# Patient Record
Sex: MALE | Race: WHITE | NOT HISPANIC OR LATINO | Employment: OTHER | URBAN - NONMETROPOLITAN AREA
[De-identification: names, ages, dates, MRNs, and addresses within clinical notes are randomized per-mention and may not be internally consistent; named-entity substitution may affect disease eponyms.]

---

## 2017-03-20 ENCOUNTER — OFFICE VISIT (OUTPATIENT)
Dept: FAMILY MEDICINE CLINIC | Facility: CLINIC | Age: 77
End: 2017-03-20

## 2017-03-20 VITALS
SYSTOLIC BLOOD PRESSURE: 134 MMHG | TEMPERATURE: 98 F | BODY MASS INDEX: 25.33 KG/M2 | WEIGHT: 187 LBS | HEART RATE: 54 BPM | DIASTOLIC BLOOD PRESSURE: 72 MMHG | OXYGEN SATURATION: 98 % | RESPIRATION RATE: 16 BRPM | HEIGHT: 72 IN

## 2017-03-20 DIAGNOSIS — F41.9 ANXIETY: Primary | ICD-10-CM

## 2017-03-20 DIAGNOSIS — R00.1 BRADYCARDIA: ICD-10-CM

## 2017-03-20 PROCEDURE — 99213 OFFICE O/P EST LOW 20 MIN: CPT | Performed by: FAMILY MEDICINE

## 2017-03-20 RX ORDER — CLONAZEPAM 0.5 MG/1
0.5 TABLET ORAL DAILY PRN
Qty: 30 TABLET | Refills: 0 | Status: SHIPPED | OUTPATIENT
Start: 2017-03-20 | End: 2017-06-05

## 2017-03-20 RX ORDER — TEMAZEPAM 15 MG/1
CAPSULE ORAL AS NEEDED
COMMUNITY
Start: 2017-01-27 | End: 2017-06-05

## 2017-03-20 NOTE — PROGRESS NOTES
Chief Complaint   Patient presents with   • Follow-up     Patient said no problems today.        History:  Cabrera Avina is a 76 y.o. male presents who today for evaluation of the above problems.  PCP currently listed as Arnaldo Arnold MD.   No issues.  Here for f/u.  He is due for medication refills. The patient is using his Rx.  Bp is stable, HR is low.  He has appt with cardiology in April Dr. Colin.  The patient is on diltiazem.  Normally follows with Dr. Colin, we will wait until that appt and f/u with results.  The patient has no chest pain, no SOA, no vision changes.  He was given #16 klonopin and he has not had any rx since December. He uses 1 about every 7 days. He has 16 left.  He is reluctant to use them as he does not want to run out.  Last dose yesterday.  Company at home yesterday, young children, active and he wants to have the Rx.      Cabrera Avina  has a past medical history of Anemia; Anxiety; Atrial fibrillation; Cancer; Cholecystitis; Colon polyp; Constipation; GERD (gastroesophageal reflux disease); History of ERCP (2005); Nephrolithiasis; and Jaylyn-rectal abscess.    No Known Allergies  Past Medical History   Diagnosis Date   • Anemia    • Anxiety      chronic   • Atrial fibrillation    • Cancer    • Cholecystitis    • Colon polyp    • Constipation    • GERD (gastroesophageal reflux disease)    • History of ERCP 2005   • Nephrolithiasis      stones removed   • Jaylyn-rectal abscess      Past Surgical History   Procedure Laterality Date   • Total hip arthroplasty     • Colonoscopy  05/2012     3 yr recall   • Kidney stone surgery     • Cholecystectomy     • Rectal surgery       Family History   Problem Relation Age of Onset   • Colon cancer Mother    • No Known Problems Father    • Prostate cancer Brother    • No Known Problems Daughter    • No Known Problems Son    • No Known Problems Maternal Grandmother    • No Known Problems Maternal Grandfather    • No Known Problems Paternal  Grandmother    • No Known Problems Paternal Grandfather    • Prostate cancer Brother          Current Outpatient Prescriptions:   •  aspirin 81 MG EC tablet, Take 81 mg by mouth daily., Disp: , Rfl:   •  clonazePAM (KLONOPIN) 0.5 MG tablet, Take 1 tablet by mouth Daily As Needed for anxiety., Disp: 16 tablet, Rfl: 0  •  DIGOXIN PO, Take 250 mcg by mouth Daily., Disp: , Rfl:   •  diltiazem CD (DILTIAZEM CD) 240 MG 24 hr capsule, Take 240 mg by mouth daily., Disp: , Rfl:   •  escitalopram (LEXAPRO) 20 MG tablet, Daily., Disp: , Rfl:   •  finasteride (PROSCAR) 5 MG tablet, Take 5 mg by mouth daily., Disp: , Rfl:   •  Fludrocortisone Acetate (FLORINEF PO), Take 0.1 mg by mouth daily., Disp: , Rfl:   •  omeprazole (PriLOSEC) 20 MG capsule, Take 1 capsule by mouth daily., Disp: 30 capsule, Rfl: 11  •  oxyCODONE-acetaminophen (PERCOCET)  MG per tablet, Take 1 tablet by mouth every 6 (six) hours as needed for moderate pain (4-6)., Disp: , Rfl:   •  temazepam (RESTORIL) 15 MG capsule, As Needed., Disp: , Rfl:   •  tiZANidine (ZANAFLEX) 4 MG tablet, Take 1 tablet by mouth Every 8 (Eight) Hours As Needed for muscle spasms., Disp: 60 tablet, Rfl: 3  •  warfarin (COUMADIN) 2 MG tablet, Take 2 mg by mouth daily., Disp: , Rfl:     ROS:  Review of Systems   Constitutional: Negative for activity change, appetite change and chills.   HENT: Negative for congestion, dental problem and drooling.    Eyes: Negative for pain, discharge and itching.   Respiratory: Negative for apnea, choking and chest tightness.    Cardiovascular: Negative for chest pain, palpitations and leg swelling.   Gastrointestinal: Negative for abdominal distention, abdominal pain and anal bleeding.   Endocrine: Negative for cold intolerance, heat intolerance and polydipsia.   Genitourinary: Negative for difficulty urinating, dysuria and enuresis.   Musculoskeletal: Negative for arthralgias, back pain and gait problem.   Skin: Negative for color change, pallor  "and rash.   Neurological: Negative for dizziness, facial asymmetry and headaches.   Hematological: Negative for adenopathy. Does not bruise/bleed easily.   Psychiatric/Behavioral: Negative for agitation, behavioral problems and confusion.        Anxiety       OBJECTIVE:  /72 (BP Location: Left arm, Patient Position: Sitting, Cuff Size: Adult)  Pulse 54  Temp 98 °F (36.7 °C) (Oral)   Resp 16  Ht 72\" (182.9 cm)  Wt 187 lb (84.8 kg)  SpO2 98%  BMI 25.36 kg/m2   Physical Exam   Constitutional: He is oriented to person, place, and time. He appears well-developed and well-nourished.   HENT:   Head: Normocephalic and atraumatic.   Right Ear: External ear normal.   Left Ear: External ear normal.   Nose: Nose normal.   Mouth/Throat: Oropharynx is clear and moist.   Eyes: Conjunctivae and EOM are normal. Pupils are equal, round, and reactive to light.   Neck: Normal range of motion. Neck supple. No thyromegaly present.   Cardiovascular: Normal rate, regular rhythm, normal heart sounds and intact distal pulses.    No murmur heard.  Pulmonary/Chest: Effort normal and breath sounds normal. No respiratory distress. He has no wheezes. He exhibits no tenderness.   Abdominal: Soft. Bowel sounds are normal. There is no tenderness. There is no rebound and no guarding.   Lymphadenopathy:     He has no cervical adenopathy.   Neurological: He is alert and oriented to person, place, and time. No cranial nerve deficit. Coordination normal.   Skin: Skin is warm and dry. No rash noted.   No obvious ecchymoses or signs of bleeding.  Chronic anticoagulation   Psychiatric: His speech is normal and behavior is normal. Judgment and thought content normal. His mood appears anxious. He is not actively hallucinating. Cognition and memory are normal. He is attentive.   Nursing note and vitals reviewed.      Assessment/Plan:  Anxiety: Chronic and stable problem. Using medications as prescribed.  Discussed need to take regularly as " prescribed, to avoid missing doses as able.  Medications reviewed with patient today. We discussed cessation/continuation of medications for current problem.  Needed Rx refills will be provided.  No side effects from medications.  Risks/benefits to current therapy discussed. Reviewed requirements of House Bill 1 today with patient.  History discussed, discussed need to complete physical exam and action plan roughly minimum q 3 months to maintain ability to receive controlled substances. Discussed personal and family history of substance abuse.  Informed consent for controlled substances will be reviewed and signed at initation of treatment, as Rx changes and annually. The potential adictive nature of medications discussed.  Simba requested today and will be checked with each office visit, where controlled Rx are written.  Discussed random drug screens/pill counts.  Appropriate methods for medication destruction were discussed to include police department.  Discussed to not zulema, keep, maintain or share medications.  It is illegal to carry these in unmarked bottle. Leave in original bottle.  We discussed options for treatment to include medications or to not use medications. Alternative therapies discussed.  The patient understands the risks associated with this controlled medication, including tolerance and addiction. Patient also agrees to only obtain this medication from me, and not from a another provider, unless that provider is covering for me in my absence. Patient also agrees to be compliant in dosing, and not self adjust the dose of medication.  The patient has signed a consent for treatment with a controlled substance as per [facility] policy. SIMBA is obtained.  Plan of care reviewed.     · Med refills as listed below.     Bradycardia: Etiology unknown.  No recent EKG.  Declind EKG today.  He noted he has a heart doctor for a reason.      Orders Placed Today:  Cabrera was seen today for  follow-up.    Diagnoses and all orders for this visit:    Anxiety  Comments:  Offered EKG today.  Declined EKG. Follow-up with Cardiology in 1 month.  He is on diltiazem.  Coumadin followed by oncology.   Orders:  -     clonazePAM (KLONOPIN) 0.5 MG tablet; Take 1 tablet by mouth Daily As Needed for Anxiety.    Bradycardia        Risks/benefits of current and new medications discussed with the patient and or family today.  The patient/family are aware and accept that if there any side effects they should call or return to clinic as soon as possible.  Appropriate F/U discussed for topics addressed today. All questions were answered to the satisfactory state of patient/family.  Should symptoms fail to improve or worsen they agree to call or return to clinic or to go to the ER. Education handouts were offered on any new Rx if requested.  Discussed the importance of following up with any needed screening tests/labs/specialist appointments and any requested follow-up recommended by me today.  Importance of maintaining follow-up discussed and patient accepts that missed appointments can delay diagnosis and potentially lead to worsening of conditions.    An After Visit Summary was printed and given to the patient at discharge.  Return in about 3 months (around 6/20/2017).         Didier Jung M.D. 3/20/2017

## 2017-03-22 ENCOUNTER — ANTICOAGULATION VISIT (OUTPATIENT)
Dept: FAMILY MEDICINE CLINIC | Facility: CLINIC | Age: 77
End: 2017-03-22

## 2017-03-22 DIAGNOSIS — Z79.01 CHRONIC ANTICOAGULATION: Primary | ICD-10-CM

## 2017-03-22 LAB — INR PPP: 2.3 (ref 0.9–1.1)

## 2017-03-22 PROCEDURE — 99211 OFF/OP EST MAY X REQ PHY/QHP: CPT | Performed by: FAMILY MEDICINE

## 2017-03-22 PROCEDURE — 85610 PROTHROMBIN TIME: CPT | Performed by: FAMILY MEDICINE

## 2017-03-29 NOTE — PROGRESS NOTES
Patient decided to use our office for chronic anticoagulation management.  This is a new evaluation, taken from Dr. Richards who has managed this problem.  INR today 1.3 and normal.  Personally reviewed the INR, addressed patient, some superficial bruising.  Benign appearance.  Plan to continue current coumadin dosing with repeat in 3 weeks.  Goal 2-3.0.  Discussed dietary changes, discussed bleeding, discussed if any changes he needed to let our office know.  Didier Jung MD DOS 3/22/17.  Date of note completion 3/29/2017

## 2017-04-12 ENCOUNTER — ANTICOAGULATION VISIT (OUTPATIENT)
Dept: FAMILY MEDICINE CLINIC | Facility: CLINIC | Age: 77
End: 2017-04-12

## 2017-04-12 LAB — INR PPP: 2.1 (ref 0.9–1.1)

## 2017-04-12 PROCEDURE — 85610 PROTHROMBIN TIME: CPT | Performed by: FAMILY MEDICINE

## 2017-04-25 RX ORDER — DIGOXIN 250 MCG
TABLET ORAL
Qty: 90 TABLET | Refills: 0 | Status: SHIPPED | OUTPATIENT
Start: 2017-04-25 | End: 2017-06-20 | Stop reason: SDUPTHER

## 2017-05-04 ENCOUNTER — ANTICOAGULATION VISIT (OUTPATIENT)
Dept: FAMILY MEDICINE CLINIC | Facility: CLINIC | Age: 77
End: 2017-05-04

## 2017-05-04 LAB — INR PPP: 2.2 (ref 0.9–1.1)

## 2017-05-04 PROCEDURE — 85610 PROTHROMBIN TIME: CPT | Performed by: FAMILY MEDICINE

## 2017-06-05 ENCOUNTER — OFFICE VISIT (OUTPATIENT)
Dept: GASTROENTEROLOGY | Facility: CLINIC | Age: 77
End: 2017-06-05

## 2017-06-05 VITALS
HEIGHT: 72 IN | SYSTOLIC BLOOD PRESSURE: 132 MMHG | OXYGEN SATURATION: 98 % | DIASTOLIC BLOOD PRESSURE: 74 MMHG | WEIGHT: 188 LBS | TEMPERATURE: 98 F | BODY MASS INDEX: 25.47 KG/M2 | HEART RATE: 49 BPM

## 2017-06-05 DIAGNOSIS — K59.01 SLOW TRANSIT CONSTIPATION: Primary | ICD-10-CM

## 2017-06-05 DIAGNOSIS — K21.9 GASTROESOPHAGEAL REFLUX DISEASE WITHOUT ESOPHAGITIS: ICD-10-CM

## 2017-06-05 PROCEDURE — 99212 OFFICE O/P EST SF 10 MIN: CPT | Performed by: INTERNAL MEDICINE

## 2017-06-05 RX ORDER — OMEPRAZOLE 20 MG/1
20 CAPSULE, DELAYED RELEASE ORAL DAILY
Qty: 30 CAPSULE | Refills: 11 | Status: SHIPPED | OUTPATIENT
Start: 2017-06-05 | End: 2017-06-20

## 2017-06-05 NOTE — PROGRESS NOTES
[facility]    Chief Complaint   Patient presents with   • Follow-up     yearly       Subjective     HPI    Cabrera Avina is a 77 y.o. male who presents with a chief complaint of  Constipation.  He tells me is doing okay.  He still will go up to 5 days without a bowel movement.  He is not using MiraLAX as previously prescribed.  He has been taken Dulcolax.  He will use it starting day 3 without a bowel movement.  It usually works by day for 5.  Some days he will go every other day.  He denies seeing melena bright red blood per rectum.  He has no abdominal discomfort.  He is up-to-date on his colonoscopy.    In regards to his reflux he states that is relatively in control.  He states he may have some breakthrough if he does not take the medicine after 3 days but is been a while since he missed the dose.       Past Medical History:   Diagnosis Date   • Anemia    • Anxiety     chronic   • Atrial fibrillation    • Cancer     non-hodgkins lymphoma   • Cholecystitis    • Colon polyp    • Colon polyp    • Constipation    • GERD (gastroesophageal reflux disease)    • History of ERCP 2005   • Nephrolithiasis     stones removed   • Jaylyn-rectal abscess    • Rectal abscess        Past Surgical History:   Procedure Laterality Date   • CHOLECYSTECTOMY     • COLONOSCOPY  05/2012    3 yr recall   • COLONOSCOPY  09/18/2015    5 yr recall   • KIDNEY STONE SURGERY     • RECTAL SURGERY     • TOTAL HIP ARTHROPLASTY           Current Outpatient Prescriptions:   •  aspirin 81 MG EC tablet, Take 81 mg by mouth daily., Disp: , Rfl:   •  digoxin (LANOXIN) 250 MCG tablet, TAKE ONE TABLET BY MOUTH DAILY, Disp: 90 tablet, Rfl: 0  •  diltiazem CD (DILTIAZEM CD) 240 MG 24 hr capsule, Take 240 mg by mouth daily., Disp: , Rfl:   •  escitalopram (LEXAPRO) 20 MG tablet, Daily., Disp: , Rfl:   •  Fludrocortisone Acetate (FLORINEF PO), Take 0.1 mg by mouth daily., Disp: , Rfl:   •  omeprazole (priLOSEC) 20 MG capsule, Take 1  capsule by mouth Daily., Disp: 30 capsule, Rfl: 11  •  warfarin (COUMADIN) 2 MG tablet, Take 2 mg by mouth daily., Disp: , Rfl:     No Known Allergies    Social History     Social History   • Marital status:      Spouse name: N/A   • Number of children: N/A   • Years of education: N/A     Occupational History   • Not on file.     Social History Main Topics   • Smoking status: Never Smoker   • Smokeless tobacco: Never Used   • Alcohol use No   • Drug use: No   • Sexual activity: Not on file     Other Topics Concern   • Not on file     Social History Narrative       Family History   Problem Relation Age of Onset   • Colon cancer Mother    • No Known Problems Father    • Prostate cancer Brother    • No Known Problems Daughter    • No Known Problems Son    • No Known Problems Maternal Grandmother    • No Known Problems Maternal Grandfather    • No Known Problems Paternal Grandmother    • No Known Problems Paternal Grandfather    • Prostate cancer Brother        Review of Systems  General no fever chills or sweats weight stable  Gastrointestinal: Not present-abdominal pain, diarrhea, dysphagia, hematemesis, melena, odynophagia, nausea, vomiting, pyrosis, regurgitation, hematochezia,    Objective     Vitals:    06/05/17 1256   BP: 132/74   Pulse: (!) 49   Temp: 98 °F (36.7 °C)   SpO2: 98%       Physical Exam  No acute distress. Vital signs as documented. Skin warm and dry and without overt rashes. EOMI, sclera anicteric.  Neck without JVD or masses. Lungs clear to auscultation bilaterally, no rales. Heart exam notable for regular rhythm, normal sounds and absence of loud murmurs, rubs or gallops. Abdomen is soft, nontender, non distended, normal bowel sounds and without evidence of organomegaly, masses, or abdominal aortic enlargement. Extremities nonedematous, no cyanosis. Neuro alert, moves extremities.    Assessment/Plan      Cabrera was seen today for follow-up.    Diagnoses and all orders for this  visit:    Slow transit constipation    Gastroesophageal reflux disease without esophagitis    Other orders  -     omeprazole (priLOSEC) 20 MG capsule; Take 1 capsule by mouth Daily.      His reflux and his constipation are stable.  In regards to his constipation reinforce high-fiber diet and plenty of fluids and activity.  He will continue with his current regimen.  In regards to his reflux and reinforced reflux cautions. I advised calcium with vit. D supplementation daily and why.  I discussed possible assoc. of renal disease and dementia with PPI use, although so far there has been no definitive evidence of causation.  An ideal goal, would be to stop PPI and other acid reducing medication use as soon as medically reasonable and use non-medical treatments such as healthy life style modifications to control symptoms and disease.  Although that goal is not obtainable for all, life style changes should always be tried to see if that goal can be obtained.  He is going tried a weaned down on his omeprazole to see how much he needs or not need.    I will I will see him back in a year for a checkup sooner if needed.  Continue ongoing management by primary care provider and other specialists.     EMR Dragon/transcription disclaimer:  Much of this encounter note is electronic transcription/translation of spoken language to printed text.  The electronic translation of spoken language may be erroneous, or at times, nonsensical words or phrases may be inadvertently transcribed.  Although I have reviewed the note for such errors, some may still exist.    Ander Miguel MD  5:06 PM  06/05/17

## 2017-06-07 ENCOUNTER — OFFICE VISIT (OUTPATIENT)
Dept: CARDIOLOGY | Facility: CLINIC | Age: 77
End: 2017-06-07

## 2017-06-07 VITALS
HEIGHT: 72 IN | HEART RATE: 58 BPM | SYSTOLIC BLOOD PRESSURE: 136 MMHG | WEIGHT: 188 LBS | DIASTOLIC BLOOD PRESSURE: 78 MMHG | BODY MASS INDEX: 25.47 KG/M2

## 2017-06-07 DIAGNOSIS — I48.20 CHRONIC ATRIAL FIBRILLATION (HCC): Primary | ICD-10-CM

## 2017-06-07 DIAGNOSIS — R00.1 BRADYCARDIA: ICD-10-CM

## 2017-06-07 PROBLEM — I48.91 ATRIAL FIBRILLATION: Status: ACTIVE | Noted: 2017-06-07

## 2017-06-07 PROCEDURE — 93000 ELECTROCARDIOGRAM COMPLETE: CPT | Performed by: INTERNAL MEDICINE

## 2017-06-07 PROCEDURE — 99213 OFFICE O/P EST LOW 20 MIN: CPT | Performed by: INTERNAL MEDICINE

## 2017-06-07 RX ORDER — FINASTERIDE 5 MG/1
5 TABLET, FILM COATED ORAL DAILY
COMMUNITY
End: 2017-06-20

## 2017-06-07 NOTE — PROGRESS NOTES
Subjective:     Encounter Date:06/07/2017      Patient ID: Cabrera Avina is a 77 y.o. male.With paroxysmal atrial fibrillation and presents today for follow-up.    Chief Complaint: Routine follow-up    Atrial Fibrillation   Presents for follow-up visit. Symptoms include bradycardia (chronic and asymptomatic). Symptoms are negative for chest pain, dizziness, hemodynamic instability, hypertension, hypotension, palpitations, shortness of breath, syncope, tachycardia and weakness. The symptoms have been stable. Past medical history includes atrial fibrillation. There are no medication compliance problems.     The patient presents today for routine follow-up.  We follow him here for a history of atrial fibrillation.  He has had no significant palpitations or prolonged episodes of tachycardia since his last office visit.  He has remained anticoagulated with Coumadin and has tolerated this well without any significant bleeding difficulties.  He denies any chest pain, shortness of breath, dyspnea on exertion, orthopnea, PND, edema, lightheaded this, dizziness, syncope.  He has not had any trouble with his medications.  Otherwise, the patient is doing well at this time without any further complaints.      Current Outpatient Prescriptions:   •  aspirin 81 MG EC tablet, Take 81 mg by mouth daily., Disp: , Rfl:   •  digoxin (LANOXIN) 250 MCG tablet, TAKE ONE TABLET BY MOUTH DAILY, Disp: 90 tablet, Rfl: 0  •  diltiazem CD (DILTIAZEM CD) 240 MG 24 hr capsule, Take 240 mg by mouth daily., Disp: , Rfl:   •  escitalopram (LEXAPRO) 20 MG tablet, Take 10 mg by mouth Daily., Disp: , Rfl:   •  finasteride (PROSCAR) 5 MG tablet, Take 5 mg by mouth Daily., Disp: , Rfl:   •  Fludrocortisone Acetate (FLORINEF PO), Take 0.1 mg by mouth daily., Disp: , Rfl:   •  omeprazole (priLOSEC) 20 MG capsule, Take 1 capsule by mouth Daily. (Patient taking differently: Take 40 mg by mouth Daily.), Disp: 30 capsule, Rfl: 11  •  warfarin (COUMADIN) 2  MG tablet, Take 2 mg by mouth Daily. 7 mg every other day and 8 mg every other, Disp: , Rfl:     No Known Allergies    Social History   Substance Use Topics   • Smoking status: Never Smoker   • Smokeless tobacco: Never Used   • Alcohol use No     Review of Systems   Constitution: Negative for chills, fever, weakness, night sweats and weight loss.   HENT: Negative for congestion, headaches and sore throat.    Cardiovascular: Negative for chest pain, claudication, dyspnea on exertion, irregular heartbeat, leg swelling, orthopnea, palpitations, paroxysmal nocturnal dyspnea and syncope.   Respiratory: Negative for cough, hemoptysis, shortness of breath and wheezing.    Endocrine: Negative for cold intolerance, heat intolerance, polydipsia and polyuria.   Hematologic/Lymphatic: Negative for bleeding problem. Does not bruise/bleed easily.   Gastrointestinal: Negative for abdominal pain, hematemesis, hematochezia, melena, nausea and vomiting.   Genitourinary: Negative for bladder incontinence, dysuria and hematuria.   Neurological: Negative for dizziness, loss of balance, numbness, paresthesias and seizures.       ECG 12 Lead  Date/Time: 6/7/2017 12:07 PM  Performed by: ARMAND LARA  Authorized by: ARMAND LARA   Comparison: compared with previous ECG from 7/12/2016  Rhythm: atrial fibrillation  Rate: bradycardic  BPM: 58  QRS axis: normal  Clinical impression: abnormal ECG  Comments: NSTT             Objective:     Physical Exam   Constitutional: He is oriented to person, place, and time. He appears well-developed and well-nourished. No distress.   HENT:   Head: Normocephalic and atraumatic.   Mouth/Throat: Oropharynx is clear and moist.   Eyes: EOM are normal. Pupils are equal, round, and reactive to light.   Neck: Normal range of motion. Neck supple. No JVD present. No thyromegaly present.   Cardiovascular: S1 normal, S2 normal, normal heart sounds and intact distal pulses.  An irregularly irregular  "rhythm present. Bradycardia present.  Exam reveals no gallop and no friction rub.    No murmur heard.  Pulmonary/Chest: Effort normal and breath sounds normal.   Abdominal: Soft. Bowel sounds are normal. He exhibits no distension. There is no tenderness.   Musculoskeletal: Normal range of motion. He exhibits no edema.   Neurological: He is alert and oriented to person, place, and time. No cranial nerve deficit.   Skin: Skin is warm and dry. No rash noted. No cyanosis or erythema. Nails show no clubbing.   Psychiatric: He has a normal mood and affect.   Nursing note and vitals reviewed.    /78 (BP Location: Left arm, Patient Position: Sitting)  Pulse 58  Ht 72\" (182.9 cm)  Wt 188 lb (85.3 kg)  BMI 25.5 kg/m2        Assessment:          Diagnosis Plan   1. Chronic atrial fibrillation  ECG 12 Lead   2. Bradycardia            Plan:       1.  Chronic atrial fibrillation: The patient remains clinically stable at this time.  He remains in atrial fibrillation with stable heart rates.  He has had no new symptoms develop since his last office visit.  He remains anticoagulated and tolerating this well.  Continue current medical therapy.    2.  Bradycardia: This is been intermittent for the patient over the years.  He is asymptomatic at this time.  No change in therapy is thought indicated at this time.    Follow-up: 12 months was needed sooner    EMR Dragon/Transcription disclaimer: Much of this encounter note is an electronic transcription/translation of spoken language to printed text. The electronic translation of spoken language may permit erroneous, or at times, nonsensical words or phrases to be inadvertently transcribed; although I have reviewed the note for such errors, some may still exist.            "

## 2017-06-12 ENCOUNTER — OFFICE VISIT (OUTPATIENT)
Dept: UROLOGY | Facility: CLINIC | Age: 77
End: 2017-06-12

## 2017-06-12 VITALS
TEMPERATURE: 97.9 F | SYSTOLIC BLOOD PRESSURE: 102 MMHG | DIASTOLIC BLOOD PRESSURE: 64 MMHG | BODY MASS INDEX: 25.73 KG/M2 | HEIGHT: 72 IN | WEIGHT: 190 LBS

## 2017-06-12 DIAGNOSIS — N28.1 RENAL CYSTS, ACQUIRED, BILATERAL: ICD-10-CM

## 2017-06-12 DIAGNOSIS — N13.8 BPH WITH URINARY OBSTRUCTION: Primary | ICD-10-CM

## 2017-06-12 DIAGNOSIS — N20.0 RENAL CALCULUS: ICD-10-CM

## 2017-06-12 DIAGNOSIS — N40.1 BPH WITH URINARY OBSTRUCTION: Primary | ICD-10-CM

## 2017-06-12 LAB
BILIRUB BLD-MCNC: NEGATIVE MG/DL
CLARITY, POC: CLEAR
COLOR UR: YELLOW
GLUCOSE UR STRIP-MCNC: NEGATIVE MG/DL
KETONES UR QL: NEGATIVE
LEUKOCYTE EST, POC: NEGATIVE
NITRITE UR-MCNC: NEGATIVE MG/ML
PH UR: 6.5 [PH] (ref 5–8)
PROT UR STRIP-MCNC: NEGATIVE MG/DL
RBC # UR STRIP: ABNORMAL /UL
SP GR UR: 1.02 (ref 1–1.03)
UROBILINOGEN UR QL: NORMAL

## 2017-06-12 PROCEDURE — 81003 URINALYSIS AUTO W/O SCOPE: CPT | Performed by: UROLOGY

## 2017-06-12 PROCEDURE — 99204 OFFICE O/P NEW MOD 45 MIN: CPT | Performed by: UROLOGY

## 2017-06-12 NOTE — PROGRESS NOTES
Mr. Avina is 77 y.o. male    CHIEF COMPLAINT: I am here for BPH    HPI  Benign Prostatic hyperplasia  Patient was initially diagnosed with benign prostatic hyperplasia [id number] years ago. This was identified in the context of worsening voiding symptoms. Severity of the diease would be moderate. This has been managed with 5 alpha reductase inhibitors. 5 alpha reductase inhibitors has/have stablized the symptoms which is satisfactory to the patient..  His disease has not been complicated by gross hematuria, urinary infection and bladder stones. His most bothersome symptom(s) is/are urinary hesitancy, weak stream.    Renal mass  The patient presents with a right renal mass and a left renal mass. This was first identified approximately 3 years ago. This was found in the context of an evaluation of an unrelated illness on a Renal ultrasound. This is a Previously diagnosed problem. The onset was unknown. Associated symptoms/signs to consider: no evidence of hematuria, flank or abdominal pain.      Renal Urolithiasis  The patient presents with right renal urolithiasis. This was initially diagnosed approximately 4 years ago. This was identified on CT that included abdominal cuts. This was found in the context of an evaluation of abdominal pain. This is a(n) established problem for the patient. The onset of this was gradual. The course is stable. The patient is not currently being managed by dietary alterations and hydration. Current symptoms that may or may not be related to this stone include none reported.        No results found for: PSA      The following portions of the patient's history were reviewed and updated as appropriate: allergies, current medications, past family history, past medical history, past social history, past surgical history and problem list.    Review of Systems   Constitutional: Negative for chills and fever.   Gastrointestinal: Negative for abdominal pain, anal bleeding and blood in  stool.   Genitourinary: Negative for flank pain and hematuria.         Current Outpatient Prescriptions:   •  aspirin 81 MG EC tablet, Take 81 mg by mouth daily., Disp: , Rfl:   •  digoxin (LANOXIN) 250 MCG tablet, TAKE ONE TABLET BY MOUTH DAILY, Disp: 90 tablet, Rfl: 0  •  diltiazem CD (DILTIAZEM CD) 240 MG 24 hr capsule, Take 240 mg by mouth daily., Disp: , Rfl:   •  escitalopram (LEXAPRO) 20 MG tablet, Take 10 mg by mouth Daily., Disp: , Rfl:   •  finasteride (PROSCAR) 5 MG tablet, Take 5 mg by mouth Daily., Disp: , Rfl:   •  Fludrocortisone Acetate (FLORINEF PO), Take 0.1 mg by mouth daily., Disp: , Rfl:   •  omeprazole (priLOSEC) 20 MG capsule, Take 1 capsule by mouth Daily. (Patient taking differently: Take 40 mg by mouth Daily.), Disp: 30 capsule, Rfl: 11  •  warfarin (COUMADIN) 2 MG tablet, Take 2 mg by mouth Daily. 7 mg every other day and 8 mg every other, Disp: , Rfl:     Past Medical History:   Diagnosis Date   • Anemia    • Anxiety     chronic   • Atrial fibrillation    • Cancer     non-hodgkins lymphoma   • Cholecystitis    • Colon polyp    • Colon polyp    • Constipation    • GERD (gastroesophageal reflux disease)    • History of ERCP 2005   • Nephrolithiasis     stones removed   • Jaylyn-rectal abscess    • Rectal abscess        Past Surgical History:   Procedure Laterality Date   • CHOLECYSTECTOMY     • COLONOSCOPY  05/2012    3 yr recall   • COLONOSCOPY  09/18/2015    5 yr recall   • KIDNEY STONE SURGERY     • RECTAL SURGERY     • TOTAL HIP ARTHROPLASTY         Social History     Social History   • Marital status:      Spouse name: N/A   • Number of children: N/A   • Years of education: N/A     Social History Main Topics   • Smoking status: Never Smoker   • Smokeless tobacco: Never Used   • Alcohol use No   • Drug use: No   • Sexual activity: Defer     Other Topics Concern   • None     Social History Narrative       Family History   Problem Relation Age of Onset   • Colon cancer Mother    • No  "Known Problems Father    • Prostate cancer Brother    • No Known Problems Daughter    • No Known Problems Son    • No Known Problems Maternal Grandmother    • No Known Problems Maternal Grandfather    • No Known Problems Paternal Grandmother    • No Known Problems Paternal Grandfather    • Prostate cancer Brother        /64  Temp 97.9 °F (36.6 °C)  Ht 72\" (182.9 cm)  Wt 190 lb (86.2 kg)  BMI 25.77 kg/m2    Physical Exam  Constitutional: He is oriented to person, place, and time. He appears well-developed and well-nourished. No distress.   HENT:   Nose: Nose normal.   Neck: Normal range of motion. Neck supple. No tracheal deviation present. No thyromegaly present.   Cardiovascular: Normal rate, regular rhythm and intact distal pulses.    No significant edema or tenderness    Pulmonary/Chest: Breath sounds normal. No accessory muscle usage. No respiratory distress.   Abdominal: Soft. Bowel sounds are normal. He exhibits no distension, no ascites and no mass. There is no hepatosplenomegaly. There is no tenderness. There is no rebound, no guarding and no CVA tenderness. No hernia.   Stool specimen is not indicated for my portion of the exam   Genitourinary: Testes normal and penis normal. Rectal exam shows no mass, no tenderness, anal tone normal and guaiac negative stool. Tender: no nodules. Right testis shows no mass, no swelling and no tenderness. Left testis shows no mass, no swelling and no tenderness. No penile tenderness (no lesion or deformities).   Genitourinary Comments:  The urethral meatus normal in position without evidence of stricture. Epididymis without mass or tenderness. Vas Deferens is palpably normal.Anus and perineum without mass or tenderness. The seminal vesicle are without mass or enlargement. The prostate is approximately 15 ml. It is Symmetric, with a Soft consistency. There are no nodules present. . The seminal vesicles are Palpably normal in size and without mass.   "   Lymphadenopathy:     He has no cervical adenopathy. No inguinal adenopathy noted on the right or left side.        Right: No inguinal adenopathy present.        Left: No inguinal adenopathy present.   Neurological: He is alert and oriented to person, place, and time.   Skin: Skin is warm and dry. No rash noted. He is not diaphoretic. No pallor.   Psychiatric: He has a normal mood and affect. His behavior is normal.   Vitals reviewed.      Results for orders placed or performed in visit on 06/12/17   POC Urinalysis Dipstick, Automated   Result Value Ref Range    Color Yellow Yellow, Straw, Dark Yellow, Janette    Clarity, UA Clear Clear    Glucose, UA Negative Negative, 1000 mg/dL (3+) mg/dL    Bilirubin Negative Negative    Ketones, UA Negative Negative    Specific Gravity  1.020 1.005 - 1.030    Blood, UA Large (A) Negative    pH, Urine 6.5 5.0 - 8.0    Protein, POC Negative Negative mg/dL    Urobilinogen, UA Normal Normal    Leukocytes Negative Negative    Nitrite, UA Negative Negative   Microscopic Urinalysis  I inspected the urine myself based on the clinical situation including the dipstick urine. The urine is spun in a centrifuge for three minutes. The spun urine shows 3-6 rbc/hpf, 0-2 wbc/hpf, none epi/hpf, negative bacteria, negative crystals, and negative casts.     Medical Records Summary:  Available to me today are  medical records from the patient's previous urologist, Dr Mayes, . There are approximately 8 pages of records that can be summarized as follows:   Patient is basically been followed by previous urologist for 3 things.    #1.  He has benign prostatic hyperplasia with some element of obstruction.  He is been on finasteride for several years. We are going to stop it.   #2.  Patient has known renal calculi present in the right kidney but has not required any further management of these other than surveillance.  #3.  He has a known renal mass that sounds like a Bosniak 2 type cyst initially  identified in 2011 involving the left kidney because it has a small septum and in the right kidney has a large Bosniak one cyst.      Assessment and Plan  Diagnoses and all orders for this visit:    BPH with urinary obstruction  -     POC Urinalysis Dipstick, Automated  -     PSA; Future    Renal calculus  -     XR Abdomen KUB; Future    Renal cysts, acquired, bilateral    #1. We are going to stop his finasteride for completion of course. Resume if symptoms improve.   #2. The patient has been diagnosed with renal urolithiasis. Location is described as  right. The patient's options for therapy are discussed based on the size, location, stone burden, and symptoms.  The decision has been made to follow these stones radiographically without treatment.  The patient understands the risks associated with the conservative approach, but this is a reasonable treatment plan.  The need to contract me for hematuria, fever, recurrent UTI's, flank pain or change in ideology is explained.  #3. Renal Cyst stable for six years. No further f/u needed.               West Jeff MD  06/12/17  10:48 AM      Cc:

## 2017-06-16 ENCOUNTER — ANTICOAGULATION VISIT (OUTPATIENT)
Dept: FAMILY MEDICINE CLINIC | Facility: CLINIC | Age: 77
End: 2017-06-16

## 2017-06-16 LAB — INR PPP: 1.9 (ref 0.9–1.1)

## 2017-06-16 PROCEDURE — 85610 PROTHROMBIN TIME: CPT | Performed by: FAMILY MEDICINE

## 2017-06-16 NOTE — PROGRESS NOTES
Pt present for routine PT/INR finger stick.  Pt has no complaints.  It was forwarded to Dr. Rachel in Hematology

## 2017-06-20 ENCOUNTER — OFFICE VISIT (OUTPATIENT)
Dept: FAMILY MEDICINE CLINIC | Facility: CLINIC | Age: 77
End: 2017-06-20

## 2017-06-20 VITALS
RESPIRATION RATE: 18 BRPM | TEMPERATURE: 98.3 F | OXYGEN SATURATION: 93 % | WEIGHT: 186.4 LBS | DIASTOLIC BLOOD PRESSURE: 68 MMHG | HEIGHT: 72 IN | SYSTOLIC BLOOD PRESSURE: 128 MMHG | BODY MASS INDEX: 25.25 KG/M2 | HEART RATE: 79 BPM

## 2017-06-20 DIAGNOSIS — G89.29 CHRONIC HIP PAIN, RIGHT: ICD-10-CM

## 2017-06-20 DIAGNOSIS — I48.20 CHRONIC ATRIAL FIBRILLATION (HCC): Primary | ICD-10-CM

## 2017-06-20 DIAGNOSIS — R00.1 BRADYCARDIA: ICD-10-CM

## 2017-06-20 DIAGNOSIS — M25.551 CHRONIC HIP PAIN, RIGHT: ICD-10-CM

## 2017-06-20 DIAGNOSIS — K21.9 GASTROESOPHAGEAL REFLUX DISEASE WITHOUT ESOPHAGITIS: ICD-10-CM

## 2017-06-20 DIAGNOSIS — F41.9 ANXIETY: ICD-10-CM

## 2017-06-20 PROCEDURE — 99214 OFFICE O/P EST MOD 30 MIN: CPT | Performed by: FAMILY MEDICINE

## 2017-06-20 RX ORDER — DILTIAZEM HYDROCHLORIDE 240 MG/1
240 CAPSULE, COATED, EXTENDED RELEASE ORAL DAILY
Qty: 90 CAPSULE | Refills: 1 | Status: SHIPPED | OUTPATIENT
Start: 2017-06-20 | End: 2017-12-19 | Stop reason: SDUPTHER

## 2017-06-20 RX ORDER — CITALOPRAM 10 MG/1
10 TABLET ORAL DAILY
Qty: 90 TABLET | Refills: 1 | Status: SHIPPED | OUTPATIENT
Start: 2017-06-20 | End: 2017-12-19 | Stop reason: DRUGHIGH

## 2017-06-20 RX ORDER — DIGOXIN 250 MCG
250 TABLET ORAL
Qty: 90 TABLET | Refills: 1 | Status: SHIPPED | OUTPATIENT
Start: 2017-06-20 | End: 2018-01-24 | Stop reason: SDUPTHER

## 2017-06-20 NOTE — PROGRESS NOTES
Chief Complaint   Patient presents with   • Follow-up     Pt is here for a f/u visit.  Pt states that he has been doing well.        History:  Cabrera Avina is a 77 y.o. male presents who today for evaluation of the above problems.  PCP currently listed as Didier Jung MD.   The patient last INR was 1.9.  Instead of alternating 7 and 8 mg he went 8mg 8mg.  He is due again in 2-4 weeks for INR check.  I am really okay to keep him about 1.9-2.5 ideally.  No issues, no complaints.  Doing very well.  BP is stable.  He met with cardiology.  He met with urology and he met with GI this month.  Urologist told him to stop the finasteride.  He recommended if any problem ID to consider resolving.  GI recommended to try to stop the prilosec.  The patient has stopped using prilosec for past 4 days. He personally stopped the lexapro after running out 3 days ago.  He feels fine at present.  He wants it refilled.  He said he got a letter that told they would not pay for lexapro any more.  He feels he needs something for his mood.  I will switch this to celexa 10mg daily.  I will also refill his digoxin and diltiazem today.  He is on baby ASA 81 mg. Anxiety is stable.  He still has Klonopin left that he uses 1x weekly.  I have seen him as of 6/20/2017 and will him to call over next 90 days for refill of klonopin if needed.  He can have zanaflex as needed as well.     Cabrera Avina  has a past medical history of Anemia; Anxiety; Atrial fibrillation; Cancer; Cholecystitis; Colon polyp; Colon polyp; Constipation; GERD (gastroesophageal reflux disease); History of ERCP (2005); Nephrolithiasis; Jaylyn-rectal abscess; and Rectal abscess.    No Known Allergies  Past Medical History:   Diagnosis Date   • Anemia    • Anxiety     chronic   • Atrial fibrillation    • Cancer     non-hodgkins lymphoma   • Cholecystitis    • Colon polyp    • Colon polyp    • Constipation    • GERD (gastroesophageal reflux disease)    • History of ERCP  2005   • Nephrolithiasis     stones removed   • Jaylyn-rectal abscess    • Rectal abscess      Past Surgical History:   Procedure Laterality Date   • CHOLECYSTECTOMY     • COLONOSCOPY  05/2012    3 yr recall   • COLONOSCOPY  09/18/2015    5 yr recall   • KIDNEY STONE SURGERY     • RECTAL SURGERY     • TOTAL HIP ARTHROPLASTY       Family History   Problem Relation Age of Onset   • Colon cancer Mother    • No Known Problems Father    • Prostate cancer Brother    • No Known Problems Daughter    • No Known Problems Son    • No Known Problems Maternal Grandmother    • No Known Problems Maternal Grandfather    • No Known Problems Paternal Grandmother    • No Known Problems Paternal Grandfather    • Prostate cancer Brother        Current Outpatient Prescriptions on File Prior to Visit   Medication Sig Dispense Refill   • aspirin 81 MG EC tablet Take 81 mg by mouth daily.     • digoxin (LANOXIN) 250 MCG tablet TAKE ONE TABLET BY MOUTH DAILY 90 tablet 0   • diltiazem CD (DILTIAZEM CD) 240 MG 24 hr capsule Take 240 mg by mouth daily.     • omeprazole (priLOSEC) 20 MG capsule Take 1 capsule by mouth Daily. (Patient taking differently: Take 40 mg by mouth Daily.) 30 capsule 11   • warfarin (COUMADIN) 2 MG tablet Take 2 mg by mouth Daily. 7 mg every other day and 8 mg every other     • [DISCONTINUED] escitalopram (LEXAPRO) 20 MG tablet Take 10 mg by mouth Daily.     • [DISCONTINUED] finasteride (PROSCAR) 5 MG tablet Take 5 mg by mouth Daily.     • [DISCONTINUED] Fludrocortisone Acetate (FLORINEF PO) Take 0.1 mg by mouth daily.       No current facility-administered medications on file prior to visit.        Family history, surgical history, past medical history, Allergies and meds reviewed with patient today and updated in [facility] EMR.     ROS:  Review of Systems   Constitutional: Negative for activity change, appetite change, chills, diaphoresis, fatigue and fever.   HENT: Negative for congestion, ear discharge, ear pain, facial  "swelling, hearing loss, rhinorrhea, sinus pressure, sneezing, sore throat and tinnitus.    Eyes: Negative for pain, discharge, redness and visual disturbance.   Respiratory: Negative for apnea, cough, choking, chest tightness, shortness of breath and wheezing.    Cardiovascular: Negative for chest pain, palpitations and leg swelling.   Gastrointestinal: Negative for abdominal pain, constipation, diarrhea, nausea and vomiting.   Genitourinary: Negative for difficulty urinating, flank pain, frequency, penile pain and urgency.   Musculoskeletal: Negative for arthralgias, back pain, gait problem, joint swelling, myalgias and neck pain.   Skin: Negative for color change, pallor and rash.   Allergic/Immunologic: Negative for environmental allergies and food allergies.   Neurological: Negative for dizziness, seizures, weakness, numbness and headaches.   Hematological: Negative for adenopathy. Does not bruise/bleed easily.   Psychiatric/Behavioral: Negative for agitation, behavioral problems, confusion, hallucinations, self-injury, sleep disturbance and suicidal ideas.       OBJECTIVE:  Vitals:    06/20/17 1054   BP: 128/68   Pulse: 79   Resp: 18   Temp: 98.3 °F (36.8 °C)   TempSrc: Oral   SpO2: 93%   Weight: 186 lb 6.4 oz (84.6 kg)   Height: 72\" (182.9 cm)     Physical Exam   Constitutional: He is oriented to person, place, and time. He appears well-developed and well-nourished.   HENT:   Head: Normocephalic and atraumatic.   Right Ear: External ear normal.   Left Ear: External ear normal.   Nose: Nose normal.   Mouth/Throat: Oropharynx is clear and moist.   Eyes: Conjunctivae and EOM are normal. Pupils are equal, round, and reactive to light.   Neck: Normal range of motion. Neck supple. No thyromegaly present.   Cardiovascular: Normal rate, regular rhythm, normal heart sounds and intact distal pulses.    No murmur heard.  Pulmonary/Chest: Effort normal and breath sounds normal. No respiratory distress. He has no wheezes. " He exhibits no tenderness.   Abdominal: Soft. Bowel sounds are normal. There is no tenderness. There is no rebound and no guarding.   Musculoskeletal:        Right hip: He exhibits decreased range of motion, decreased strength, tenderness and crepitus.        Left hip: He exhibits decreased range of motion, decreased strength and tenderness. He exhibits no bony tenderness.        Right knee: He exhibits decreased range of motion. He exhibits no swelling and no effusion. No tenderness found. No medial joint line and no lateral joint line tenderness noted.        Left knee: He exhibits decreased range of motion. He exhibits no swelling and no effusion. No tenderness found. No medial joint line and no lateral joint line tenderness noted.        Right ankle: Normal. He exhibits normal range of motion. No tenderness.        Left ankle: Normal. He exhibits normal range of motion. No tenderness.        Lumbar back: He exhibits decreased range of motion. He exhibits no tenderness, no bony tenderness, no pain and no spasm.   Paraspinal tenderness remains bilaterally.   Lymphadenopathy:     He has no cervical adenopathy.   Neurological: He is alert and oriented to person, place, and time. No cranial nerve deficit. Coordination normal.   Skin: Skin is warm and dry. No rash noted.   No obvious ecchymoses or signs of bleeding.  Chronic anticoagulation   Psychiatric: His behavior is normal. Judgment and thought content normal. His mood appears anxious.   Pacing room and halls prior to my entry.    Nursing note and vitals reviewed.      Assessment/Plan:  Chronic atrial fibrillation: Chronic established problem. Current Status:stable.  We reviewed current therapy for this problem.  Discussed R/B/A to current therapy. Discussed need to follow recommendations and to use medications regularly as prescribed, to avoid missing doses as able.  Medications reviewed with patient today. We discussed cessation/continuation of medications for  current problem.  If refills needed Rx refills will be provided.  No side effects from medications reported.  Risks/benefits to current therapy discussed.  Needs med refills. Will provide these to the patient today.   · Orders as listed below   -     diltiaZEM CD (DILTIAZEM CD) 240 MG 24 hr capsule; Take 1 capsule by mouth Daily.  -     digoxin (LANOXIN) 250 MCG tablet; Take 1 tablet by mouth Daily.    Bradycardia  Comments:  Resolved, stable. HR 79 today.     Chronic hip pain, right: EMMA with pain in posterior hip and SI joint. Pain with FADIR as well.  Suspect impingement.  He has had surgery.  Not interested in further w/u for this problem.   Comments:  Unchanged, he does not want surgery.     Gastroesophageal reflux disease without esophagitis: Chronic problem.  Discussed consideration of regular F/U with GI to monitor for changes that can sometimes occur with chronic GERD.  We reviewed SE of PPI.  We have discussed R/B/A to these agents. Offered handout on current and new medications. We reviewed latest news regarding bone loss, regarding risks for Cdiff, MI risks, Bacterial peritonitis, interstitial nephritis, Pneumonia risks due to long term use of PPI.    Comments:  Stable off prilosec.  Monitor symptoms.  Med list updated.  He feels he is stable at this time. He is off the rx.      Anxiety:  Was on lexapro.  This is not covered by his insurance at present apparently.  R/B/A to other SSRI d/w patient. SSRI Education: I've explained to the patient that drugs within the SSRI class can have side effects such as weight gain, sexual dysfunction, insomnia, headache, nausea and increase in suicidal ideation. These medications are generally effective at alleviating symptoms of anxiety and/or depression but may take up to 6 weeks to show full effects.  The patient has been instructed to call the clinic if significant side effects do occur.  Depression and anxiety require long-term treatment.  Benzodiazepine  medications are not indicated for the treatment of depression or anxiety.  For any suicidal or homicidal ideations, seek help immediately and go to the nearest ER.  Medications should be supplemented with counseling services, which were recommended.    ORDERS:  -     citalopram (CELEXA) 10 MG tablet; Take 1 tablet by mouth Daily.      Risks/benefits of current and new medications discussed with the patient and or family today.  The patient/family are aware and accept that if there any side effects they should call or return to clinic as soon as possible.  Appropriate F/U discussed for topics addressed today. All questions were answered to the satisfactory state of patient/family.  Should symptoms fail to improve or worsen they agree to call or return to clinic or to go to the ER. Education handouts were offered on any new Rx if requested.  Discussed the importance of following up with any needed screening tests/labs/specialist appointments and any requested follow-up recommended by me today.  Importance of maintaining follow-up discussed and patient accepts that missed appointments can delay diagnosis and potentially lead to worsening of conditions.    An After Visit Summary was printed and given to the patient at discharge.  Follow-up: Return in about 3 months (around 9/20/2017) for call in 1 month w/ update on celexa..         Didier Jung M.D. 6/20/2017

## 2017-06-20 NOTE — PATIENT INSTRUCTIONS
Citalopram tablets  What is this medicine?  CITALOPRAM (sye CALOS oh pram) is a medicine for depression.  This medicine may be used for other purposes; ask your health care provider or pharmacist if you have questions.  COMMON BRAND NAME(S): Celexa  What should I tell my health care provider before I take this medicine?  They need to know if you have any of these conditions:  -bleeding disorders  -bipolar disorder or a family history of bipolar disorder  -glaucoma  -heart disease  -history of irregular heartbeat  -kidney disease  -liver disease  -low levels of magnesium or potassium in the blood  -receiving electroconvulsive therapy  -seizures  -suicidal thoughts, plans, or attempt; a previous suicide attempt by you or a family member  -take medicines that treat or prevent blood clots  -thyroid disease  -an unusual or allergic reaction to citalopram, escitalopram, other medicines, foods, dyes, or preservatives  -pregnant or trying to become pregnant  -breast-feeding  How should I use this medicine?  Take this medicine by mouth with a glass of water. Follow the directions on the prescription label. You can take it with or without food. Take your medicine at regular intervals. Do not take your medicine more often than directed. Do not stop taking this medicine suddenly except upon the advice of your doctor. Stopping this medicine too quickly may cause serious side effects or your condition may worsen.  A special MedGuide will be given to you by the pharmacist with each prescription and refill. Be sure to read this information carefully each time.  Talk to your pediatrician regarding the use of this medicine in children. Special care may be needed.  Patients over 60 years old may have a stronger reaction and need a smaller dose.  Overdosage: If you think you have taken too much of this medicine contact a poison control center or emergency room at once.  NOTE: This medicine is only for you. Do not share this medicine with  others.  What if I miss a dose?  If you miss a dose, take it as soon as you can. If it is almost time for your next dose, take only that dose. Do not take double or extra doses.  What may interact with this medicine?  Do not take this medicine with any of the following medications:  -certain medicines for fungal infections like fluconazole, itraconazole, ketoconazole, posaconazole, voriconazole  -cisapride  -dofetilide  -dronedarone  -escitalopram  -linezolid  -MAOIs like Carbex, Eldepryl, Marplan, Nardil, and Parnate  -methylene blue (injected into a vein)  -pimozide  -thioridazine  -ziprasidone  This medicine may also interact with the following medications:  -alcohol  -amphetamines  -aspirin and aspirin-like medicines  -carbamazepine  -certain medicines for depression, anxiety, or psychotic disturbances  -certain medicines for infections like chloroquine, clarithromycin, erythromycin, furazolidone, isoniazid, pentamidine  -certain medicines for migraine headaches like almotriptan, eletriptan, frovatriptan, naratriptan, rizatriptan, sumatriptan, zolmitriptan  -certain medicines for sleep  -certain medicines that treat or prevent blood clots like dalteparin, enoxaparin, warfarin  -cimetidine  -diuretics  -fentanyl  -lithium  -methadone  -metoprolol  -NSAIDs, medicines for pain and inflammation, like ibuprofen or naproxen  -omeprazole  -other medicines that prolong the QT interval (cause an abnormal heart rhythm)  -procarbazine  -rasagiline  -supplements like Mariana's wort, kava kava, valerian  -tramadol  -tryptophan  This list may not describe all possible interactions. Give your health care provider a list of all the medicines, herbs, non-prescription drugs, or dietary supplements you use. Also tell them if you smoke, drink alcohol, or use illegal drugs. Some items may interact with your medicine.  What should I watch for while using this medicine?  Tell your doctor if your symptoms do not get better or if they  get worse. Visit your doctor or health care professional for regular checks on your progress. Because it may take several weeks to see the full effects of this medicine, it is important to continue your treatment as prescribed by your doctor.  Patients and their families should watch out for new or worsening thoughts of suicide or depression. Also watch out for sudden changes in feelings such as feeling anxious, agitated, panicky, irritable, hostile, aggressive, impulsive, severely restless, overly excited and hyperactive, or not being able to sleep. If this happens, especially at the beginning of treatment or after a change in dose, call your health care professional.  You may get drowsy or dizzy. Do not drive, use machinery, or do anything that needs mental alertness until you know how this medicine affects you. Do not stand or sit up quickly, especially if you are an older patient. This reduces the risk of dizzy or fainting spells. Alcohol may interfere with the effect of this medicine. Avoid alcoholic drinks.  Your mouth may get dry. Chewing sugarless gum or sucking hard candy, and drinking plenty of water will help. Contact your doctor if the problem does not go away or is severe.  What side effects may I notice from receiving this medicine?  Side effects that you should report to your doctor or health care professional as soon as possible:  -allergic reactions like skin rash, itching or hives, swelling of the face, lips, or tongue  -black, tarry stools  -breathing problems  -changes in vision  -chest pain  -confusion  -eye pain  -fast, irregular heartbeat  -feeling faint or lightheaded, falls  -hallucination, loss of contact with reality  -loss of balance or coordination  -loss of memory  -restlessness, pacing, inability to keep still  -seizures  -stiff muscles  -suicidal thoughts or other mood changes  -trouble sleeping  -unusual bleeding or bruising  -unusually weak or tired  -vomiting  Side effects that  usually do not require medical attention (report to your doctor or health care professional if they continue or are bothersome):  -change in appetite or weight  -change in sex drive or performance  -dizziness  -headache  -increased sweating  -indigestion, nausea  -tremor  This list may not describe all possible side effects. Call your doctor for medical advice about side effects. You may report side effects to FDA at 8-234-BCL-5828.  Where should I keep my medicine?  Keep out of reach of children.  Store at room temperature between 15 and 30 degrees C (59 and 86 degrees F). Throw away any unused medicine after the expiration date.  NOTE: This sheet is a summary. It may not cover all possible information. If you have questions about this medicine, talk to your doctor, pharmacist, or health care provider.     © 2017, [employer]. (2017-02-07 11:53:44)

## 2017-07-03 ENCOUNTER — ANTICOAGULATION VISIT (OUTPATIENT)
Dept: FAMILY MEDICINE CLINIC | Facility: CLINIC | Age: 77
End: 2017-07-03

## 2017-07-03 LAB — INR PPP: 2.7 (ref 0.9–1.1)

## 2017-07-03 PROCEDURE — 85610 PROTHROMBIN TIME: CPT | Performed by: FAMILY MEDICINE

## 2017-07-11 ENCOUNTER — TELEPHONE (OUTPATIENT)
Dept: FAMILY MEDICINE CLINIC | Facility: CLINIC | Age: 77
End: 2017-07-11

## 2017-07-11 NOTE — TELEPHONE ENCOUNTER
I HAVE REVIEWED THE PATIENT CURRENT MED LIST I DO NOT SEE THESE AS CURRENT MEDICATIONS. PATIENT WILL NEED TO SCHEDULE TO SEE DR PIERRE

## 2017-07-11 NOTE — TELEPHONE ENCOUNTER
Patient came into office and requested refills of Lorazepam and Tizalidine. Patient was last seen on 6/20/17 by Dr. Jung. Please review.

## 2017-07-13 ENCOUNTER — OFFICE VISIT (OUTPATIENT)
Dept: FAMILY MEDICINE CLINIC | Facility: CLINIC | Age: 77
End: 2017-07-13

## 2017-07-13 VITALS
OXYGEN SATURATION: 98 % | HEIGHT: 72 IN | SYSTOLIC BLOOD PRESSURE: 130 MMHG | BODY MASS INDEX: 24.92 KG/M2 | TEMPERATURE: 98 F | HEART RATE: 58 BPM | WEIGHT: 184 LBS | DIASTOLIC BLOOD PRESSURE: 68 MMHG | RESPIRATION RATE: 18 BRPM

## 2017-07-13 DIAGNOSIS — F41.9 ANXIETY: Primary | ICD-10-CM

## 2017-07-13 DIAGNOSIS — G89.29 CHRONIC HIP PAIN, RIGHT: ICD-10-CM

## 2017-07-13 DIAGNOSIS — K21.9 GASTROESOPHAGEAL REFLUX DISEASE WITHOUT ESOPHAGITIS: ICD-10-CM

## 2017-07-13 DIAGNOSIS — M25.551 CHRONIC HIP PAIN, RIGHT: ICD-10-CM

## 2017-07-13 DIAGNOSIS — M54.50 CHRONIC RIGHT-SIDED LOW BACK PAIN WITHOUT SCIATICA: ICD-10-CM

## 2017-07-13 DIAGNOSIS — G89.29 CHRONIC RIGHT-SIDED LOW BACK PAIN WITHOUT SCIATICA: ICD-10-CM

## 2017-07-13 DIAGNOSIS — Z79.01 CHRONIC ANTICOAGULATION: ICD-10-CM

## 2017-07-13 PROCEDURE — 99214 OFFICE O/P EST MOD 30 MIN: CPT | Performed by: FAMILY MEDICINE

## 2017-07-13 RX ORDER — CLONAZEPAM 0.5 MG/1
0.5 TABLET ORAL DAILY PRN
Qty: 30 TABLET | Refills: 0 | Status: SHIPPED | OUTPATIENT
Start: 2017-07-13 | End: 2018-03-19

## 2017-07-13 RX ORDER — TIZANIDINE HYDROCHLORIDE 4 MG/1
4 CAPSULE, GELATIN COATED ORAL 3 TIMES DAILY PRN
Qty: 60 CAPSULE | Refills: 2 | Status: SHIPPED | OUTPATIENT
Start: 2017-07-13 | End: 2018-03-19

## 2017-07-13 RX ORDER — OXYCODONE AND ACETAMINOPHEN 10; 325 MG/1; MG/1
TABLET ORAL
COMMUNITY
Start: 2017-07-11 | End: 2017-12-19 | Stop reason: SDUPTHER

## 2017-07-13 NOTE — PROGRESS NOTES
Chief Complaint   Patient presents with   • Med Refill     Medication refill.  Pt had two medications that state they are given by Dr. Jung, but weren't listed in the computer.        History:  Cabrera Avina is a 77 y.o. male presents who today for evaluation of the above problems.  PCP currently listed as Didier Jung MD.   Patient is here requesting refills of klonopin and zanaflex.  He is using percocet this am due to back and leg pain.  He needs zanaflex for his back.   Stable.  UDS today. Nii today.  Informed consent.   Last fill of Klonopin 3/2017 by me 0.5mg as needed. Uses them very infrequently. These have lasted 3 months.  I am okay to refill meds.  He has ecchymoses along bilateral thigh.  INR was 2.7.  He wants INR under 2.5 and requests repeat of the INR today. INR 2.7 again today.  I told him this is at goal.  He did not want to make any changes today.  H/O Atrial fib on anticoagulation.  He is currently using Rx regularly.  INR managed by our clinic at this time.  HR is stable/controlled. Bradycardia today, asymptomatic.  No chest pain, no vision changes, no HA.  He does not want EKG today. He is stable.  Mood is okay on celexa.  Klonopin controls anxiety and he requests refills.      Cabrera Avina  has a past medical history of Anemia; Anxiety; Atrial fibrillation; Cancer; Cholecystitis; Colon polyp; Colon polyp; Constipation; GERD (gastroesophageal reflux disease); History of ERCP (2005); Nephrolithiasis; Jaylyn-rectal abscess; and Rectal abscess.    No Known Allergies  Past Medical History:   Diagnosis Date   • Anemia    • Anxiety     chronic   • Atrial fibrillation    • Cancer     non-hodgkins lymphoma   • Cholecystitis    • Colon polyp    • Colon polyp    • Constipation    • GERD (gastroesophageal reflux disease)    • History of ERCP 2005   • Nephrolithiasis     stones removed   • Jaylyn-rectal abscess    • Rectal abscess      Past Surgical History:   Procedure Laterality Date   •  CHOLECYSTECTOMY     • COLONOSCOPY  05/2012    3 yr recall   • COLONOSCOPY  09/18/2015    5 yr recall   • KIDNEY STONE SURGERY     • RECTAL SURGERY     • TOTAL HIP ARTHROPLASTY       Family History   Problem Relation Age of Onset   • Colon cancer Mother    • No Known Problems Father    • Prostate cancer Brother    • No Known Problems Daughter    • No Known Problems Son    • No Known Problems Maternal Grandmother    • No Known Problems Maternal Grandfather    • No Known Problems Paternal Grandmother    • No Known Problems Paternal Grandfather    • Prostate cancer Brother        Current Outpatient Prescriptions on File Prior to Visit   Medication Sig Dispense Refill   • aspirin 81 MG EC tablet Take 81 mg by mouth daily.     • citalopram (CELEXA) 10 MG tablet Take 1 tablet by mouth Daily. 90 tablet 1   • digoxin (LANOXIN) 250 MCG tablet Take 1 tablet by mouth Daily. 90 tablet 1   • diltiaZEM CD (DILTIAZEM CD) 240 MG 24 hr capsule Take 1 capsule by mouth Daily. 90 capsule 1   • warfarin (COUMADIN) 2 MG tablet Take 2 mg by mouth Daily. 7 mg every other day and 8 mg every other       No current facility-administered medications on file prior to visit.        Family history, surgical history, past medical history, Allergies and meds reviewed with patient today and updated in [facility] EMR.     ROS:  Review of Systems   Constitutional: Negative for activity change, appetite change, chills, diaphoresis, fatigue and fever.   HENT: Negative for congestion, ear discharge, ear pain, facial swelling, hearing loss, rhinorrhea, sinus pressure, sneezing, sore throat and tinnitus.    Eyes: Negative for pain, discharge, redness and visual disturbance.   Respiratory: Negative for apnea, cough, choking, chest tightness, shortness of breath and wheezing.    Cardiovascular: Negative for chest pain, palpitations and leg swelling.   Gastrointestinal: Negative for abdominal pain, constipation, diarrhea, nausea and vomiting.   Genitourinary:  "Negative for difficulty urinating, flank pain, frequency, penile pain and urgency.   Musculoskeletal: Negative for arthralgias, back pain, gait problem, joint swelling, myalgias and neck pain.   Skin: Negative for color change, pallor and rash.   Allergic/Immunologic: Negative for environmental allergies and food allergies.   Neurological: Negative for dizziness, seizures, weakness, numbness and headaches.   Hematological: Negative for adenopathy. Does not bruise/bleed easily.   Psychiatric/Behavioral: Negative for agitation, behavioral problems, confusion, hallucinations, self-injury, sleep disturbance and suicidal ideas. The patient is nervous/anxious.        OBJECTIVE:  Vitals:    07/13/17 1120   BP: 130/68   Pulse: 58   Resp: 18   Temp: 98 °F (36.7 °C)   TempSrc: Oral   SpO2: 98%   Weight: 184 lb (83.5 kg)   Height: 72\" (182.9 cm)     Physical Exam   Constitutional: He is oriented to person, place, and time. He appears well-developed and well-nourished.   HENT:   Head: Normocephalic and atraumatic.   Right Ear: External ear normal.   Left Ear: External ear normal.   Nose: Nose normal.   Mouth/Throat: Oropharynx is clear and moist.   Eyes: Conjunctivae and EOM are normal. Pupils are equal, round, and reactive to light.   Neck: Normal range of motion. Neck supple. No thyromegaly present.   Cardiovascular: Regular rhythm, normal heart sounds and intact distal pulses.  Bradycardia present.    No murmur heard.  Pulmonary/Chest: Effort normal and breath sounds normal. No respiratory distress. He has no wheezes. He exhibits no tenderness.   Abdominal: Soft. Bowel sounds are normal. There is no tenderness. There is no rebound and no guarding.   Musculoskeletal:        Right hip: He exhibits decreased range of motion, decreased strength, tenderness and crepitus.        Left hip: He exhibits decreased range of motion, decreased strength and tenderness. He exhibits no bony tenderness.        Right knee: He exhibits " decreased range of motion. He exhibits no swelling and no effusion. No tenderness found. No medial joint line and no lateral joint line tenderness noted.        Left knee: He exhibits decreased range of motion. He exhibits no swelling and no effusion. No tenderness found. No medial joint line and no lateral joint line tenderness noted.        Right ankle: Normal. He exhibits normal range of motion. No tenderness.        Left ankle: Normal. He exhibits normal range of motion. No tenderness.        Thoracic back: He exhibits decreased range of motion, tenderness, pain and spasm. He exhibits normal pulse.        Lumbar back: He exhibits decreased range of motion. He exhibits no tenderness, no bony tenderness, no pain and no spasm.   Paraspinal tenderness remains bilaterally. Straight leg normal bilaterally both sitting and supine.  patellar and achilles reflexes normal.  No ankles clonus, normal with 2 beats.   Babinski negative.  Normal contour bilateral LE. FABERE and FADIR negative bilaterally. Piriformis stretching negative bilaterally. Hallux strength equal 5/5 bilaterally.    Lymphadenopathy:     He has no cervical adenopathy.   Neurological: He is alert and oriented to person, place, and time. No cranial nerve deficit. Coordination normal.   Skin: Skin is warm and dry. No rash noted.   ecchymoses bilateral forearms. No other ecchymoses.  Chronic anticoagulation   Psychiatric: His behavior is normal. Judgment and thought content normal. His mood appears anxious.   Pacing room and halls prior to my entry.    Nursing note and vitals reviewed.      Assessment/Plan:  Anxiety: Chronic established problem. Current Status:stable.  We reviewed current therapy for this problem.  Discussed R/B/A to current therapy. Discussed need to follow recommendations and to use medications regularly as prescribed, to avoid missing doses as able.  Medications reviewed with patient today. We discussed cessation/continuation of  medications for current problem.  If refills needed Rx refills will be provided.  No side effects from medications reported.  Risks/benefits to current therapy discussed. The patient has read and signed the [facility] Controlled Substance Contract.  I will continue to see patient for regular follow up appointments.  They are well controlled on their medication.  SIMBA is updated every 3 months. The patient is aware of the potential for addiction and dependence. R/B/A to meds d/w patient, SE reviewed, handout offered regarding medications listed.    We talked today about changing meds and he is very happy with current rx.  Does not need Celexa Refills.  The patient has been using klonopin as instructed.    · Orders as listed below   -     clonazePAM (KLONOPIN) 0.5 MG tablet; Take 1 tablet by mouth Daily As Needed for Anxiety   -     Nursing communication    Gastroesophageal reflux disease without esophagitis: Chronic problem.  Discussed consideration of regular F/U with GI to monitor for changes that can sometimes occur with chronic GERD.  We reviewed SE of PPI.  We have discussed R/B/A to these agents. Offered handout on current and new medications. We reviewed latest news regarding bone loss, regarding risks for Cdiff, MI risks, Bacterial peritonitis, interstitial nephritis, Pneumonia risks due to long term use of PPI. Occasional prilosec.     Chronic anticoagulation: Chronic at goal. Alternating 7mg and 8 mg.  The patient is currently stable.  Discussed goal of 2-3.  He likes to be closer to 2.5.  After discussion he is going to drop by 1 mg today.  Aware of dose changes, aware of impact of food on his coumadin level.    -     Protime-INR; Future  -     Protime-INR    Chronic hip pain, right/Back pain: Not interested in referral nor imaging.  Zanaflex controls back and hip pain more than anything so far.  He wants the Rx refills.  He has percocet from outside source.  Uses these infrequently..   -      TiZANidine (ZANAFLEX) 4 MG capsule; Take 1 capsule by mouth 3 (Three) Times a Day As Needed for Muscle Spasms.    Bradycardia:  Asymptomatic, declined EKG today.     Risks/benefits of current and new medications discussed with the patient and or family today.  The patient/family are aware and accept that if there any side effects they should call or return to clinic as soon as possible.  Appropriate F/U discussed for topics addressed today. All questions were answered to the satisfactory state of patient/family.  Should symptoms fail to improve or worsen they agree to call or return to clinic or to go to the ER. Education handouts were offered on any new Rx if requested.  Discussed the importance of following up with any needed screening tests/labs/specialist appointments and any requested follow-up recommended by me today.  Importance of maintaining follow-up discussed and patient accepts that missed appointments can delay diagnosis and potentially lead to worsening of conditions.    An After Visit Summary was printed and given to the patient at discharge.  Follow-up: Return in about 1 month (around 8/13/2017) for INR.         Didier Jung M.D. 7/25/2017

## 2017-07-21 ENCOUNTER — ANTICOAGULATION VISIT (OUTPATIENT)
Dept: FAMILY MEDICINE CLINIC | Facility: CLINIC | Age: 77
End: 2017-07-21

## 2017-07-21 LAB — INR PPP: 2.7 (ref 0.9–1.1)

## 2017-07-21 PROCEDURE — 85610 PROTHROMBIN TIME: CPT | Performed by: FAMILY MEDICINE

## 2017-08-02 ENCOUNTER — ANTICOAGULATION VISIT (OUTPATIENT)
Dept: FAMILY MEDICINE CLINIC | Facility: CLINIC | Age: 77
End: 2017-08-02

## 2017-08-02 LAB — INR PPP: 2.5 (ref 0.9–1.1)

## 2017-08-02 PROCEDURE — 85610 PROTHROMBIN TIME: CPT | Performed by: FAMILY MEDICINE

## 2017-08-16 ENCOUNTER — ANTICOAGULATION VISIT (OUTPATIENT)
Dept: FAMILY MEDICINE CLINIC | Facility: CLINIC | Age: 77
End: 2017-08-16

## 2017-08-16 LAB — INR PPP: 2.5 (ref 0.9–1.1)

## 2017-08-16 PROCEDURE — 85610 PROTHROMBIN TIME: CPT | Performed by: FAMILY MEDICINE

## 2017-08-29 ENCOUNTER — ANTICOAGULATION VISIT (OUTPATIENT)
Dept: FAMILY MEDICINE CLINIC | Facility: CLINIC | Age: 77
End: 2017-08-29

## 2017-08-29 DIAGNOSIS — I50.9 CONGESTIVE HEART FAILURE, UNSPECIFIED CONGESTIVE HEART FAILURE CHRONICITY, UNSPECIFIED CONGESTIVE HEART FAILURE TYPE: Primary | ICD-10-CM

## 2017-08-29 LAB — INR PPP: 2.4 (ref 0.9–1.1)

## 2017-08-29 PROCEDURE — 85610 PROTHROMBIN TIME: CPT | Performed by: FAMILY MEDICINE

## 2017-09-21 ENCOUNTER — OFFICE VISIT (OUTPATIENT)
Dept: FAMILY MEDICINE CLINIC | Facility: CLINIC | Age: 77
End: 2017-09-21

## 2017-09-21 VITALS
DIASTOLIC BLOOD PRESSURE: 86 MMHG | HEIGHT: 72 IN | RESPIRATION RATE: 18 BRPM | HEART RATE: 83 BPM | SYSTOLIC BLOOD PRESSURE: 132 MMHG | WEIGHT: 182.8 LBS | BODY MASS INDEX: 24.76 KG/M2

## 2017-09-21 DIAGNOSIS — Z00.00 ROUTINE GENERAL MEDICAL EXAMINATION AT HEALTH CARE FACILITY: ICD-10-CM

## 2017-09-21 DIAGNOSIS — R79.1 SUBTHERAPEUTIC INTERNATIONAL NORMALIZED RATIO (INR): ICD-10-CM

## 2017-09-21 DIAGNOSIS — Z00.00 MEDICARE ANNUAL WELLNESS VISIT, SUBSEQUENT: Primary | ICD-10-CM

## 2017-09-21 DIAGNOSIS — Z79.01 CHRONIC ANTICOAGULATION: Primary | ICD-10-CM

## 2017-09-21 LAB
INR PPP: 1.8 (ref 0.9–1.1)
INR PPP: 1.8 (ref 0.9–1.1)

## 2017-09-21 PROCEDURE — 99212 OFFICE O/P EST SF 10 MIN: CPT | Performed by: FAMILY MEDICINE

## 2017-09-21 PROCEDURE — 85610 PROTHROMBIN TIME: CPT | Performed by: FAMILY MEDICINE

## 2017-09-21 PROCEDURE — G0439 PPPS, SUBSEQ VISIT: HCPCS | Performed by: FAMILY MEDICINE

## 2017-09-21 RX ORDER — OMEPRAZOLE 20 MG/1
CAPSULE, DELAYED RELEASE ORAL
COMMUNITY
Start: 2017-08-16 | End: 2018-03-19

## 2017-09-21 RX ORDER — TEMAZEPAM 15 MG/1
15 CAPSULE ORAL
COMMUNITY
Start: 2017-08-17 | End: 2018-05-10 | Stop reason: SDUPTHER

## 2017-10-09 ENCOUNTER — ANTICOAGULATION VISIT (OUTPATIENT)
Dept: FAMILY MEDICINE CLINIC | Facility: CLINIC | Age: 77
End: 2017-10-09

## 2017-10-09 LAB — INR PPP: 2 (ref 0.9–1.1)

## 2017-10-09 PROCEDURE — 85610 PROTHROMBIN TIME: CPT | Performed by: FAMILY MEDICINE

## 2017-10-09 NOTE — PROGRESS NOTES
Patient presents for routine Pt/Inr fingerstick. Results sent to Dr. Jung to monitor. Patient had no complaints or concerns.

## 2017-10-30 ENCOUNTER — ANTICOAGULATION VISIT (OUTPATIENT)
Dept: FAMILY MEDICINE CLINIC | Facility: CLINIC | Age: 77
End: 2017-10-30

## 2017-10-30 LAB — INR PPP: 2.7 (ref 0.9–1.1)

## 2017-10-30 PROCEDURE — 85610 PROTHROMBIN TIME: CPT | Performed by: FAMILY MEDICINE

## 2017-11-28 ENCOUNTER — ANTICOAGULATION VISIT (OUTPATIENT)
Dept: FAMILY MEDICINE CLINIC | Facility: CLINIC | Age: 77
End: 2017-11-28

## 2017-11-28 LAB — INR PPP: 2.2 (ref 0.9–1.1)

## 2017-11-28 PROCEDURE — 85610 PROTHROMBIN TIME: CPT | Performed by: FAMILY MEDICINE

## 2017-11-28 NOTE — PROGRESS NOTES
Patient presents for routine Pt/Inr fingerstick. Results forwarded to Dr. Rachel. Patient had no complaints or concerns.

## 2017-12-12 ENCOUNTER — ANTICOAGULATION VISIT (OUTPATIENT)
Dept: FAMILY MEDICINE CLINIC | Facility: CLINIC | Age: 77
End: 2017-12-12

## 2017-12-12 LAB — INR PPP: 1.9 (ref 0.9–1.1)

## 2017-12-12 PROCEDURE — 85610 PROTHROMBIN TIME: CPT | Performed by: FAMILY MEDICINE

## 2017-12-19 ENCOUNTER — OFFICE VISIT (OUTPATIENT)
Dept: FAMILY MEDICINE CLINIC | Facility: CLINIC | Age: 77
End: 2017-12-19

## 2017-12-19 VITALS
BODY MASS INDEX: 25.22 KG/M2 | SYSTOLIC BLOOD PRESSURE: 160 MMHG | HEIGHT: 72 IN | OXYGEN SATURATION: 97 % | DIASTOLIC BLOOD PRESSURE: 83 MMHG | WEIGHT: 186.2 LBS | TEMPERATURE: 97.4 F | RESPIRATION RATE: 18 BRPM | HEART RATE: 63 BPM

## 2017-12-19 DIAGNOSIS — M25.551 CHRONIC PAIN OF RIGHT HIP: ICD-10-CM

## 2017-12-19 DIAGNOSIS — F41.9 ANXIETY: ICD-10-CM

## 2017-12-19 DIAGNOSIS — C85.90 LYMPHOMA IN REMISSION (HCC): ICD-10-CM

## 2017-12-19 DIAGNOSIS — I48.20 CHRONIC ATRIAL FIBRILLATION (HCC): ICD-10-CM

## 2017-12-19 DIAGNOSIS — G89.29 CHRONIC PAIN OF RIGHT HIP: ICD-10-CM

## 2017-12-19 DIAGNOSIS — K21.9 GASTROESOPHAGEAL REFLUX DISEASE WITHOUT ESOPHAGITIS: ICD-10-CM

## 2017-12-19 DIAGNOSIS — I48.20 CHRONIC ATRIAL FIBRILLATION (HCC): Primary | ICD-10-CM

## 2017-12-19 PROCEDURE — 99214 OFFICE O/P EST MOD 30 MIN: CPT | Performed by: FAMILY MEDICINE

## 2017-12-19 RX ORDER — DILTIAZEM HYDROCHLORIDE 240 MG/1
CAPSULE, COATED, EXTENDED RELEASE ORAL
Qty: 90 CAPSULE | Refills: 0 | Status: SHIPPED | OUTPATIENT
Start: 2017-12-19 | End: 2018-03-19 | Stop reason: SDUPTHER

## 2017-12-19 RX ORDER — CITALOPRAM 20 MG/1
20 TABLET ORAL DAILY
Qty: 30 TABLET | Refills: 2 | Status: SHIPPED | OUTPATIENT
Start: 2017-12-19 | End: 2018-02-19 | Stop reason: SDUPTHER

## 2017-12-19 RX ORDER — OXYCODONE AND ACETAMINOPHEN 10; 325 MG/1; MG/1
1 TABLET ORAL 3 TIMES DAILY PRN
Qty: 90 TABLET | Refills: 0 | Status: SHIPPED | OUTPATIENT
Start: 2017-12-19 | End: 2018-01-19 | Stop reason: SDUPTHER

## 2017-12-19 NOTE — PROGRESS NOTES
Subjective cc: right hip pain   Cabrera Avina is a 77 y.o. male who presents to establish care with me. Patient in 2005 had emergency lap dillon, found to have non-hodgkins lymphoma.  Patient was in hospital in and out for 4 months, 6 surgeries in 7 months, kidney stones, dania-rectal abscess. In 2010 in June - right hip replacement with Dr Cerna.  He reports his problems started then.  Patient reports constant pain since that time.  They offered him a right hip replacement again but patient declined.  He reports he is hardly able to walk.  Patient sees dr hendrix for oncology, he has been taking Percocet.  He takes Percocet BID, occasionally a little more if his activity increases.  He was told by Dr Hendrix that he cannot prescribe it any more because he will only be doing his oncology treatments.      Patient has atrial fibrillation since 2004.  He is treated with coumadin and managed by this office.     He takes Celexa daily every morning.  It is not helping with his anxiety.  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  He gets easily depressed.  His dose of celexa has never been increased, he is interested in increasing it. He takes Temazepam QHS PRN.     Sees Dr Miguel for GI.  He previously had GERD and took Omeprazole. It is not giving him any problems so he has not restarted it.     He sees Dr Jeff for urology. He discontinued his finesteride - the patient is nervous about this.      Pain   This is a chronic problem. The current episode started more than 1 year ago. The problem occurs constantly. Associated symptoms include arthralgias (right hip). Pertinent negatives include no abdominal pain, anorexia, change in bowel habit, chest pain, chills, congestion, coughing, diaphoresis, fatigue, fever, headaches, joint swelling, nausea, neck pain, numbness, rash, sore throat, urinary symptoms, vomiting or weakness. The symptoms are aggravated by walking, standing and  "exertion. He has tried oral narcotics (patient gets stiff if sitting for long periods of time ) for the symptoms. The treatment provided moderate relief.        The following portions of the patient's history were reviewed and updated as appropriate: allergies, current medications, past family history, past medical history, past social history, past surgical history and problem list.        Review of Systems   Constitutional: Negative for activity change, appetite change, chills, diaphoresis, fatigue and fever.   HENT: Negative for congestion and sore throat.    Respiratory: Negative for cough.    Cardiovascular: Negative for chest pain.   Gastrointestinal: Negative for abdominal pain, anorexia, change in bowel habit, nausea and vomiting.        H/o GERD - no current problems   Genitourinary: Negative for decreased urine volume, difficulty urinating, frequency and hematuria.   Musculoskeletal: Positive for arthralgias (right hip) and gait problem. Negative for back pain, joint swelling and neck pain.   Skin: Negative for rash.   Neurological: Negative for weakness, numbness and headaches.   Psychiatric/Behavioral: The patient is nervous/anxious.        Objective   Blood pressure 160/83, pulse 63, temperature 97.4 °F (36.3 °C), temperature source Temporal Artery , resp. rate 18, height 182.9 cm (72\"), weight 84.5 kg (186 lb 3.2 oz), SpO2 97 %.  Physical Exam   Constitutional: He is oriented to person, place, and time. He appears well-developed and well-nourished. No distress.   HENT:   Head: Normocephalic and atraumatic.   Right Ear: External ear normal.   Left Ear: External ear normal.   Nose: Nose normal.   Mouth/Throat: Oropharynx is clear and moist. No oropharyngeal exudate.   Eyes: Conjunctivae and EOM are normal. Pupils are equal, round, and reactive to light. Right eye exhibits no discharge. Left eye exhibits no discharge.   Neck: Normal range of motion. No tracheal deviation present. No thyromegaly present. "   Cardiovascular: Normal rate.  An irregularly irregular rhythm present.   Pulmonary/Chest: Effort normal and breath sounds normal. No stridor. No respiratory distress. He has no wheezes. He has no rales.   Abdominal: Soft. Bowel sounds are normal. He exhibits no distension. There is no tenderness.   Musculoskeletal: He exhibits tenderness. He exhibits no edema.   Significantly limited ROM in right hip, pain with movement, walks with limp favoring right hip, difficulty changing positions and getting onto the exam table.    Lymphadenopathy:     He has no cervical adenopathy.   Neurological: He is alert and oriented to person, place, and time.   Skin: Skin is warm and dry. He is not diaphoretic.   Psychiatric: He has a normal mood and affect. His behavior is normal. Judgment and thought content normal.   Nursing note and vitals reviewed.      Assessment/Plan   Problems Addressed this Visit        Cardiovascular and Mediastinum    Atrial fibrillation - Primary       Digestive    Gastroesophageal reflux disease without esophagitis       Nervous and Auditory    Chronic hip pain       Hematopoietic and Hemostatic    Lymphoma in remission       Other    Anxiety               PLAN:     #1 anxiety: will increase Celexa.  Patient no longer taking Klonopin.  Patient will continue to take Temazepam QHS PRN - does not have to take it very often.     #2 chronic right hip pain: [facility] #[id number].  Will continue current dose of Percocet. Patient signed controlled medication contract. Advised he has to come to office every month to get medication and have re-evaluation. Advised that if his pain medication requirement increases I will not be managing this and will refer him to pain management at that time. Patient is in agreement,    #3 GERD: currently controlled with diet, will restart medication if patient needs it in the future    #4 Lymphoma: follows with oncology, reviewed labs that he has done there.     #5 Atrial fibrillation:  currently in a fib, rate controlled, on coumadin - managed in this office, patient to continue current medications, advised on importance of compliance, advised on warning signs    Screening: Reviewed labs in office today, normal.  Colonoscopy UTD - repeat in 2019.           This document has been electronically signed by Sunita Crawford MD on December 31, 2017 1:01 PM

## 2018-01-05 ENCOUNTER — ANTICOAGULATION VISIT (OUTPATIENT)
Dept: FAMILY MEDICINE CLINIC | Facility: CLINIC | Age: 78
End: 2018-01-05

## 2018-01-05 LAB — INR PPP: 2.2 (ref 0.9–1.1)

## 2018-01-05 PROCEDURE — 85610 PROTHROMBIN TIME: CPT | Performed by: FAMILY MEDICINE

## 2018-01-19 ENCOUNTER — OFFICE VISIT (OUTPATIENT)
Dept: FAMILY MEDICINE CLINIC | Facility: CLINIC | Age: 78
End: 2018-01-19

## 2018-01-19 VITALS
DIASTOLIC BLOOD PRESSURE: 66 MMHG | HEART RATE: 79 BPM | TEMPERATURE: 98.4 F | BODY MASS INDEX: 25.63 KG/M2 | WEIGHT: 189.2 LBS | OXYGEN SATURATION: 97 % | RESPIRATION RATE: 16 BRPM | HEIGHT: 72 IN | SYSTOLIC BLOOD PRESSURE: 143 MMHG

## 2018-01-19 DIAGNOSIS — R79.1 SUBTHERAPEUTIC INTERNATIONAL NORMALIZED RATIO (INR): ICD-10-CM

## 2018-01-19 DIAGNOSIS — F41.9 ANXIETY: ICD-10-CM

## 2018-01-19 DIAGNOSIS — G89.29 CHRONIC PAIN OF RIGHT HIP: Primary | ICD-10-CM

## 2018-01-19 DIAGNOSIS — M25.551 CHRONIC PAIN OF RIGHT HIP: Primary | ICD-10-CM

## 2018-01-19 DIAGNOSIS — I48.20 CHRONIC ATRIAL FIBRILLATION (HCC): ICD-10-CM

## 2018-01-19 DIAGNOSIS — K21.9 GASTROESOPHAGEAL REFLUX DISEASE WITHOUT ESOPHAGITIS: ICD-10-CM

## 2018-01-19 DIAGNOSIS — C85.90 LYMPHOMA IN REMISSION (HCC): ICD-10-CM

## 2018-01-19 PROCEDURE — 96160 PT-FOCUSED HLTH RISK ASSMT: CPT | Performed by: FAMILY MEDICINE

## 2018-01-19 PROCEDURE — 99214 OFFICE O/P EST MOD 30 MIN: CPT | Performed by: FAMILY MEDICINE

## 2018-01-19 RX ORDER — OXYCODONE AND ACETAMINOPHEN 10; 325 MG/1; MG/1
1 TABLET ORAL 3 TIMES DAILY PRN
Qty: 90 TABLET | Refills: 0 | Status: SHIPPED | OUTPATIENT
Start: 2018-01-19 | End: 2018-02-19 | Stop reason: SDUPTHER

## 2018-01-19 NOTE — PROGRESS NOTES
Subjective cc: right hip pain   Cabrera Avina is a 77 y.o. male who presents for chronic pain medication refills for his right hip. Patient reports no changes over hte past 1 month. His pain is manageable with the current dose of medication. He is able to remain fairly active.  He continues to take the Percocet BID, occasionally TID if he is really active that day.  He keeps it locked in a safe. He denies any side effects.  He has a h/o non-hodgkins lymphoma and is followed by oncology, dr macias.      Patient has atrial fibrillation since 2004.  He is treated with coumadin and managed by this office. His INR is sub therapeutic today but patient has been taking his mediation as prescribed, has not missed doses, and has not been on any new medication. He does not feel that todays INR is accurate.     He takes Celexa daily every morning.  The increased dose is helping with his anxiety.  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  He gets easily depressed.  He takes Temazepam QHS PRN.     Sees Dr Miguel for GI.  He previously had GERD and took Omeprazole. It is not giving him any problems so he has not restarted it.     He sees Dr Jeff for urology. He discontinued his finesteride - the patient is nervous about this.      Pain   This is a chronic problem. The current episode started more than 1 year ago. The problem occurs constantly. Associated symptoms include arthralgias (right hip). Pertinent negatives include no abdominal pain, anorexia, change in bowel habit, chest pain, chills, congestion, coughing, diaphoresis, fatigue, fever, headaches, joint swelling, nausea, neck pain, numbness, rash, sore throat, urinary symptoms, vomiting or weakness. The symptoms are aggravated by walking, standing and exertion. He has tried oral narcotics (patient gets stiff if sitting for long periods of time ) for the symptoms. The treatment provided moderate relief.        The following portions of  "the patient's history were reviewed and updated as appropriate: allergies, current medications, past family history, past medical history, past social history, past surgical history and problem list.        Review of Systems   Constitutional: Negative for activity change, appetite change, chills, diaphoresis, fatigue and fever.   HENT: Negative for congestion and sore throat.    Respiratory: Negative for cough.    Cardiovascular: Negative for chest pain.   Gastrointestinal: Negative for abdominal pain, anorexia, change in bowel habit, nausea and vomiting.        H/o GERD - no current problems   Genitourinary: Negative for decreased urine volume, difficulty urinating, frequency and hematuria.   Musculoskeletal: Positive for arthralgias (right hip) and gait problem. Negative for back pain, joint swelling and neck pain.   Skin: Negative for rash.   Neurological: Negative for weakness, numbness and headaches.   Psychiatric/Behavioral: The patient is nervous/anxious.        Objective   Blood pressure 143/66, pulse 79, temperature 98.4 °F (36.9 °C), temperature source Oral, resp. rate 16, height 182.9 cm (72.01\"), weight 85.8 kg (189 lb 3.2 oz), SpO2 97 %.  Physical Exam   Constitutional: He is oriented to person, place, and time. He appears well-developed and well-nourished. No distress.   HENT:   Head: Normocephalic and atraumatic.   Right Ear: External ear normal.   Left Ear: External ear normal.   Nose: Nose normal.   Mouth/Throat: Oropharynx is clear and moist. No oropharyngeal exudate.   Eyes: Conjunctivae and EOM are normal. Pupils are equal, round, and reactive to light. Right eye exhibits no discharge. Left eye exhibits no discharge.   Neck: Normal range of motion. No tracheal deviation present. No thyromegaly present.   Cardiovascular: Normal rate.  An irregularly irregular rhythm present.   Pulmonary/Chest: Effort normal and breath sounds normal. No stridor. No respiratory distress. He has no wheezes. He has no " rales.   Abdominal: Soft. Bowel sounds are normal. He exhibits no distension. There is no tenderness.   Musculoskeletal: He exhibits tenderness. He exhibits no edema.   Significantly limited ROM in right hip, pain with movement, walks with limp favoring right hip, difficulty changing positions and getting onto the exam table.    Lymphadenopathy:     He has no cervical adenopathy.   Neurological: He is alert and oriented to person, place, and time.   Skin: Skin is warm and dry. He is not diaphoretic.   Psychiatric: He has a normal mood and affect. His behavior is normal. Judgment and thought content normal.   Nursing note and vitals reviewed.      Assessment/Plan   Problems Addressed this Visit        Cardiovascular and Mediastinum    Atrial fibrillation       Digestive    Gastroesophageal reflux disease without esophagitis       Nervous and Auditory    Chronic hip pain - Primary       Hematopoietic and Hemostatic    Lymphoma in remission       Other    Anxiety      Other Visit Diagnoses     Subtherapeutic international normalized ratio (INR)                   PLAN:     #1 anxiety: will continue Celexa.  Patient will continue to take Temazepam QHS PRN - does not have to take it very often.     #2 chronic right hip pain: Nii appropriate.  Will continue current dose of Percocet. Patient signed controlled medication contract previouly. Advised he has to come to office every month to get medication and have re-evaluation. Advised that if his pain medication requirement increases I will not be managing this and will refer him to pain management at that time. Patient is in agreement. Patient to drop rx off at pharmacy even though he is not completely out of medication yet.     #3 GERD: currently controlled with diet, will restart medication if patient needs it in the future    #4 Lymphoma: follows with oncology, reviewed labs that he has done there.     #5 Atrial fibrillation: currently in a fib, rate controlled, on  coumadin - managed in this office, patient to continue current medications, advised on importance of compliance, advised on warning signs    #6 subtherapeutic INR: reviewed dosing, return next week for recheck.     Screening: Reviewed labs in office today, normal.  Colonoscopy UTD - repeat in 2019.           This document has been electronically signed by Sunita Crawford MD on January 20, 2018 9:10 AM

## 2018-01-24 ENCOUNTER — ANTICOAGULATION VISIT (OUTPATIENT)
Dept: FAMILY MEDICINE CLINIC | Facility: CLINIC | Age: 78
End: 2018-01-24

## 2018-01-24 DIAGNOSIS — I48.20 CHRONIC ATRIAL FIBRILLATION (HCC): ICD-10-CM

## 2018-01-24 LAB — INR PPP: 1.4 (ref 0.9–1.1)

## 2018-01-24 PROCEDURE — 85610 PROTHROMBIN TIME: CPT

## 2018-01-24 RX ORDER — DIGOXIN 250 MCG
250 TABLET ORAL
Qty: 90 TABLET | Refills: 1 | Status: SHIPPED | OUTPATIENT
Start: 2018-01-24 | End: 2018-06-25 | Stop reason: SDUPTHER

## 2018-01-30 ENCOUNTER — ANTICOAGULATION VISIT (OUTPATIENT)
Dept: FAMILY MEDICINE CLINIC | Facility: CLINIC | Age: 78
End: 2018-01-30

## 2018-01-30 LAB — INR PPP: 1.9 (ref 0.9–1.1)

## 2018-01-30 PROCEDURE — 85610 PROTHROMBIN TIME: CPT | Performed by: FAMILY MEDICINE

## 2018-02-19 ENCOUNTER — OFFICE VISIT (OUTPATIENT)
Dept: FAMILY MEDICINE CLINIC | Facility: CLINIC | Age: 78
End: 2018-02-19

## 2018-02-19 VITALS
TEMPERATURE: 97.8 F | OXYGEN SATURATION: 96 % | BODY MASS INDEX: 25.19 KG/M2 | HEART RATE: 54 BPM | SYSTOLIC BLOOD PRESSURE: 128 MMHG | DIASTOLIC BLOOD PRESSURE: 71 MMHG | WEIGHT: 186 LBS | HEIGHT: 72 IN | RESPIRATION RATE: 18 BRPM

## 2018-02-19 DIAGNOSIS — M25.551 CHRONIC PAIN OF RIGHT HIP: Primary | ICD-10-CM

## 2018-02-19 DIAGNOSIS — G89.29 CHRONIC PAIN OF RIGHT HIP: Primary | ICD-10-CM

## 2018-02-19 DIAGNOSIS — I48.20 CHRONIC ATRIAL FIBRILLATION (HCC): ICD-10-CM

## 2018-02-19 DIAGNOSIS — C85.90 LYMPHOMA IN REMISSION (HCC): ICD-10-CM

## 2018-02-19 DIAGNOSIS — F41.9 ANXIETY: ICD-10-CM

## 2018-02-19 PROCEDURE — 99214 OFFICE O/P EST MOD 30 MIN: CPT | Performed by: FAMILY MEDICINE

## 2018-02-19 PROCEDURE — 85610 PROTHROMBIN TIME: CPT | Performed by: FAMILY MEDICINE

## 2018-02-19 RX ORDER — OXYCODONE AND ACETAMINOPHEN 10; 325 MG/1; MG/1
1 TABLET ORAL 3 TIMES DAILY PRN
Qty: 90 TABLET | Refills: 0 | Status: SHIPPED | OUTPATIENT
Start: 2018-02-19 | End: 2018-03-19 | Stop reason: SDUPTHER

## 2018-02-19 RX ORDER — CITALOPRAM 20 MG/1
20 TABLET ORAL DAILY
Qty: 90 TABLET | Refills: 1 | Status: SHIPPED | OUTPATIENT
Start: 2018-02-19 | End: 2018-05-21 | Stop reason: SDUPTHER

## 2018-02-19 NOTE — PROGRESS NOTES
Subjective cc: right hip pain   Cabrera Avina is a 77 y.o. male who presents for chronic pain medication refills for his right hip. Patient reports no changes over hte past 1 month, but the pain has been really bad this winter for some reason. His pain is manageable with the current dose of medication. He is able to remain fairly active.  He continues to take the Percocet BID, occasionally TID if he is really active that day.  He keeps it locked in a safe. He denies any side effects.  He has a h/o non-hodgkins lymphoma and is followed by oncology, dr macias.  Last saw him on Friday - no changes - pleased with progress - CBC brought in today for review.    Patient has atrial fibrillation since 2004.  He is treated with coumadin and managed by this office. His INR is sub therapeutic today. Patient has been taking 8, 8, 7, 8, 8, 7.  INR is still subtherapeutic. Patient denies any bleeding problems.     He takes Celexa daily every morning.  The increased dose has helped with his anxiety.  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  He gets easily depressed.  He takes Temazepam QHS PRN. Refill not needed today.     Sees Dr Miguel for GI.  He previously had GERD and took Omeprazole. It is not giving him any problems so he has not restarted it.     He sees Dr Jeff for urology. He discontinued his finesteride - the patient is nervous about this.      Pain   This is a chronic problem. The current episode started more than 1 year ago. The problem occurs constantly. Associated symptoms include arthralgias (right hip). Pertinent negatives include no abdominal pain, anorexia, change in bowel habit, chest pain, chills, congestion, coughing, diaphoresis, fatigue, fever, headaches, joint swelling, nausea, neck pain, numbness, rash, sore throat, urinary symptoms, vomiting or weakness. The symptoms are aggravated by walking, standing and exertion. He has tried oral narcotics (patient gets stiff if  "sitting for long periods of time ) for the symptoms. The treatment provided moderate relief.        The following portions of the patient's history were reviewed and updated as appropriate: allergies, current medications, past family history, past medical history, past social history, past surgical history and problem list.        Review of Systems   Constitutional: Negative for activity change, appetite change, chills, diaphoresis, fatigue and fever.   HENT: Negative for congestion and sore throat.    Respiratory: Negative for cough.    Cardiovascular: Negative for chest pain.   Gastrointestinal: Negative for abdominal pain, anorexia, change in bowel habit, nausea and vomiting.        H/o GERD - no current problems   Genitourinary: Negative for decreased urine volume, difficulty urinating, frequency and hematuria.   Musculoskeletal: Positive for arthralgias (right hip) and gait problem. Negative for back pain, joint swelling and neck pain.   Skin: Negative for rash.   Neurological: Negative for weakness, numbness and headaches.   Psychiatric/Behavioral: The patient is nervous/anxious.        Objective   Blood pressure 128/71, pulse 54, temperature 97.8 °F (36.6 °C), temperature source Oral, resp. rate 18, height 182.9 cm (72.01\"), weight 84.4 kg (186 lb), SpO2 96 %.  Physical Exam   Constitutional: He is oriented to person, place, and time. He appears well-developed and well-nourished. No distress.   HENT:   Head: Normocephalic and atraumatic.   Right Ear: External ear normal.   Left Ear: External ear normal.   Nose: Nose normal.   Mouth/Throat: Oropharynx is clear and moist. No oropharyngeal exudate.   Eyes: Conjunctivae and EOM are normal. Pupils are equal, round, and reactive to light. Right eye exhibits no discharge. Left eye exhibits no discharge.   Neck: Normal range of motion. No tracheal deviation present. No thyromegaly present.   Cardiovascular: Normal rate.  An irregularly irregular rhythm present. "   Pulmonary/Chest: Effort normal and breath sounds normal. No stridor. No respiratory distress. He has no wheezes. He has no rales.   Abdominal: Soft. Bowel sounds are normal. He exhibits no distension. There is no tenderness.   Musculoskeletal: He exhibits tenderness. He exhibits no edema.   Significantly limited ROM in right hip, pain with movement, walks with limp favoring right hip, difficulty changing positions and getting onto the exam table.    Lymphadenopathy:     He has no cervical adenopathy.   Neurological: He is alert and oriented to person, place, and time.   Skin: Skin is warm and dry. He is not diaphoretic.   Psychiatric: He has a normal mood and affect. His behavior is normal. Judgment and thought content normal.   Nursing note and vitals reviewed.      Assessment/Plan   Problems Addressed this Visit        Cardiovascular and Mediastinum    Atrial fibrillation       Nervous and Auditory    Chronic hip pain - Primary       Hematopoietic and Hemostatic    Lymphoma in remission       Other    Anxiety               PLAN:     #1 anxiety: will continue Celexa.  Patient will continue to take Temazepam QHS PRN - does not have to take it very often. Refill not needed today.     #2 chronic right hip pain: Nii appropriate.  Will continue current dose of Percocet. Patient signed controlled medication contract previously. Advised he has to come to office every month to get medication and have re-evaluation. Advised that if his pain medication requirement increases I will not be managing this and will refer him to pain management at that time. Patient is in agreement. Patient to drop rx off at pharmacy even though he is not completely out of medication yet. Refill given today. WILL GET UDS AT NEXT VISIT.     #3 GERD: currently controlled with diet, will restart medication if patient needs it in the future    #4 Lymphoma: follows with oncology, reviewed labs that he has done there.     #5 Atrial fibrillation:  currently in a fib, rate controlled, on coumadin - managed in this office, patient to continue current medications, advised on importance of compliance, advised on warning signs    #6 subtherapeutic INR: will increase to 8 MG daily. reviewed dosing, return next week for recheck.     Patient's BMI is above normal parameters. Follow-up plan includes:  exercise counseling and nutrition counseling.    Screening: Reviewed labs in office today, normal.  Colonoscopy UTD - repeat in 2019.           This document has been electronically signed by Sunita Crawford MD on February 19, 2018 12:03 PM

## 2018-02-28 LAB
INR PPP: 1.8 (ref 0.9–1.1)
PROTHROMBIN TIME: 22.2 SECONDS

## 2018-03-01 ENCOUNTER — TELEPHONE (OUTPATIENT)
Dept: FAMILY MEDICINE CLINIC | Facility: CLINIC | Age: 78
End: 2018-03-01

## 2018-03-09 ENCOUNTER — ANTICOAGULATION VISIT (OUTPATIENT)
Dept: FAMILY MEDICINE CLINIC | Facility: CLINIC | Age: 78
End: 2018-03-09

## 2018-03-09 DIAGNOSIS — I50.9 CONGESTIVE HEART FAILURE, UNSPECIFIED CONGESTIVE HEART FAILURE CHRONICITY, UNSPECIFIED CONGESTIVE HEART FAILURE TYPE: Primary | ICD-10-CM

## 2018-03-09 LAB — INR PPP: 2.7 (ref 0.9–1.1)

## 2018-03-09 PROCEDURE — 85610 PROTHROMBIN TIME: CPT | Performed by: FAMILY MEDICINE

## 2018-03-12 ENCOUNTER — HOSPITAL ENCOUNTER (OUTPATIENT)
Dept: GENERAL RADIOLOGY | Facility: HOSPITAL | Age: 78
Discharge: HOME | End: 2018-03-12
Attending: UROLOGY | Admitting: UROLOGY

## 2018-03-12 ENCOUNTER — OFFICE VISIT (OUTPATIENT)
Dept: UROLOGY | Facility: CLINIC | Age: 78
End: 2018-03-12

## 2018-03-12 VITALS — BODY MASS INDEX: 25.47 KG/M2 | HEIGHT: 72 IN | TEMPERATURE: 97.8 F | WEIGHT: 188 LBS

## 2018-03-12 DIAGNOSIS — N40.1 BPH WITH URINARY OBSTRUCTION: Primary | ICD-10-CM

## 2018-03-12 DIAGNOSIS — N20.0 RENAL CALCULUS: ICD-10-CM

## 2018-03-12 DIAGNOSIS — N13.8 BPH WITH URINARY OBSTRUCTION: Primary | ICD-10-CM

## 2018-03-12 LAB
BILIRUB BLD-MCNC: NEGATIVE MG/DL
CLARITY, POC: CLEAR
COLOR UR: YELLOW
GLUCOSE UR STRIP-MCNC: NEGATIVE MG/DL
KETONES UR QL: NEGATIVE
LEUKOCYTE EST, POC: NEGATIVE
NITRITE UR-MCNC: NEGATIVE MG/ML
PH UR: 5.5 [PH] (ref 5–8)
PROT UR STRIP-MCNC: NEGATIVE MG/DL
RBC # UR STRIP: ABNORMAL /UL
SP GR UR: 1.02 (ref 1–1.03)
UROBILINOGEN UR QL: NORMAL

## 2018-03-12 PROCEDURE — 81003 URINALYSIS AUTO W/O SCOPE: CPT | Performed by: UROLOGY

## 2018-03-12 PROCEDURE — 99213 OFFICE O/P EST LOW 20 MIN: CPT | Performed by: UROLOGY

## 2018-03-12 PROCEDURE — 74018 RADEX ABDOMEN 1 VIEW: CPT

## 2018-03-12 NOTE — PROGRESS NOTES
Mr. Avina is 77 y.o. male    CHIEF COMPLAINT: I am here for BPH    HPI  This gentleman has been followed for several years in the practice but only recent by me for benign prostatic hyperplasia and obstruction.  He has moderate voiding symptoms that consist of urgency frequency and decreased force of stream.  He is satisfied with the symptoms as they have no effect on bother.  They did not change we took him on finasteride because his prostate is now so small.    Also see him for recurrent urolithiasis and known renal calculi.  He had small bilateral renal calculi on his most recent study.  He denies any hematuria or flank discomfort or diaphoresis.    No results found for: PSA      The following portions of the patient's history were reviewed and updated as appropriate: allergies, current medications, past family history, past medical history, past social history, past surgical history and problem list.      Review of Systems   Constitutional: Negative for chills and fever.   Gastrointestinal: Negative for abdominal pain, anal bleeding and blood in stool.   Genitourinary: Negative for flank pain and hematuria.           Current Outpatient Prescriptions:   •  aspirin 81 MG EC tablet, Take 81 mg by mouth daily., Disp: , Rfl:   •  citalopram (CELEXA) 20 MG tablet, Take 1 tablet by mouth Daily., Disp: 90 tablet, Rfl: 1  •  clonazePAM (KLONOPIN) 0.5 MG tablet, Take 1 tablet by mouth Daily As Needed for Anxiety or Seizures., Disp: 30 tablet, Rfl: 0  •  digoxin (LANOXIN) 250 MCG tablet, Take 1 tablet by mouth Daily., Disp: 90 tablet, Rfl: 1  •  diltiaZEM CD (CARDIZEM CD) 240 MG 24 hr capsule, TAKE ONE CAPSULE BY MOUTH DAILY, Disp: 90 capsule, Rfl: 0  •  omeprazole (priLOSEC) 20 MG capsule, , Disp: , Rfl:   •  oxyCODONE-acetaminophen (PERCOCET)  MG per tablet, Take 1 tablet by mouth 3 (Three) Times a Day As Needed for Severe Pain ., Disp: 90 tablet, Rfl: 0  •  temazepam (RESTORIL) 15 MG capsule, , Disp: , Rfl:   •  " TiZANidine (ZANAFLEX) 4 MG capsule, Take 1 capsule by mouth 3 (Three) Times a Day As Needed for Muscle Spasms., Disp: 60 capsule, Rfl: 2  •  warfarin (COUMADIN) 2 MG tablet, Take 2 mg by mouth Daily. 7 mg every other day and 8 mg every other, Disp: , Rfl:     Past Medical History:   Diagnosis Date   • Anemia    • Anxiety     chronic   • Atrial fibrillation    • Cancer     non-hodgkins lymphoma   • Cholecystitis    • Colon polyp    • Colon polyp    • Constipation    • GERD (gastroesophageal reflux disease)    • History of ERCP 2005   • Nephrolithiasis     stones removed   • Jaylyn-rectal abscess    • Rectal abscess        Past Surgical History:   Procedure Laterality Date   • CHOLECYSTECTOMY     • COLONOSCOPY  05/2012    3 yr recall   • COLONOSCOPY  09/18/2015    5 yr recall   • KIDNEY STONE SURGERY     • RECTAL SURGERY     • TOTAL HIP ARTHROPLASTY         Social History     Social History   • Marital status:      Social History Main Topics   • Smoking status: Never Smoker   • Smokeless tobacco: Current User     Types: Snuff      Comment: occasional use    • Alcohol use No   • Drug use: No   • Sexual activity: Defer     Other Topics Concern   • Not on file       Family History   Problem Relation Age of Onset   • Colon cancer Mother    • No Known Problems Father    • Prostate cancer Brother    • No Known Problems Daughter    • No Known Problems Son    • No Known Problems Maternal Grandmother    • No Known Problems Maternal Grandfather    • No Known Problems Paternal Grandmother    • No Known Problems Paternal Grandfather    • Prostate cancer Brother          Temp 97.8 °F (36.6 °C)   Ht 182.9 cm (72\")   Wt 85.3 kg (188 lb)   BMI 25.50 kg/m²       Physical Exam  Alert and oriented ×3  Not agitated or distressed  No obvious deformities  No respiratory distress  Skin without pallor or diaphoresis  KOSTAS: Benign feeling prostate without nodule approximately 15 mL in size.   Penis and testicles are normal "     Data  Results for orders placed or performed in visit on 03/12/18   POC Urinalysis Dipstick, Automated   Result Value Ref Range    Color Yellow Yellow, Straw, Dark Yellow, Janette    Clarity, UA Clear Clear    Glucose, UA Negative Negative, 1000 mg/dL (3+) mg/dL    Bilirubin Negative Negative    Ketones, UA Negative Negative    Specific Gravity  1.025 1.005 - 1.030    Blood, UA Moderate (A) Negative    pH, Urine 5.5 5.0 - 8.0    Protein, POC Negative Negative mg/dL    Urobilinogen, UA Normal Normal    Leukocytes Negative Negative    Nitrite, UA Negative Negative         Imaging Results (last 7 days)     ** No results found for the last 168 hours. **            Assessment and Plan  Diagnoses and all orders for this visit:    BPH with urinary obstruction  -     POC Urinalysis Dipstick, Automated    Renal calculus  -     XR Abdomen KUB    #1. It is explained the benign prostatic hyperplasia is a chronic urologic condition.  His voiding symptoms are stable on his current regimen. He has remained infection free since his last visit. He has no evidence of progression. We discussed symptoms that would be worrisome of either of these. We talked about the importance of being seen if he develops gross hematuria, burning with urination, or episodes that may be worrisome for inability to empty the bladder. We talked about medications that may increase the risk of urinary retention especially over the counter decongestants. This chronic issue appears stable overall. We will continue to monitor this with history, exam, urinalysis. Any suggestion of progression will require further work-up.     #2. KuB to follow stones.         West Jeff MD  03/12/18  12:11 PM      Cc: Sunita Crawford MD  Cc: Paul Rachel MD

## 2018-03-19 ENCOUNTER — OFFICE VISIT (OUTPATIENT)
Dept: FAMILY MEDICINE CLINIC | Facility: CLINIC | Age: 78
End: 2018-03-19

## 2018-03-19 ENCOUNTER — ANTICOAGULATION VISIT (OUTPATIENT)
Dept: FAMILY MEDICINE CLINIC | Facility: CLINIC | Age: 78
End: 2018-03-19

## 2018-03-19 VITALS
RESPIRATION RATE: 18 BRPM | HEIGHT: 72 IN | OXYGEN SATURATION: 98 % | DIASTOLIC BLOOD PRESSURE: 84 MMHG | WEIGHT: 186.2 LBS | HEART RATE: 62 BPM | SYSTOLIC BLOOD PRESSURE: 140 MMHG | TEMPERATURE: 97.9 F | BODY MASS INDEX: 25.22 KG/M2

## 2018-03-19 DIAGNOSIS — G89.29 CHRONIC PAIN OF RIGHT HIP: Primary | ICD-10-CM

## 2018-03-19 DIAGNOSIS — M25.551 CHRONIC PAIN OF RIGHT HIP: Primary | ICD-10-CM

## 2018-03-19 DIAGNOSIS — I48.91 ATRIAL FIBRILLATION, UNSPECIFIED TYPE (HCC): ICD-10-CM

## 2018-03-19 DIAGNOSIS — K21.9 GASTROESOPHAGEAL REFLUX DISEASE WITHOUT ESOPHAGITIS: ICD-10-CM

## 2018-03-19 DIAGNOSIS — I48.20 CHRONIC ATRIAL FIBRILLATION (HCC): ICD-10-CM

## 2018-03-19 LAB — INR PPP: 2.5 (ref 0.9–1.1)

## 2018-03-19 PROCEDURE — 99214 OFFICE O/P EST MOD 30 MIN: CPT | Performed by: FAMILY MEDICINE

## 2018-03-19 PROCEDURE — 85610 PROTHROMBIN TIME: CPT | Performed by: FAMILY MEDICINE

## 2018-03-19 RX ORDER — OXYCODONE AND ACETAMINOPHEN 10; 325 MG/1; MG/1
1 TABLET ORAL 3 TIMES DAILY PRN
Qty: 90 TABLET | Refills: 0 | Status: SHIPPED | OUTPATIENT
Start: 2018-03-19 | End: 2018-04-19 | Stop reason: SDUPTHER

## 2018-03-19 RX ORDER — DILTIAZEM HYDROCHLORIDE 240 MG/1
240 CAPSULE, COATED, EXTENDED RELEASE ORAL DAILY
Qty: 90 CAPSULE | Refills: 1 | Status: SHIPPED | OUTPATIENT
Start: 2018-03-19 | End: 2018-09-14 | Stop reason: SDUPTHER

## 2018-03-19 NOTE — PROGRESS NOTES
Subjective cc: right hip pain   Cabrera Avina is a 77 y.o. male who presents for chronic pain medication refills for his right hip.  Patient reports that he has been doing well. He denies any changes over the past month.  His pain is manageable with the current dose of medication. He is able to remain active.  He continues to take the Percocet BID, occasionally TID if he is really active that day.  He denies any side effects.  He has a h/o non-hodgkins lymphoma and is followed by oncology, dr conception. Note reviewed today.     Patient has atrial fibrillation since 2004.  He is treated with coumadin and managed by this office. His INR has been going up and down and we have been monitoring it closely. Patient has been taking 8MG daily - takes 7MG about every 4th day.  INR is therapeutic currently. Patient denies any bleeding problems.     Sees Dr Miguel for GI for GERD.     He sees Dr Jeff for urology.     Pain   This is a chronic problem. The current episode started more than 1 year ago. The problem occurs constantly. The problem has been gradually worsening. Associated symptoms include arthralgias (right hip). Pertinent negatives include no abdominal pain, anorexia, change in bowel habit, chest pain, chills, congestion, coughing, diaphoresis, fatigue, fever, headaches, joint swelling, nausea, neck pain, numbness, rash, sore throat, urinary symptoms, vomiting or weakness. The symptoms are aggravated by walking, standing and exertion. He has tried oral narcotics (patient gets stiff if sitting for long periods of time ) for the symptoms. The treatment provided moderate relief.   Hip Pain    Incident onset: no specific injury - s/p right hip replacement.  There was no injury mechanism. The pain is present in the right hip. The quality of the pain is described as aching. The pain is at a severity of 8/10. The pain is severe. The pain has been constant since onset. Associated symptoms include a loss of motion.  "Pertinent negatives include no inability to bear weight, loss of sensation, muscle weakness, numbness or tingling. Foreign body present: artificial hip. The symptoms are aggravated by movement and weight bearing. Treatments tried: percocet. The treatment provided moderate relief.        The following portions of the patient's history were reviewed and updated as appropriate: allergies, current medications, past family history, past medical history, past social history, past surgical history and problem list.        Review of Systems   Constitutional: Negative for activity change, appetite change, chills, diaphoresis, fatigue, fever and unexpected weight change.   HENT: Negative for congestion and sore throat.    Respiratory: Negative for cough, shortness of breath and wheezing.    Cardiovascular: Negative for chest pain.   Gastrointestinal: Negative for abdominal pain, anal bleeding, anorexia, blood in stool, change in bowel habit, constipation, diarrhea, nausea and vomiting.        H/o GERD - no current problems   Genitourinary: Negative for decreased urine volume, difficulty urinating, frequency and hematuria.   Musculoskeletal: Positive for arthralgias (right hip) and gait problem. Negative for back pain, joint swelling and neck pain.   Skin: Negative for rash.   Neurological: Negative for tingling, weakness, numbness and headaches.   Hematological: Negative for adenopathy.   Psychiatric/Behavioral: The patient is nervous/anxious.    All other systems reviewed and are negative.      Objective   Blood pressure 140/84, pulse 62, temperature 97.9 °F (36.6 °C), temperature source Oral, resp. rate 18, height 182.9 cm (72\"), weight 84.5 kg (186 lb 3.2 oz), SpO2 98 %.  Physical Exam   Constitutional: He is oriented to person, place, and time. Vital signs are normal. He appears well-developed and well-nourished. He is active and cooperative. No distress.   HENT:   Head: Normocephalic and atraumatic.   Right Ear: External " ear normal.   Left Ear: External ear normal.   Nose: Nose normal.   Mouth/Throat: Oropharynx is clear and moist. Abnormal dentition. No oropharyngeal exudate.   Eyes: Conjunctivae and EOM are normal. Right eye exhibits no discharge. Left eye exhibits no discharge.   Neck: Normal range of motion. No tracheal deviation present. No thyromegaly present.   Cardiovascular: Normal rate.  An irregularly irregular rhythm present.   Pulmonary/Chest: Effort normal and breath sounds normal. No stridor. No respiratory distress. He has no wheezes. He has no rales.   Abdominal: Soft. Bowel sounds are normal. He exhibits no distension. There is no tenderness.   Musculoskeletal: He exhibits tenderness. He exhibits no edema.   Significantly limited ROM in right hip, pain with movement, walks with limp favoring right hip, difficulty changing positions and getting onto the exam table.    Lymphadenopathy:     He has no cervical adenopathy.   Neurological: He is alert and oriented to person, place, and time.   Skin: Skin is warm and dry. He is not diaphoretic.   Psychiatric: He has a normal mood and affect. His behavior is normal. Judgment and thought content normal.   Nursing note and vitals reviewed.      Assessment/Plan   Problems Addressed this Visit        Cardiovascular and Mediastinum    Atrial fibrillation    Relevant Medications    diltiaZEM CD (CARDIZEM CD) 240 MG 24 hr capsule    Other Relevant Orders    Protime-INR, Fingerstick       Digestive    Gastroesophageal reflux disease without esophagitis       Nervous and Auditory    Chronic hip pain - Primary      Other Visit Diagnoses    None.          PLAN:     #1 anxiety: continue Celexa.  Patient will continue to take Temazepam QHS PRN - does not have to take it very often. Refill not needed today.     #2 chronic right hip pain: Nii appropriate - in chart and reviewed.  Will continue current dose of Percocet. Patient signed controlled medication contract previously. Advised  he has to come to office every month to get medication and have re-evaluation. Advised that if his pain medication requirement increases I will not be managing this and will refer him to pain management at that time. Patient is in agreement. Patient to drop rx off at pharmacy even though he is not completely out of medication yet. Refill given today.     #3 GERD: currently controlled with diet, will restart medication if patient needs it in the future    #4 Lymphoma: follows with oncology, reviewed labs that he has done there today.     #5 Atrial fibrillation: INR therapeutic. currently in a fib, rate controlled, on coumadin - managed in this office, patient to continue current medications, advised on importance of compliance, advised on warning signs    Patient's BMI is above normal parameters. Follow-up plan includes:  exercise counseling and nutrition counseling.    Screening: Reviewed labs in office today, normal.  Colonoscopy UTD - repeat in 2019.           This document has been electronically signed by Sunita Crawford MD on March 19, 2018 12:44 PM

## 2018-04-19 ENCOUNTER — OFFICE VISIT (OUTPATIENT)
Dept: FAMILY MEDICINE CLINIC | Facility: CLINIC | Age: 78
End: 2018-04-19

## 2018-04-19 VITALS
RESPIRATION RATE: 14 BRPM | OXYGEN SATURATION: 97 % | WEIGHT: 185 LBS | HEIGHT: 72 IN | DIASTOLIC BLOOD PRESSURE: 70 MMHG | BODY MASS INDEX: 25.06 KG/M2 | TEMPERATURE: 98.2 F | HEART RATE: 62 BPM | SYSTOLIC BLOOD PRESSURE: 124 MMHG

## 2018-04-19 DIAGNOSIS — G89.4 CHRONIC PAIN SYNDROME: Primary | ICD-10-CM

## 2018-04-19 DIAGNOSIS — F41.9 ANXIETY: ICD-10-CM

## 2018-04-19 DIAGNOSIS — G89.29 CHRONIC PAIN OF RIGHT HIP: ICD-10-CM

## 2018-04-19 DIAGNOSIS — I48.91 ATRIAL FIBRILLATION, UNSPECIFIED TYPE (HCC): ICD-10-CM

## 2018-04-19 DIAGNOSIS — M25.551 CHRONIC PAIN OF RIGHT HIP: ICD-10-CM

## 2018-04-19 DIAGNOSIS — C85.90 LYMPHOMA IN REMISSION (HCC): ICD-10-CM

## 2018-04-19 DIAGNOSIS — K21.9 GASTROESOPHAGEAL REFLUX DISEASE WITHOUT ESOPHAGITIS: ICD-10-CM

## 2018-04-19 LAB — INR PPP: 2.7 (ref 0.9–1.1)

## 2018-04-19 PROCEDURE — 99213 OFFICE O/P EST LOW 20 MIN: CPT | Performed by: FAMILY MEDICINE

## 2018-04-19 PROCEDURE — 85610 PROTHROMBIN TIME: CPT | Performed by: FAMILY MEDICINE

## 2018-04-19 RX ORDER — OXYCODONE AND ACETAMINOPHEN 10; 325 MG/1; MG/1
1 TABLET ORAL 3 TIMES DAILY PRN
Qty: 90 TABLET | Refills: 0 | Status: SHIPPED | OUTPATIENT
Start: 2018-04-19 | End: 2018-05-10 | Stop reason: SDUPTHER

## 2018-04-19 NOTE — PROGRESS NOTES
Subjective cc: right hip pain   Cabrera Avina is a 77 y.o. male who presents for chronic pain medication refills for his right hip.  He denies any changes over the past month, doing well.  His pain is manageable with the current dose of medication. Occasionally gets muscle spasms for which he previously took a muscle relaxer. He is able to remain active.  He continues to take the Percocet TID.  He denies any side effects.  He has a h/o non-hodgkins lymphoma and is followed by oncology, dr macias. His last dose of pain medication was this AM     Patient has atrial fibrillation since 2004.  He is treated with coumadin and managed by this office.. Patient has been taking 8MG daily - takes 7MG about every 4th day.  INR is therapeutic currently at 2.7. Patient denies any bleeding problems.     Patient wants to know if he can come here to get B12 injections. He is currently getting them at dr macias office.     Sees Dr Miguel for GI for GERD.     He sees Dr Jeff for urology.     Hip Pain    Incident onset: no specific injury - s/p right hip replacement.  There was no injury mechanism. The pain is present in the right hip. The quality of the pain is described as aching. The pain is at a severity of 8/10. The pain is severe. The pain has been constant since onset. Associated symptoms include a loss of motion. Pertinent negatives include no inability to bear weight, loss of sensation, muscle weakness, numbness or tingling. Foreign body present: artificial hip. The symptoms are aggravated by movement and weight bearing. Treatments tried: percocet. The treatment provided moderate relief.   Pain   This is a chronic problem. The current episode started more than 1 year ago. The problem occurs constantly. The problem has been gradually worsening. Associated symptoms include arthralgias (right hip). Pertinent negatives include no abdominal pain, anorexia, change in bowel habit, chest pain, chills, congestion, coughing,  "diaphoresis, fatigue, fever, headaches, joint swelling, nausea, neck pain, numbness, rash, sore throat, urinary symptoms, vomiting or weakness. The symptoms are aggravated by walking, standing and exertion. He has tried oral narcotics (patient gets stiff if sitting for long periods of time ) for the symptoms. The treatment provided moderate relief.        The following portions of the patient's history were reviewed and updated as appropriate: allergies, current medications, past family history, past medical history, past social history, past surgical history and problem list.        Review of Systems   Constitutional: Negative for activity change, appetite change, chills, diaphoresis, fatigue, fever and unexpected weight change.   HENT: Negative for congestion and sore throat.    Respiratory: Negative for cough, shortness of breath and wheezing.    Cardiovascular: Negative for chest pain.   Gastrointestinal: Negative for abdominal pain, anal bleeding, anorexia, blood in stool, change in bowel habit, constipation, diarrhea, nausea and vomiting.        H/o GERD - no current problems   Genitourinary: Negative for decreased urine volume, difficulty urinating, frequency and hematuria.   Musculoskeletal: Positive for arthralgias (right hip) and gait problem. Negative for back pain, joint swelling and neck pain.   Skin: Negative for rash.   Neurological: Negative for tingling, weakness, numbness and headaches.   Hematological: Negative for adenopathy.   Psychiatric/Behavioral: The patient is nervous/anxious.    All other systems reviewed and are negative.      Objective   Blood pressure 124/70, pulse 62, temperature 98.2 °F (36.8 °C), temperature source Oral, resp. rate 14, height 182.9 cm (72\"), weight 83.9 kg (185 lb), SpO2 97 %.  Physical Exam   Constitutional: He is oriented to person, place, and time. Vital signs are normal. He appears well-developed and well-nourished. He is active and cooperative. No distress. "   HENT:   Head: Normocephalic and atraumatic.   Right Ear: External ear normal.   Left Ear: External ear normal.   Nose: Nose normal.   Mouth/Throat: Oropharynx is clear and moist. Abnormal dentition. No oropharyngeal exudate.   Eyes: Conjunctivae and EOM are normal. Right eye exhibits no discharge. Left eye exhibits no discharge.   Neck: Normal range of motion. No tracheal deviation present. No thyromegaly present.   Cardiovascular: Normal rate.  An irregularly irregular rhythm present.   Pulmonary/Chest: Effort normal and breath sounds normal. No stridor. No respiratory distress. He has no wheezes. He has no rales.   Abdominal: Soft. Bowel sounds are normal. He exhibits no distension. There is no tenderness.   Musculoskeletal: He exhibits tenderness. He exhibits no edema.   Significantly limited ROM in right hip, pain with movement, walks with limp favoring right hip, difficulty changing positions and getting onto the exam table.    Lymphadenopathy:     He has no cervical adenopathy.   Neurological: He is alert and oriented to person, place, and time.   Skin: Skin is warm and dry. He is not diaphoretic.   Psychiatric: He has a normal mood and affect. His behavior is normal. Judgment and thought content normal.   Nursing note and vitals reviewed.      Assessment/Plan   Problems Addressed this Visit        Cardiovascular and Mediastinum    Atrial fibrillation    Relevant Orders    POC Protime / INR (Completed)       Digestive    Gastroesophageal reflux disease without esophagitis       Nervous and Auditory    Chronic hip pain       Hematopoietic and Hemostatic    Lymphoma in remission       Other    Anxiety      Other Visit Diagnoses     Chronic pain syndrome    -  Primary    Relevant Orders    ToxASSURE Select 13 (MW) - Urine, Clean Catch           PLAN:     #1 anxiety: continue Celexa.  Patient will continue to take Temazepam QHS PRN.     #2 chronic right hip pain: Nii appropriate - in chart and reviewed.  Will  continue current dose of Percocet. Patient signed controlled medication contract previously. Advised he has to come to office every month to get medication and have re-evaluation. Advised that if his pain medication requirement increases I will not be managing this and will refer him to pain management at that time. Patient is in agreement.  Refill given today. Call for refill of muscle relaxer if needed - discussed risks, benefits, and alternatives to these treatments - should not drive if taking muscle relaxer.    #3 GERD: currently controlled with diet, will restart medication if patient needs it in the future    #4 Lymphoma: follows with oncology. Have order from dr macias sent for b12 if needed.     #5 Atrial fibrillation: INR therapeutic at 2.7. currently in a fib, rate controlled, on coumadin - managed in this office, patient to continue current medications, advised on importance of compliance, advised on warning signs of bleeding.    Patient's Body mass index is 25.09 kg/m². BMI is within normal parameters. No follow-up required.    Screening: labs previously normal.  Colonoscopy UTD - repeat in 2019. Need to discuss vaccines at next visit.           This document has been electronically signed by Sunita Crawford MD on April 19, 2018 11:47 AM

## 2018-04-26 LAB — DRUGS UR: NORMAL

## 2018-05-03 ENCOUNTER — ANTICOAGULATION VISIT (OUTPATIENT)
Dept: FAMILY MEDICINE CLINIC | Facility: CLINIC | Age: 78
End: 2018-05-03

## 2018-05-03 DIAGNOSIS — I50.9 CONGESTIVE HEART FAILURE, UNSPECIFIED CONGESTIVE HEART FAILURE CHRONICITY, UNSPECIFIED CONGESTIVE HEART FAILURE TYPE: Primary | ICD-10-CM

## 2018-05-03 LAB — INR PPP: 2.6 (ref 0.9–1.1)

## 2018-05-03 PROCEDURE — 85610 PROTHROMBIN TIME: CPT | Performed by: FAMILY MEDICINE

## 2018-05-10 ENCOUNTER — OFFICE VISIT (OUTPATIENT)
Dept: FAMILY MEDICINE CLINIC | Facility: CLINIC | Age: 78
End: 2018-05-10

## 2018-05-10 VITALS
BODY MASS INDEX: 24.6 KG/M2 | HEIGHT: 72 IN | HEART RATE: 67 BPM | TEMPERATURE: 98.4 F | OXYGEN SATURATION: 98 % | WEIGHT: 181.6 LBS | SYSTOLIC BLOOD PRESSURE: 136 MMHG | DIASTOLIC BLOOD PRESSURE: 72 MMHG

## 2018-05-10 DIAGNOSIS — I48.20 CHRONIC ATRIAL FIBRILLATION (HCC): ICD-10-CM

## 2018-05-10 DIAGNOSIS — G47.09 OTHER INSOMNIA: ICD-10-CM

## 2018-05-10 DIAGNOSIS — F41.9 ANXIETY: ICD-10-CM

## 2018-05-10 DIAGNOSIS — G89.29 CHRONIC PAIN OF RIGHT HIP: Primary | ICD-10-CM

## 2018-05-10 DIAGNOSIS — M25.551 CHRONIC PAIN OF RIGHT HIP: Primary | ICD-10-CM

## 2018-05-10 PROCEDURE — 99213 OFFICE O/P EST LOW 20 MIN: CPT | Performed by: FAMILY MEDICINE

## 2018-05-10 RX ORDER — TEMAZEPAM 15 MG/1
15 CAPSULE ORAL NIGHTLY PRN
Qty: 30 CAPSULE | Refills: 2 | Status: SHIPPED | OUTPATIENT
Start: 2018-05-10 | End: 2018-09-14 | Stop reason: SDUPTHER

## 2018-05-10 RX ORDER — OXYCODONE AND ACETAMINOPHEN 10; 325 MG/1; MG/1
1 TABLET ORAL 3 TIMES DAILY PRN
Qty: 90 TABLET | Refills: 0 | Status: SHIPPED | OUTPATIENT
Start: 2018-05-10 | End: 2018-06-12 | Stop reason: SDUPTHER

## 2018-05-10 NOTE — PROGRESS NOTES
Subjective cc: tingling in lower ext/chronic pain   Cabrera Avina is a 78 y.o. male who present for refills on chronic pain medication. Patients pain is the same as last time, right hip is worse, it does go into his back.     Patient reports pain and tingling in his lower ext, below the knee, has been going on for a couple months, does not seem to be worsening, percocet helps it. Extreme activity makes it worse. Patient is not wanting to do anything about it at this time.      Hip Pain    The incident occurred more than 1 week ago. There was no injury mechanism. The pain is present in the right hip. The quality of the pain is described as aching. Pain scale: can get up to 8-9, currently 0, had percocet this AM. The patient is experiencing no pain. The pain has been constant since onset. Associated symptoms include a loss of motion and tingling. Pertinent negatives include no inability to bear weight, loss of sensation, muscle weakness or numbness. Foreign body present: artificial hip  The symptoms are aggravated by movement and weight bearing. He has tried ice, rest and NSAIDs (percocet ) for the symptoms. The treatment provided significant relief.        The following portions of the patient's history were reviewed and updated as appropriate: allergies, current medications, past family history, past medical history, past social history, past surgical history and problem list.        Review of Systems   Constitutional: Negative for activity change, appetite change, chills, fatigue, fever and unexpected weight change.   Respiratory: Negative for cough, chest tightness, shortness of breath and wheezing.    Cardiovascular: Negative for chest pain and palpitations.   Gastrointestinal: Negative for blood in stool and constipation.   Musculoskeletal: Positive for arthralgias, back pain and gait problem.   Skin: Negative for color change and wound.   Neurological: Positive for tingling. Negative for numbness.  "  Hematological: Bruises/bleeds easily (no signs of active bleeding ).   Psychiatric/Behavioral: Negative for agitation and behavioral problems. The patient is nervous/anxious.        Objective   Blood pressure 136/72, pulse 67, temperature 98.4 °F (36.9 °C), height 182.9 cm (72.01\"), weight 82.4 kg (181 lb 9.6 oz), SpO2 98 %.  Physical Exam   Constitutional: He is oriented to person, place, and time. Vital signs are normal. He appears well-developed and well-nourished. He is active and cooperative. No distress.   HENT:   Head: Normocephalic and atraumatic.   Right Ear: External ear normal.   Left Ear: External ear normal.   Nose: Nose normal.   Mouth/Throat: Oropharynx is clear and moist. Abnormal dentition. No oropharyngeal exudate.   Eyes: Conjunctivae and EOM are normal. Right eye exhibits no discharge. Left eye exhibits no discharge.   Neck: Normal range of motion. No tracheal deviation present. No thyromegaly present.   Cardiovascular: Normal rate.  An irregularly irregular rhythm present.   Pulmonary/Chest: Effort normal and breath sounds normal. No stridor. No respiratory distress. He has no wheezes. He has no rales.   Abdominal: Soft. Bowel sounds are normal. He exhibits no distension. There is no tenderness.   Musculoskeletal: He exhibits tenderness. He exhibits no edema.   Significantly limited ROM in right hip, pain with movement, walks with limp favoring right hip, difficulty changing positions and getting onto the exam table.    Lymphadenopathy:     He has no cervical adenopathy.   Neurological: He is alert and oriented to person, place, and time.   Skin: Skin is warm and dry. He is not diaphoretic.   Psychiatric: He has a normal mood and affect. His behavior is normal. Judgment and thought content normal.   Nursing note and vitals reviewed.      Assessment/Plan   Problems Addressed this Visit        Cardiovascular and Mediastinum    Atrial fibrillation       Nervous and Auditory    Chronic hip pain - " Primary       Other    Anxiety    Other insomnia        Nii:     #1 Chronic right hip pain: Nii reviewed #[id number], controlled contract in chart, refill given on medication, remain active     #2 neuropathy: discussed options of treatment with other medications. Patient reports it is not to the point that he wants to do anything about it at this time.     #3 insomnia: continue on restoril PRN, discussed sleep hygiene.     #4 atrial fib: rate controlled, continue on coumadin, discussed INR range.           This document has been electronically signed by Sunita Crawford MD on May 22, 2018 8:45 AM

## 2018-05-21 RX ORDER — CITALOPRAM 20 MG/1
TABLET ORAL
Qty: 90 TABLET | Refills: 0 | Status: SHIPPED | OUTPATIENT
Start: 2018-05-21 | End: 2018-06-11 | Stop reason: ALTCHOICE

## 2018-05-22 PROBLEM — G47.09 OTHER INSOMNIA: Status: ACTIVE | Noted: 2018-05-22

## 2018-05-29 ENCOUNTER — ANTICOAGULATION VISIT (OUTPATIENT)
Dept: FAMILY MEDICINE CLINIC | Facility: CLINIC | Age: 78
End: 2018-05-29

## 2018-05-29 LAB — INR PPP: 2.6 (ref 0.9–1.1)

## 2018-05-29 PROCEDURE — 85610 PROTHROMBIN TIME: CPT | Performed by: FAMILY MEDICINE

## 2018-06-11 ENCOUNTER — OFFICE VISIT (OUTPATIENT)
Dept: CARDIOLOGY | Facility: CLINIC | Age: 78
End: 2018-06-11

## 2018-06-11 VITALS
DIASTOLIC BLOOD PRESSURE: 72 MMHG | HEART RATE: 48 BPM | WEIGHT: 178 LBS | OXYGEN SATURATION: 98 % | BODY MASS INDEX: 24.11 KG/M2 | HEIGHT: 72 IN | SYSTOLIC BLOOD PRESSURE: 124 MMHG

## 2018-06-11 DIAGNOSIS — I48.20 CHRONIC ATRIAL FIBRILLATION (HCC): Primary | ICD-10-CM

## 2018-06-11 PROCEDURE — 99213 OFFICE O/P EST LOW 20 MIN: CPT | Performed by: INTERNAL MEDICINE

## 2018-06-11 PROCEDURE — 93000 ELECTROCARDIOGRAM COMPLETE: CPT | Performed by: INTERNAL MEDICINE

## 2018-06-11 RX ORDER — FLUDROCORTISONE ACETATE 0.1 MG/1
0.1 TABLET ORAL DAILY
COMMUNITY
End: 2018-07-31

## 2018-06-11 RX ORDER — ESCITALOPRAM OXALATE 10 MG/1
10 TABLET ORAL DAILY
COMMUNITY
End: 2019-02-11 | Stop reason: SDDI

## 2018-06-11 NOTE — PROGRESS NOTES
Subjective:     Encounter Date: 06/11/2018      Patient ID: Cabrera Avina is a 78 y.o. male with atrial fibrillation who presents today for follow-up.    Chief Complaint: Follow-up    Atrial Fibrillation   Presents for follow-up visit. Symptoms include bradycardia. Symptoms are negative for chest pain, dizziness, hemodynamic instability, hypotension, palpitations, shortness of breath, syncope, tachycardia and weakness. The symptoms have been stable. Past medical history includes atrial fibrillation. There are no medication compliance problems.     This patient presents today for follow-up.  He has a history of atrial fibrillation.  The patient remains in atrial fibrillation and is chronically anticoagulated.  He has not had any difficulty with any of his medications.  He denies any significant palpitations.  The patient has some generalized fatigue that is intermittent.  The patient says that he is also lost some weight over the past 12 months.  He says that over his lifetime, he has done this periodically.  He has not had any other significant changes in his health.  He does have some chronic right hip pain.  The patient denies chest pain, shortness of breath, dyspnea on exertion.  He also denies orthopnea, PND, edema, lightheadedness, dizziness, syncope.      Current Outpatient Prescriptions:   •  aspirin 81 MG EC tablet, Take 81 mg by mouth daily., Disp: , Rfl:   •  digoxin (LANOXIN) 250 MCG tablet, Take 1 tablet by mouth Daily., Disp: 90 tablet, Rfl: 1  •  diltiaZEM CD (CARDIZEM CD) 240 MG 24 hr capsule, Take 1 capsule by mouth Daily., Disp: 90 capsule, Rfl: 1  •  escitalopram (LEXAPRO) 10 MG tablet, Take 10 mg by mouth Daily., Disp: , Rfl:   •  fludrocortisone 0.1 MG tablet, Take 0.1 mg by mouth Daily., Disp: , Rfl:   •  oxyCODONE-acetaminophen (PERCOCET)  MG per tablet, Take 1 tablet by mouth 3 (Three) Times a Day As Needed for Severe Pain ., Disp: 90 tablet, Rfl: 0  •  temazepam (RESTORIL) 15 MG  capsule, Take 1 capsule by mouth At Night As Needed for Sleep., Disp: 30 capsule, Rfl: 2  •  warfarin (COUMADIN) 2 MG tablet, Take 2 mg by mouth Daily. 7 mg every other day and 8 mg every other, Disp: , Rfl:     No Known Allergies    Social History   Substance Use Topics   • Smoking status: Never Smoker   • Smokeless tobacco: Current User     Types: Snuff      Comment: occasional use    • Alcohol use No     Review of Systems   Constitution: Positive for malaise/fatigue and weight loss. Negative for chills, fever, weakness and night sweats.   Cardiovascular: Negative for chest pain, claudication, dyspnea on exertion, irregular heartbeat, leg swelling, orthopnea, palpitations, paroxysmal nocturnal dyspnea and syncope.   Respiratory: Negative for cough, hemoptysis, shortness of breath and wheezing.    Hematologic/Lymphatic: Negative for bleeding problem. Does not bruise/bleed easily.   Musculoskeletal: Positive for arthritis and joint pain.   Gastrointestinal: Negative for abdominal pain, hematemesis, hematochezia, melena, nausea and vomiting.   Genitourinary: Negative for dysuria and hematuria.   Neurological: Negative for dizziness, headaches, loss of balance, numbness, paresthesias and seizures.         ECG 12 Lead  Date/Time: 6/11/2018 3:15 PM  Performed by: ARMAND LARA  Authorized by: ARMAND LARA   Comparison: compared with previous ECG from 6/7/2017  Comparison to previous ECG: PVCs now present  Rhythm: atrial fibrillation  Ectopy: PVCs  Rate: normal  BPM: 61  QRS axis: normal  Other findings: PRWP  Clinical impression: abnormal ECG and dysrhythmia - atrial  Comments: NSTT             Objective:     Physical Exam   Constitutional: He is oriented to person, place, and time. He appears well-developed and well-nourished. No distress.   HENT:   Head: Normocephalic and atraumatic.   Mouth/Throat: Oropharynx is clear and moist.   Eyes: EOM are normal. Pupils are equal, round, and reactive to  "light.   Neck: Normal range of motion. Neck supple. No JVD present. No thyromegaly present.   Cardiovascular: Normal rate, S1 normal, S2 normal, normal heart sounds and intact distal pulses.  An irregularly irregular rhythm present. Exam reveals no gallop and no friction rub.    No murmur heard.  Pulmonary/Chest: Effort normal and breath sounds normal.   Abdominal: Soft. Bowel sounds are normal. He exhibits no distension. There is no tenderness.   Musculoskeletal: Normal range of motion. He exhibits no edema.   Neurological: He is alert and oriented to person, place, and time. No cranial nerve deficit.   Skin: Skin is warm and dry. No rash noted. No cyanosis or erythema. Nails show no clubbing.   Psychiatric: He has a normal mood and affect.   Vitals reviewed.    /72 (BP Location: Left arm, Patient Position: Sitting)   Pulse (!) 48   Ht 182.9 cm (72\")   Wt 80.7 kg (178 lb)   SpO2 98%   BMI 24.14 kg/m²     Lab Review:     Lab Results   Component Value Date    INR 2.60 (A) 05/29/2018    INR 2.60 (A) 05/03/2018    INR 2.7 (A) 04/19/2018    PROTIME 22.2 02/28/2018         Assessment:          Diagnosis Plan   1. Chronic atrial fibrillation  ECG 12 Lead          Plan:       1.  Chronic atrial fibrillation: Overall, this patient is clinically stable.  He remains with heart rates well-controlled.  He is chronically anticoagulated.  I did review his most recent INR checks, which are all therapeutic.  No changes at this time.     Follow-up: 12 months unless needed sooner         "

## 2018-06-12 ENCOUNTER — RESULTS ENCOUNTER (OUTPATIENT)
Dept: UROLOGY | Facility: CLINIC | Age: 78
End: 2018-06-12

## 2018-06-12 ENCOUNTER — CLINICAL SUPPORT (OUTPATIENT)
Dept: FAMILY MEDICINE CLINIC | Facility: CLINIC | Age: 78
End: 2018-06-12

## 2018-06-12 ENCOUNTER — OFFICE VISIT (OUTPATIENT)
Dept: FAMILY MEDICINE CLINIC | Facility: CLINIC | Age: 78
End: 2018-06-12

## 2018-06-12 ENCOUNTER — ANTICOAGULATION VISIT (OUTPATIENT)
Dept: FAMILY MEDICINE CLINIC | Facility: CLINIC | Age: 78
End: 2018-06-12

## 2018-06-12 VITALS
HEIGHT: 72 IN | RESPIRATION RATE: 18 BRPM | OXYGEN SATURATION: 99 % | BODY MASS INDEX: 24 KG/M2 | SYSTOLIC BLOOD PRESSURE: 130 MMHG | DIASTOLIC BLOOD PRESSURE: 64 MMHG | HEART RATE: 62 BPM | WEIGHT: 177.2 LBS | TEMPERATURE: 98.1 F

## 2018-06-12 DIAGNOSIS — Z12.5 PROSTATE CANCER SCREENING: ICD-10-CM

## 2018-06-12 DIAGNOSIS — I48.20 ATRIAL FIBRILLATION, CHRONIC (HCC): Primary | ICD-10-CM

## 2018-06-12 DIAGNOSIS — G89.29 CHRONIC PAIN OF RIGHT HIP: Primary | ICD-10-CM

## 2018-06-12 DIAGNOSIS — N40.1 BPH WITH URINARY OBSTRUCTION: ICD-10-CM

## 2018-06-12 DIAGNOSIS — M79.2 PERIPHERAL NEUROPATHIC PAIN: ICD-10-CM

## 2018-06-12 DIAGNOSIS — R63.4 WEIGHT LOSS, UNINTENTIONAL: ICD-10-CM

## 2018-06-12 DIAGNOSIS — M25.551 CHRONIC PAIN OF RIGHT HIP: Primary | ICD-10-CM

## 2018-06-12 DIAGNOSIS — N13.8 BPH WITH URINARY OBSTRUCTION: ICD-10-CM

## 2018-06-12 DIAGNOSIS — I48.20 CHRONIC ATRIAL FIBRILLATION (HCC): ICD-10-CM

## 2018-06-12 LAB — INR PPP: 2.6 (ref 0.9–1.1)

## 2018-06-12 PROCEDURE — 99213 OFFICE O/P EST LOW 20 MIN: CPT | Performed by: FAMILY MEDICINE

## 2018-06-12 PROCEDURE — 85610 PROTHROMBIN TIME: CPT | Performed by: FAMILY MEDICINE

## 2018-06-12 RX ORDER — OXYCODONE AND ACETAMINOPHEN 10; 325 MG/1; MG/1
1 TABLET ORAL 3 TIMES DAILY PRN
Qty: 90 TABLET | Refills: 0 | Status: SHIPPED | OUTPATIENT
Start: 2018-06-12 | End: 2018-07-13 | Stop reason: SDUPTHER

## 2018-06-12 NOTE — PROGRESS NOTES
Subjective CC: right hip pain   Cabrera Avina is a 78 y.o. male who presents for refills on his pain medication.  Patient reports that his right hip pain is the same. The medication allows him to function. No side effects.  He is doing pretty well.  His concerns patient reports unintentional weight loss, no appetite - this has been since he had chemo. Patient eats one meal/day. Patient had non hodkins lymphoma dx in 2005. Patient did chemo x 27 months, 2 weeks of radiation. No appetite since then.  He brings in his labs from oncology - hgb 12.7 hct 38.5.  Sees them every 3 months. No new symptoms. Patient is more active in the summer. Colonoscopy in 2015. Pt sees urology and had a KUB, didn't hear results - reviewed with pt today. Patient did not have his PSA drawn. He has peripheral neuropathy but does not want treatment at this time.     Hip Pain    The incident occurred more than 1 week ago. Injury mechanism: right hip arthoplasty  The pain is present in the right hip. The quality of the pain is described as aching. The pain is at a severity of 8/10. The pain is severe. The pain has been constant since onset. Associated symptoms include a loss of motion and tingling. Pertinent negatives include no inability to bear weight or muscle weakness. The symptoms are aggravated by weight bearing and movement. Treatments tried: percocet. The treatment provided moderate relief.        The following portions of the patient's history were reviewed and updated as appropriate: allergies, current medications, past family history, past medical history, past social history, past surgical history and problem list.        Review of Systems   Constitutional: Positive for activity change (increased) and unexpected weight change. Negative for appetite change, diaphoresis, fatigue and fever.   Respiratory: Negative for cough, chest tightness, shortness of breath and wheezing.    Cardiovascular: Negative for chest pain, palpitations  "and leg swelling.   Gastrointestinal: Negative for abdominal pain, blood in stool, constipation, diarrhea, nausea and vomiting.   Musculoskeletal: Positive for arthralgias and gait problem.   Skin: Negative for wound.   Neurological: Positive for tingling.   Hematological: Bruises/bleeds easily.       Objective   Blood pressure 130/64, pulse 62, temperature 98.1 °F (36.7 °C), temperature source Oral, resp. rate 18, height 182.9 cm (72\"), weight 80.4 kg (177 lb 3.2 oz), SpO2 99 %.  Physical Exam   Constitutional: He is oriented to person, place, and time. Vital signs are normal. He appears well-developed and well-nourished. He is active and cooperative. No distress.   HENT:   Head: Normocephalic and atraumatic.   Right Ear: External ear normal.   Left Ear: External ear normal.   Nose: Nose normal.   Mouth/Throat: Oropharynx is clear and moist. Abnormal dentition. No oropharyngeal exudate.   Eyes: Conjunctivae and EOM are normal. Right eye exhibits no discharge. Left eye exhibits no discharge.   Neck: Normal range of motion. No tracheal deviation present. No thyromegaly present.   Cardiovascular: Normal rate.  An irregularly irregular rhythm present.   Pulmonary/Chest: Effort normal and breath sounds normal. No stridor. No respiratory distress. He has no wheezes. He has no rales.   Abdominal: Soft. Bowel sounds are normal. He exhibits no distension. There is no tenderness.   Musculoskeletal: He exhibits tenderness. He exhibits no edema.   Significantly limited ROM in right hip, pain with movement, walks with limp favoring right hip, difficulty changing positions and getting onto the exam table.    Lymphadenopathy:     He has no cervical adenopathy.   Neurological: He is alert and oriented to person, place, and time.   Skin: Skin is warm and dry. He is not diaphoretic.   Psychiatric: He has a normal mood and affect. His behavior is normal. Judgment and thought content normal.   Nursing note and vitals " reviewed.      Assessment/Plan   Problems Addressed this Visit        Cardiovascular and Mediastinum    Atrial fibrillation       Nervous and Auditory    Chronic hip pain - Primary      Other Visit Diagnoses     Prostate cancer screening        Relevant Orders    PSA SCREENING    Weight loss, unintentional        Peripheral neuropathic pain            PLAN:     #1 atrial fib: rate controlled, INR is WNL, continue current dose    #2 chronic right hip pain: refill on medication given, discussed risks and benefits, UDS UTD, controlled contract in chart, Nii UTD     #3 prostate cancer screen: will get PSA     #4 weight loss: reviewed labs, imaging, colonoscopy, will get PSA, monitor over next month - if continuing to decrease will get chest CT. Could be because pt has a poor appetite and has been more active in the warmer months.     #5 peripheral neuropathy: patient declines treatment at this time, discussed options - pt to notify us if he wants treatment.           This document has been electronically signed by Sunita Crawford MD on June 12, 2018 1:20 PM

## 2018-06-13 LAB — PSA SERPL-MCNC: 1.12 NG/ML (ref 0–4)

## 2018-06-18 DIAGNOSIS — I48.20 CHRONIC ATRIAL FIBRILLATION (HCC): ICD-10-CM

## 2018-06-18 RX ORDER — DILTIAZEM HYDROCHLORIDE 240 MG/1
CAPSULE, COATED, EXTENDED RELEASE ORAL
Qty: 90 CAPSULE | Refills: 0 | OUTPATIENT
Start: 2018-06-18

## 2018-06-19 ENCOUNTER — ANTICOAGULATION VISIT (OUTPATIENT)
Dept: FAMILY MEDICINE CLINIC | Facility: CLINIC | Age: 78
End: 2018-06-19

## 2018-06-19 NOTE — ADDENDUM NOTE
Addended by: TATIANA MERCADO on: 6/19/2018 08:59 AM     Modules accepted: Level of Service, SmartSet

## 2018-06-19 NOTE — ADDENDUM NOTE
Addended by: TATIANA MERCADO on: 6/19/2018 08:58 AM     Modules accepted: Level of Service, SmartSet

## 2018-06-25 DIAGNOSIS — I48.20 CHRONIC ATRIAL FIBRILLATION (HCC): ICD-10-CM

## 2018-06-25 RX ORDER — DIGOXIN 250 MCG
TABLET ORAL
Qty: 90 TABLET | Refills: 1 | Status: SHIPPED | OUTPATIENT
Start: 2018-06-25 | End: 2018-11-13 | Stop reason: SDUPTHER

## 2018-07-03 ENCOUNTER — OFFICE VISIT (OUTPATIENT)
Dept: GASTROENTEROLOGY | Facility: CLINIC | Age: 78
End: 2018-07-03

## 2018-07-03 VITALS
TEMPERATURE: 95.3 F | HEIGHT: 72 IN | OXYGEN SATURATION: 99 % | SYSTOLIC BLOOD PRESSURE: 124 MMHG | HEART RATE: 54 BPM | WEIGHT: 181 LBS | BODY MASS INDEX: 24.52 KG/M2 | DIASTOLIC BLOOD PRESSURE: 60 MMHG

## 2018-07-03 DIAGNOSIS — K21.9 GASTROESOPHAGEAL REFLUX DISEASE WITHOUT ESOPHAGITIS: ICD-10-CM

## 2018-07-03 DIAGNOSIS — K59.01 SLOW TRANSIT CONSTIPATION: Primary | ICD-10-CM

## 2018-07-03 DIAGNOSIS — D12.6 ADENOMATOUS POLYP OF COLON, UNSPECIFIED PART OF COLON: ICD-10-CM

## 2018-07-03 PROCEDURE — 99213 OFFICE O/P EST LOW 20 MIN: CPT | Performed by: INTERNAL MEDICINE

## 2018-07-03 NOTE — PROGRESS NOTES
[facility]    Chief Complaint   Patient presents with   • Follow-up     Patient is here for his yearly follow up for GERD.       Subjective     HPI    Cabrera Avina is a 78 y.o. male who presents with a chief complaint of  Constipation.    He states he is doing the same as he was last year.  He moves his bowels every 4-5 days.  He still taking MiraLAX.  He used to take Perdiem from the yellow can but is not able to find it anymore.  One swallow take a Dulcolax.  He is never been on any prescription medication such as Linzess.  He denies any alarm symptoms.  He has passed no blood or melena.  Bowel habits are unchanged over the last couple years.    Regards to his reflux he tells me he is able to get off PPI therapy.  He denies any heartburn.  He is not having trouble since stopping the PPI.  He does watch what he eats.      Past Medical History:   Diagnosis Date   • Anemia    • Anxiety     chronic   • Atrial fibrillation (CMS/HCC)    • Cancer (CMS/HCC)     non-hodgkins lymphoma   • Cholecystitis    • Colon polyp    • Colon polyp    • Constipation    • GERD (gastroesophageal reflux disease)    • History of ERCP 2005   • Nephrolithiasis     stones removed   • Jaylyn-rectal abscess    • Rectal abscess        Past Surgical History:   Procedure Laterality Date   • CHOLECYSTECTOMY     • COLONOSCOPY  05/2012    3 yr recall   • COLONOSCOPY  09/18/2015    5 yr recall   • KIDNEY STONE SURGERY     • RECTAL SURGERY     • TOTAL HIP ARTHROPLASTY           Current Outpatient Prescriptions:   •  aspirin 81 MG EC tablet, Take 81 mg by mouth daily., Disp: , Rfl:   •  digoxin (LANOXIN) 250 MCG tablet, TAKE ONE TABLET BY MOUTH DAILY, Disp: 90 tablet, Rfl: 1  •  diltiaZEM CD (CARDIZEM CD) 240 MG 24 hr capsule, Take 1 capsule by mouth Daily., Disp: 90 capsule, Rfl: 1  •  escitalopram (LEXAPRO) 10 MG tablet, Take 10 mg by mouth Daily., Disp: , Rfl:   •  fludrocortisone 0.1 MG tablet, Take 0.1 mg by mouth Daily., Disp:  , Rfl:   •  oxyCODONE-acetaminophen (PERCOCET)  MG per tablet, Take 1 tablet by mouth 3 (Three) Times a Day As Needed for Severe Pain ., Disp: 90 tablet, Rfl: 0  •  temazepam (RESTORIL) 15 MG capsule, Take 1 capsule by mouth At Night As Needed for Sleep., Disp: 30 capsule, Rfl: 2  •  warfarin (COUMADIN) 2 MG tablet, Take 2 mg by mouth Daily. 7 mg every other day and 8 mg every other, Disp: , Rfl:   •  linaclotide (LINZESS) 290 MCG capsule capsule, Take 1 capsule by mouth Every Morning Before Breakfast., Disp: 30 capsule, Rfl: 11    No Known Allergies    Social History     Social History   • Marital status:      Spouse name: N/A   • Number of children: N/A   • Years of education: N/A     Occupational History   • Not on file.     Social History Main Topics   • Smoking status: Never Smoker   • Smokeless tobacco: Current User     Types: Snuff   • Alcohol use No   • Drug use: No   • Sexual activity: Defer     Other Topics Concern   • Not on file     Social History Narrative   • No narrative on file       Family History   Problem Relation Age of Onset   • Colon cancer Mother    • No Known Problems Father    • Prostate cancer Brother    • No Known Problems Daughter    • No Known Problems Son    • No Known Problems Maternal Grandmother    • No Known Problems Maternal Grandfather    • No Known Problems Paternal Grandmother    • No Known Problems Paternal Grandfather    • Prostate cancer Brother    • Colon polyps Neg Hx        Review of Systems  General no fever chills or sweats weight stable  Gastrointestinal: Not present-abdominal pain,  diarrhea, dysphagia, hematemesis, melena, odynophagia, nausea, vomiting, pyrosis, regurgitation, hematochezia,    Objective     Vitals:    07/03/18 1340   BP: 124/60   Pulse: 54   Temp: 95.3 °F (35.2 °C)   SpO2: 99%       Physical Exam  No acute distress. Vital signs as documented. Skin warm and dry and without overt rashes. EOMI, sclera anicteric.  Neck without JVD or masses.  Lungs clear to auscultation bilaterally, no rales. Heart exam notable for regular rhythm, normal sounds and absence of loud murmurs, rubs or gallops. Abdomen is soft, nontender, non distended, normal bowel sounds and without evidence of organomegaly, masses, or abdominal aortic enlargement. Extremities nonedematous, no cyanosis. Neuro alert, moves extremities.        Assessment/Plan   Problem List Items Addressed This Visit        Digestive    Slow transit constipation - Primary    Gastroesophageal reflux disease without esophagitis    Adenomatous polyp of colon    Overview     Colonoscopy September 2015 small adenoma removed.  Surveillance colonoscopy recommended in September 2020 if clinically appropriate                 First, in regards to his constipation he is at his baseline.  We talked about trying Linzess.  He would like to give it a try.  I prescribed 290 µg to take daily.  We talked about side effects.  We talked how to use it.  It works well for him he will continue with that.  He does work in a continuing to use MiraLAX and adjust the dose as needed.  I will see him back in the year for follow-up sooner if he has any concerns.    In regards to his reflux is now off PPI therapy.  I reinforced reflux precautions.    Continue ongoing management by primary care provider and other specialists.     Patient's Body mass index is 24.55 kg/m². BMI is within normal parameters. No follow-up required.        EMR Dragon/transcription disclaimer:  Much of this encounter note is electronic transcription/translation of spoken language to printed text.  The electronic translation of spoken language may be erroneous, or at times, nonsensical words or phrases may be inadvertently transcribed.  Although I have reviewed the note for such errors, some may still exist.    Ander Miguel MD  1:25 PM  07/03/18

## 2018-07-12 ENCOUNTER — ANTICOAGULATION VISIT (OUTPATIENT)
Dept: FAMILY MEDICINE CLINIC | Facility: CLINIC | Age: 78
End: 2018-07-12

## 2018-07-12 DIAGNOSIS — I48.91 ATRIAL FIBRILLATION, UNSPECIFIED TYPE (HCC): Primary | ICD-10-CM

## 2018-07-12 LAB — INR PPP: 2.6 (ref 0.9–1.1)

## 2018-07-12 PROCEDURE — 85610 PROTHROMBIN TIME: CPT | Performed by: FAMILY MEDICINE

## 2018-07-13 ENCOUNTER — OFFICE VISIT (OUTPATIENT)
Dept: FAMILY MEDICINE CLINIC | Facility: CLINIC | Age: 78
End: 2018-07-13

## 2018-07-13 VITALS
WEIGHT: 178 LBS | TEMPERATURE: 98 F | DIASTOLIC BLOOD PRESSURE: 80 MMHG | RESPIRATION RATE: 18 BRPM | SYSTOLIC BLOOD PRESSURE: 122 MMHG | OXYGEN SATURATION: 97 % | BODY MASS INDEX: 24.11 KG/M2 | HEART RATE: 64 BPM | HEIGHT: 72 IN

## 2018-07-13 DIAGNOSIS — C85.90 LYMPHOMA IN REMISSION (HCC): ICD-10-CM

## 2018-07-13 DIAGNOSIS — F41.9 ANXIETY: ICD-10-CM

## 2018-07-13 DIAGNOSIS — G89.29 CHRONIC PAIN OF RIGHT HIP: Primary | ICD-10-CM

## 2018-07-13 DIAGNOSIS — M25.551 CHRONIC PAIN OF RIGHT HIP: Primary | ICD-10-CM

## 2018-07-13 DIAGNOSIS — R09.89 DIMINISHED PULSES IN LOWER EXTREMITY: ICD-10-CM

## 2018-07-13 DIAGNOSIS — I48.19 PERSISTENT ATRIAL FIBRILLATION (HCC): ICD-10-CM

## 2018-07-13 DIAGNOSIS — G47.09 OTHER INSOMNIA: ICD-10-CM

## 2018-07-13 PROCEDURE — 99214 OFFICE O/P EST MOD 30 MIN: CPT | Performed by: FAMILY MEDICINE

## 2018-07-13 RX ORDER — OXYCODONE AND ACETAMINOPHEN 10; 325 MG/1; MG/1
1 TABLET ORAL 3 TIMES DAILY PRN
Qty: 90 TABLET | Refills: 0 | Status: SHIPPED | OUTPATIENT
Start: 2018-07-13 | End: 2018-08-10 | Stop reason: SDUPTHER

## 2018-07-13 NOTE — PROGRESS NOTES
Subjective CC: right hip pain   Cabrera Avina is a 78 y.o. male who presents for refills on his pain medication.  Patient reports that his right hip pain is the same. When asked about claudication he does report that his pain is worse after walking. The medication allows him to function. No side effects.  He is doing pretty well.  He was having weight loss previously but this has stayed the same recently. His appetite is better. Patient eats one meal/day. Patient had non hodkins lymphoma dx in 2005. Patient did chemo x 27 months, 2 weeks of radiation.  Sees them every 3 months - follow up in Aug. No new symptoms. Patient is more active in the summer. Colonoscopy in 2015. Pt sees urology and had a KUB. Patient has had good BM recently - he was prescribed Linszess but has not started it.  He has peripheral neuropathy but does not want treatment at this time. It is not as severe as it had been - it is better lately. It is no longer waking him up at night.      Hip Pain    The incident occurred more than 1 week ago. Injury mechanism: right hip arthoplasty  The pain is present in the right hip. The quality of the pain is described as aching. The pain is at a severity of 8/10. The pain is severe. The pain has been constant since onset. Associated symptoms include a loss of motion and tingling. Pertinent negatives include no inability to bear weight or muscle weakness. The symptoms are aggravated by weight bearing and movement. Treatments tried: percocet. The treatment provided moderate relief.   Pain   Associated symptoms include arthralgias. Pertinent negatives include no abdominal pain, chest pain, coughing, diaphoresis, fatigue, fever, nausea or vomiting.   Atrial Fibrillation   Symptoms are negative for chest pain, palpitations and shortness of breath. Past medical history includes atrial fibrillation.        The following portions of the patient's history were reviewed and updated as appropriate: allergies, current  "medications, past family history, past medical history, past social history, past surgical history and problem list.        Review of Systems   Constitutional: Negative for activity change, appetite change, diaphoresis, fatigue, fever and unexpected weight change (stable).   Respiratory: Negative for cough, chest tightness, shortness of breath and wheezing.    Cardiovascular: Negative for chest pain, palpitations and leg swelling.   Gastrointestinal: Negative for abdominal pain, blood in stool, constipation, diarrhea, nausea and vomiting.   Musculoskeletal: Positive for arthralgias and gait problem.   Skin: Negative for wound.   Neurological: Positive for tingling.   Hematological: Bruises/bleeds easily.       Objective   Blood pressure 122/80, pulse 64, temperature 98 °F (36.7 °C), temperature source Oral, resp. rate 18, height 182.9 cm (72\"), weight 80.7 kg (178 lb), SpO2 97 %.  Physical Exam   Constitutional: He is oriented to person, place, and time. Vital signs are normal. He appears well-developed and well-nourished. He is active and cooperative. No distress.   HENT:   Head: Normocephalic and atraumatic.   Right Ear: External ear normal.   Left Ear: External ear normal.   Nose: Nose normal.   Mouth/Throat: Oropharynx is clear and moist. Abnormal dentition. No oropharyngeal exudate.   Eyes: Conjunctivae and EOM are normal. Right eye exhibits no discharge. Left eye exhibits no discharge.       Neck: Normal range of motion. No tracheal deviation present. No thyromegaly present.   Cardiovascular: Normal rate.  An irregularly irregular rhythm present. Exam reveals decreased pulses.    Pulses:       Dorsalis pedis pulses are 1+ on the right side, and 1+ on the left side.        Posterior tibial pulses are 1+ on the right side, and 1+ on the left side.   Pulmonary/Chest: Effort normal and breath sounds normal. No stridor. No respiratory distress. He has no wheezes. He has no rales.   Abdominal: Soft. Bowel sounds are " normal. He exhibits no distension. There is no tenderness.   Musculoskeletal: He exhibits tenderness. He exhibits no edema.   Significantly limited ROM in right hip, pain with movement, walks with limp favoring right hip, difficulty changing positions and getting onto the exam table.    Lymphadenopathy:     He has no cervical adenopathy.   Neurological: He is alert and oriented to person, place, and time.   Skin: Skin is warm and dry. He is not diaphoretic.   Psychiatric: He has a normal mood and affect. His behavior is normal. Judgment and thought content normal.   Nursing note and vitals reviewed.      Assessment/Plan   Problems Addressed this Visit        Cardiovascular and Mediastinum    Atrial fibrillation (CMS/HCC)       Nervous and Auditory    Chronic hip pain - Primary       Hematopoietic and Hemostatic    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)       Other    Anxiety    Other insomnia      Other Visit Diagnoses     Diminished pulses in lower extremity        Relevant Orders    US Ankle / Brachial Indices Extremity Complete        PLAN:     #1 atrial fib: rate controlled, INR is WNL, continue current dose, no signs of active bleeding.     #2 chronic right hip pain: refill on medication given, discussed risks and benefits, GUERO UTD, controlled contract in chart, Nii UTD, return in 1 month.     #3 diminished lower ext pulses: will get ABIs.     #4 Lymphoma: stable, following with oncology     #5 peripheral neuropathy: seems to be improving. patient declines treatment at this time, discussed options - pt to notify us if he wants treatment.     #6 screening: pt declines PNA and zoster vaccines today - advised to clear with oncology at his next visit.           This document has been electronically signed by Sunita Crawford MD on July 13, 2018 1:45 PM

## 2018-07-17 ENCOUNTER — HOSPITAL ENCOUNTER (OUTPATIENT)
Dept: ULTRASOUND IMAGING | Facility: HOSPITAL | Age: 78
Discharge: HOME | End: 2018-07-17
Admitting: FAMILY MEDICINE

## 2018-07-17 DIAGNOSIS — R09.89 DIMINISHED PULSES IN LOWER EXTREMITY: ICD-10-CM

## 2018-07-17 PROCEDURE — 93923 UPR/LXTR ART STDY 3+ LVLS: CPT

## 2018-07-18 NOTE — PROGRESS NOTES
PATIENT NOTIFIED OF  RESULTS.  Patient would like to see you next week about this I have scheduled him.

## 2018-07-24 ENCOUNTER — OFFICE VISIT (OUTPATIENT)
Dept: FAMILY MEDICINE CLINIC | Facility: CLINIC | Age: 78
End: 2018-07-24

## 2018-07-24 VITALS
WEIGHT: 181 LBS | DIASTOLIC BLOOD PRESSURE: 80 MMHG | BODY MASS INDEX: 24.52 KG/M2 | HEART RATE: 76 BPM | TEMPERATURE: 98.3 F | SYSTOLIC BLOOD PRESSURE: 126 MMHG | HEIGHT: 72 IN | OXYGEN SATURATION: 99 % | RESPIRATION RATE: 18 BRPM

## 2018-07-24 DIAGNOSIS — R68.89 ABNORMAL ANKLE BRACHIAL INDEX (ABI): ICD-10-CM

## 2018-07-24 DIAGNOSIS — R09.89 DIMINISHED PULSES IN LOWER EXTREMITY: Primary | ICD-10-CM

## 2018-07-24 PROCEDURE — 99213 OFFICE O/P EST LOW 20 MIN: CPT | Performed by: FAMILY MEDICINE

## 2018-07-27 NOTE — PROGRESS NOTES
"Subjective cc: Discuss results of vascular study  Cabrera Avina is a 78 y.o. male who presents to discuss results of vascular study.  SUHAS was ordered on patient due to diminished peripheral pulses.  Patient had abnormal SUHAS suggesting atherosclerosis in his lower extremities.  Patient wanted to discuss this in greater detail today in person.  Patient wanted to understand the results of his tests.  Patient I have reviewed this.  Patient is agreeable to vascular referral. He is chronically anticoagulated.     History of Present Illness     The following portions of the patient's history were reviewed and updated as appropriate: allergies, current medications, past family history, past medical history, past social history, past surgical history and problem list.        Review of Systems   Constitutional: Negative for activity change.   Cardiovascular: Negative for chest pain, palpitations and leg swelling.   Musculoskeletal: Positive for arthralgias and gait problem.       Objective   Blood pressure 126/80, pulse 76, temperature 98.3 °F (36.8 °C), temperature source Oral, resp. rate 18, height 182.9 cm (72\"), weight 82.1 kg (181 lb), SpO2 99 %.  Physical Exam   Constitutional: He is oriented to person, place, and time. Vital signs are normal. He appears well-developed and well-nourished. He is active and cooperative. No distress.   HENT:   Head: Normocephalic and atraumatic.   Right Ear: External ear normal.   Left Ear: External ear normal.   Nose: Nose normal.   Mouth/Throat: Abnormal dentition.   Eyes: Conjunctivae and EOM are normal. Right eye exhibits no discharge. Left eye exhibits no discharge.   Neck: No tracheal deviation present.   Cardiovascular: Normal rate.  An irregularly irregular rhythm present. Exam reveals decreased pulses.    Pulses:       Dorsalis pedis pulses are 1+ on the right side, and 1+ on the left side.        Posterior tibial pulses are 1+ on the right side, and 1+ on the left side. "   Pulmonary/Chest: Effort normal and breath sounds normal. No stridor. No respiratory distress. He has no wheezes. He has no rales.   Musculoskeletal: He exhibits no edema.   Significantly limited ROM in right hip, pain with movement, walks with limp favoring right hip, difficulty changing positions and getting onto the exam table.    Neurological: He is alert and oriented to person, place, and time.   Skin: Skin is warm and dry. He is not diaphoretic.   Psychiatric: He has a normal mood and affect. His behavior is normal. Judgment and thought content normal.   Nursing note and vitals reviewed.      FINDINGS:  Systolic pressures were obtained from bilateral brachial, posterior  tibial and dorsalis pedis arteries. Systolic pressure within the right  brachial artery was measured at 117 mmHg, while pressure within the left  brachial artery was measured at 125 mmHg.      Systolic pressures in the regions of the posterior tibialis and dorsalis  pedis arteries are not obtainable due to noncompressibility. This  suggests rigid atherosclerosis.     IMPRESSION:  1. Suspected atherosclerosis in the lower extremity arteries, supported  by noncompressible posterior tibialis and dorsalis pedis arteries.    Assessment/Plan   Problems Addressed this Visit     None      Visit Diagnoses     Diminished pulses in lower extremity    -  Primary    Relevant Orders    Ambulatory Referral to Vascular Surgery    Abnormal ankle brachial index (SUHAS)            PLAN:     #1 abnormal SUHAS/diminished pulses: discussed results with patient, referral to vascular for second ipinion.           This document has been electronically signed by Sunita Crawford MD on July 27, 2018 2:37 PM

## 2018-07-30 ENCOUNTER — TELEPHONE (OUTPATIENT)
Dept: VASCULAR SURGERY | Facility: CLINIC | Age: 78
End: 2018-07-30

## 2018-07-31 ENCOUNTER — OFFICE VISIT (OUTPATIENT)
Dept: VASCULAR SURGERY | Facility: CLINIC | Age: 78
End: 2018-07-31

## 2018-07-31 VITALS
DIASTOLIC BLOOD PRESSURE: 62 MMHG | SYSTOLIC BLOOD PRESSURE: 150 MMHG | BODY MASS INDEX: 24.38 KG/M2 | HEIGHT: 72 IN | WEIGHT: 180 LBS | HEART RATE: 64 BPM

## 2018-07-31 DIAGNOSIS — I71.40 ABDOMINAL AORTIC ANEURYSM (AAA) WITHOUT RUPTURE (HCC): ICD-10-CM

## 2018-07-31 DIAGNOSIS — G89.29 CHRONIC PAIN OF RIGHT HIP: ICD-10-CM

## 2018-07-31 DIAGNOSIS — I73.9 PAD (PERIPHERAL ARTERY DISEASE) (HCC): Primary | ICD-10-CM

## 2018-07-31 DIAGNOSIS — M25.551 CHRONIC PAIN OF RIGHT HIP: ICD-10-CM

## 2018-07-31 PROCEDURE — 99204 OFFICE O/P NEW MOD 45 MIN: CPT | Performed by: SURGERY

## 2018-07-31 NOTE — PROGRESS NOTES
07/31/2018      Sunita Crawford MD  [address]  [place] [address]    Cabrera Avina  1940    Chief Complaint   Patient presents with   • Leg Pain     Complaint of bilateral leg pain.       Dear Sunita Crawford MD:      HPI  I had the pleasure of seeing your patient Cabrera Avina in the office today.  Thank you kindly for this consultation.  As you recall, Cabrera Avina is a 78 y.o.  male who you are currently following for routine health maintenance.  He was recently seen by you for lower extremity PAD and had vascular studies ordered.  The test suggests lower extremity PAD of both lower extremities.  Upon further questioning, it appears as though most of his symptoms are related to neurogenic claudication.  He has symptoms of burning and numbness down both legs.  He states that he is very active and used to bike and run all the time now he can walk long distances.      Past Medical History:   Diagnosis Date   • Anemia    • Anxiety     chronic   • Atrial fibrillation (CMS/HCC)    • Cancer (CMS/HCC)     non-hodgkins lymphoma   • Cholecystitis    • Colon polyp    • Colon polyp    • Constipation    • GERD (gastroesophageal reflux disease)    • History of ERCP 2005   • Nephrolithiasis     stones removed   • Jaylyn-rectal abscess    • Rectal abscess        Past Surgical History:   Procedure Laterality Date   • CHOLECYSTECTOMY     • COLONOSCOPY  05/2012    3 yr recall   • COLONOSCOPY  09/18/2015    5 yr recall   • KIDNEY STONE SURGERY     • RECTAL SURGERY      X 2   • TOTAL HIP ARTHROPLASTY         Family History   Problem Relation Age of Onset   • Colon cancer Mother    • No Known Problems Father    • Prostate cancer Brother    • No Known Problems Daughter    • No Known Problems Son    • No Known Problems Maternal Grandmother    • No Known Problems Maternal Grandfather    • No Known Problems Paternal Grandmother    • No Known Problems Paternal Grandfather    • Prostate cancer Brother    • Colon  polyps Neg Hx        Social History     Social History   • Marital status:      Spouse name: N/A   • Number of children: N/A   • Years of education: N/A     Occupational History   • Not on file.     Social History Main Topics   • Smoking status: Never Smoker   • Smokeless tobacco: Current User     Types: Snuff   • Alcohol use No   • Drug use: No   • Sexual activity: Defer     Other Topics Concern   • Not on file     Social History Narrative   • No narrative on file       No Known Allergies    Prior to Admission medications    Medication Sig Start Date End Date Taking? Authorizing Provider   aspirin 81 MG EC tablet Take 81 mg by mouth daily.    Historical Provider, MD   digoxin (LANOXIN) 250 MCG tablet TAKE ONE TABLET BY MOUTH DAILY 6/25/18   Sunita Crawford MD   diltiaZEM CD (CARDIZEM CD) 240 MG 24 hr capsule Take 1 capsule by mouth Daily. 3/19/18   Snuita Crawford MD   escitalopram (LEXAPRO) 10 MG tablet Take 10 mg by mouth Daily.    Historical Provider, MD   fludrocortisone 0.1 MG tablet Take 0.1 mg by mouth Daily.    Historical Provider, MD   linaclotide (LINZESS) 290 MCG capsule capsule Take 1 capsule by mouth Every Morning Before Breakfast. 7/3/18   Ander Miguel MD   oxyCODONE-acetaminophen (PERCOCET)  MG per tablet Take 1 tablet by mouth 3 (Three) Times a Day As Needed for Severe Pain . 7/13/18   Sunita Crawford MD   temazepam (RESTORIL) 15 MG capsule Take 1 capsule by mouth At Night As Needed for Sleep. 5/10/18   Sunita Crawford MD   warfarin (COUMADIN) 2 MG tablet Take 2 mg by mouth Daily. 7 mg every other day and 8 mg every other    Historical Provider, MD       Review of Systems   Constitutional: Negative.    HENT: Negative.    Eyes: Negative.    Respiratory: Negative.    Cardiovascular: Negative.    Gastrointestinal: Negative.    Endocrine: Negative.    Genitourinary: Negative.    Musculoskeletal: Positive for gait problem.   Skin: Negative.    Allergic/Immunologic:  "Negative.    Hematological: Negative.    Psychiatric/Behavioral: Negative.    All other systems reviewed and are negative.      /62   Pulse 64   Ht 182.9 cm (72\")   Wt 81.6 kg (180 lb)   BMI 24.41 kg/m²   Physical Exam   Constitutional: He is oriented to person, place, and time. He appears well-developed and well-nourished.   HENT:   Head: Normocephalic and atraumatic.   Eyes: Pupils are equal, round, and reactive to light. No scleral icterus.   Neck: Neck supple. No JVD present. Carotid bruit is not present. No thyromegaly present.   Cardiovascular: Normal rate, regular rhythm and normal heart sounds.    Pulses:       Carotid pulses are 2+ on the right side, and 2+ on the left side.       Femoral pulses are 2+ on the right side, and 2+ on the left side.       Popliteal pulses are 2+ on the right side, and 2+ on the left side.        Dorsalis pedis pulses are 2+ on the right side, and 2+ on the left side.        Posterior tibial pulses are 2+ on the right side, and 2+ on the left side.   Pulmonary/Chest: Effort normal and breath sounds normal.   Abdominal: Soft. Bowel sounds are normal. He exhibits pulsatile midline mass. He exhibits no distension, no abdominal bruit and no mass. There is no hepatosplenomegaly. There is no tenderness.   Musculoskeletal: Normal range of motion. He exhibits no edema.   Lymphadenopathy:     He has no cervical adenopathy.   Neurological: He is alert and oriented to person, place, and time. He has normal strength. No cranial nerve deficit or sensory deficit.   Skin: Skin is warm, dry and intact.   Psychiatric: He has a normal mood and affect.   Nursing note and vitals reviewed.      Us Ankle / Brachial Indices Extremity Complete    Result Date: 7/17/2018  Narrative: US ANKLE / BRACHIAL INDICES EXTREMITY COMPLETE- 7/17/2018 11:54 AM CDT  HISTORY: diminished pulses; R09.89-Other specified symptoms and signs involving the circulatory and respiratory systems  COMPARISON: NONE  " FINDINGS: Systolic pressures were obtained from bilateral brachial, posterior tibial and dorsalis pedis arteries. Systolic pressure within the right brachial artery was measured at 117 mmHg, while pressure within the left brachial artery was measured at 125 mmHg.  Systolic pressures in the regions of the posterior tibialis and dorsalis pedis arteries are not obtainable due to noncompressibility. This suggests rigid atherosclerosis.      Impression: 1. Suspected atherosclerosis in the lower extremity arteries, supported by noncompressible posterior tibialis and dorsalis pedis arteries.  This report was finalized on 07/17/2018 16:55 by Dr. Lopez Boyer MD.      Patient Active Problem List   Diagnosis   • Slow transit constipation   • Gastroesophageal reflux disease without esophagitis   • 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[facility])   • Anxiety   • Chronic hip pain   • Atrial fibrillation (CMS/[facility])   • Other insomnia   • Adenomatous polyp of colon         ICD-10-CM ICD-9-CM   1. PAD (peripheral artery disease) (CMS/[facility]) I73.9 443.9   2. Chronic pain of right hip M25.551 719.45    G89.29 338.29   3. Abdominal aortic aneurysm (AAA) without rupture (CMS/[facility]) I71.4 441.4           Plan: After thoroughly evaluating Cabrera Avina, I believe the best course of action is to remain conservative from a vascular surgery standpoint.  With regards to his lower extremity arterial testing, the arteries are noncompressible but he does have palpable pedal pulses on exam.  He does have a history of back problems and this sounds more like a radiculopathy.  He may need an MRI of his lumbar spine and may be even Neurontin or Lyrica to help with the neuropathy. He also has a prominent aorta and probably has a small abdominal aortic aneurysm.  I would like for him to undergo an abdominal ultrasound for screening.  I will see him back in the next 2 weeks or so to check this.  The patient can continue taking her current medication regimen as  previously planned.  This was all discussed in full with complete understanding.    Thank you for allowing me to participate in the care of your patient.  Please do not hesitate with any questions or concerns.  I will keep you aware of any further encounters with Cabrera Avina.        Sincerely yours,         Jorge Alberto Rosario, DO Sunita Crawford MD

## 2018-07-31 NOTE — PATIENT INSTRUCTIONS
What You Need to Know About Smokeless Tobacco Use  Tobacco use is one of the leading causes of cancer and other chronic health problems. Smokeless tobacco is tobacco that is put directly into the mouth instead of being smoked. It may also be called chewing tobacco or snuff. Smokeless tobacco is made from the leaves of tobacco plants and it comes in several forms:  · Loose, dry leaves, plugs, or twists.  · Moist pouches.  · Dissolving lozenges or strips.    Chewing, sucking, or holding the tobacco in your mouth causes your mouth to make more saliva. The saliva mixes with the tobacco to make “tobacco juice” that is swallowed or spit out.  How can smokeless tobacco affect me?  Using smokeless tobacco:  · Increases your risk of developing cancer. Smokeless tobacco contains at least 28 different types of cancer-causing chemicals (carcinogens).  · Increases your chances of developing other long-term health problems, including high blood pressure, heart disease, stroke, and dental problems.  · Can make you become addicted. Nicotine is one of the chemicals in tobacco. When you chew tobacco, you absorb nicotine from the tobacco juice. This can make you feel more alert than usual.  · Can cause problems with pregnancy. Pregnant women who use smokeless tobacco are more likely to miscarry or deliver a baby too early (premature delivery).  · Can affect the appearance and health of your mouth. Using smokeless tobacco may cause bad breath, yellow-brown teeth, mouth sores, cracking and bleeding lips, gum recession, and lesions on the soft tissues of your mouth (leukoplakia).    What are the benefits of not using smokeless tobacco?  The benefits of not using smokeless tobacco include:  · A healthy mind because:  ? You avoid addiction.  · A healthy body because:  ? You avoid dental problems.  ? You promote healthy pregnancy.  ? You avoid long-term health problems.  · A healthy wallet because:  ? You avoid costs of buying  tobacco.  ? You avoid health care costs in the future.  · A healthy family because:  ? You avoid accidental poisoning of children in your household.    What can happen if I continue to use smokeless tobacco?  If you continue to use smokeless tobacco, you will increase your risk for developing certain cancers. These include:  · Tongue.  · Lips, mouth, and gums.  · Throat (esophagus) and voice box (larynx).  · Stomach.  · Pancreas.  · Bladder.  · Colon.    Long-term use of smokeless tobacco can also lead to:  · High blood pressure, heart disease, and stroke.  · Gum disease, gum recession, and bone loss around the teeth.  · Tooth decay.    How do I quit using smokeless tobacco?  Quitting the use of smokeless tobacco can be hard, but it can be done. Follow these steps:  · Pick a date to quit. Set a date within the next two weeks. This gives you time to prepare.  · Write down the reasons why you are quitting. Keep this list in places where you will see it often, such as on your bathroom mirror or in your car or wallet.  · Identify the people, places, things, and activities that make you want to use tobacco (triggers) and avoid them.  · Get rid of any tobacco you have and remove any tobacco smells. To do this:  ? Throw away all containers of tobacco at home, at work, and in your car.  ? Throw away any other items that you use regularly when you chew tobacco.  ? Clean your car and make sure to remove all tobacco-related items.  ? Clean your home, including curtains and carpets.  · Tell your family, friends, and coworkers that you are quitting. This can make quitting easier.  · Ask your health care provider for help quitting smokeless tobacco. This may involve treatment. Find out what treatment options are covered by your health insurance.  · Keep track of how many days have passed since you quit. Remembering how long and hard you have worked to quit can help you avoid using tobacco again.    Where can I get support?  Ask  your health care provider if there is a local support group for quitting smokeless tobacco.  Where can I get more information?  You can learn more about the risks of using smokeless tobacco and the benefits of quitting from these sources:  · National Cancer [place]: www.cancer.gov  · American Cancer Society: www.cancer.org    When should I seek medical care?  Seek medical care if you have:  · White or other discolored patches in your mouth.  · Difficulty swallowing.  · A change in your voice.  · Unexplained weight loss.  · Stomach pain, nausea, or vomiting.    Summary  · Smokeless tobacco contains at least 28 different chemicals that are known to cause cancer (carcinogen).  · Nicotine is an addictive chemical in smokeless tobacco.  · When you quit using smokeless tobacco, you lower your risk of developing cancer.  This information is not intended to replace advice given to you by your health care provider. Make sure you discuss any questions you have with your health care provider.  Document Released: 05/21/2012 Document Revised: 08/12/2017 Document Reviewed: 07/29/2016  [employer] Interactive Patient Education © 2018 [employer]

## 2018-08-10 ENCOUNTER — OFFICE VISIT (OUTPATIENT)
Dept: FAMILY MEDICINE CLINIC | Facility: CLINIC | Age: 78
End: 2018-08-10

## 2018-08-10 ENCOUNTER — HOSPITAL ENCOUNTER (OUTPATIENT)
Dept: ULTRASOUND IMAGING | Facility: HOSPITAL | Age: 78
Discharge: HOME | End: 2018-08-10
Attending: SURGERY | Admitting: SURGERY

## 2018-08-10 VITALS
TEMPERATURE: 98.4 F | WEIGHT: 181.2 LBS | RESPIRATION RATE: 20 BRPM | SYSTOLIC BLOOD PRESSURE: 126 MMHG | HEIGHT: 72 IN | DIASTOLIC BLOOD PRESSURE: 68 MMHG | HEART RATE: 51 BPM | OXYGEN SATURATION: 97 % | BODY MASS INDEX: 24.54 KG/M2

## 2018-08-10 DIAGNOSIS — R79.1 SUPRATHERAPEUTIC INR: ICD-10-CM

## 2018-08-10 DIAGNOSIS — I71.40 ABDOMINAL AORTIC ANEURYSM (AAA) WITHOUT RUPTURE (HCC): ICD-10-CM

## 2018-08-10 DIAGNOSIS — G62.9 NEUROPATHY: ICD-10-CM

## 2018-08-10 DIAGNOSIS — G89.29 CHRONIC PAIN OF RIGHT HIP: Primary | ICD-10-CM

## 2018-08-10 DIAGNOSIS — I48.20 CHRONIC ATRIAL FIBRILLATION (HCC): ICD-10-CM

## 2018-08-10 DIAGNOSIS — R09.89 DIMINISHED PULSES IN LOWER EXTREMITY: ICD-10-CM

## 2018-08-10 DIAGNOSIS — M25.551 CHRONIC PAIN OF RIGHT HIP: Primary | ICD-10-CM

## 2018-08-10 LAB
INR PPP: 3.7 (ref 0.9–1.1)
PROTHROMBIN TIME: 44 SECONDS

## 2018-08-10 PROCEDURE — 99214 OFFICE O/P EST MOD 30 MIN: CPT | Performed by: FAMILY MEDICINE

## 2018-08-10 PROCEDURE — 85610 PROTHROMBIN TIME: CPT | Performed by: FAMILY MEDICINE

## 2018-08-10 PROCEDURE — 76775 US EXAM ABDO BACK WALL LIM: CPT

## 2018-08-10 RX ORDER — OXYCODONE AND ACETAMINOPHEN 10; 325 MG/1; MG/1
1 TABLET ORAL 3 TIMES DAILY PRN
Qty: 90 TABLET | Refills: 0 | Status: SHIPPED | OUTPATIENT
Start: 2018-08-10 | End: 2018-09-14 | Stop reason: SDUPTHER

## 2018-08-10 RX ORDER — WARFARIN SODIUM 2 MG/1
2 TABLET ORAL
Qty: 120 TABLET | Refills: 5 | Status: SHIPPED | OUTPATIENT
Start: 2018-08-10 | End: 2018-11-13 | Stop reason: SDUPTHER

## 2018-08-10 NOTE — PROGRESS NOTES
Subjective cc: Discuss results of US/pain medication refill  Cabrera Avina is a 78 y.o. male who presents to discuss results of AAA screen and get a refill on his pain medication.  Patient was noted to have diminished pulses at his previous appointment.  SUHAS was completed suggesting peripheral arterial disease.  Patient was sent to vascular surgery for further evaluation.  Vascular recommends conservative management regarding his PAD.  However there was concern for abdominal aortic aneurysm.  Patient has return to office for ultrasound today.  I have reviewed the results with patient which are negative for AAA.    Patient reports he does have pain in his feet.  He reports this wakes him up approximately 3 AM in the morning.  Patient I have discussed medication management such as gabapentin or Lyrica for this in the past.  Patient does not wish to start medication like this at this time.  Patient is encouraged to let me know when he would like to start medication.    The patient would like to have his pain medication refilled today.  Patient is seen monthly for reevaluation of his pain.  Patient reports his pain is the same.  Its most prominent in his right hip.  Without his pain medication he is unable to function secondary to the pain.  With the pain medication it makes his pain bearable and he is able to perform activities around his house.    Patient is scheduled to go later today for lab work with oncology.  He is stressed out because he is afraid he will be late.    Patient INR is above goal today.  Patient has been taking 8 mg, 8 mg, 7 mg, 8 mg, 8 mg, 7 mg. patient's INR has been well controlled over the past several months.  It is unclear what caused it to become elevated.  Patient denies any bleeding at this time.  Pain   This is a chronic problem. The current episode started more than 1 year ago. The problem occurs constantly. The problem has been unchanged. Associated symptoms include arthralgias and  "numbness. Pertinent negatives include no abdominal pain, anorexia, chest pain or coughing. The symptoms are aggravated by exertion, standing and walking. He has tried rest, walking, position changes and oral narcotics (Percocet, patient cannot tolerate NSAIDs secondary to Coumadin) for the symptoms. The treatment provided moderate relief.        The following portions of the patient's history were reviewed and updated as appropriate: allergies, current medications, past family history, past medical history, past social history, past surgical history and problem list.        Review of Systems   Constitutional: Negative for activity change and appetite change.   Respiratory: Negative for cough and shortness of breath.    Cardiovascular: Negative for chest pain, palpitations and leg swelling.   Gastrointestinal: Negative for abdominal pain and anorexia.   Musculoskeletal: Positive for arthralgias and gait problem.   Neurological: Positive for numbness.   Hematological: Bruises/bleeds easily.   Psychiatric/Behavioral: Positive for dysphoric mood (controlled with medication) and sleep disturbance (wakes up in the middle of the night secondary to pain). Negative for self-injury and suicidal ideas. The patient is nervous/anxious.        Objective   Blood pressure 126/68, pulse 51, temperature 98.4 °F (36.9 °C), temperature source Oral, resp. rate 20, height 182.9 cm (72.01\"), weight 82.2 kg (181 lb 3.2 oz), SpO2 97 %.  Physical Exam   Constitutional: He is oriented to person, place, and time. Vital signs are normal. He appears well-developed and well-nourished. He is active and cooperative. No distress.   HENT:   Head: Normocephalic and atraumatic.   Right Ear: External ear normal.   Left Ear: External ear normal.   Nose: Nose normal.   Mouth/Throat: Abnormal dentition.   Eyes: Conjunctivae and EOM are normal. Right eye exhibits no discharge. Left eye exhibits no discharge.   Neck: No tracheal deviation present. "   Cardiovascular: Normal rate.  An irregularly irregular rhythm present. Exam reveals decreased pulses.    Pulses:       Dorsalis pedis pulses are 1+ on the right side, and 1+ on the left side.        Posterior tibial pulses are 1+ on the right side, and 1+ on the left side.   Pulmonary/Chest: Effort normal and breath sounds normal. No stridor. No respiratory distress. He has no wheezes. He has no rales.   Abdominal: Soft. Normal appearance and bowel sounds are normal. He exhibits no distension, no fluid wave, no pulsatile midline mass and no mass. There is no tenderness.   Musculoskeletal: He exhibits no edema.   Significantly limited ROM in right hip, pain with movement, walks with limp favoring right hip, difficulty changing positions and getting onto the exam table.    Neurological: He is alert and oriented to person, place, and time.   Skin: Skin is warm and dry. He is not diaphoretic.   Psychiatric: He has a normal mood and affect. His behavior is normal. Judgment and thought content normal.   Nursing note and vitals reviewed.    Lab Results (last 24 hours)     Procedure Component Value Units Date/Time    POC Protime / INR [[id number]]  (Abnormal) Collected:  08/10/18 0932    Specimen:  Blood Updated:  08/10/18 1058     Protime 44.0 seconds      INR 3.7 (A)        HISTORY: Screening for abdominal aortic aneurysm     Abdominal aortic ultrasound: Transabdominal imaging of the abdominal  aorta performed.     COMPARISON: CT exam 11/14/2011     FINDINGS: The proximal aorta measures 3.2 x 3.1 cm. The mid aorta  measures 2.2 x 2.2 cm. The distal aorta measures 2.0 x 2.0 cm. Common  iliac arteries measure 1.4 cm. There is appropriate Doppler flow seen  within the aorta. The IVC is patent.     IMPRESSION:  1. No abdominal aortic aneurysm.  This report was finalized on 08/10/2018 09:26 by Dr. Ro Esposito MD.    Assessment/Plan   Problems Addressed this Visit        Cardiovascular and Mediastinum    Atrial  fibrillation (CMS/HCC)    Relevant Medications    warfarin (COUMADIN) 2 MG tablet    Other Relevant Orders    POC Protime / INR (Completed)       Nervous and Auditory    Chronic hip pain - Primary    Relevant Medications    oxyCODONE-acetaminophen (PERCOCET)  MG per tablet      Other Visit Diagnoses     Diminished pulses in lower extremity        Neuropathy        Supratherapeutic INR            PLAN:     #1 vascular: discussed results with patient, no sign of AAA on ultrasound, reviewed patient's recent visit with Dr. Rosario.    #2 neuropathy: Discussed with patient we could trial gabapentin or Lyrica when he is ready.  Patient declines medication at this time.  Patient to let me know when he is ready.    #3 chronic pain in right hip: Chronic, controlled with oral pain medication.  Patient is unable to take NSAIDs secondary to Coumadin.  Nii reviewed and appropriate.  Controlled contract in the chart.  Refill given on medication.  Sent to pharmacy.  Pharmacy will hold until due.  Patient to return in September for reevaluation.    #4 supratherapeutic INR: Patient to hold his Coumadin ×2 days.  Resume dosing.  Return in one week for repeat INR.  Refill given on Coumadin.    #5 atrial fibrillation: Patient is anticoagulated and rate controlled.            This document has been electronically signed by Sunita Crawford MD on August 10, 2018 12:34 PM

## 2018-08-13 ENCOUNTER — TELEPHONE (OUTPATIENT)
Dept: VASCULAR SURGERY | Facility: CLINIC | Age: 78
End: 2018-08-13

## 2018-08-13 NOTE — TELEPHONE ENCOUNTER
Asked if he should keep tomorrows appointment.Informed Dr Rosario would go over test results with patient.

## 2018-08-14 ENCOUNTER — APPOINTMENT (OUTPATIENT)
Dept: ULTRASOUND IMAGING | Facility: HOSPITAL | Age: 78
End: 2018-08-14
Attending: SURGERY

## 2018-08-14 ENCOUNTER — OFFICE VISIT (OUTPATIENT)
Dept: VASCULAR SURGERY | Facility: CLINIC | Age: 78
End: 2018-08-14

## 2018-08-14 VITALS
SYSTOLIC BLOOD PRESSURE: 128 MMHG | WEIGHT: 181 LBS | OXYGEN SATURATION: 98 % | DIASTOLIC BLOOD PRESSURE: 74 MMHG | HEIGHT: 72 IN | HEART RATE: 56 BPM | BODY MASS INDEX: 24.52 KG/M2

## 2018-08-14 DIAGNOSIS — I73.9 PAD (PERIPHERAL ARTERY DISEASE) (HCC): Primary | ICD-10-CM

## 2018-08-14 PROCEDURE — 99212 OFFICE O/P EST SF 10 MIN: CPT | Performed by: SURGERY

## 2018-08-14 NOTE — PROGRESS NOTES
"08/14/2018       Sunita Crawford MD  [address]  [place] [address]    Cabrera Avina  1940    Chief Complaint   Patient presents with   • Follow-up     Patients here for his 2 week follow up, US Aorta completed on 8-10-18. Patient denies any stroke like symptoms.       Dear Sunita Crawford MD:      HPI  I had the pleasure of seeing your patient Cabrera Avina in the office today.    As you recall, Cabrera Avina is a 78 y.o.  male who you are currently following for routine health maintenance.  He was recently seen by you for lower extremity PAD and had vascular studies ordered.  The test suggests lower extremity PAD of both lower extremities.  Upon further questioning, it appears as though most of his symptoms are related to neurogenic claudication.  He has symptoms of burning and numbness down both legs.  He states that he is very active and used to bike and run all the time now he can walk long distances.  He did have noninvasive testing performed today to screen for abdominal aortic aneurysm.         Review of Systems   Constitutional: Negative.    HENT: Negative.    Eyes: Negative.    Respiratory: Negative.    Cardiovascular: Negative.    Gastrointestinal: Negative.    Endocrine: Negative.    Genitourinary: Negative.    Musculoskeletal: Positive for gait problem.   Skin: Negative.    Allergic/Immunologic: Negative.    Hematological: Negative.    Psychiatric/Behavioral: Negative.    All other systems reviewed and are negative.      /74 (BP Location: Right arm, Patient Position: Sitting, Cuff Size: Adult)   Pulse 56   Ht 182.9 cm (72.01\")   Wt 82.1 kg (181 lb)   SpO2 98%   BMI 24.54 kg/m²   Physical Exam   Constitutional: He is oriented to person, place, and time. He appears well-developed and well-nourished.   HENT:   Head: Normocephalic and atraumatic.   Eyes: Pupils are equal, round, and reactive to light. No scleral icterus.   Neck: Neck supple. No JVD present. Carotid " bruit is not present. No thyromegaly present.   Cardiovascular: Normal rate, regular rhythm and normal heart sounds.    Pulses:       Carotid pulses are 2+ on the right side, and 2+ on the left side.       Femoral pulses are 2+ on the right side, and 2+ on the left side.       Popliteal pulses are 2+ on the right side, and 2+ on the left side.        Dorsalis pedis pulses are 2+ on the right side, and 2+ on the left side.        Posterior tibial pulses are 2+ on the right side, and 2+ on the left side.   Pulmonary/Chest: Effort normal and breath sounds normal.   Abdominal: Soft. Bowel sounds are normal. He exhibits pulsatile midline mass. He exhibits no distension, no abdominal bruit and no mass. There is no hepatosplenomegaly. There is no tenderness.   Musculoskeletal: Normal range of motion. He exhibits no edema.   Lymphadenopathy:     He has no cervical adenopathy.   Neurological: He is alert and oriented to person, place, and time. He has normal strength. No cranial nerve deficit or sensory deficit.   Skin: Skin is warm, dry and intact.   Psychiatric: He has a normal mood and affect.   Nursing note and vitals reviewed.      Us Ankle / Brachial Indices Extremity Complete    Result Date: 7/17/2018  Narrative: US ANKLE / BRACHIAL INDICES EXTREMITY COMPLETE- 7/17/2018 11:54 AM CDT  HISTORY: diminished pulses; R09.89-Other specified symptoms and signs involving the circulatory and respiratory systems  COMPARISON: NONE  FINDINGS: Systolic pressures were obtained from bilateral brachial, posterior tibial and dorsalis pedis arteries. Systolic pressure within the right brachial artery was measured at 117 mmHg, while pressure within the left brachial artery was measured at 125 mmHg.  Systolic pressures in the regions of the posterior tibialis and dorsalis pedis arteries are not obtainable due to noncompressibility. This suggests rigid atherosclerosis.      Impression: 1. Suspected atherosclerosis in the lower extremity  arteries, supported by noncompressible posterior tibialis and dorsalis pedis arteries.  This report was finalized on 07/17/2018 16:55 by Dr. Lopez Boyer MD.    Us Aorta Limited    Result Date: 8/10/2018  Narrative: HISTORY: Screening for abdominal aortic aneurysm  Abdominal aortic ultrasound: Transabdominal imaging of the abdominal aorta performed.  COMPARISON: CT exam 11/14/2011  FINDINGS: The proximal aorta measures 3.2 x 3.1 cm. The mid aorta measures 2.2 x 2.2 cm. The distal aorta measures 2.0 x 2.0 cm. Common iliac arteries measure 1.4 cm. There is appropriate Doppler flow seen within the aorta. The IVC is patent.      Impression: 1. No abdominal aortic aneurysm. This report was finalized on 08/10/2018 09:26 by Dr. Ro Esposito MD.      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 PAD (peripheral artery disease) (CMS/[facility]) I73.9 443.9         Plan: After thoroughly evaluating Cabrera Avina, I believe the best course of action is to remain conservative from a vascular surgery standpoint.  With regards to his lower extremity arterial testing, the arteries are noncompressible but he does have palpable pedal pulses on exam. We will see him back in 1 year with repeat noninvasive testing.   He does have a history of back problems and this sounds more like a radiculopathy.  He may need an MRI of his lumbar spine and may be even Neurontin or Lyrica to help with the neuropathy. His ultrasound was negative for aneurysm.  The patient can continue taking her current medication regimen as previously planned.  This was all discussed in full with complete understanding.    Thank you for allowing me to participate in the care of your patient.  Please do not  hesitate with any questions or concerns.  I will keep you aware of any further encounters with Cabrera Avina.        Sincerely yours,         JOCELNIE Lozano, Sunita Clark MD

## 2018-08-17 ENCOUNTER — ANTICOAGULATION VISIT (OUTPATIENT)
Dept: FAMILY MEDICINE CLINIC | Facility: CLINIC | Age: 78
End: 2018-08-17

## 2018-08-17 DIAGNOSIS — I48.20 CHRONIC ATRIAL FIBRILLATION (HCC): Primary | ICD-10-CM

## 2018-08-17 LAB — INR PPP: 1.9 (ref 0.9–1.1)

## 2018-08-17 PROCEDURE — 85610 PROTHROMBIN TIME: CPT | Performed by: FAMILY MEDICINE

## 2018-08-31 ENCOUNTER — ANTICOAGULATION VISIT (OUTPATIENT)
Dept: FAMILY MEDICINE CLINIC | Facility: CLINIC | Age: 78
End: 2018-08-31

## 2018-08-31 PROCEDURE — 85610 PROTHROMBIN TIME: CPT | Performed by: NURSE PRACTITIONER

## 2018-09-05 DIAGNOSIS — I48.91 ATRIAL FIBRILLATION, UNSPECIFIED TYPE (HCC): Primary | ICD-10-CM

## 2018-09-14 ENCOUNTER — OFFICE VISIT (OUTPATIENT)
Dept: FAMILY MEDICINE CLINIC | Facility: CLINIC | Age: 78
End: 2018-09-14

## 2018-09-14 VITALS
OXYGEN SATURATION: 98 % | HEIGHT: 72 IN | RESPIRATION RATE: 18 BRPM | HEART RATE: 72 BPM | WEIGHT: 180.8 LBS | BODY MASS INDEX: 24.49 KG/M2 | SYSTOLIC BLOOD PRESSURE: 118 MMHG | TEMPERATURE: 98.1 F | DIASTOLIC BLOOD PRESSURE: 74 MMHG

## 2018-09-14 DIAGNOSIS — G89.29 CHRONIC PAIN OF RIGHT HIP: Primary | ICD-10-CM

## 2018-09-14 DIAGNOSIS — G47.09 OTHER INSOMNIA: ICD-10-CM
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
## 2018-09-14 DIAGNOSIS — F41.9 ANXIETY: ICD-10-CM

## 2018-09-14 DIAGNOSIS — M25.551 CHRONIC PAIN OF RIGHT HIP: Primary | ICD-10-CM

## 2018-09-14 DIAGNOSIS — I48.20 CHRONIC ATRIAL FIBRILLATION (HCC): ICD-10-CM

## 2018-09-14 DIAGNOSIS — Z79.01 ANTICOAGULATED ON COUMADIN: ICD-10-CM

## 2018-09-14 LAB — INR PPP: 2.8 (ref 0.9–1.1)

## 2018-09-14 PROCEDURE — 99213 OFFICE O/P EST LOW 20 MIN: CPT | Performed by: FAMILY MEDICINE

## 2018-09-14 PROCEDURE — 85610 PROTHROMBIN TIME: CPT | Performed by: FAMILY MEDICINE

## 2018-09-14 RX ORDER — OXYCODONE AND ACETAMINOPHEN 10; 325 MG/1; MG/1
1 TABLET ORAL 3 TIMES DAILY PRN
Qty: 90 TABLET | Refills: 0 | Status: SHIPPED | OUTPATIENT
Start: 2018-09-14 | End: 2018-10-15 | Stop reason: SDUPTHER

## 2018-09-14 RX ORDER — DILTIAZEM HYDROCHLORIDE 240 MG/1
CAPSULE, COATED, EXTENDED RELEASE ORAL
Qty: 90 CAPSULE | Refills: 3 | Status: SHIPPED | OUTPATIENT
Start: 2018-09-14 | End: 2018-12-14 | Stop reason: SDUPTHER

## 2018-09-14 RX ORDER — TEMAZEPAM 15 MG/1
15 CAPSULE ORAL NIGHTLY PRN
Qty: 30 CAPSULE | Refills: 2 | Status: SHIPPED | OUTPATIENT
Start: 2018-09-14 | End: 2018-12-14 | Stop reason: SDUPTHER

## 2018-09-14 NOTE — PROGRESS NOTES
Subjective cc: pain medication refill  Cabrera Avina is a 78 y.o. male who presents to get a refill on his pain medication. Pt reports he slipped and fell and hit his posterior head about 1 week ago, he was sore for a while, he has an area that is still healing, he denies any LOC.     The patient would like to have his pain medication refilled today.  Patient is seen monthly for reevaluation of his pain.  Patient reports his pain is the same.  Its most prominent in his right hip.  Without his pain medication he is unable to function secondary to the pain.  With the pain medication it makes his pain bearable and he is able to perform activities around his house.    Patient follows with oncology for labs every 3 months.    Patient INR is at goal today.   Patient denies any bleeding at this time.     Pain   This is a chronic problem. The current episode started more than 1 year ago. The problem occurs constantly. The problem has been unchanged. Associated symptoms include arthralgias and numbness. Pertinent negatives include no abdominal pain, anorexia, chest pain, coughing, fatigue, fever, headaches or weakness. The symptoms are aggravated by exertion, standing and walking. He has tried rest, walking, position changes and oral narcotics (Percocet, patient cannot tolerate NSAIDs secondary to Coumadin) for the symptoms. The treatment provided moderate relief.   Atrial Fibrillation   Presents for follow-up visit. Symptoms are negative for chest pain, dizziness, palpitations, shortness of breath, tachycardia and weakness. The symptoms have been stable. Past medical history includes atrial fibrillation. There are no medication compliance problems.        The following portions of the patient's history were reviewed and updated as appropriate: allergies, current medications, past family history, past medical history, past social history, past surgical history and problem list.        Review of Systems   Constitutional:  "Negative for activity change, appetite change, fatigue, fever and unexpected weight change.   Respiratory: Negative for cough, chest tightness and shortness of breath.    Cardiovascular: Negative for chest pain, palpitations and leg swelling.   Gastrointestinal: Negative for abdominal pain and anorexia.   Musculoskeletal: Positive for arthralgias and gait problem.   Skin: Positive for wound.   Neurological: Positive for numbness. Negative for dizziness, facial asymmetry, speech difficulty, weakness, light-headedness and headaches.   Hematological: Bruises/bleeds easily.   Psychiatric/Behavioral: Positive for dysphoric mood (controlled with medication) and sleep disturbance (wakes up in the middle of the night secondary to pain). Negative for self-injury and suicidal ideas. The patient is nervous/anxious.        Objective   Blood pressure 118/74, pulse 72, temperature 98.1 °F (36.7 °C), temperature source Oral, resp. rate 18, height 182.9 cm (72\"), weight 82 kg (180 lb 12.8 oz), SpO2 98 %.  Physical Exam   Constitutional: He is oriented to person, place, and time. Vital signs are normal. He appears well-developed and well-nourished. He is active and cooperative. No distress.   HENT:   Head: Normocephalic and atraumatic.       Right Ear: External ear normal.   Left Ear: External ear normal.   Nose: Nose normal.   Mouth/Throat: Abnormal dentition.   Eyes: Conjunctivae and EOM are normal. Right eye exhibits no discharge. Left eye exhibits no discharge.   Neck: No tracheal deviation present.   Cardiovascular: Normal rate.  An irregularly irregular rhythm present. Exam reveals decreased pulses.    Pulses:       Dorsalis pedis pulses are 1+ on the right side, and 1+ on the left side.        Posterior tibial pulses are 1+ on the right side, and 1+ on the left side.   Pulmonary/Chest: Effort normal and breath sounds normal. No stridor. No respiratory distress. He has no wheezes. He has no rales.   Abdominal: Soft. Normal " appearance and bowel sounds are normal. He exhibits no distension, no fluid wave, no pulsatile midline mass and no mass. There is no tenderness.   Musculoskeletal: He exhibits no edema.   Significantly limited ROM in right hip, pain with movement, walks with limp favoring right hip, difficulty changing positions and getting onto the exam table.    Neurological: He is alert and oriented to person, place, and time.   Skin: Skin is warm and dry. He is not diaphoretic.   Psychiatric: He has a normal mood and affect. His behavior is normal. Judgment and thought content normal.   Nursing note and vitals reviewed.    Lab Results (last 24 hours)     Procedure Component Value Units Date/Time    POCT INR [[id number]]  (Abnormal) Collected:  09/14/18 1532    Specimen:  Blood Updated:  09/14/18 1624     INR 2.8 (A)     Comment: 33.1             Assessment/Plan   Problems Addressed this Visit        Nervous and Auditory    Chronic hip pain - Primary    Relevant Medications    oxyCODONE-acetaminophen (PERCOCET)  MG per tablet       Other    Anxiety    Other insomnia      Other Visit Diagnoses     Anticoagulated on Coumadin        Relevant Orders    POCT INR (Completed)    Fall, initial encounter            PLAN:     #1 insomnia/anxiety: chronic, controlled, continue on current medication of celexa and restoril    #2 fall: new, no work up. Pt appears to have recovered well, wound is healing, no sign of bleeding.     #3 chronic pain in right hip: Chronic, controlled with oral pain medication.  Patient is unable to take NSAIDs secondary to Coumadin.  Nii reviewed and appropriate.  Controlled contract in the chart.  Refill given on medication.  Sent to pharmacy.  Pharmacy will hold until due.      #4 normal INR: continue on current dose of coumadin, no sign of bleeding, INR WNL, will repeat at his wellness visit.     #5 atrial fibrillation: Patient is anticoagulated and rate controlled. Refill given on cardizem.              This document has been electronically signed by Sunita Crawford MD on September 14, 2018 4:42 PM

## 2018-09-24 ENCOUNTER — OFFICE VISIT (OUTPATIENT)
Dept: FAMILY MEDICINE CLINIC | Facility: CLINIC | Age: 78
End: 2018-09-24

## 2018-09-24 VITALS
SYSTOLIC BLOOD PRESSURE: 114 MMHG | RESPIRATION RATE: 18 BRPM | WEIGHT: 181.4 LBS | HEART RATE: 62 BPM | HEIGHT: 72 IN | DIASTOLIC BLOOD PRESSURE: 60 MMHG | BODY MASS INDEX: 24.57 KG/M2 | OXYGEN SATURATION: 97 % | TEMPERATURE: 97.8 F

## 2018-09-24 DIAGNOSIS — Z00.00 ENCOUNTER FOR MEDICARE ANNUAL WELLNESS EXAM: Primary | ICD-10-CM

## 2018-09-24 DIAGNOSIS — I48.91 ATRIAL FIBRILLATION, UNSPECIFIED TYPE (HCC): ICD-10-CM

## 2018-09-24 LAB — INR PPP: 2.4 (ref 0.9–1.1)

## 2018-09-24 PROCEDURE — G0439 PPPS, SUBSEQ VISIT: HCPCS | Performed by: FAMILY MEDICINE

## 2018-09-24 PROCEDURE — 85610 PROTHROMBIN TIME: CPT | Performed by: FAMILY MEDICINE

## 2018-09-24 PROCEDURE — 96160 PT-FOCUSED HLTH RISK ASSMT: CPT | Performed by: FAMILY MEDICINE

## 2018-10-01 ENCOUNTER — OFFICE VISIT (OUTPATIENT)
Dept: FAMILY MEDICINE CLINIC | Facility: CLINIC | Age: 78
End: 2018-10-01

## 2018-10-01 VITALS
DIASTOLIC BLOOD PRESSURE: 62 MMHG | WEIGHT: 174 LBS | HEIGHT: 72 IN | OXYGEN SATURATION: 96 % | BODY MASS INDEX: 23.57 KG/M2 | HEART RATE: 87 BPM | RESPIRATION RATE: 20 BRPM | TEMPERATURE: 98.4 F | SYSTOLIC BLOOD PRESSURE: 112 MMHG

## 2018-10-01 DIAGNOSIS — R31.9 HEMATURIA, UNSPECIFIED TYPE: ICD-10-CM

## 2018-10-01 DIAGNOSIS — R50.9 FEVER, UNSPECIFIED FEVER CAUSE: Primary | ICD-10-CM

## 2018-10-01 LAB
BILIRUB BLD-MCNC: ABNORMAL MG/DL
CLARITY, POC: ABNORMAL
COLOR UR: ABNORMAL
EXPIRATION DATE: NORMAL
FLUAV AG NPH QL: NEGATIVE
FLUBV AG NPH QL: NEGATIVE
GLUCOSE UR STRIP-MCNC: NEGATIVE MG/DL
INTERNAL CONTROL: NORMAL
KETONES UR QL: ABNORMAL
LEUKOCYTE EST, POC: NEGATIVE
Lab: NORMAL
NITRITE UR-MCNC: NEGATIVE MG/ML
PH UR: 6 [PH] (ref 5–8)
PROT UR STRIP-MCNC: ABNORMAL MG/DL
RBC # UR STRIP: ABNORMAL /UL
SP GR UR: 1.02 (ref 1–1.03)
UROBILINOGEN UR QL: NORMAL

## 2018-10-01 PROCEDURE — 99214 OFFICE O/P EST MOD 30 MIN: CPT | Performed by: FAMILY MEDICINE

## 2018-10-01 PROCEDURE — 87804 INFLUENZA ASSAY W/OPTIC: CPT | Performed by: FAMILY MEDICINE

## 2018-10-01 PROCEDURE — 81003 URINALYSIS AUTO W/O SCOPE: CPT | Performed by: FAMILY MEDICINE

## 2018-10-01 RX ORDER — CEFDINIR 300 MG/1
300 CAPSULE ORAL 2 TIMES DAILY
Qty: 10 CAPSULE | Refills: 0 | Status: SHIPPED | OUTPATIENT
Start: 2018-10-01 | End: 2018-10-15

## 2018-10-01 NOTE — PROGRESS NOTES
"Subjective cc: fever   Cabrera Avina is a 78 y.o. male who complains of fever x 4 days at night/early afternoon, tmax 102.4, sweats during day, \"dont feel well,\" fatigue. Chronic blood in urine - seems to be worse than usual.  He denies any SOA, CP, vomiting, diarrhea. He reports decreased appetite and he has lost weight over the last several days. Pt has seen urology in the past. His wife has cough but no fever. No recent hospitalizations, no recent immunizations.     Fever    This is a new problem. The current episode started in the past 7 days. The problem occurs daily. The problem has been unchanged. The maximum temperature noted was 102 to 102.9 F. Associated symptoms include muscle aches. Pertinent negatives include no abdominal pain, chest pain, congestion, coughing, diarrhea, ear pain, headaches, nausea, rash, sleepiness, sore throat, urinary pain, vomiting or wheezing. He has tried nothing for the symptoms. The treatment provided no relief.   Risk factors: hx of cancer and sick contacts    Risk factors: no contaminated food, no contaminated water, no immunosuppression, no occupational exposure, no recent sickness and no recent travel         The following portions of the patient's history were reviewed and updated as appropriate: allergies, current medications, past family history, past medical history, past social history, past surgical history and problem list.        Review of Systems   Constitutional: Positive for activity change, appetite change, chills, diaphoresis, fatigue, fever and unexpected weight change.   HENT: Negative for congestion, ear pain and sore throat.    Respiratory: Negative for cough, chest tightness, shortness of breath and wheezing.    Cardiovascular: Negative for chest pain and palpitations.   Gastrointestinal: Negative for abdominal pain, diarrhea, nausea and vomiting.   Genitourinary: Positive for hematuria (chronic, but worse than usual). Negative for decreased urine volume, " "difficulty urinating, dysuria and urgency.   Musculoskeletal: Positive for arthralgias (chronic) and gait problem.   Skin: Negative for rash.   Neurological: Negative for syncope, light-headedness and headaches.   Hematological: Negative for adenopathy.   Psychiatric/Behavioral: Positive for sleep disturbance. Negative for agitation, behavioral problems and confusion. The patient is nervous/anxious.        Objective   Blood pressure 112/62, pulse 87, temperature 98.4 °F (36.9 °C), temperature source Oral, resp. rate 20, height 182.9 cm (72\"), weight 78.9 kg (174 lb), SpO2 96 %.  Physical Exam   Constitutional: He is oriented to person, place, and time. He appears well-developed and well-nourished. He appears distressed.   HENT:   Head: Normocephalic and atraumatic.   Right Ear: External ear normal.   Left Ear: External ear normal.   Nose: Nose normal.   Mouth/Throat: Oropharynx is clear and moist. No oropharyngeal exudate.   Eyes: EOM are normal. Right eye exhibits no discharge. Left eye exhibits no discharge.   Neck: Normal range of motion. No tracheal deviation present. No thyromegaly present.   Cardiovascular: Normal rate, regular rhythm and intact distal pulses.    Pulmonary/Chest: Effort normal and breath sounds normal. No stridor. No respiratory distress. He has no wheezes. He has no rales. He exhibits no tenderness.   Abdominal: Soft. Bowel sounds are normal. He exhibits no distension. There is no tenderness.   Musculoskeletal: He exhibits no edema.   Lymphadenopathy:     He has no cervical adenopathy.   Neurological: He is alert and oriented to person, place, and time. He exhibits normal muscle tone. Coordination normal.   Skin: Skin is warm. No rash noted. He is diaphoretic.   Multiple small scratches and abrasions on lower legs   Psychiatric: His behavior is normal. Judgment and thought content normal. His mood appears anxious.   Nursing note and vitals reviewed.      Lab Results (last 24 hours)     " Procedure Component Value Units Date/Time    POCT Influenza A/B [[id number]]  (Normal) Collected:  10/01/18 1141    Specimen:  Swab Updated:  10/01/18 1425     Rapid Influenza A Ag negative     Rapid Influenza B Ag negative     Internal Control Passed     Lot Number [id number]     Expiration Date 03/06/2021    POCT urinalysis dipstick, automated [[id number]]  (Abnormal) Collected:  10/01/18 1403    Specimen:  Urine Updated:  10/01/18 1425     Color Janette (A)     Clarity, UA Hazy (A)     Specific Gravity  1.025     pH, Urine 6.0     Leukocytes Negative     Nitrite, UA Negative     Protein,  mg/dL (A) mg/dL      Glucose, UA Negative mg/dL      Ketones, UA 40 mg/dL (A)     Urobilinogen, UA Normal     Bilirubin Small (1+) (A)     Blood, UA Moderate (A)            Assessment/Plan   Problems Addressed this Visit     None      Visit Diagnoses     Fever, unspecified fever cause    -  Primary    Relevant Orders    POCT Influenza A/B (Completed)    CBC No Differential    POCT urinalysis dipstick, automated (Completed)    Urine Culture - Urine, Urine, Clean Catch    Hematuria, unspecified type        Relevant Orders    POCT urinalysis dipstick, automated (Completed)    Urine Culture - Urine, Urine, Clean Catch        PLAN:     #1 fever: new, needs work-up, unknown source at this time, vitals stable, afebrile currently, considered tick borne illness due to waxing and waning fevers - pt denies tick bite - will defer doxycycline at this time due to atrial fib. Considered PNA but pt denies any respiratory complaints, discussed XR, our XR is currently down, lungs clear on exam, will defer to tomorrow if pt is not improving, considered bacteremia but no open wounds or likely sources of skin infection - pt has multiple small scratches from being on coumadin chronically but none appear infected. Considered cystitis due to chronic hematuria worse per pt - will get UA - pt unable to urinate at time of appt and returned to give urine  sample. At this time I feel the most likely source is viral infection - influenza screen is negative. Will get CBC. Reviewed past labs from oncology. Increase PO fluids. Advised on use of tylenol PRN, no motrin due to coumadin, will empirically start on oral antibiotics due to h/o cancer, fever and pt appearance today - advised pt abx will alter his INR and we will need to monitor this closely. Advised to return if not improving in 24 hours and/or getting worse.           This document has been electronically signed by Sunita Crawford MD on October 2, 2018 8:17 AM

## 2018-10-02 ENCOUNTER — TELEPHONE (OUTPATIENT)
Dept: FAMILY MEDICINE CLINIC | Facility: CLINIC | Age: 78
End: 2018-10-02

## 2018-10-02 LAB
ERYTHROCYTE [DISTWIDTH] IN BLOOD BY AUTOMATED COUNT: 13.6 % (ref 12–15)
HCT VFR BLD AUTO: 42.6 % (ref 40–52)
HGB BLD-MCNC: 13.8 G/DL (ref 14–18)
MCH RBC QN AUTO: 30.4 PG (ref 28–32)
MCHC RBC AUTO-ENTMCNC: 32.4 G/DL (ref 33–36)
MCV RBC AUTO: 93.8 FL (ref 82–95)
PLATELET # BLD AUTO: 207 10*3/MM3 (ref 130–400)
RBC # BLD AUTO: 4.54 10*6/MM3 (ref 4.8–5.9)
WBC # BLD AUTO: 12.22 10*3/MM3 (ref 4.8–10.8)

## 2018-10-02 NOTE — TELEPHONE ENCOUNTER
----- Message from Sunita Crawford MD sent at 10/2/2018  7:40 AM CDT -----  Please call and check on mr palomo. Please advise him his WBC (infection marker) was elevated at 12, normally he runs about 6 on last labs from oncology. This can be from bacterial or viral infection; so I wanted to see if he is doing better with the antibiotics. Is he eating and drinking?       Spoke with pt wife upon calling. Pt slept well through the night and reports had no fever. Wife encouraged to push fluids and make sure pt is eating. Wife will report if pt doesn't feel well.

## 2018-10-03 LAB
BACTERIA UR CULT: NO GROWTH
BACTERIA UR CULT: NORMAL

## 2018-10-05 ENCOUNTER — CLINICAL SUPPORT (OUTPATIENT)
Dept: FAMILY MEDICINE CLINIC | Facility: CLINIC | Age: 78
End: 2018-10-05

## 2018-10-05 VITALS — WEIGHT: 176.4 LBS | BODY MASS INDEX: 23.92 KG/M2

## 2018-10-05 DIAGNOSIS — R63.8 DECREASED ORAL INTAKE: ICD-10-CM

## 2018-10-05 DIAGNOSIS — I48.91 ATRIAL FIBRILLATION, UNSPECIFIED TYPE (HCC): ICD-10-CM

## 2018-10-05 DIAGNOSIS — R50.9 FEVER, UNSPECIFIED FEVER CAUSE: ICD-10-CM

## 2018-10-05 DIAGNOSIS — D72.829 LEUKOCYTOSIS, UNSPECIFIED TYPE: Primary | ICD-10-CM

## 2018-10-05 LAB — INR PPP: 3.4 (ref 0.9–1.1)

## 2018-10-05 PROCEDURE — 85610 PROTHROMBIN TIME: CPT | Performed by: FAMILY MEDICINE

## 2018-10-05 NOTE — PROGRESS NOTES
Patient here for fingerstick protime.   Dr Crawford has advised for the patient to hold Coumadin today and tomorrow and start Coumadin 6mg on Sunday and recheck on Monday.

## 2018-10-06 LAB
ALBUMIN SERPL-MCNC: 3.7 G/DL (ref 3.5–5)
ALBUMIN/GLOB SERPL: 1 G/DL (ref 1.1–2.5)
ALP SERPL-CCNC: 69 U/L (ref 24–120)
ALT SERPL-CCNC: 24 U/L (ref 0–54)
AST SERPL-CCNC: 29 U/L (ref 7–45)
BASOPHILS # BLD AUTO: 0.05 10*3/MM3 (ref 0–0.2)
BASOPHILS NFR BLD AUTO: 1 % (ref 0–2)
BILIRUB SERPL-MCNC: 0.5 MG/DL (ref 0.1–1)
BUN SERPL-MCNC: 17 MG/DL (ref 5–21)
BUN/CREAT SERPL: 18.7 (ref 7–25)
CALCIUM SERPL-MCNC: 9.6 MG/DL (ref 8.4–10.4)
CHLORIDE SERPL-SCNC: 97 MMOL/L (ref 98–110)
CO2 SERPL-SCNC: 33 MMOL/L (ref 24–31)
CREAT SERPL-MCNC: 0.91 MG/DL (ref 0.5–1.4)
EOSINOPHIL # BLD AUTO: 0.23 10*3/MM3 (ref 0–0.7)
EOSINOPHIL NFR BLD AUTO: 4.8 % (ref 0–4)
ERYTHROCYTE [DISTWIDTH] IN BLOOD BY AUTOMATED COUNT: 13.4 % (ref 12–15)
GLOBULIN SER CALC-MCNC: 3.6 GM/DL
GLUCOSE SERPL-MCNC: 101 MG/DL (ref 70–100)
HCT VFR BLD AUTO: 43 % (ref 40–52)
HGB BLD-MCNC: 13.6 G/DL (ref 14–18)
IMM GRANULOCYTES # BLD: 0.01 10*3/MM3 (ref 0–0.03)
IMM GRANULOCYTES NFR BLD: 0.2 % (ref 0–5)
LYMPHOCYTES # BLD AUTO: 0.9 10*3/MM3 (ref 0.72–4.86)
LYMPHOCYTES NFR BLD AUTO: 18.8 % (ref 15–45)
MCH RBC QN AUTO: 30.4 PG (ref 28–32)
MCHC RBC AUTO-ENTMCNC: 31.6 G/DL (ref 33–36)
MCV RBC AUTO: 96.2 FL (ref 82–95)
MONOCYTES # BLD AUTO: 0.54 10*3/MM3 (ref 0.19–1.3)
MONOCYTES NFR BLD AUTO: 11.3 % (ref 4–12)
NEUTROPHILS # BLD AUTO: 3.06 10*3/MM3 (ref 1.87–8.4)
NEUTROPHILS NFR BLD AUTO: 63.9 % (ref 39–78)
NRBC BLD AUTO-RTO: 0 /100 WBC (ref 0–0)
PLATELET # BLD AUTO: 276 10*3/MM3 (ref 130–400)
POTASSIUM SERPL-SCNC: 3.9 MMOL/L (ref 3.5–5.3)
PROT SERPL-MCNC: 7.3 G/DL (ref 6.3–8.7)
RBC # BLD AUTO: 4.47 10*6/MM3 (ref 4.8–5.9)
SODIUM SERPL-SCNC: 139 MMOL/L (ref 135–145)
WBC # BLD AUTO: 4.79 10*3/MM3 (ref 4.8–10.8)

## 2018-10-08 ENCOUNTER — TELEPHONE (OUTPATIENT)
Dept: FAMILY MEDICINE CLINIC | Facility: CLINIC | Age: 78
End: 2018-10-08

## 2018-10-08 ENCOUNTER — CLINICAL SUPPORT (OUTPATIENT)
Dept: FAMILY MEDICINE CLINIC | Facility: CLINIC | Age: 78
End: 2018-10-08

## 2018-10-08 DIAGNOSIS — I48.91 ATRIAL FIBRILLATION, UNSPECIFIED TYPE (HCC): ICD-10-CM

## 2018-10-08 LAB — INR PPP: 1.9 (ref 0.9–1.1)

## 2018-10-08 PROCEDURE — 85610 PROTHROMBIN TIME: CPT | Performed by: FAMILY MEDICINE

## 2018-10-15 ENCOUNTER — OFFICE VISIT (OUTPATIENT)
Dept: FAMILY MEDICINE CLINIC | Facility: CLINIC | Age: 78
End: 2018-10-15

## 2018-10-15 VITALS
HEART RATE: 67 BPM | TEMPERATURE: 98.5 F | WEIGHT: 179.2 LBS | SYSTOLIC BLOOD PRESSURE: 132 MMHG | RESPIRATION RATE: 18 BRPM | BODY MASS INDEX: 24.27 KG/M2 | OXYGEN SATURATION: 98 % | HEIGHT: 72 IN | DIASTOLIC BLOOD PRESSURE: 78 MMHG

## 2018-10-15 DIAGNOSIS — C85.90 LYMPHOMA IN REMISSION (HCC): ICD-10-CM

## 2018-10-15 DIAGNOSIS — I48.91 ATRIAL FIBRILLATION, UNSPECIFIED TYPE (HCC): ICD-10-CM

## 2018-10-15 DIAGNOSIS — G89.29 CHRONIC PAIN OF RIGHT HIP: ICD-10-CM

## 2018-10-15 DIAGNOSIS — M25.551 CHRONIC PAIN OF RIGHT HIP: ICD-10-CM

## 2018-10-15 DIAGNOSIS — F41.9 ANXIETY: ICD-10-CM

## 2018-10-15 DIAGNOSIS — G47.09 OTHER INSOMNIA: Primary | ICD-10-CM

## 2018-10-15 LAB — INR PPP: 2.8 (ref 0.9–1.1)

## 2018-10-15 PROCEDURE — 99213 OFFICE O/P EST LOW 20 MIN: CPT | Performed by: FAMILY MEDICINE

## 2018-10-15 PROCEDURE — 85610 PROTHROMBIN TIME: CPT | Performed by: FAMILY MEDICINE

## 2018-10-15 RX ORDER — OXYCODONE AND ACETAMINOPHEN 10; 325 MG/1; MG/1
1 TABLET ORAL 3 TIMES DAILY PRN
Qty: 90 TABLET | Refills: 0 | Status: SHIPPED | OUTPATIENT
Start: 2018-10-15 | End: 2018-11-13 | Stop reason: SDUPTHER

## 2018-10-15 RX ORDER — TEMAZEPAM 15 MG/1
15 CAPSULE ORAL NIGHTLY PRN
Qty: 30 CAPSULE | Refills: 2 | Status: CANCELLED | OUTPATIENT
Start: 2018-10-15

## 2018-10-15 NOTE — PROGRESS NOTES
Subjective cc: pain medication refill  Cabrera Avina is a 78 y.o. male who presents to get a refill on his pain medication. Pt reports he is feeling much better, no more fever, labs reviewed, he is still a little tired but it is getting better.     The patient would like to have his pain medication refilled today.  Patient is seen monthly for reevaluation of his pain.  Patient reports his pain is the same.  Its most prominent in his right hip.  Without his pain medication he is unable to function secondary to the pain.  With the pain medication it makes his pain bearable and he is able to perform activities around his house.    Patient follows with oncology for labs every 3 months. We reviewed his labs from his last visit - his WBC had normalized - we will let oncology return to checking them.     Patient INR is at goal today.   Patient denies any bleeding at this time. He is currently alternating 7MG and 8MG    Atrial fib is rate controlled.     Pain   This is a chronic problem. The current episode started more than 1 year ago. The problem occurs constantly. The problem has been unchanged. Associated symptoms include arthralgias, fatigue and numbness. Pertinent negatives include no abdominal pain, anorexia, chest pain, chills, coughing, fever, headaches or weakness. The symptoms are aggravated by exertion, standing and walking. He has tried rest, walking, position changes and oral narcotics (Percocet, patient cannot tolerate NSAIDs secondary to Coumadin) for the symptoms. The treatment provided moderate relief.   Atrial Fibrillation   Presents for follow-up visit. Symptoms are negative for bradycardia, chest pain, dizziness, palpitations, shortness of breath, tachycardia and weakness. The symptoms have been stable. Past medical history includes atrial fibrillation. There are no medication compliance problems.   Anxiety   Presents for follow-up visit. Symptoms include nervous/anxious behavior and restlessness.  "Patient reports no chest pain, dizziness, palpitations, shortness of breath or suicidal ideas. Symptoms occur most days. The severity of symptoms is mild. The quality of sleep is good. Nighttime awakenings: occasional.     Compliance with medications is %.        The following portions of the patient's history were reviewed and updated as appropriate: allergies, current medications, past family history, past medical history, past social history, past surgical history and problem list.        Review of Systems   Constitutional: Positive for fatigue. Negative for activity change, appetite change, chills, fever and unexpected weight change.   Respiratory: Negative for cough, chest tightness and shortness of breath.    Cardiovascular: Negative for chest pain, palpitations and leg swelling.   Gastrointestinal: Negative for abdominal pain and anorexia.   Musculoskeletal: Positive for arthralgias and gait problem.   Skin: Positive for wound.   Neurological: Positive for numbness. Negative for dizziness, facial asymmetry, speech difficulty, weakness, light-headedness and headaches.   Hematological: Bruises/bleeds easily.   Psychiatric/Behavioral: Positive for dysphoric mood (controlled with medication) and sleep disturbance (wakes up in the middle of the night secondary to pain). Negative for self-injury and suicidal ideas. The patient is nervous/anxious.        Objective   Blood pressure 132/78, pulse 67, temperature 98.5 °F (36.9 °C), temperature source Oral, resp. rate 18, height 182.9 cm (72\"), weight 81.3 kg (179 lb 3.2 oz), SpO2 98 %.  Physical Exam   Constitutional: He is oriented to person, place, and time. Vital signs are normal. He appears well-developed and well-nourished. He is active and cooperative.  Non-toxic appearance. He does not have a sickly appearance. He does not appear ill. No distress.   HENT:   Head: Normocephalic and atraumatic.       Right Ear: External ear normal.   Left Ear: External ear " normal.   Nose: Nose normal.   Mouth/Throat: Oropharynx is clear and moist. Abnormal dentition.   Eyes: Conjunctivae and EOM are normal. Right eye exhibits no discharge. Left eye exhibits no discharge.   Neck: No tracheal deviation present.   Cardiovascular: Normal rate.  An irregularly irregular rhythm present. Exam reveals decreased pulses.    Pulses:       Dorsalis pedis pulses are 1+ on the right side, and 1+ on the left side.        Posterior tibial pulses are 1+ on the right side, and 1+ on the left side.   Pulmonary/Chest: Effort normal and breath sounds normal. No accessory muscle usage or stridor. No respiratory distress. He has no wheezes. He has no rales.   Abdominal: Soft. Normal appearance and bowel sounds are normal. He exhibits no distension, no fluid wave, no pulsatile midline mass and no mass. There is no tenderness.   Musculoskeletal: He exhibits no edema.   Significantly limited ROM in right hip, pain with movement, walks with limp favoring right hip, difficulty changing positions and getting onto the exam table.    Neurological: He is alert and oriented to person, place, and time.   Skin: Skin is warm and dry. He is not diaphoretic.   Psychiatric: He has a normal mood and affect. His behavior is normal. Judgment and thought content normal.   Nursing note and vitals reviewed.    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     #2 elevated WBC/fever: resolved.     #3 chronic pain in right hip: Chronic, controlled with oral pain medication.  Patient is unable to take NSAIDs secondary to Coumadin.  Nii reviewed and appropriate.  Controlled contract in the chart.  Refill given on medication.  Sent to pharmacy.  Return in 1 month     #4 normal INR: continue on current dose of coumadin, no sign of bleeding, INR WNL, will repeat in 1 month.     #5 atrial fibrillation: Patient is anticoagulated and rate controlled. Continue on cardizem.             This document has been electronically signed by Sunita Crawford MD on October 15, 2018 11:30 AM

## 2018-10-29 ENCOUNTER — CLINICAL SUPPORT (OUTPATIENT)
Dept: FAMILY MEDICINE CLINIC | Facility: CLINIC | Age: 78
End: 2018-10-29

## 2018-10-29 DIAGNOSIS — I48.91 ATRIAL FIBRILLATION, UNSPECIFIED TYPE (HCC): ICD-10-CM

## 2018-10-29 LAB — INR PPP: 2.9 (ref 0.9–1.1)

## 2018-10-29 PROCEDURE — 85610 PROTHROMBIN TIME: CPT | Performed by: FAMILY MEDICINE

## 2018-11-13 ENCOUNTER — OFFICE VISIT (OUTPATIENT)
Dept: FAMILY MEDICINE CLINIC | Facility: CLINIC | Age: 78
End: 2018-11-13

## 2018-11-13 VITALS
OXYGEN SATURATION: 98 % | SYSTOLIC BLOOD PRESSURE: 124 MMHG | HEIGHT: 72 IN | HEART RATE: 75 BPM | WEIGHT: 182.8 LBS | DIASTOLIC BLOOD PRESSURE: 68 MMHG | BODY MASS INDEX: 24.76 KG/M2 | TEMPERATURE: 98.7 F | RESPIRATION RATE: 20 BRPM

## 2018-11-13 DIAGNOSIS — I48.20 CHRONIC ATRIAL FIBRILLATION (HCC): ICD-10-CM

## 2018-11-13 DIAGNOSIS — G47.09 OTHER INSOMNIA: ICD-10-CM

## 2018-11-13 DIAGNOSIS — F41.9 ANXIETY: ICD-10-CM

## 2018-11-13 DIAGNOSIS — Z72.0 TOBACCO USE: ICD-10-CM

## 2018-11-13 DIAGNOSIS — G89.29 CHRONIC PAIN OF RIGHT HIP: Primary | ICD-10-CM

## 2018-11-13 DIAGNOSIS — M25.551 CHRONIC PAIN OF RIGHT HIP: Primary | ICD-10-CM

## 2018-11-13 DIAGNOSIS — I48.91 ATRIAL FIBRILLATION, UNSPECIFIED TYPE (HCC): ICD-10-CM

## 2018-11-13 LAB — INR PPP: 2.6 (ref 0.9–1.1)

## 2018-11-13 PROCEDURE — 85610 PROTHROMBIN TIME: CPT | Performed by: FAMILY MEDICINE

## 2018-11-13 PROCEDURE — 99214 OFFICE O/P EST MOD 30 MIN: CPT | Performed by: FAMILY MEDICINE

## 2018-11-13 RX ORDER — WARFARIN SODIUM 2 MG/1
2 TABLET ORAL
Qty: 120 TABLET | Refills: 5 | Status: SHIPPED | OUTPATIENT
Start: 2018-11-13 | End: 2019-01-11 | Stop reason: SDUPTHER

## 2018-11-13 RX ORDER — DIGOXIN 250 MCG
250 TABLET ORAL DAILY
Qty: 90 TABLET | Refills: 1 | Status: SHIPPED | OUTPATIENT
Start: 2018-11-13 | End: 2019-02-11 | Stop reason: SDUPTHER

## 2018-11-13 RX ORDER — OXYCODONE AND ACETAMINOPHEN 10; 325 MG/1; MG/1
1 TABLET ORAL 3 TIMES DAILY PRN
Qty: 90 TABLET | Refills: 0 | Status: SHIPPED | OUTPATIENT
Start: 2018-11-13 | End: 2018-12-14 | Stop reason: SDUPTHER

## 2018-11-13 NOTE — PROGRESS NOTES
Subjective cc: pain medication refill  Cabrera Avina is a 78 y.o. male who presents to get a refill on his pain medication. Pt reports he is doing well. No new complaints. He is remaining active. He is still following with oncology and has his labs to review.     The patient would like to have his pain medication refilled today.  Patient is seen monthly for reevaluation of his pain.  Patient reports his pain is the same.  Its most prominent in his right hip.  Without his pain medication he is unable to function secondary to the pain.  With the pain medication it makes his pain bearable and he is able to perform activities around his house.    Patient follows with oncology for labs every 3 months. .     Patient INR is at goal today.   Patient denies any bleeding at this time. He is currently alternating 7MG and 8MG    Atrial fib is rate controlled.     Pain   This is a chronic problem. The current episode started more than 1 year ago. The problem occurs constantly. The problem has been unchanged. Associated symptoms include arthralgias, fatigue and numbness. Pertinent negatives include no abdominal pain, anorexia, chest pain, chills, coughing, fever, headaches or weakness. The symptoms are aggravated by exertion, standing and walking. He has tried rest, walking, position changes and oral narcotics (Percocet, patient cannot tolerate NSAIDs secondary to Coumadin) for the symptoms. The treatment provided moderate relief.   Anxiety   Presents for follow-up visit. Symptoms include nervous/anxious behavior and restlessness. Patient reports no chest pain, dizziness, palpitations, shortness of breath or suicidal ideas. Symptoms occur most days. The severity of symptoms is mild. The quality of sleep is good. Nighttime awakenings: occasional.     Compliance with medications is %.   Atrial Fibrillation   Presents for follow-up visit. Symptoms are negative for bradycardia, chest pain, dizziness, palpitations, shortness  "of breath, tachycardia and weakness. The symptoms have been stable. Past medical history includes atrial fibrillation. There are no medication compliance problems.        The following portions of the patient's history were reviewed and updated as appropriate: allergies, current medications, past family history, past medical history, past social history, past surgical history and problem list.        Review of Systems   Constitutional: Positive for fatigue. Negative for activity change, appetite change, chills, fever and unexpected weight change.   Respiratory: Negative for cough, chest tightness and shortness of breath.    Cardiovascular: Negative for chest pain, palpitations and leg swelling.   Gastrointestinal: Negative for abdominal pain and anorexia.   Musculoskeletal: Positive for arthralgias and gait problem.   Neurological: Positive for numbness. Negative for dizziness, facial asymmetry, speech difficulty, weakness, light-headedness and headaches.   Hematological: Bruises/bleeds easily.   Psychiatric/Behavioral: Positive for dysphoric mood (controlled with medication) and sleep disturbance (wakes up in the middle of the night secondary to pain). Negative for self-injury and suicidal ideas. The patient is nervous/anxious.        Objective   Blood pressure 124/68, pulse 75, temperature 98.7 °F (37.1 °C), temperature source Oral, resp. rate 20, height 182.9 cm (72\"), weight 82.9 kg (182 lb 12.8 oz), SpO2 98 %.  Physical Exam   Constitutional: He is oriented to person, place, and time. Vital signs are normal. He appears well-developed and well-nourished. He is active and cooperative.  Non-toxic appearance. He does not have a sickly appearance. He does not appear ill. No distress.   HENT:   Head: Normocephalic and atraumatic.   Right Ear: External ear normal.   Left Ear: External ear normal.   Nose: Nose normal.   Mouth/Throat: Oropharynx is clear and moist. Abnormal dentition.   Eyes: Conjunctivae and EOM are " normal. Right eye exhibits no discharge. Left eye exhibits no discharge.   Neck: No tracheal deviation present.   Cardiovascular: Normal rate and intact distal pulses. An irregularly irregular rhythm present. Exam reveals decreased pulses.   Pulses:       Dorsalis pedis pulses are 1+ on the right side, and 1+ on the left side.        Posterior tibial pulses are 1+ on the right side, and 1+ on the left side.   Pulmonary/Chest: Effort normal and breath sounds normal. No accessory muscle usage or stridor. No respiratory distress. He has no wheezes. He has no rales.   Abdominal: Soft. Normal appearance and bowel sounds are normal. He exhibits no distension, no fluid wave, no pulsatile midline mass and no mass. There is no tenderness.   Musculoskeletal: He exhibits no edema.   Significantly limited ROM in right hip, pain with movement, walks with limp favoring right hip, difficulty changing positions and getting onto the exam table.    Neurological: He is alert and oriented to person, place, and time.   Skin: Skin is warm and dry. He is not diaphoretic.   Psychiatric: He has a normal mood and affect. His behavior is normal. Judgment and thought content normal.   Nursing note and vitals reviewed.    Lab Results (last 24 hours)     ** No results found for the last 24 hours. **          Assessment/Plan   Problems Addressed this Visit        Cardiovascular and Mediastinum    Atrial fibrillation (CMS/HCC)    Relevant Medications    digoxin (LANOXIN) 250 MCG tablet    warfarin (COUMADIN) 2 MG tablet       Nervous and Auditory    Chronic hip pain - Primary    Relevant Medications    oxyCODONE-acetaminophen (PERCOCET)  MG per tablet       Other    Anxiety    Other insomnia    Tobacco use        PLAN:     #1 insomnia/anxiety: chronic, controlled, continue on current medication of celexa and restoril - no refills needed at this time, sent on 9/14/18 with 2 refills on restoril.     #2 tobacco use: chronic, uncontrolled,  advised to stop     #3 chronic pain in right hip: Chronic, controlled with oral pain medication.  Patient is unable to take NSAIDs secondary to Coumadin.  Nii reviewed and appropriate.  Controlled contract in the chart.  Refill given on medication.  Sent to pharmacy.  Return in 1 month     #4 normal INR: continue on current dose of coumadin, no sign of bleeding, INR WNL, will repeat in 2 weeks     #5 atrial fibrillation: Patient is anticoagulated and rate controlled. Continue on cardizem.             This document has been electronically signed by Sunita Crawford MD on November 21, 2018 5:27 PM

## 2018-11-21 PROBLEM — Z72.0 TOBACCO USE: Status: ACTIVE | Noted: 2018-11-21

## 2018-11-27 ENCOUNTER — CLINICAL SUPPORT (OUTPATIENT)
Dept: FAMILY MEDICINE CLINIC | Facility: CLINIC | Age: 78
End: 2018-11-27

## 2018-11-27 DIAGNOSIS — I48.91 ATRIAL FIBRILLATION, UNSPECIFIED TYPE (HCC): ICD-10-CM

## 2018-11-27 LAB — INR PPP: 2.8 (ref 0.9–1.1)

## 2018-11-27 PROCEDURE — 85610 PROTHROMBIN TIME: CPT | Performed by: FAMILY MEDICINE

## 2018-12-13 ENCOUNTER — OFFICE VISIT (OUTPATIENT)
Dept: FAMILY MEDICINE CLINIC | Facility: CLINIC | Age: 78
End: 2018-12-13

## 2018-12-13 DIAGNOSIS — I50.9 CONGESTIVE HEART FAILURE, UNSPECIFIED HF CHRONICITY, UNSPECIFIED HEART FAILURE TYPE (HCC): Primary | ICD-10-CM

## 2018-12-14 ENCOUNTER — OFFICE VISIT (OUTPATIENT)
Dept: FAMILY MEDICINE CLINIC | Facility: CLINIC | Age: 78
End: 2018-12-14

## 2018-12-14 VITALS
WEIGHT: 182 LBS | BODY MASS INDEX: 24.65 KG/M2 | SYSTOLIC BLOOD PRESSURE: 136 MMHG | RESPIRATION RATE: 18 BRPM | HEART RATE: 61 BPM | TEMPERATURE: 98 F | HEIGHT: 72 IN | OXYGEN SATURATION: 98 % | DIASTOLIC BLOOD PRESSURE: 72 MMHG

## 2018-12-14 DIAGNOSIS — G89.29 CHRONIC PAIN OF RIGHT HIP: Primary | ICD-10-CM

## 2018-12-14 DIAGNOSIS — M25.551 CHRONIC PAIN OF RIGHT HIP: Primary | ICD-10-CM

## 2018-12-14 DIAGNOSIS — Z79.01 CHRONIC ANTICOAGULATION: ICD-10-CM

## 2018-12-14 DIAGNOSIS — I48.20 CHRONIC ATRIAL FIBRILLATION (HCC): ICD-10-CM

## 2018-12-14 DIAGNOSIS — I48.91 ATRIAL FIBRILLATION, UNSPECIFIED TYPE (HCC): ICD-10-CM

## 2018-12-14 DIAGNOSIS — G47.09 OTHER INSOMNIA: ICD-10-CM

## 2018-12-14 DIAGNOSIS — Z91.81 AT RISK FOR FALLS: ICD-10-CM

## 2018-12-14 LAB — INR PPP: 2.7 (ref 0.9–1.1)

## 2018-12-14 PROCEDURE — 85610 PROTHROMBIN TIME: CPT | Performed by: FAMILY MEDICINE

## 2018-12-14 PROCEDURE — 99214 OFFICE O/P EST MOD 30 MIN: CPT | Performed by: FAMILY MEDICINE

## 2018-12-14 RX ORDER — DILTIAZEM HYDROCHLORIDE 240 MG/1
240 CAPSULE, COATED, EXTENDED RELEASE ORAL DAILY
Qty: 90 CAPSULE | Refills: 3 | Status: SHIPPED | OUTPATIENT
Start: 2018-12-14 | End: 2019-10-30 | Stop reason: SDUPTHER

## 2018-12-14 RX ORDER — OXYCODONE AND ACETAMINOPHEN 10; 325 MG/1; MG/1
1 TABLET ORAL 3 TIMES DAILY PRN
Qty: 90 TABLET | Refills: 0 | Status: SHIPPED | OUTPATIENT
Start: 2018-12-14 | End: 2019-01-11 | Stop reason: SDUPTHER

## 2018-12-14 RX ORDER — TEMAZEPAM 15 MG/1
15 CAPSULE ORAL NIGHTLY PRN
Qty: 30 CAPSULE | Refills: 2 | Status: SHIPPED | OUTPATIENT
Start: 2018-12-14 | End: 2019-03-11 | Stop reason: SDUPTHER

## 2018-12-14 NOTE — PROGRESS NOTES
"Subjective cc: pain medication refill  Cabrera Avina is a 78 y.o. male who presents to get a refill on his pain medication. Pt reports he is doing well. He is remaining active. He does report \"overdoing it\" at home recently when he was leaf blowing, his legs became weak and he sunk to the ground, no fall, no trauma.  He is still following with oncology.     The patient would like to have his pain medication refilled today.  Patient is seen monthly for reevaluation of his pain.  Patient reports his pain is the same.  Its most prominent in his right hip.  Without his pain medication he is unable to function secondary to the pain.  With the pain medication it makes his pain bearable and he is able to perform activities around his house.    Patient follows with oncology for labs every 3 months. .     Patient INR is at goal today.   Patient denies any bleeding at this time. He is currently alternating 7MG and 8MG    Atrial fib is rate controlled.     He is currently taking restoril PRN for insomnia which controls his symptoms and allows him to get restful sleep.     Pain   This is a chronic problem. The current episode started more than 1 year ago. The problem occurs constantly. The problem has been unchanged. Associated symptoms include arthralgias, fatigue, numbness and weakness. Pertinent negatives include no abdominal pain, anorexia, chest pain, chills, coughing, fever or headaches. The symptoms are aggravated by exertion, standing and walking. He has tried rest, walking, position changes and oral narcotics (Percocet, patient cannot tolerate NSAIDs secondary to Coumadin) for the symptoms. The treatment provided moderate relief.   Anxiety   Presents for follow-up visit. Symptoms include insomnia, nervous/anxious behavior and restlessness. Patient reports no chest pain, dizziness, palpitations, shortness of breath or suicidal ideas. Symptoms occur most days. The severity of symptoms is mild. The quality of sleep is " "good. Nighttime awakenings: occasional.     Compliance with medications is %.   Atrial Fibrillation   Presents for follow-up visit. Symptoms include weakness. Symptoms are negative for bradycardia, chest pain, dizziness, palpitations, shortness of breath, syncope and tachycardia. The symptoms have been stable. Past medical history includes atrial fibrillation. There are no medication compliance problems.        The following portions of the patient's history were reviewed and updated as appropriate: allergies, current medications, past family history, past medical history, past social history, past surgical history and problem list.        Review of Systems   Constitutional: Positive for fatigue. Negative for activity change, appetite change, chills, fever and unexpected weight change.   Respiratory: Negative for cough, chest tightness and shortness of breath.    Cardiovascular: Negative for chest pain, palpitations, leg swelling and syncope.   Gastrointestinal: Negative for abdominal pain and anorexia.   Musculoskeletal: Positive for arthralgias and gait problem.   Neurological: Positive for weakness and numbness. Negative for dizziness, syncope, facial asymmetry, speech difficulty, light-headedness and headaches.   Hematological: Bruises/bleeds easily.   Psychiatric/Behavioral: Positive for dysphoric mood (controlled with medication) and sleep disturbance (wakes up in the middle of the night secondary to pain). Negative for self-injury and suicidal ideas. The patient is nervous/anxious and has insomnia.        Objective   Blood pressure 136/72, pulse 61, temperature 98 °F (36.7 °C), temperature source Oral, resp. rate 18, height 182.9 cm (72\"), weight 82.6 kg (182 lb), SpO2 98 %.  Physical Exam   Constitutional: He is oriented to person, place, and time. Vital signs are normal. He appears well-developed and well-nourished. He is active and cooperative.  Non-toxic appearance. He does not have a sickly " appearance. He does not appear ill. No distress.   HENT:   Head: Normocephalic and atraumatic.   Right Ear: External ear normal.   Left Ear: External ear normal.   Nose: Nose normal.   Mouth/Throat: Oropharynx is clear and moist. Abnormal dentition.   Eyes: Conjunctivae and EOM are normal. Right eye exhibits no discharge. Left eye exhibits no discharge.   Neck: No tracheal deviation present.   Cardiovascular: Normal rate and intact distal pulses. An irregularly irregular rhythm present.   Pulmonary/Chest: Effort normal and breath sounds normal. No accessory muscle usage or stridor. No respiratory distress. He has no wheezes. He has no rales.   Abdominal: Soft. Normal appearance and bowel sounds are normal. He exhibits no distension, no fluid wave, no pulsatile midline mass and no mass. There is no tenderness.   Musculoskeletal: He exhibits no edema.   Significantly limited ROM in right hip, pain with movement, walks with limp favoring right hip, difficulty changing positions and getting onto the exam table.    Neurological: He is alert and oriented to person, place, and time.   Skin: Skin is warm and dry. He is not diaphoretic.   Psychiatric: He has a normal mood and affect. His behavior is normal. Judgment and thought content normal.   Nursing note and vitals reviewed.    Lab Results (last 24 hours)     Procedure Component Value Units Date/Time    POCT INR [[id number]]  (Abnormal) Collected:  12/14/18 1332    Specimen:  Blood Updated:  12/14/18 1332     INR 2.7     Comment: 32.1             Assessment/Plan   Problems Addressed this Visit        Cardiovascular and Mediastinum    Atrial fibrillation (CMS/HCC)    Relevant Medications    diltiaZEM CD (CARDIZEM CD) 240 MG 24 hr capsule       Nervous and Auditory    Chronic hip pain - Primary    Relevant Medications    oxyCODONE-acetaminophen (PERCOCET)  MG per tablet       Other    Other insomnia    Chronic anticoagulation      Other Visit Diagnoses     At risk for  falls            PLAN:     #1 insomnia/anxiety: chronic, controlled, continue on current medication of celexa and restoril - refill sent.     #2 tobacco use: chronic, uncontrolled, advised to stop     #3 chronic pain in right hip: Chronic, controlled with oral pain medication.  Patient is unable to take NSAIDs secondary to Coumadin.  Nii reviewed and appropriate.  Controlled contract in the chart.  Refill given on medication.  Sent to pharmacy.  Return in 1 month     #4 normal INR: continue on current dose of coumadin, no sign of bleeding, INR WNL, will repeat in 2 weeks at pt request    #5 atrial fibrillation: Patient is anticoagulated and rate controlled. Continue on cardizem. Refill given     #6 at risk for falls: pt reports weakness recently when exerting himself. Advised on the high risk of bleeding with falls, needs to take fall precautions, needs to contact the office for evaliation if does fall, offered referral to PT but pt declines at this itme.               This document has been electronically signed by Sunita Crawford MD on December 14, 2018 11:18 PM

## 2018-12-27 ENCOUNTER — CLINICAL SUPPORT (OUTPATIENT)
Dept: FAMILY MEDICINE CLINIC | Facility: CLINIC | Age: 78
End: 2018-12-27

## 2018-12-27 DIAGNOSIS — I48.91 ATRIAL FIBRILLATION, UNSPECIFIED TYPE (HCC): ICD-10-CM

## 2018-12-27 LAB — INR PPP: 2.3 (ref 0.9–1.1)

## 2018-12-27 PROCEDURE — 85610 PROTHROMBIN TIME: CPT | Performed by: FAMILY MEDICINE

## 2019-01-11 ENCOUNTER — OFFICE VISIT (OUTPATIENT)
Dept: FAMILY MEDICINE CLINIC | Facility: CLINIC | Age: 79
End: 2019-01-11

## 2019-01-11 ENCOUNTER — TELEPHONE (OUTPATIENT)
Dept: FAMILY MEDICINE CLINIC | Facility: CLINIC | Age: 79
End: 2019-01-11

## 2019-01-11 VITALS
BODY MASS INDEX: 24.73 KG/M2 | HEIGHT: 72 IN | DIASTOLIC BLOOD PRESSURE: 70 MMHG | WEIGHT: 182.6 LBS | RESPIRATION RATE: 18 BRPM | TEMPERATURE: 98 F | HEART RATE: 79 BPM | SYSTOLIC BLOOD PRESSURE: 132 MMHG | OXYGEN SATURATION: 98 %

## 2019-01-11 DIAGNOSIS — Z79.01 CHRONIC ANTICOAGULATION: Primary | ICD-10-CM

## 2019-01-11 DIAGNOSIS — K59.03 DRUG-INDUCED CONSTIPATION: ICD-10-CM

## 2019-01-11 DIAGNOSIS — M25.551 CHRONIC PAIN OF RIGHT HIP: ICD-10-CM

## 2019-01-11 DIAGNOSIS — F41.9 ANXIETY: ICD-10-CM

## 2019-01-11 DIAGNOSIS — I48.20 CHRONIC ATRIAL FIBRILLATION (HCC): ICD-10-CM

## 2019-01-11 DIAGNOSIS — I48.91 ATRIAL FIBRILLATION, UNSPECIFIED TYPE (HCC): ICD-10-CM

## 2019-01-11 DIAGNOSIS — G89.29 CHRONIC PAIN OF RIGHT HIP: ICD-10-CM

## 2019-01-11 DIAGNOSIS — Z72.0 TOBACCO USE: ICD-10-CM

## 2019-01-11 LAB — INR PPP: 2.1 (ref 0.9–1.1)

## 2019-01-11 PROCEDURE — 99214 OFFICE O/P EST MOD 30 MIN: CPT | Performed by: FAMILY MEDICINE

## 2019-01-11 PROCEDURE — 85610 PROTHROMBIN TIME: CPT | Performed by: FAMILY MEDICINE

## 2019-01-11 RX ORDER — OXYCODONE AND ACETAMINOPHEN 10; 325 MG/1; MG/1
1 TABLET ORAL EVERY 6 HOURS PRN
Qty: 100 TABLET | Refills: 0 | Status: SHIPPED | OUTPATIENT
Start: 2019-01-11 | End: 2019-02-11 | Stop reason: SDUPTHER

## 2019-01-11 RX ORDER — WARFARIN SODIUM 2 MG/1
7 TABLET ORAL
Qty: 360 TABLET | Refills: 5 | Status: SHIPPED | OUTPATIENT
Start: 2019-01-11 | End: 2019-11-05 | Stop reason: SDUPTHER

## 2019-01-11 NOTE — TELEPHONE ENCOUNTER
I had not sent it yet - it had to be sent from my office since it was controlled - but both are there now

## 2019-01-11 NOTE — PROGRESS NOTES
Subjective cc: pain medication refill  Cabrera Avina is a 78 y.o. male who presents to get a refill on his pain medication. Pt reports he is doing well. He is remaining active.     The patient would like to have his pain medication refilled today.  Patient is seen monthly for reevaluation of his pain.  Patient reports his pain is gradually getting worse.  Sometimes he has to take 4 tablets instead of 3, this occurs a few times per week.   Its most prominent in his right hip.  Without his pain medication he is unable to function secondary to the pain.  With the pain medication it makes his pain bearable and he is able to perform activities around his house. He complains of worsening constipation - hard stools, was going every 3 days, now every 5. Takes a lot of laxative and stool softeners when he starts to ntoice he hasnt gone for a few days.     Patient follows with oncology for labs every 3 months.   Patient INR is at goal today.   Patient denies any bleeding at this time. He is currently alternating 7MG, 7MG and 8MG. He needs refill on coumadin.     Atrial fib is rate controlled.     He is currently taking restoril PRN for insomnia which controls his symptoms and allows him to get restful sleep.     Pain   This is a chronic problem. The current episode started more than 1 year ago. The problem occurs constantly. The problem has been unchanged. Associated symptoms include arthralgias, numbness and weakness. Pertinent negatives include no abdominal pain, anorexia, chest pain, chills, coughing, fatigue, fever or headaches. The symptoms are aggravated by exertion, standing and walking. He has tried rest, walking, position changes and oral narcotics (Percocet, patient cannot tolerate NSAIDs secondary to Coumadin) for the symptoms. The treatment provided moderate relief.   Anxiety   Presents for follow-up visit. Symptoms include insomnia, nervous/anxious behavior and restlessness. Patient reports no chest pain,  "dizziness, palpitations, shortness of breath or suicidal ideas. Symptoms occur most days. The severity of symptoms is mild. The quality of sleep is good. Nighttime awakenings: occasional.     Compliance with medications is %.   Atrial Fibrillation   Presents for follow-up visit. Symptoms include weakness. Symptoms are negative for bradycardia, chest pain, dizziness, palpitations, shortness of breath, syncope and tachycardia. The symptoms have been stable. Past medical history includes atrial fibrillation. There are no medication compliance problems.        The following portions of the patient's history were reviewed and updated as appropriate: allergies, current medications, past family history, past medical history, past social history, past surgical history and problem list.        Review of Systems   Constitutional: Negative for activity change, appetite change, chills, fatigue, fever and unexpected weight change.   Respiratory: Negative for cough, chest tightness and shortness of breath.    Cardiovascular: Negative for chest pain, palpitations, leg swelling and syncope.   Gastrointestinal: Positive for constipation (worsening ). Negative for abdominal pain and anorexia.   Musculoskeletal: Positive for arthralgias and gait problem.   Neurological: Positive for weakness and numbness. Negative for dizziness, syncope, facial asymmetry, speech difficulty, light-headedness and headaches.   Hematological: Bruises/bleeds easily.   Psychiatric/Behavioral: Positive for dysphoric mood (controlled with medication) and sleep disturbance (wakes up in the middle of the night secondary to pain). Negative for self-injury and suicidal ideas. The patient is nervous/anxious and has insomnia.        Objective   Blood pressure 132/70, pulse 79, temperature 98 °F (36.7 °C), temperature source Oral, resp. rate 18, height 182.9 cm (72\"), weight 82.8 kg (182 lb 9.6 oz), SpO2 98 %.  Physical Exam   Constitutional: He is oriented to " person, place, and time. Vital signs are normal. He appears well-developed and well-nourished. He is active and cooperative.  Non-toxic appearance. He does not have a sickly appearance. He does not appear ill. No distress.   HENT:   Head: Normocephalic and atraumatic.   Right Ear: External ear normal.   Left Ear: External ear normal.   Nose: Nose normal.   Mouth/Throat: Oropharynx is clear and moist. Abnormal dentition.   Eyes: Conjunctivae and EOM are normal. Right eye exhibits no discharge. Left eye exhibits no discharge.   Neck: No tracheal deviation present.   Cardiovascular: Normal rate and intact distal pulses. An irregularly irregular rhythm present.   Pulmonary/Chest: Effort normal and breath sounds normal. No accessory muscle usage or stridor. No respiratory distress. He has no wheezes. He has no rales.   Abdominal: Soft. Normal appearance and bowel sounds are normal. He exhibits no distension, no fluid wave, no pulsatile midline mass and no mass. There is no tenderness.   Musculoskeletal: He exhibits no edema.   Significantly limited ROM in right hip, pain with movement, walks with limp favoring right hip, difficulty changing positions and getting onto the exam table.    Neurological: He is alert and oriented to person, place, and time.   Skin: Skin is warm and dry. He is not diaphoretic.   Psychiatric: He has a normal mood and affect. His behavior is normal. Judgment and thought content normal.   Nursing note and vitals reviewed.    Lab Results (last 24 hours)     Procedure Component Value Units Date/Time    POCT INR [[id number]]  (Abnormal) Collected:  01/11/19 1109    Specimen:  Blood Updated:  01/11/19 1109     INR 2.1     Comment: 25.3             Assessment/Plan   Problems Addressed this Visit        Cardiovascular and Mediastinum    Atrial fibrillation (CMS/HCC)    Relevant Medications    warfarin (COUMADIN) 2 MG tablet       Nervous and Auditory    Chronic hip pain    Relevant Medications     oxyCODONE-acetaminophen (PERCOCET)  MG per tablet       Other    Anxiety    Tobacco use    Chronic anticoagulation - Primary      Other Visit Diagnoses     Drug-induced constipation            PLAN:     #1 insomnia/anxiety: chronic, controlled, continue on current medication of celexa and restoril.     #2 tobacco use: chronic, uncontrolled, advised to stop     #3 chronic pain in right hip: Chronic, controlled with oral pain medication. Will increase to total of 100 tab/month to help with pain relief.  Patient is unable to take NSAIDs secondary to Coumadin.  Nii reviewed and appropriate.  Controlled contract in the chart.  Refill given on medication.  Sent to pharmacy.  Return in 1 month     #4 normal INR: continue on current dose of coumadin, no sign of bleeding, INR WNL, will repeat in 2 weeks at pt request    #5 atrial fibrillation: Patient is anticoagulated and rate controlled. Continue on cardizem. Refill given     #6 constipation: new, worsening, advised on diet changes, advised this is due to his medication, advised he needs to be taking laxative and stool softner on a more regular basis instead of waiting until he is already very constipated.               This document has been electronically signed by Sunita Crawford MD on January 11, 2019 5:21 PM

## 2019-01-11 NOTE — TELEPHONE ENCOUNTER
Pt called and stated that only his warfarin was at pharmacy and not his percocet also. Wanted to know what the issue was? Can you please help?    Thank you!

## 2019-01-14 RX ORDER — POLYETHYLENE GLYCOL 3350 17 G/17G
17 POWDER, FOR SOLUTION ORAL DAILY
Qty: 100 PACKET | Refills: 3 | Status: SHIPPED | OUTPATIENT
Start: 2019-01-14 | End: 2020-11-02

## 2019-01-28 ENCOUNTER — CLINICAL SUPPORT (OUTPATIENT)
Dept: FAMILY MEDICINE CLINIC | Facility: CLINIC | Age: 79
End: 2019-01-28

## 2019-01-28 DIAGNOSIS — I48.91 ATRIAL FIBRILLATION, UNSPECIFIED TYPE (HCC): ICD-10-CM

## 2019-01-28 LAB — INR PPP: 2.2 (ref 0.9–1.1)

## 2019-01-28 PROCEDURE — 85610 PROTHROMBIN TIME: CPT | Performed by: FAMILY MEDICINE

## 2019-02-04 ENCOUNTER — TRANSCRIBE ORDERS (OUTPATIENT)
Dept: ONCOLOGY | Facility: CLINIC | Age: 79
End: 2019-02-04

## 2019-02-04 DIAGNOSIS — Q61.3 POLYCYSTIC KIDNEY: ICD-10-CM

## 2019-02-04 DIAGNOSIS — Z85.72 PERSONAL HISTORY OF MALIGNANT LYMPHOMA: Primary | ICD-10-CM

## 2019-02-04 DIAGNOSIS — E53.8 VITAMIN B12 DEFICIENCY (NON ANEMIC): ICD-10-CM

## 2019-02-04 DIAGNOSIS — D50.9 IRON DEFICIENCY ANEMIA, UNSPECIFIED IRON DEFICIENCY ANEMIA TYPE: ICD-10-CM

## 2019-02-04 DIAGNOSIS — D64.9 ANEMIA, UNSPECIFIED TYPE: ICD-10-CM

## 2019-02-08 ENCOUNTER — LAB (OUTPATIENT)
Dept: LAB | Facility: HOSPITAL | Age: 79
End: 2019-02-08
Attending: INTERNAL MEDICINE

## 2019-02-08 DIAGNOSIS — Z85.72 PERSONAL HISTORY OF MALIGNANT LYMPHOMA: ICD-10-CM

## 2019-02-08 DIAGNOSIS — D64.9 ANEMIA, UNSPECIFIED TYPE: ICD-10-CM

## 2019-02-08 DIAGNOSIS — E53.8 VITAMIN B12 DEFICIENCY (NON ANEMIC): ICD-10-CM

## 2019-02-08 DIAGNOSIS — Q61.3 POLYCYSTIC KIDNEY: ICD-10-CM

## 2019-02-08 DIAGNOSIS — D50.9 IRON DEFICIENCY ANEMIA, UNSPECIFIED IRON DEFICIENCY ANEMIA TYPE: ICD-10-CM

## 2019-02-08 LAB
ALBUMIN SERPL-MCNC: 4.6 G/DL (ref 3.5–5)
ALBUMIN/GLOB SERPL: 1.1 G/DL (ref 1.1–2.5)
ALP SERPL-CCNC: 71 U/L (ref 24–120)
ALT SERPL W P-5'-P-CCNC: 17 U/L (ref 0–54)
ANION GAP SERPL CALCULATED.3IONS-SCNC: 10 MMOL/L (ref 4–13)
AST SERPL-CCNC: 25 U/L (ref 7–45)
BASOPHILS # BLD AUTO: 0.04 10*3/MM3 (ref 0–0.2)
BASOPHILS NFR BLD AUTO: 1 % (ref 0–2)
BILIRUB SERPL-MCNC: 0.7 MG/DL (ref 0.1–1)
BUN BLD-MCNC: 17 MG/DL (ref 5–21)
BUN/CREAT SERPL: 18.1 (ref 7–25)
CALCIUM SPEC-SCNC: 9.8 MG/DL (ref 8.4–10.4)
CHLORIDE SERPL-SCNC: 99 MMOL/L (ref 98–110)
CO2 SERPL-SCNC: 31 MMOL/L (ref 24–31)
CREAT BLD-MCNC: 0.94 MG/DL (ref 0.5–1.4)
DEPRECATED RDW RBC AUTO: 43.4 FL (ref 40–54)
EOSINOPHIL # BLD AUTO: 0.13 10*3/MM3 (ref 0–0.7)
EOSINOPHIL NFR BLD AUTO: 3.3 % (ref 0–4)
ERYTHROCYTE [DISTWIDTH] IN BLOOD BY AUTOMATED COUNT: 13 % (ref 12–15)
FERRITIN SERPL-MCNC: 146 NG/ML (ref 17.9–464)
FOLATE SERPL-MCNC: 7.4 NG/ML
GFR SERPL CREATININE-BSD FRML MDRD: 78 ML/MIN/1.73
GLOBULIN UR ELPH-MCNC: 4.2 GM/DL
GLUCOSE BLD-MCNC: 110 MG/DL (ref 70–100)
HCT VFR BLD AUTO: 42.7 % (ref 40–52)
HGB BLD-MCNC: 13.9 G/DL (ref 14–18)
IMM GRANULOCYTES # BLD AUTO: 0 10*3/MM3 (ref 0–0.03)
IMM GRANULOCYTES NFR BLD AUTO: 0 % (ref 0–5)
IRON 24H UR-MRATE: 100 MCG/DL (ref 42–180)
IRON SATN MFR SERPL: 29 % (ref 20–45)
LDH SERPL-CCNC: 425 U/L (ref 265–665)
LYMPHOCYTES # BLD AUTO: 0.82 10*3/MM3 (ref 0.72–4.86)
LYMPHOCYTES NFR BLD AUTO: 20.6 % (ref 15–45)
MCH RBC QN AUTO: 30 PG (ref 28–32)
MCHC RBC AUTO-ENTMCNC: 32.6 G/DL (ref 33–36)
MCV RBC AUTO: 92 FL (ref 82–95)
MONOCYTES # BLD AUTO: 0.44 10*3/MM3 (ref 0.19–1.3)
MONOCYTES NFR BLD AUTO: 11 % (ref 4–12)
NEUTROPHILS # BLD AUTO: 2.56 10*3/MM3 (ref 1.87–8.4)
NEUTROPHILS NFR BLD AUTO: 64.1 % (ref 39–78)
NRBC BLD AUTO-RTO: 0 /100 WBC (ref 0–0)
PLATELET # BLD AUTO: 202 10*3/MM3 (ref 130–400)
PMV BLD AUTO: 9.8 FL (ref 6–12)
POTASSIUM BLD-SCNC: 4.5 MMOL/L (ref 3.5–5.3)
PROT SERPL-MCNC: 8.8 G/DL (ref 6.3–8.7)
RBC # BLD AUTO: 4.64 10*6/MM3 (ref 4.8–5.9)
SODIUM BLD-SCNC: 140 MMOL/L (ref 135–145)
TIBC SERPL-MCNC: 345 MCG/DL (ref 225–420)
VIT B12 BLD-MCNC: 542 PG/ML (ref 239–931)
WBC NRBC COR # BLD: 3.99 10*3/MM3 (ref 4.8–10.8)

## 2019-02-08 PROCEDURE — 36415 COLL VENOUS BLD VENIPUNCTURE: CPT

## 2019-02-08 PROCEDURE — 82232 ASSAY OF BETA-2 PROTEIN: CPT

## 2019-02-08 PROCEDURE — 85025 COMPLETE CBC W/AUTO DIFF WBC: CPT

## 2019-02-08 PROCEDURE — 83540 ASSAY OF IRON: CPT

## 2019-02-08 PROCEDURE — 82728 ASSAY OF FERRITIN: CPT

## 2019-02-08 PROCEDURE — 82746 ASSAY OF FOLIC ACID SERUM: CPT

## 2019-02-08 PROCEDURE — 82607 VITAMIN B-12: CPT

## 2019-02-08 PROCEDURE — 83550 IRON BINDING TEST: CPT

## 2019-02-08 PROCEDURE — 83615 LACTATE (LD) (LDH) ENZYME: CPT

## 2019-02-08 PROCEDURE — 80053 COMPREHEN METABOLIC PANEL: CPT

## 2019-02-10 LAB — B2 MICROGLOB SERPL-MCNC: 1.6 MG/L (ref 0.6–2.4)

## 2019-02-11 ENCOUNTER — OFFICE VISIT (OUTPATIENT)
Dept: FAMILY MEDICINE CLINIC | Facility: CLINIC | Age: 79
End: 2019-02-11

## 2019-02-11 VITALS
BODY MASS INDEX: 25.11 KG/M2 | RESPIRATION RATE: 18 BRPM | SYSTOLIC BLOOD PRESSURE: 130 MMHG | HEIGHT: 72 IN | DIASTOLIC BLOOD PRESSURE: 68 MMHG | TEMPERATURE: 99.6 F | HEART RATE: 78 BPM | WEIGHT: 185.4 LBS | OXYGEN SATURATION: 98 %

## 2019-02-11 DIAGNOSIS — G89.29 CHRONIC PAIN OF RIGHT HIP: Primary | ICD-10-CM

## 2019-02-11 DIAGNOSIS — M25.551 CHRONIC PAIN OF RIGHT HIP: Primary | ICD-10-CM

## 2019-02-11 DIAGNOSIS — K59.01 SLOW TRANSIT CONSTIPATION: ICD-10-CM

## 2019-02-11 DIAGNOSIS — I48.20 CHRONIC ATRIAL FIBRILLATION (HCC): ICD-10-CM

## 2019-02-11 DIAGNOSIS — G47.09 OTHER INSOMNIA: ICD-10-CM

## 2019-02-11 DIAGNOSIS — Z79.01 CHRONIC ANTICOAGULATION: ICD-10-CM

## 2019-02-11 LAB — INR PPP: 2.4 (ref 0.9–1.1)

## 2019-02-11 PROCEDURE — 85610 PROTHROMBIN TIME: CPT | Performed by: FAMILY MEDICINE

## 2019-02-11 PROCEDURE — 99214 OFFICE O/P EST MOD 30 MIN: CPT | Performed by: FAMILY MEDICINE

## 2019-02-11 RX ORDER — DIGOXIN 250 MCG
250 TABLET ORAL DAILY
Qty: 90 TABLET | Refills: 3 | Status: SHIPPED | OUTPATIENT
Start: 2019-02-11 | End: 2019-11-15 | Stop reason: SDUPTHER

## 2019-02-11 RX ORDER — OXYCODONE AND ACETAMINOPHEN 10; 325 MG/1; MG/1
1 TABLET ORAL EVERY 6 HOURS PRN
Qty: 100 TABLET | Refills: 0 | Status: SHIPPED | OUTPATIENT
Start: 2019-02-11 | End: 2019-03-11 | Stop reason: SDUPTHER

## 2019-02-11 NOTE — PROGRESS NOTES
Subjective cc: pain medication refill  Cabrera Avina is a 78 y.o. male who presents to get a refill on his pain medication. Pt reports he is doing well. He is remaining active. Pain is most prominent in his right hip.  Without his pain medication he is unable to function secondary to the pain.  With the pain medication it makes his pain bearable and he is able to perform activities around his house.   Patient follows with oncology for labs every 3 months.   Patient INR is at goal today.   Patient denies any bleeding at this time. He is currently alternating 7MG, 7MG and 8MG. He needs refill on coumadin.     Atrial fib is rate controlled.     He is currently taking restoril PRN for insomnia which controls his symptoms and allows him to get restful sleep. No refills needed at this time.     Pain   This is a chronic problem. The current episode started more than 1 year ago. The problem occurs constantly. The problem has been unchanged. Associated symptoms include arthralgias, numbness and weakness. Pertinent negatives include no abdominal pain, anorexia, chest pain, chills, coughing, fatigue, fever or headaches. The symptoms are aggravated by exertion, standing and walking. He has tried rest, walking, position changes and oral narcotics (Percocet, patient cannot tolerate NSAIDs secondary to Coumadin) for the symptoms. The treatment provided moderate relief.   Atrial Fibrillation   Presents for follow-up visit. Symptoms include weakness. Symptoms are negative for bradycardia, chest pain, dizziness, hemodynamic instability, palpitations, shortness of breath, syncope and tachycardia. The symptoms have been stable. Past medical history includes atrial fibrillation. There are no medication compliance problems.        The following portions of the patient's history were reviewed and updated as appropriate: allergies, current medications, past family history, past medical history, past social history, past surgical history  "and problem list.        Review of Systems   Constitutional: Negative for activity change, appetite change, chills, fatigue, fever and unexpected weight change.   Respiratory: Negative for cough, chest tightness and shortness of breath.    Cardiovascular: Negative for chest pain, palpitations, leg swelling and syncope.   Gastrointestinal: Positive for constipation. Negative for abdominal pain and anorexia.   Musculoskeletal: Positive for arthralgias and gait problem.   Neurological: Positive for weakness and numbness. Negative for dizziness, syncope, facial asymmetry, speech difficulty, light-headedness and headaches.   Hematological: Bruises/bleeds easily.   Psychiatric/Behavioral: Positive for dysphoric mood (controlled with medication) and sleep disturbance (wakes up in the middle of the night secondary to pain). Negative for self-injury.       Objective   Blood pressure 130/68, pulse 78, temperature 99.6 °F (37.6 °C), temperature source Oral, resp. rate 18, height 182.9 cm (72\"), weight 84.1 kg (185 lb 6.4 oz), SpO2 98 %.  Physical Exam   Constitutional: He is oriented to person, place, and time. Vital signs are normal. He appears well-developed and well-nourished. He is active and cooperative.  Non-toxic appearance. He does not have a sickly appearance. He does not appear ill. No distress.   HENT:   Head: Normocephalic and atraumatic.   Right Ear: External ear normal.   Left Ear: External ear normal.   Nose: Nose normal.   Mouth/Throat: Oropharynx is clear and moist. Abnormal dentition.   Eyes: Conjunctivae and EOM are normal. Right eye exhibits no discharge. Left eye exhibits no discharge.   Neck: No tracheal deviation present.   Cardiovascular: Normal rate and intact distal pulses. An irregularly irregular rhythm present.   Pulmonary/Chest: Effort normal and breath sounds normal. No accessory muscle usage or stridor. No respiratory distress. He has no wheezes. He has no rales.   Abdominal: Soft. Normal " appearance and bowel sounds are normal. He exhibits no distension, no fluid wave, no pulsatile midline mass and no mass. There is no tenderness.   Musculoskeletal: He exhibits no edema.   Significantly limited ROM in right hip, pain with movement, walks with limp favoring right hip, difficulty changing positions and getting onto the exam table.    Neurological: He is alert and oriented to person, place, and time.   Skin: Skin is warm and dry. He is not diaphoretic.   Psychiatric: He has a normal mood and affect. His behavior is normal. Judgment and thought content normal.   Nursing note and vitals reviewed.    Lab Results (last 24 hours)     Procedure Component Value Units Date/Time    POCT INR [[id number]]  (Abnormal) Collected:  02/11/19 1134    Specimen:  Blood Updated:  02/11/19 1134     INR 2.4     Comment: 29.0             Assessment/Plan   Problems Addressed this Visit        Cardiovascular and Mediastinum    Atrial fibrillation (CMS/HCC)    Relevant Medications    digoxin (LANOXIN) 250 MCG tablet    Other Relevant Orders    POCT INR (Completed)       Digestive    Slow transit constipation       Nervous and Auditory    Chronic hip pain - Primary    Relevant Medications    oxyCODONE-acetaminophen (PERCOCET)  MG per tablet       Other    Other insomnia    Chronic anticoagulation        PLAN:     #1 insomnia/anxiety: chronic, controlled, continue on current medication of celexa and restoril.     #2 tobacco use: chronic, uncontrolled, advised to stop     #3 chronic pain in right hip: Chronic, controlled with oral pain medication.  Patient is unable to take NSAIDs secondary to Coumadin.  Nii reviewed and appropriate.  Controlled contract in the chart.  Refill given on medication.  Sent to pharmacy.  Return in 1 month     #4 normal INR: continue on current dose of coumadin, no sign of bleeding, INR WNL, will repeat in 2 weeks at pt request    #5 atrial fibrillation: Patient is anticoagulated and rate  controlled. Continue on cardizem. Refill given     #6 constipation: chronic, improved, continue on regular use of fiber and stool softeners, advised on diet changes, advised this is due to his medication            This document has been electronically signed by Sunita Crawford MD on February 11, 2019 11:48 AM

## 2019-02-26 ENCOUNTER — CLINICAL SUPPORT (OUTPATIENT)
Dept: FAMILY MEDICINE CLINIC | Facility: CLINIC | Age: 79
End: 2019-02-26

## 2019-02-26 DIAGNOSIS — I48.91 ATRIAL FIBRILLATION, UNSPECIFIED TYPE (HCC): ICD-10-CM

## 2019-02-26 LAB — INR PPP: 2 (ref 0.9–1.1)

## 2019-02-26 PROCEDURE — 85610 PROTHROMBIN TIME: CPT | Performed by: FAMILY MEDICINE

## 2019-03-04 ENCOUNTER — OFFICE VISIT (OUTPATIENT)
Dept: FAMILY MEDICINE CLINIC | Facility: CLINIC | Age: 79
End: 2019-03-04

## 2019-03-04 VITALS
HEIGHT: 72 IN | SYSTOLIC BLOOD PRESSURE: 132 MMHG | WEIGHT: 178.4 LBS | TEMPERATURE: 97.8 F | DIASTOLIC BLOOD PRESSURE: 64 MMHG | OXYGEN SATURATION: 97 % | RESPIRATION RATE: 18 BRPM | HEART RATE: 80 BPM | BODY MASS INDEX: 24.16 KG/M2

## 2019-03-04 DIAGNOSIS — J10.1 INFLUENZA A: Primary | ICD-10-CM

## 2019-03-04 DIAGNOSIS — R50.9 FEVER, UNSPECIFIED FEVER CAUSE: ICD-10-CM

## 2019-03-04 LAB
EXPIRATION DATE: ABNORMAL
FLUAV AG NPH QL: POSITIVE
FLUBV AG NPH QL: NEGATIVE
INTERNAL CONTROL: ABNORMAL
Lab: ABNORMAL

## 2019-03-04 PROCEDURE — 99213 OFFICE O/P EST LOW 20 MIN: CPT | Performed by: FAMILY MEDICINE

## 2019-03-04 PROCEDURE — 87804 INFLUENZA ASSAY W/OPTIC: CPT | Performed by: FAMILY MEDICINE

## 2019-03-04 RX ORDER — BROMPHENIRAMINE MALEATE, PSEUDOEPHEDRINE HYDROCHLORIDE, AND DEXTROMETHORPHAN HYDROBROMIDE 2; 30; 10 MG/5ML; MG/5ML; MG/5ML
10 SYRUP ORAL 4 TIMES DAILY PRN
Qty: 118 ML | Refills: 0 | Status: SHIPPED | OUTPATIENT
Start: 2019-03-04 | End: 2019-04-11

## 2019-03-04 NOTE — PROGRESS NOTES
"Subjective cc: fever and chills on thursday  Cabrera Avina is a 78 y.o. male who presents with complaint of fever, chills, body aches and fatigue.     Influenza   This is a new problem. The current episode started in the past 7 days. The problem occurs constantly. The problem has been unchanged. Associated symptoms include arthralgias, chills, congestion, coughing, fatigue, a fever and myalgias. Pertinent negatives include no abdominal pain, anorexia, change in bowel habit, chest pain, diaphoresis, nausea, neck pain, numbness, rash, sore throat, swollen glands, vomiting or weakness. Nothing aggravates the symptoms. He has tried rest and sleep for the symptoms. The treatment provided mild relief.        The following portions of the patient's history were reviewed and updated as appropriate: allergies, current medications, past family history, past medical history, past social history, past surgical history and problem list.        Review of Systems   Constitutional: Positive for activity change, appetite change, chills, fatigue and fever. Negative for diaphoresis.   HENT: Positive for congestion and hearing loss. Negative for sore throat.    Respiratory: Positive for cough. Negative for shortness of breath.    Cardiovascular: Negative for chest pain.   Gastrointestinal: Negative for abdominal pain, anorexia, change in bowel habit, nausea and vomiting.   Musculoskeletal: Positive for arthralgias and myalgias. Negative for neck pain.   Skin: Negative for rash.   Neurological: Negative for weakness and numbness.   All other systems reviewed and are negative.      Objective   Blood pressure 132/64, pulse 80, temperature 97.8 °F (36.6 °C), temperature source Oral, resp. rate 18, height 182.9 cm (72\"), weight 80.9 kg (178 lb 6.4 oz), SpO2 97 %.  Physical Exam   Constitutional: He is oriented to person, place, and time. He appears well-developed and well-nourished. He is active and cooperative.  Non-toxic " appearance. He has a sickly appearance. No distress.   HENT:   Head: Normocephalic and atraumatic.   Right Ear: External ear normal.   Left Ear: External ear normal.   Nose: Nose normal.   Mouth/Throat: Oropharynx is clear and moist. No oropharyngeal exudate.   Eyes: Conjunctivae and EOM are normal. Right eye exhibits no discharge. Left eye exhibits no discharge.   Neck: Normal range of motion. No tracheal deviation present. No thyromegaly present.   Cardiovascular: Normal rate, regular rhythm, normal heart sounds and intact distal pulses.   Pulmonary/Chest: Effort normal and breath sounds normal. No stridor. No respiratory distress. He has no wheezes. He exhibits no tenderness.   Musculoskeletal: He exhibits no edema.   Lymphadenopathy:     He has no cervical adenopathy.   Neurological: He is alert and oriented to person, place, and time.   Skin: Skin is warm and dry. He is not diaphoretic.   Psychiatric: He has a normal mood and affect. His behavior is normal. Judgment and thought content normal.   Nursing note and vitals reviewed.    Lab Results (last 24 hours)     Procedure Component Value Units Date/Time    POCT Influenza A/B [[id number]]  (Abnormal) Collected:  03/04/19 1145    Specimen:  Swab Updated:  03/04/19 1145     Rapid Influenza A Ag Positive     Rapid Influenza B Ag Negative     Internal Control Passed     Lot Number [id number]     Expiration Date 09 21 2021            Assessment/Plan   Problems Addressed this Visit     None      Visit Diagnoses     Fever, unspecified fever cause    -  Primary    Relevant Orders    POCT Influenza A/B (Completed)    Influenza A        Relevant Medications    brompheniramine-pseudoephedrine-DM 30-2-10 MG/5ML syrup        PLAN:     #1 influenza A: new, tamiflu would not benefit at this time due to length of symptoms, advised on conservative measures, advised on warning signs, return if not improved           This document has been electronically signed by Sunita Clark  MD Yvette on March 4, 2019 11:56 AM

## 2019-03-07 NOTE — PROGRESS NOTES
Mr. Avina is 78 y.o. male    CHIEF COMPLAINT: I am here for follow up on BPH.     HPI  Urolithiasis  Today's visit is related to follow up of a known stone burden as described below  Location: right upper pole   Quality: Past stones have been mostly CaOx stones.   Severity: Current stone burden is two stones just under 1 cm. .   Duration: Began having stones approximately 1990. Most recent episode was three years ago had ESWL that was unsuccessful by Dr. Mayes and subsequent Ureteroscopy.   Context: Out of the blue  Modifying factors: Didn't respond to ESWL   Associated signs or symptoms: At present the patient no flank pain, abdominal pain or hematuria  Other issues to be addressed at this visit:   - Known hx of BPH with obstruction.  He is very content with his symptoms at this point.  We stopped his finasteride approximately 1 year ago.  He cannot tell any difference in his voiding.        The following portions of the patient's history were reviewed and updated as appropriate: allergies, current medications, past family history, past medical history, past social history, past surgical history and problem list.      Review of Systems   Constitutional: Negative for chills and fever.   Gastrointestinal: Negative for abdominal pain, anal bleeding and blood in stool.   Genitourinary: Negative for decreased urine volume, difficulty urinating, discharge, dysuria, enuresis, flank pain, frequency, genital sores, hematuria, penile pain, penile swelling, scrotal swelling, testicular pain and urgency.           Current Outpatient Medications:   •  aspirin 81 MG EC tablet, Take 81 mg by mouth daily., Disp: , Rfl:   •  brompheniramine-pseudoephedrine-DM 30-2-10 MG/5ML syrup, Take 10 mL by mouth 4 (Four) Times a Day As Needed for Congestion, Cough or Allergies., Disp: 118 mL, Rfl: 0  •  digoxin (LANOXIN) 250 MCG tablet, Take 1 tablet by mouth Daily., Disp: 90 tablet, Rfl: 3  •  diltiaZEM CD (CARDIZEM CD) 240 MG 24 hr  capsule, Take 1 capsule by mouth Daily., Disp: 90 capsule, Rfl: 3  •  oxyCODONE-acetaminophen (PERCOCET)  MG per tablet, Take 1 tablet by mouth Every 6 (Six) Hours As Needed for Severe Pain . Must last 30 days, Disp: 100 tablet, Rfl: 0  •  polyethylene glycol (MIRALAX) packet, Take 17 g by mouth Daily., Disp: 100 packet, Rfl: 3  •  temazepam (RESTORIL) 15 MG capsule, Take 1 capsule by mouth At Night As Needed for Sleep., Disp: 30 capsule, Rfl: 2  •  warfarin (COUMADIN) 2 MG tablet, Take 3.5 tablets by mouth Daily. 7MG, 7MG, 8MG, then repeat, Disp: 360 tablet, Rfl: 5    Past Medical History:   Diagnosis Date   • Anemia    • Anxiety     chronic   • Atrial fibrillation (CMS/HCC)    • Cancer (CMS/HCC)     non-hodgkins lymphoma   • Cholecystitis    • Colon polyp    • Colon polyp    • Constipation    • GERD (gastroesophageal reflux disease)    • History of ERCP 2005   • Nephrolithiasis     stones removed   • Jaylyn-rectal abscess    • Rectal abscess        Past Surgical History:   Procedure Laterality Date   • CHOLECYSTECTOMY     • COLONOSCOPY  05/2012    3 yr recall   • COLONOSCOPY  09/18/2015    5 yr recall   • KIDNEY STONE SURGERY     • RECTAL SURGERY      X 2   • TOTAL HIP ARTHROPLASTY         Social History     Socioeconomic History   • Marital status:      Spouse name: Not on file   • Number of children: Not on file   • Years of education: Not on file   • Highest education level: Not on file   Tobacco Use   • Smoking status: Never Smoker   • Smokeless tobacco: Current User     Types: Snuff   Substance and Sexual Activity   • Alcohol use: No   • Drug use: No   • Sexual activity: Defer       Family History   Problem Relation Age of Onset   • Colon cancer Mother    • No Known Problems Father    • Prostate cancer Brother    • No Known Problems Daughter    • No Known Problems Son    • No Known Problems Maternal Grandmother    • No Known Problems Maternal Grandfather    • No Known Problems Paternal Grandmother   "  • No Known Problems Paternal Grandfather    • Prostate cancer Brother    • Colon polyps Neg Hx          Temp 97.6 °F (36.4 °C)   Ht 167.6 cm (66\")   Wt 78.5 kg (173 lb)   BMI 27.92 kg/m²       Physical Exam  Neuro: Alert and oriented ×3  Const: Not agitated or distressed; Vital signs are reviewed. No obvious deformities  Pulmonary: No respiratory distress  Skin: Without pallor or diaphoresis  GI: Abdomen is soft and nontender.  No significant distention.  No hernias noted.  : Penis and testicles are normal. Benign feeling prostate without nodule approximately 25 mL in size.       Data  Results for orders placed or performed in visit on 03/18/19   POC Urinalysis Dipstick, Multipro   Result Value Ref Range    Color Yellow Yellow, Straw, Dark Yellow, Janette    Clarity, UA Clear Clear    Glucose, UA Negative Negative, 1000 mg/dL (3+) mg/dL    Bilirubin Negative Negative    Ketones, UA Negative Negative    Specific Gravity  1.020 1.005 - 1.030    Blood, UA Small (A) Negative    pH, Urine 6.0 5.0 - 8.0    Protein, POC Negative Negative mg/dL    Urobilinogen, UA Normal Normal    Nitrite, UA Negative Negative    Leukocytes Negative Negative         Imaging Results (last 7 days)     ** No results found for the last 168 hours. **        Patient's Body mass index is 27.92 kg/m². BMI is above normal parameters. Recommendations include: educational material.    International Prostate Symptom Score  The following is posted based on patient questionnaire answers:  0 - not at all    1-7 mild symptoms  1- Less than one time in five  8-19 moderate symptoms  2 -Less than half the time  20-35 severe symptoms  3 - About half the time  4 - More than half the time  5 - Almost always     For following sections:  Incomplete Emptying: - How often have you had the sensation  of not emptying your bladder completely after you finished urinating?  1  Frequency: -How often have you had to urinate again less than   two hours after you " finished urinating?      2  Intermittency: -How often have you found you stopped and started again  Several times when you urinate?       3  Urgency: -How often do you find it difficult to postpone urination?             1  Weak stream: - How often have you had a weak urinary stream?             2  Straining: - How often have you had to push or strain to begin  Urination?          3  Sleeping: -How many times did you most typically get up to urinate   From the time you went to bed at night until the time you got up in the   2  Morning          Total `  14    Quality of Life  How would you feel if you had to live with your urinary condition the way   2  It is now, no better, no worse for the rest of your life?    Where: 0=delighted; 1= pleased, 2= mostly satisfied, 3= mixed, 4 = mostly  Dissatisfied, 5= Unhappy, 6 = terrible      Assessment and Plan  Diagnoses and all orders for this visit:    BPH with urinary obstruction  -     POC Urinalysis Dipstick, Multipro    Renal calculus  -     XR Abdomen KUB; Future      - contineu watchful waiting and limiting caffeine.   - Renal stones refractory to ESWL.     (Please note that portions of this note were completed with a voice recognition program.)  [place] Jeff MD  03/18/19  12:26 PM      Cc: Sunita Crawfrod MD

## 2019-03-11 ENCOUNTER — OFFICE VISIT (OUTPATIENT)
Dept: FAMILY MEDICINE CLINIC | Facility: CLINIC | Age: 79
End: 2019-03-11

## 2019-03-11 ENCOUNTER — HOSPITAL ENCOUNTER (OUTPATIENT)
Dept: GENERAL RADIOLOGY | Facility: HOSPITAL | Age: 79
Discharge: HOME | End: 2019-03-11
Admitting: FAMILY MEDICINE

## 2019-03-11 VITALS
HEART RATE: 72 BPM | RESPIRATION RATE: 18 BRPM | OXYGEN SATURATION: 96 % | DIASTOLIC BLOOD PRESSURE: 82 MMHG | BODY MASS INDEX: 23.73 KG/M2 | TEMPERATURE: 98.4 F | WEIGHT: 175.2 LBS | HEIGHT: 72 IN | SYSTOLIC BLOOD PRESSURE: 150 MMHG

## 2019-03-11 DIAGNOSIS — R63.4 WEIGHT LOSS: ICD-10-CM

## 2019-03-11 DIAGNOSIS — R79.1 SUPRATHERAPEUTIC INR: ICD-10-CM

## 2019-03-11 DIAGNOSIS — G89.29 CHRONIC PAIN OF RIGHT HIP: Primary | ICD-10-CM

## 2019-03-11 DIAGNOSIS — Z79.01 CHRONIC ANTICOAGULATION: ICD-10-CM

## 2019-03-11 DIAGNOSIS — I48.91 ATRIAL FIBRILLATION, UNSPECIFIED TYPE (HCC): ICD-10-CM

## 2019-03-11 DIAGNOSIS — G47.09 OTHER INSOMNIA: ICD-10-CM

## 2019-03-11 DIAGNOSIS — M25.551 CHRONIC PAIN OF RIGHT HIP: Primary | ICD-10-CM

## 2019-03-11 DIAGNOSIS — N20.0 RENAL CALCULUS: Primary | ICD-10-CM

## 2019-03-11 DIAGNOSIS — R05.9 COUGH: ICD-10-CM

## 2019-03-11 LAB — INR PPP: 4.2 (ref 0.9–1.1)

## 2019-03-11 PROCEDURE — 85610 PROTHROMBIN TIME: CPT | Performed by: FAMILY MEDICINE

## 2019-03-11 PROCEDURE — 71046 X-RAY EXAM CHEST 2 VIEWS: CPT

## 2019-03-11 PROCEDURE — 99214 OFFICE O/P EST MOD 30 MIN: CPT | Performed by: FAMILY MEDICINE

## 2019-03-11 RX ORDER — TEMAZEPAM 15 MG/1
15 CAPSULE ORAL NIGHTLY PRN
Qty: 30 CAPSULE | Refills: 2 | Status: SHIPPED | OUTPATIENT
Start: 2019-03-11 | End: 2019-06-10 | Stop reason: SDUPTHER

## 2019-03-11 RX ORDER — OXYCODONE AND ACETAMINOPHEN 10; 325 MG/1; MG/1
1 TABLET ORAL EVERY 6 HOURS PRN
Qty: 100 TABLET | Refills: 0 | Status: SHIPPED | OUTPATIENT
Start: 2019-03-11 | End: 2019-04-11 | Stop reason: SDUPTHER

## 2019-03-11 NOTE — PROGRESS NOTES
Subjective cc: pain medication refill  Cabrera Avina is a 78 y.o. male who presents to get a refill on his pain medication. Pain is most prominent in his right hip.  With the pain medication it makes his pain bearable and he is able to perform activities around his house. He is not feeling well today from recent flu illness, he still feels very tired, run down and no appetite. He has lost 3 pounds since his last visit.     Patient INR is above goal today. No new medications, no recent changes. It was 2.0 previously.  Patient denies any bleeding at this time. He is currently alternating 7MG, 7MG and 8MG.     Atrial fib is rate controlled.     He is currently taking restoril PRN for insomnia which controls his symptoms and allows him to get restful sleep. Refills needed at this time.     Pain   This is a chronic problem. The current episode started more than 1 year ago. The problem occurs constantly. The problem has been unchanged. Associated symptoms include arthralgias, coughing, fatigue, numbness and weakness. Pertinent negatives include no abdominal pain, anorexia, chest pain, chills, diaphoresis, fever or headaches. The symptoms are aggravated by exertion, standing and walking. He has tried rest, walking, position changes and oral narcotics (Percocet, patient cannot tolerate NSAIDs secondary to Coumadin) for the symptoms. The treatment provided moderate relief.   Atrial Fibrillation   Presents for follow-up visit. Symptoms include weakness. Symptoms are negative for bradycardia, chest pain, dizziness, hemodynamic instability, palpitations, shortness of breath, syncope and tachycardia. The symptoms have been stable. Past medical history includes atrial fibrillation. There are no medication compliance problems.        The following portions of the patient's history were reviewed and updated as appropriate: allergies, current medications, past family history, past medical history, past social history, past  "surgical history and problem list.        Review of Systems   Constitutional: Positive for activity change, appetite change, fatigue and unexpected weight change. Negative for chills, diaphoresis and fever.   Respiratory: Positive for cough. Negative for chest tightness and shortness of breath.    Cardiovascular: Negative for chest pain, palpitations, leg swelling and syncope.   Gastrointestinal: Positive for constipation. Negative for abdominal pain and anorexia.   Musculoskeletal: Positive for arthralgias and gait problem.   Neurological: Positive for weakness and numbness. Negative for dizziness, syncope, facial asymmetry, speech difficulty, light-headedness and headaches.   Hematological: Bruises/bleeds easily.   Psychiatric/Behavioral: Positive for dysphoric mood (controlled with medication) and sleep disturbance (wakes up in the middle of the night secondary to pain). Negative for self-injury.   All other systems reviewed and are negative.      Objective   Blood pressure 150/82, pulse 72, temperature 98.4 °F (36.9 °C), temperature source Oral, resp. rate 18, height 182.9 cm (72\"), weight 79.5 kg (175 lb 3.2 oz), SpO2 96 %.  Physical Exam   Constitutional: He is oriented to person, place, and time. Vital signs are normal. He appears well-developed and well-nourished. He is active and cooperative.  Non-toxic appearance. He has a sickly appearance. He does not appear ill. No distress.   HENT:   Head: Normocephalic and atraumatic.   Right Ear: External ear normal.   Left Ear: External ear normal.   Nose: Nose normal.   Mouth/Throat: Oropharynx is clear and moist. Abnormal dentition.   Eyes: Conjunctivae and EOM are normal. Right eye exhibits no discharge. Left eye exhibits no discharge.   Neck: No tracheal deviation present.   Cardiovascular: Normal rate and intact distal pulses. An irregularly irregular rhythm present.   Pulmonary/Chest: Effort normal and breath sounds normal. No accessory muscle usage or " stridor. No respiratory distress. He has no wheezes. He has no rales.   Abdominal: Soft. Normal appearance and bowel sounds are normal. He exhibits no distension, no fluid wave, no pulsatile midline mass and no mass. There is no tenderness.   Musculoskeletal: He exhibits no edema.   Significantly limited ROM in right hip, pain with movement, walks with limp favoring right hip, difficulty changing positions and getting onto the exam table.    Neurological: He is alert and oriented to person, place, and time.   Skin: Skin is warm and dry. He is not diaphoretic.   Psychiatric: He has a normal mood and affect. His behavior is normal. Judgment and thought content normal.   Nursing note and vitals reviewed.    Lab Results (last 24 hours)     Procedure Component Value Units Date/Time    POCT INR [[id number]]  (Abnormal) Collected:  03/11/19 1109    Specimen:  Blood Updated:  03/11/19 1109     INR 4.2     Comment: 50.9             Assessment/Plan   Problems Addressed this Visit        Cardiovascular and Mediastinum    Atrial fibrillation (CMS/HCC)       Nervous and Auditory    Chronic hip pain - Primary    Relevant Orders    Comprehensive metabolic panel       Other    Chronic anticoagulation      Other Visit Diagnoses     Supratherapeutic INR        Relevant Orders    Comprehensive metabolic panel    CBC & Differential    Protime-INR    Cough        Relevant Orders    XR Chest PA & Lateral (In Office)    Weight loss        Relevant Orders    XR Chest PA & Lateral (In Office)    Comprehensive metabolic panel    CBC & Differential        PLAN:     #1 insomnia/anxiety: chronic, controlled, continue on current medication of celexa and restoril.     #2 cough/recent influenza: new, will get CXR to r/o PNA since having cough and decreased oxygen sat from normal - if neg advised on conservative measure to improve viral illness and warning signs.     #3 chronic pain in right hip: Chronic, controlled with oral pain medication.   Patient is unable to take NSAIDs secondary to Coumadin.  Nii reviewed and appropriate.  Controlled contract in the chart.  Refill given on medication.  Sent to pharmacy.  Return in 1 month     #4 chronic anticoagulation: hold coumadin, no sign of bleeding, INR supratherapeutic, will send to hospital for lab draw     #5 atrial fibrillation: Patient is anticoagulated and rate controlled. Continue on cardizem.      #6 constipation: chronic, improved, continue on regular use of fiber and stool softeners, advised on diet changes, advised this is due to his medication            This document has been electronically signed by Sunita Crawford MD on March 11, 2019 11:25 AM

## 2019-03-12 ENCOUNTER — CLINICAL SUPPORT (OUTPATIENT)
Dept: FAMILY MEDICINE CLINIC | Facility: CLINIC | Age: 79
End: 2019-03-12

## 2019-03-12 DIAGNOSIS — R73.09 HIGH GLUCOSE: Primary | ICD-10-CM

## 2019-03-12 DIAGNOSIS — I48.91 ATRIAL FIBRILLATION, UNSPECIFIED TYPE (HCC): ICD-10-CM

## 2019-03-12 LAB
ALBUMIN SERPL-MCNC: 4.1 G/DL (ref 3.5–5)
ALBUMIN/GLOB SERPL: 1 G/DL (ref 1.1–2.5)
ALP SERPL-CCNC: 79 U/L (ref 24–120)
ALT SERPL-CCNC: 28 U/L (ref 0–54)
AST SERPL-CCNC: 24 U/L (ref 7–45)
BASOPHILS # BLD AUTO: 0.02 10*3/MM3 (ref 0–0.2)
BASOPHILS NFR BLD AUTO: 0.5 % (ref 0–2)
BILIRUB SERPL-MCNC: 0.8 MG/DL (ref 0.1–1)
BUN SERPL-MCNC: 15 MG/DL (ref 5–21)
BUN/CREAT SERPL: 17.9 (ref 7–25)
CALCIUM SERPL-MCNC: 9.7 MG/DL (ref 8.4–10.4)
CHLORIDE SERPL-SCNC: 98 MMOL/L (ref 98–110)
CO2 SERPL-SCNC: 30 MMOL/L (ref 24–31)
CREAT SERPL-MCNC: 0.84 MG/DL (ref 0.5–1.4)
EOSINOPHIL # BLD AUTO: 0.08 10*3/MM3 (ref 0–0.7)
EOSINOPHIL NFR BLD AUTO: 2 % (ref 0–4)
ERYTHROCYTE [DISTWIDTH] IN BLOOD BY AUTOMATED COUNT: 13.2 % (ref 12–15)
GLOBULIN SER CALC-MCNC: 4 GM/DL
GLUCOSE SERPL-MCNC: 123 MG/DL (ref 70–100)
HBA1C MFR BLD: 5.5 %
HCT VFR BLD AUTO: 42.7 % (ref 40–52)
HGB BLD-MCNC: 13.7 G/DL (ref 14–18)
IMM GRANULOCYTES # BLD AUTO: 0.01 10*3/MM3 (ref 0–0.05)
IMM GRANULOCYTES NFR BLD AUTO: 0.3 % (ref 0–5)
INR PPP: 3.1 (ref 0.91–1.09)
INR PPP: 3.9 (ref 0.9–1.1)
LYMPHOCYTES # BLD AUTO: 0.86 10*3/MM3 (ref 0.72–4.86)
LYMPHOCYTES NFR BLD AUTO: 21.7 % (ref 15–45)
MCH RBC QN AUTO: 29.8 PG (ref 28–32)
MCHC RBC AUTO-ENTMCNC: 32.1 G/DL (ref 33–36)
MCV RBC AUTO: 92.8 FL (ref 82–95)
MONOCYTES # BLD AUTO: 0.59 10*3/MM3 (ref 0.19–1.3)
MONOCYTES NFR BLD AUTO: 14.9 % (ref 4–12)
NEUTROPHILS # BLD AUTO: 2.4 10*3/MM3 (ref 1.87–8.4)
NEUTROPHILS NFR BLD AUTO: 60.6 % (ref 39–78)
NRBC BLD AUTO-RTO: 0 /100 WBC (ref 0–0)
PLATELET # BLD AUTO: 220 10*3/MM3 (ref 130–400)
POTASSIUM SERPL-SCNC: 4.5 MMOL/L (ref 3.5–5.3)
PROT SERPL-MCNC: 8.1 G/DL (ref 6.3–8.7)
PROTHROMBIN TIME: 33.1 SECONDS (ref 11.9–14.6)
RBC # BLD AUTO: 4.6 10*6/MM3 (ref 4.8–5.9)
SODIUM SERPL-SCNC: 140 MMOL/L (ref 135–145)
WBC # BLD AUTO: 3.96 10*3/MM3 (ref 4.8–10.8)

## 2019-03-12 PROCEDURE — 83036 HEMOGLOBIN GLYCOSYLATED A1C: CPT | Performed by: FAMILY MEDICINE

## 2019-03-12 PROCEDURE — 85610 PROTHROMBIN TIME: CPT | Performed by: FAMILY MEDICINE

## 2019-03-14 ENCOUNTER — CLINICAL SUPPORT (OUTPATIENT)
Dept: FAMILY MEDICINE CLINIC | Facility: CLINIC | Age: 79
End: 2019-03-14

## 2019-03-14 DIAGNOSIS — I48.91 ATRIAL FIBRILLATION, UNSPECIFIED TYPE (HCC): ICD-10-CM

## 2019-03-14 LAB — INR PPP: 2.2 (ref 0.9–1.1)

## 2019-03-14 PROCEDURE — 85610 PROTHROMBIN TIME: CPT | Performed by: FAMILY MEDICINE

## 2019-03-15 ENCOUNTER — HOSPITAL ENCOUNTER (OUTPATIENT)
Dept: GENERAL RADIOLOGY | Facility: HOSPITAL | Age: 79
Discharge: HOME | End: 2019-03-15
Admitting: NURSE PRACTITIONER

## 2019-03-15 DIAGNOSIS — N20.0 RENAL CALCULUS: ICD-10-CM

## 2019-03-15 PROCEDURE — 74018 RADEX ABDOMEN 1 VIEW: CPT

## 2019-03-18 ENCOUNTER — OFFICE VISIT (OUTPATIENT)
Dept: UROLOGY | Facility: CLINIC | Age: 79
End: 2019-03-18

## 2019-03-18 ENCOUNTER — TRANSCRIBE ORDERS (OUTPATIENT)
Dept: ONCOLOGY | Facility: CLINIC | Age: 79
End: 2019-03-18

## 2019-03-18 VITALS — WEIGHT: 173 LBS | TEMPERATURE: 97.6 F | BODY MASS INDEX: 27.8 KG/M2 | HEIGHT: 66 IN

## 2019-03-18 DIAGNOSIS — N40.1 BPH WITH URINARY OBSTRUCTION: Primary | ICD-10-CM

## 2019-03-18 DIAGNOSIS — N13.8 BPH WITH URINARY OBSTRUCTION: Primary | ICD-10-CM

## 2019-03-18 DIAGNOSIS — N20.0 RENAL CALCULUS: ICD-10-CM

## 2019-03-18 DIAGNOSIS — Z85.72 HISTORY OF MALIGNANT LYMPHOMA: Primary | ICD-10-CM

## 2019-03-18 LAB
BILIRUB BLD-MCNC: NEGATIVE MG/DL
CLARITY, POC: CLEAR
COLOR UR: YELLOW
GLUCOSE UR STRIP-MCNC: NEGATIVE MG/DL
KETONES UR QL: NEGATIVE
LEUKOCYTE EST, POC: NEGATIVE
NITRITE UR-MCNC: NEGATIVE MG/ML
PH UR: 6 [PH] (ref 5–8)
PROT UR STRIP-MCNC: NEGATIVE MG/DL
RBC # UR STRIP: ABNORMAL /UL
SP GR UR: 1.02 (ref 1–1.03)
UROBILINOGEN UR QL: NORMAL

## 2019-03-18 PROCEDURE — 99213 OFFICE O/P EST LOW 20 MIN: CPT | Performed by: UROLOGY

## 2019-03-18 PROCEDURE — 81003 URINALYSIS AUTO W/O SCOPE: CPT | Performed by: UROLOGY

## 2019-03-18 NOTE — PATIENT INSTRUCTIONS

## 2019-03-22 ENCOUNTER — CLINICAL SUPPORT (OUTPATIENT)
Dept: FAMILY MEDICINE CLINIC | Facility: CLINIC | Age: 79
End: 2019-03-22

## 2019-03-22 DIAGNOSIS — I48.91 ATRIAL FIBRILLATION, UNSPECIFIED TYPE (HCC): ICD-10-CM

## 2019-03-22 LAB — INR PPP: 2.6 (ref 0.9–1.1)

## 2019-03-22 PROCEDURE — 85610 PROTHROMBIN TIME: CPT | Performed by: FAMILY MEDICINE

## 2019-03-28 ENCOUNTER — CLINICAL SUPPORT (OUTPATIENT)
Dept: FAMILY MEDICINE CLINIC | Facility: CLINIC | Age: 79
End: 2019-03-28

## 2019-03-28 DIAGNOSIS — I48.91 ATRIAL FIBRILLATION, UNSPECIFIED TYPE (HCC): ICD-10-CM

## 2019-03-28 LAB — INR PPP: 2.4 (ref 0.9–1.1)

## 2019-03-28 PROCEDURE — 85610 PROTHROMBIN TIME: CPT | Performed by: FAMILY MEDICINE

## 2019-04-04 ENCOUNTER — CLINICAL SUPPORT (OUTPATIENT)
Dept: FAMILY MEDICINE CLINIC | Facility: CLINIC | Age: 79
End: 2019-04-04

## 2019-04-04 DIAGNOSIS — I48.91 ATRIAL FIBRILLATION, UNSPECIFIED TYPE (HCC): ICD-10-CM

## 2019-04-04 LAB — INR PPP: 2 (ref 0.9–1.1)

## 2019-04-04 PROCEDURE — 85610 PROTHROMBIN TIME: CPT | Performed by: FAMILY MEDICINE

## 2019-04-11 ENCOUNTER — OFFICE VISIT (OUTPATIENT)
Dept: FAMILY MEDICINE CLINIC | Facility: CLINIC | Age: 79
End: 2019-04-11

## 2019-04-11 VITALS
TEMPERATURE: 98.5 F | HEART RATE: 55 BPM | OXYGEN SATURATION: 98 % | RESPIRATION RATE: 18 BRPM | DIASTOLIC BLOOD PRESSURE: 68 MMHG | HEIGHT: 72 IN | WEIGHT: 173.4 LBS | SYSTOLIC BLOOD PRESSURE: 130 MMHG | BODY MASS INDEX: 23.49 KG/M2

## 2019-04-11 DIAGNOSIS — I48.91 ATRIAL FIBRILLATION, UNSPECIFIED TYPE (HCC): ICD-10-CM

## 2019-04-11 DIAGNOSIS — Z79.899 MEDICATION MANAGEMENT: Primary | ICD-10-CM

## 2019-04-11 DIAGNOSIS — F41.9 ANXIETY AND DEPRESSION: ICD-10-CM

## 2019-04-11 DIAGNOSIS — M25.551 CHRONIC PAIN OF RIGHT HIP: ICD-10-CM

## 2019-04-11 DIAGNOSIS — G47.09 OTHER INSOMNIA: ICD-10-CM

## 2019-04-11 DIAGNOSIS — F32.A ANXIETY AND DEPRESSION: ICD-10-CM

## 2019-04-11 DIAGNOSIS — Z79.01 CHRONIC ANTICOAGULATION: ICD-10-CM

## 2019-04-11 DIAGNOSIS — G89.29 CHRONIC PAIN OF RIGHT HIP: ICD-10-CM

## 2019-04-11 LAB — INR PPP: 2.6 (ref 0.9–1.1)

## 2019-04-11 PROCEDURE — 99214 OFFICE O/P EST MOD 30 MIN: CPT | Performed by: FAMILY MEDICINE

## 2019-04-11 PROCEDURE — 85610 PROTHROMBIN TIME: CPT | Performed by: FAMILY MEDICINE

## 2019-04-11 RX ORDER — OXYCODONE AND ACETAMINOPHEN 10; 325 MG/1; MG/1
1 TABLET ORAL EVERY 6 HOURS PRN
Qty: 100 TABLET | Refills: 0 | Status: SHIPPED | OUTPATIENT
Start: 2019-04-11 | End: 2019-05-10 | Stop reason: SDUPTHER

## 2019-04-11 RX ORDER — BUSPIRONE HYDROCHLORIDE 5 MG/1
5 TABLET ORAL 2 TIMES DAILY PRN
Qty: 60 TABLET | Refills: 0 | Status: SHIPPED | OUTPATIENT
Start: 2019-04-11 | End: 2019-05-23

## 2019-04-11 RX ORDER — ESCITALOPRAM OXALATE 10 MG/1
10 TABLET ORAL DAILY
Qty: 90 TABLET | Refills: 0 | Status: SHIPPED | OUTPATIENT
Start: 2019-04-11 | End: 2019-06-10

## 2019-04-11 NOTE — PROGRESS NOTES
Subjective cc: pain medication refill  Cabrera Avina is a 78 y.o. male who presents to get a refill on his pain medication. Pain is most prominent in his right hip.  With the pain medication it makes his pain bearable and he is able to perform activities around his house.     He reports that his depression is worse. He is dealing with his grandson. No thoughts of harming himself. He feels very down all the time, started crying during [employer] services.     Patient INR is normal today.  Patient denies any bleeding at this time. He is currently alternating 7MG, 7MG and 8MG.     Atrial fib is rate controlled.     He is currently taking restoril PRN for insomnia which controls his symptoms and allows him to get restful sleep.      Atrial Fibrillation   Presents for follow-up visit. Symptoms include weakness. Symptoms are negative for bradycardia, chest pain, dizziness, hemodynamic instability, palpitations, shortness of breath, syncope and tachycardia. The symptoms have been stable. Past medical history includes atrial fibrillation. There are no medication compliance problems.   Pain   This is a chronic problem. The current episode started more than 1 year ago. The problem occurs constantly. The problem has been unchanged. Associated symptoms include arthralgias, coughing, fatigue, numbness and weakness. Pertinent negatives include no abdominal pain, anorexia, chest pain, chills, diaphoresis, fever or headaches. The symptoms are aggravated by exertion, standing and walking. He has tried rest, walking, position changes and oral narcotics (Percocet, patient cannot tolerate NSAIDs secondary to Coumadin) for the symptoms. The treatment provided moderate relief.   Depression   Visit Type: initial  Onset of symptoms: 1-4 weeks ago  Progression since onset: gradually worsening  Patient presents with the following symptoms: anhedonia, decreased concentration, depressed mood, excessive worry, feelings of hopelessness,  "insomnia, muscle tension and nervousness/anxiety.  Patient is not experiencing: palpitations, shortness of breath, suicidal ideas, suicidal planning and thoughts of death.  Frequency of symptoms: most days   Severity: causing significant distress   Aggravated by: family issues           The following portions of the patient's history were reviewed and updated as appropriate: allergies, current medications, past family history, past medical history, past social history, past surgical history and problem list.        Review of Systems   Constitutional: Positive for activity change, appetite change and fatigue. Negative for chills, diaphoresis, fever and unexpected weight change.   Respiratory: Positive for cough. Negative for chest tightness and shortness of breath.    Cardiovascular: Negative for chest pain, palpitations, leg swelling and syncope.   Gastrointestinal: Positive for constipation. Negative for abdominal pain and anorexia.   Musculoskeletal: Positive for arthralgias and gait problem.   Neurological: Positive for weakness and numbness. Negative for dizziness, syncope, facial asymmetry, speech difficulty, light-headedness and headaches.   Hematological: Bruises/bleeds easily.   Psychiatric/Behavioral: Positive for decreased concentration, dysphoric mood and sleep disturbance (wakes up in the middle of the night secondary to pain). Negative for self-injury and suicidal ideas. The patient is nervous/anxious and has insomnia.    All other systems reviewed and are negative.      Objective   Blood pressure 130/68, pulse 55, temperature 98.5 °F (36.9 °C), temperature source Oral, resp. rate 18, height 182.9 cm (72\"), weight 78.7 kg (173 lb 6.4 oz), SpO2 98 %.  Physical Exam   Constitutional: He is oriented to person, place, and time. Vital signs are normal. He appears well-developed and well-nourished. He is active and cooperative.  Non-toxic appearance. He does not have a sickly appearance. He does not appear " ill. No distress.   HENT:   Head: Normocephalic and atraumatic.   Right Ear: External ear normal.   Left Ear: External ear normal.   Nose: Nose normal.   Mouth/Throat: Oropharynx is clear and moist. Abnormal dentition.   Eyes: Conjunctivae and EOM are normal. Right eye exhibits no discharge. Left eye exhibits no discharge.   Neck: No tracheal deviation present.   Cardiovascular: Normal rate and intact distal pulses. An irregularly irregular rhythm present.   Pulmonary/Chest: Effort normal and breath sounds normal. No accessory muscle usage or stridor. No respiratory distress. He has no wheezes. He has no rales.   Abdominal: Soft. Normal appearance and bowel sounds are normal. He exhibits no distension, no fluid wave, no pulsatile midline mass and no mass. There is no tenderness.   Musculoskeletal: He exhibits no edema.   Significantly limited ROM in right hip, pain with movement, walks with limp favoring right hip, difficulty changing positions and getting onto the exam table.    Neurological: He is alert and oriented to person, place, and time.   Skin: Skin is warm and dry. He is not diaphoretic.   Psychiatric: His behavior is normal. Judgment and thought content normal. His mood appears anxious. He exhibits a depressed mood.   Nursing note and vitals reviewed.    Lab Results (last 24 hours)     Procedure Component Value Units Date/Time    POCT INR [[id number]]  (Abnormal) Collected:  04/11/19 1115    Specimen:  Blood Updated:  04/11/19 1116     INR 2.6     Comment: 31.0             Assessment/Plan   Problems Addressed this Visit        Cardiovascular and Mediastinum    Atrial fibrillation (CMS/HCC)       Nervous and Auditory    Chronic hip pain    Relevant Medications    oxyCODONE-acetaminophen (PERCOCET)  MG per tablet       Other    Other insomnia    Chronic anticoagulation      Other Visit Diagnoses     Medication management    -  Primary    Relevant Orders    ToxASSURE Select 13 (MW) - Urine, Clean Catch     Anxiety and depression        Relevant Medications    escitalopram (LEXAPRO) 10 MG tablet    busPIRone (BUSPAR) 5 MG tablet        PLAN:     #1 insomnia: chronic, controlled, continue on current medication of restoril.     #2 depression/anxiety: new, will start on lexapro and buspar, recheck in 1 month    #3 chronic pain in right hip: Chronic, controlled with oral pain medication.  Patient is unable to take NSAIDs secondary to Coumadin.  Nii reviewed and appropriate.  Controlled contract in the chart.  Refill given on medication.  Sent to pharmacy.  Return in 1 month     #4 chronic anticoagulation: nomral INR today, continue on current dose    #5 atrial fibrillation: Patient is anticoagulated and rate controlled. Continue on cardizem.      #6 constipation: chronic, improved, continue on regular use of fiber and stool softeners, advised on diet changes, advised this is due to his medication            This document has been electronically signed by Sunita Crawford MD on April 11, 2019 12:38 PM

## 2019-04-12 ENCOUNTER — TELEPHONE (OUTPATIENT)
Dept: FAMILY MEDICINE CLINIC | Facility: CLINIC | Age: 79
End: 2019-04-12

## 2019-04-12 NOTE — TELEPHONE ENCOUNTER
I called and spoke with the patient he denies that he is taking celexa he states that he has in the past but is not currently. I advised him that he could not take them both he understood.

## 2019-04-18 LAB — DRUGS UR: NORMAL

## 2019-04-24 ENCOUNTER — CLINICAL SUPPORT (OUTPATIENT)
Dept: FAMILY MEDICINE CLINIC | Facility: CLINIC | Age: 79
End: 2019-04-24

## 2019-04-24 DIAGNOSIS — I48.91 ATRIAL FIBRILLATION, UNSPECIFIED TYPE (HCC): ICD-10-CM

## 2019-04-24 LAB — INR PPP: 2.3 (ref 0.9–1.1)

## 2019-04-24 PROCEDURE — 85610 PROTHROMBIN TIME: CPT | Performed by: FAMILY MEDICINE

## 2019-05-09 ENCOUNTER — APPOINTMENT (OUTPATIENT)
Dept: LAB | Facility: HOSPITAL | Age: 79
End: 2019-05-09

## 2019-05-09 LAB
ALBUMIN SERPL-MCNC: 4.7 G/DL (ref 3.5–5)
ALBUMIN/GLOB SERPL: 1.1 G/DL (ref 1.1–2.5)
ALP SERPL-CCNC: 71 U/L (ref 24–120)
ALT SERPL W P-5'-P-CCNC: 16 U/L (ref 0–54)
ANION GAP SERPL CALCULATED.3IONS-SCNC: 9 MMOL/L (ref 4–13)
AST SERPL-CCNC: 26 U/L (ref 7–45)
BASOPHILS # BLD AUTO: 0.05 10*3/MM3 (ref 0–0.2)
BASOPHILS NFR BLD AUTO: 1.1 % (ref 0–2)
BILIRUB SERPL-MCNC: 0.6 MG/DL (ref 0.1–1)
BUN BLD-MCNC: 16 MG/DL (ref 5–21)
BUN/CREAT SERPL: 18 (ref 7–25)
CALCIUM SPEC-SCNC: 9.9 MG/DL (ref 8.4–10.4)
CHLORIDE SERPL-SCNC: 100 MMOL/L (ref 98–110)
CO2 SERPL-SCNC: 31 MMOL/L (ref 24–31)
CREAT BLD-MCNC: 0.89 MG/DL (ref 0.5–1.4)
DEPRECATED RDW RBC AUTO: 48.2 FL (ref 40–54)
EOSINOPHIL # BLD AUTO: 0.14 10*3/MM3 (ref 0–0.7)
EOSINOPHIL NFR BLD AUTO: 3 % (ref 0–4)
ERYTHROCYTE [DISTWIDTH] IN BLOOD BY AUTOMATED COUNT: 14.1 % (ref 12–15)
FERRITIN SERPL-MCNC: 143 NG/ML (ref 17.9–464)
FOLATE SERPL-MCNC: 8.16 NG/ML (ref 4.78–24.2)
GFR SERPL CREATININE-BSD FRML MDRD: 83 ML/MIN/1.73
GLOBULIN UR ELPH-MCNC: 4.1 GM/DL
GLUCOSE BLD-MCNC: 96 MG/DL (ref 70–100)
HCT VFR BLD AUTO: 41 % (ref 40–52)
HGB BLD-MCNC: 13.3 G/DL (ref 14–18)
IMM GRANULOCYTES # BLD AUTO: 0.01 10*3/MM3 (ref 0–0.05)
IMM GRANULOCYTES NFR BLD AUTO: 0.2 % (ref 0–5)
IRON 24H UR-MRATE: 84 MCG/DL (ref 42–180)
IRON SATN MFR SERPL: 24 % (ref 20–45)
LDH SERPL-CCNC: 467 U/L (ref 265–665)
LYMPHOCYTES # BLD AUTO: 1.02 10*3/MM3 (ref 0.72–4.86)
LYMPHOCYTES NFR BLD AUTO: 22.1 % (ref 15–45)
MCH RBC QN AUTO: 30.1 PG (ref 28–32)
MCHC RBC AUTO-ENTMCNC: 32.4 G/DL (ref 33–36)
MCV RBC AUTO: 92.8 FL (ref 82–95)
MONOCYTES # BLD AUTO: 0.55 10*3/MM3 (ref 0.19–1.3)
MONOCYTES NFR BLD AUTO: 11.9 % (ref 4–12)
NEUTROPHILS # BLD AUTO: 2.84 10*3/MM3 (ref 1.87–8.4)
NEUTROPHILS NFR BLD AUTO: 61.7 % (ref 39–78)
NRBC BLD AUTO-RTO: 0 /100 WBC (ref 0–0.2)
PLATELET # BLD AUTO: 209 10*3/MM3 (ref 130–400)
PMV BLD AUTO: 9.9 FL (ref 6–12)
POTASSIUM BLD-SCNC: 4.4 MMOL/L (ref 3.5–5.3)
PROT SERPL-MCNC: 8.8 G/DL (ref 6.3–8.7)
RBC # BLD AUTO: 4.42 10*6/MM3 (ref 4.8–5.9)
SODIUM BLD-SCNC: 140 MMOL/L (ref 135–145)
TIBC SERPL-MCNC: 348 MCG/DL (ref 225–420)
VIT B12 BLD-MCNC: 467 PG/ML (ref 239–931)
WBC NRBC COR # BLD: 4.61 10*3/MM3 (ref 4.8–10.8)

## 2019-05-09 PROCEDURE — 80053 COMPREHEN METABOLIC PANEL: CPT | Performed by: INTERNAL MEDICINE

## 2019-05-09 PROCEDURE — 82728 ASSAY OF FERRITIN: CPT | Performed by: INTERNAL MEDICINE

## 2019-05-09 PROCEDURE — 85025 COMPLETE CBC W/AUTO DIFF WBC: CPT | Performed by: INTERNAL MEDICINE

## 2019-05-09 PROCEDURE — 82232 ASSAY OF BETA-2 PROTEIN: CPT | Performed by: INTERNAL MEDICINE

## 2019-05-09 PROCEDURE — 36415 COLL VENOUS BLD VENIPUNCTURE: CPT | Performed by: INTERNAL MEDICINE

## 2019-05-09 PROCEDURE — 82746 ASSAY OF FOLIC ACID SERUM: CPT | Performed by: INTERNAL MEDICINE

## 2019-05-09 PROCEDURE — 82607 VITAMIN B-12: CPT | Performed by: INTERNAL MEDICINE

## 2019-05-09 PROCEDURE — 83540 ASSAY OF IRON: CPT | Performed by: INTERNAL MEDICINE

## 2019-05-09 PROCEDURE — 83615 LACTATE (LD) (LDH) ENZYME: CPT | Performed by: INTERNAL MEDICINE

## 2019-05-09 PROCEDURE — 83550 IRON BINDING TEST: CPT | Performed by: INTERNAL MEDICINE

## 2019-05-10 ENCOUNTER — OFFICE VISIT (OUTPATIENT)
Dept: FAMILY MEDICINE CLINIC | Facility: CLINIC | Age: 79
End: 2019-05-10

## 2019-05-10 VITALS
BODY MASS INDEX: 23.57 KG/M2 | HEIGHT: 72 IN | WEIGHT: 174 LBS | SYSTOLIC BLOOD PRESSURE: 124 MMHG | OXYGEN SATURATION: 98 % | DIASTOLIC BLOOD PRESSURE: 66 MMHG | TEMPERATURE: 98.6 F | HEART RATE: 65 BPM

## 2019-05-10 DIAGNOSIS — F41.9 ANXIETY: Primary | ICD-10-CM

## 2019-05-10 DIAGNOSIS — G89.29 CHRONIC PAIN OF RIGHT HIP: ICD-10-CM

## 2019-05-10 DIAGNOSIS — I48.91 ATRIAL FIBRILLATION, UNSPECIFIED TYPE (HCC): ICD-10-CM

## 2019-05-10 DIAGNOSIS — M25.551 CHRONIC PAIN OF RIGHT HIP: ICD-10-CM

## 2019-05-10 DIAGNOSIS — Z79.01 CHRONIC ANTICOAGULATION: ICD-10-CM

## 2019-05-10 DIAGNOSIS — G47.09 OTHER INSOMNIA: ICD-10-CM

## 2019-05-10 LAB — INR PPP: 2.1 (ref 0.9–1.1)

## 2019-05-10 PROCEDURE — 85610 PROTHROMBIN TIME: CPT | Performed by: FAMILY MEDICINE

## 2019-05-10 PROCEDURE — 99214 OFFICE O/P EST MOD 30 MIN: CPT | Performed by: FAMILY MEDICINE

## 2019-05-10 RX ORDER — OXYCODONE AND ACETAMINOPHEN 10; 325 MG/1; MG/1
1 TABLET ORAL EVERY 6 HOURS PRN
Qty: 100 TABLET | Refills: 0 | Status: SHIPPED | OUTPATIENT
Start: 2019-05-10 | End: 2019-06-10 | Stop reason: SDUPTHER

## 2019-05-10 NOTE — PROGRESS NOTES
Subjective cc: pain medication refill  Cabrera Avina is a 78 y.o. male who presents to get a refill on his pain medication. Pain is most prominent in his right hip.  With the pain medication it makes his pain bearable and he is able to perform activities around his house.     He reports that his depression is about the same, he cannot tell a big difference with the lexapro but he seems better in our interaction today.  No thoughts of harming himself.   Patient INR is normal today.  Patient denies any bleeding at this time. He is currently alternating 7MG, 7MG and 8MG.     Atrial fib is rate controlled.     He is currently taking restoril PRN for insomnia which controls his symptoms and allows him to get restful sleep.      Pain   This is a chronic problem. The current episode started more than 1 year ago. The problem occurs constantly. The problem has been unchanged. Associated symptoms include arthralgias, numbness and weakness. Pertinent negatives include no abdominal pain, anorexia, chest pain, chills, coughing, diaphoresis, fatigue, fever or headaches. The symptoms are aggravated by exertion, standing and walking. He has tried rest, walking, position changes and oral narcotics (Percocet, patient cannot tolerate NSAIDs secondary to Coumadin) for the symptoms. The treatment provided moderate relief.   Atrial Fibrillation   Presents for follow-up visit. Symptoms include weakness. Symptoms are negative for bradycardia, chest pain, dizziness, hemodynamic instability, palpitations, shortness of breath, syncope and tachycardia. The symptoms have been stable. Past medical history includes atrial fibrillation. There are no medication compliance problems.   Depression   Visit Type: initial  Onset of symptoms: 1-4 weeks ago  Progression since onset: unchanged (pt reports no change but he seems to be improved from previous)  Patient presents with the following symptoms: decreased concentration, depressed mood,  "excessive worry, feelings of hopelessness, insomnia, muscle tension and nervousness/anxiety.  Patient is not experiencing: anhedonia, palpitations, shortness of breath, suicidal ideas, suicidal planning and thoughts of death.  Frequency of symptoms: most days   Severity: moderate   Aggravated by: family issues  Treatment tried: SSRI  Compliance with treatment: good  Improvement on treatment: mild           The following portions of the patient's history were reviewed and updated as appropriate: allergies, current medications, past family history, past medical history, past social history, past surgical history and problem list.        Review of Systems   Constitutional: Negative for activity change, appetite change, chills, diaphoresis, fatigue, fever and unexpected weight change.   Respiratory: Negative for cough, chest tightness and shortness of breath.    Cardiovascular: Negative for chest pain, palpitations, leg swelling and syncope.   Gastrointestinal: Positive for constipation. Negative for abdominal pain and anorexia.   Musculoskeletal: Positive for arthralgias and gait problem.   Neurological: Positive for weakness and numbness. Negative for dizziness, syncope, facial asymmetry, speech difficulty, light-headedness and headaches.   Hematological: Bruises/bleeds easily.   Psychiatric/Behavioral: Positive for decreased concentration, dysphoric mood and sleep disturbance (wakes up in the middle of the night secondary to pain). Negative for self-injury and suicidal ideas. The patient is nervous/anxious and has insomnia.    All other systems reviewed and are negative.      Objective   Blood pressure 124/66, pulse 65, temperature 98.6 °F (37 °C), height 182.9 cm (72\"), weight 78.9 kg (174 lb), SpO2 98 %.  Physical Exam   Constitutional: He is oriented to person, place, and time. Vital signs are normal. He appears well-developed and well-nourished. He is active and cooperative.  Non-toxic appearance. He does not " have a sickly appearance. He does not appear ill. No distress.   HENT:   Head: Normocephalic and atraumatic.   Right Ear: External ear normal.   Left Ear: External ear normal.   Nose: Nose normal.   Mouth/Throat: Oropharynx is clear and moist. Abnormal dentition.   Eyes: Conjunctivae and EOM are normal. Right eye exhibits no discharge. Left eye exhibits no discharge.   Neck: No tracheal deviation present.   Cardiovascular: Normal rate and intact distal pulses. An irregularly irregular rhythm present.   Pulmonary/Chest: Effort normal and breath sounds normal. No accessory muscle usage or stridor. No respiratory distress. He has no wheezes. He has no rales.   Abdominal: Soft. Normal appearance and bowel sounds are normal. He exhibits no distension, no fluid wave, no pulsatile midline mass and no mass. There is no tenderness.   Musculoskeletal: He exhibits no edema.   Significantly limited ROM in right hip, pain with movement, walks with limp favoring right hip, difficulty changing positions and getting onto the exam table.    Neurological: He is alert and oriented to person, place, and time.   Skin: Skin is warm and dry. He is not diaphoretic.   Psychiatric: His behavior is normal. Judgment and thought content normal. His mood appears not anxious. He does not exhibit a depressed mood.   Nursing note and vitals reviewed.    Lab Results (last 24 hours)     Procedure Component Value Units Date/Time    POCT INR [[id number]]  (Abnormal) Collected:  05/10/19 1146    Specimen:  Blood Updated:  05/10/19 1147     INR 2.1          Assessment/Plan   Problems Addressed this Visit        Cardiovascular and Mediastinum    Atrial fibrillation (CMS/HCC)       Nervous and Auditory    Chronic hip pain    Relevant Medications    oxyCODONE-acetaminophen (PERCOCET)  MG per tablet       Other    Anxiety - Primary    Other insomnia    Chronic anticoagulation        PLAN:     #1 insomnia: chronic, controlled, continue on current  medication of restoril.     #2 depression/anxiety:chronic, continue on lexapro and buspar, recheck in 1 month    #3 chronic pain in right hip: Chronic, controlled with oral pain medication.  Patient is unable to take NSAIDs secondary to Coumadin.  Nii reviewed and appropriate.  Controlled contract in the chart.  Refill given on medication.  Sent to pharmacy.  Return in 1 month     #4 chronic anticoagulation: nomral INR today, continue on current dose    #5 atrial fibrillation: Patient is anticoagulated and rate controlled. Continue on cardizem.      #6 constipation: chronic, improved, continue on regular use of fiber and stool softeners, advised on diet changes, advised this is due to his medication            This document has been electronically signed by Sunita Crawford MD on May 10, 2019 11:58 AM

## 2019-05-13 LAB — B2 MICROGLOB SERPL-MCNC: 2 MG/L (ref 0.6–2.4)

## 2019-05-23 ENCOUNTER — OFFICE VISIT (OUTPATIENT)
Dept: GASTROENTEROLOGY | Facility: CLINIC | Age: 79
End: 2019-05-23

## 2019-05-23 VITALS
SYSTOLIC BLOOD PRESSURE: 130 MMHG | OXYGEN SATURATION: 98 % | HEIGHT: 72 IN | HEART RATE: 66 BPM | WEIGHT: 178 LBS | DIASTOLIC BLOOD PRESSURE: 68 MMHG | BODY MASS INDEX: 24.11 KG/M2 | TEMPERATURE: 98 F

## 2019-05-23 DIAGNOSIS — K21.9 GASTROESOPHAGEAL REFLUX DISEASE WITHOUT ESOPHAGITIS: Primary | ICD-10-CM

## 2019-05-23 DIAGNOSIS — K59.01 SLOW TRANSIT CONSTIPATION: ICD-10-CM

## 2019-05-23 DIAGNOSIS — D12.6 ADENOMATOUS POLYP OF COLON, UNSPECIFIED PART OF COLON: ICD-10-CM

## 2019-05-23 PROCEDURE — 99213 OFFICE O/P EST LOW 20 MIN: CPT | Performed by: INTERNAL MEDICINE

## 2019-05-23 NOTE — PROGRESS NOTES
[facility]    Chief Complaint   Patient presents with   • Constipation       Subjective     HPI    Cabrera Avina is a 79 y.o. male who presents with a chief complaint of constipation.    He comes in for an annual checkup.  He tells me he is doing okay.  He still suffers with constipation but he is doing okay with an over-the-counter supplement.  He is taking Perigose daily.  ( White can)   he states with this he will move his bowels every 3 to 4 days.  Last year we gave him Linzess to try.  He states that did not help much.  MiraLAX was not very helpful either.  Denies any blood or mucus.    Regards to his reflux is under control.  He has been off omeprazole now for 2 years.  Denies heartburn or regurgitation.      Past Medical History:   Diagnosis Date   • Anemia    • Anxiety     chronic   • Atrial fibrillation (CMS/HCC)    • Cancer (CMS/HCC)     non-hodgkins lymphoma   • Cholecystitis    • Colon polyp    • Colon polyp    • Constipation    • GERD (gastroesophageal reflux disease)    • History of ERCP 2005   • Nephrolithiasis     stones removed   • Jaylyn-rectal abscess    • Rectal abscess        Past Surgical History:   Procedure Laterality Date   • CHOLECYSTECTOMY     • COLONOSCOPY  05/2012    3 yr recall   • COLONOSCOPY  09/18/2015    5 yr recall   • KIDNEY STONE SURGERY     • RECTAL SURGERY      X 2   • TOTAL HIP ARTHROPLASTY           Current Outpatient Medications:   •  aspirin 81 MG EC tablet, Take 81 mg by mouth daily., Disp: , Rfl:   •  digoxin (LANOXIN) 250 MCG tablet, Take 1 tablet by mouth Daily., Disp: 90 tablet, Rfl: 3  •  diltiaZEM CD (CARDIZEM CD) 240 MG 24 hr capsule, Take 1 capsule by mouth Daily., Disp: 90 capsule, Rfl: 3  •  oxyCODONE-acetaminophen (PERCOCET)  MG per tablet, Take 1 tablet by mouth Every 6 (Six) Hours As Needed for Severe Pain . Must last 30 days, Disp: 100 tablet, Rfl: 0  •  polyethylene glycol (MIRALAX) packet, Take 17 g by mouth Daily., Disp: 100  packet, Rfl: 3  •  temazepam (RESTORIL) 15 MG capsule, Take 1 capsule by mouth At Night As Needed for Sleep., Disp: 30 capsule, Rfl: 2  •  warfarin (COUMADIN) 2 MG tablet, Take 3.5 tablets by mouth Daily. 7MG, 7MG, 8MG, then repeat, Disp: 360 tablet, Rfl: 5  •  escitalopram (LEXAPRO) 10 MG tablet, Take 1 tablet by mouth Daily., Disp: 90 tablet, Rfl: 0    No Known Allergies    Social History     Socioeconomic History   • Marital status:      Spouse name: Not on file   • Number of children: Not on file   • Years of education: Not on file   • Highest education level: Not on file   Tobacco Use   • Smoking status: Never Smoker   • Smokeless tobacco: Never Used   Substance and Sexual Activity   • Alcohol use: No   • Drug use: No   • Sexual activity: Defer       Family History   Problem Relation Age of Onset   • Colon cancer Mother    • No Known Problems Father    • Prostate cancer Brother    • No Known Problems Daughter    • No Known Problems Son    • No Known Problems Maternal Grandmother    • No Known Problems Maternal Grandfather    • No Known Problems Paternal Grandmother    • No Known Problems Paternal Grandfather    • Prostate cancer Brother    • Colon polyps Neg Hx        Review of Systems  General no fever chills or sweats weight stable  Gastrointestinal: Not present-abdominal pain,, diarrhea, dysphagia, hematemesis, melena, odynophagia, nausea, vomiting, pyrosis, regurgitation, hematochezia,    Objective     Vitals:    05/23/19 1314   BP: 130/68   Pulse: 66   Temp: 98 °F (36.7 °C)   SpO2: 98%       Physical Exam   Constitutional: He appears well-developed and well-nourished. No distress.   Eyes: Conjunctivae and EOM are normal.   Cardiovascular: Normal rate and normal heart sounds.   Irregular rhythm consistent with his A. fib   Pulmonary/Chest: Effort normal and breath sounds normal.   Abdominal: Soft. Bowel sounds are normal. He exhibits no distension and no mass. There is no tenderness. There is no  rebound and no guarding.   Musculoskeletal: He exhibits no edema.   Skin: Skin is warm.             Assessment/Plan   Problem List Items Addressed This Visit        Digestive    Slow transit constipation    Gastroesophageal reflux disease without esophagitis - Primary    Adenomatous polyp of colon    Overview     Colonoscopy September 2015 small adenoma removed.  Surveillance colonoscopy recommended in September 2020 if clinically appropriate                 First regarding his constipation this is under control.  Reinforced healthy high-fiber diet and activity.    Regarding his reflux disease this also is under control without medications.  Encourage continue with reflux precautions.    Regarding his history of adenomatous polyps his last colonoscopy was a little less than 4 years ago.  Next colonoscopy is recommended for surveillance in approximately September 2020.  He expressed understanding.  Sooner if he would have any signs symptoms indicate.  Will come back and see us next summer and we can investigate whether to pursue colonoscopy.  Continue ongoing management by primary care provider and other specialists.     Patient's Body mass index is 24.14 kg/m². BMI is within normal parameters. No follow-up required..        EMR Dragon/transcription disclaimer:  Much of this encounter note is electronic transcription/translation of spoken language to printed text.  The electronic translation of spoken language may be erroneous, or at times, nonsensical words or phrases may be inadvertently transcribed.  Although I have reviewed the note for such errors, some may still exist.    Ander Miguel MD  1:24 PM  05/23/19

## 2019-05-24 DIAGNOSIS — G47.09 OTHER INSOMNIA: ICD-10-CM

## 2019-05-24 RX ORDER — TEMAZEPAM 15 MG/1
15 CAPSULE ORAL NIGHTLY PRN
Qty: 30 CAPSULE | Refills: 2 | OUTPATIENT
Start: 2019-05-24

## 2019-05-28 ENCOUNTER — CLINICAL SUPPORT (OUTPATIENT)
Dept: FAMILY MEDICINE CLINIC | Facility: CLINIC | Age: 79
End: 2019-05-28

## 2019-05-28 DIAGNOSIS — I48.91 ATRIAL FIBRILLATION, UNSPECIFIED TYPE (HCC): ICD-10-CM

## 2019-05-28 LAB — INR PPP: 1.9 (ref 0.9–1.1)

## 2019-05-28 PROCEDURE — 85610 PROTHROMBIN TIME: CPT | Performed by: FAMILY MEDICINE

## 2019-05-28 NOTE — PROGRESS NOTES
Pt presents for PT INR   Allergies reviewed   Pt reports no bleeding occurrences  1.9/22.4sec sent to

## 2019-06-07 ENCOUNTER — TRANSCRIBE ORDERS (OUTPATIENT)
Dept: ONCOLOGY | Facility: CLINIC | Age: 79
End: 2019-06-07

## 2019-06-07 DIAGNOSIS — Z85.72 PERSONAL HISTORY OF MALIGNANT LYMPHOMA: Primary | ICD-10-CM

## 2019-06-07 DIAGNOSIS — D64.9 ANEMIA, UNSPECIFIED TYPE: ICD-10-CM

## 2019-06-07 DIAGNOSIS — E53.8 VITAMIN B 12 DEFICIENCY: ICD-10-CM

## 2019-06-07 DIAGNOSIS — D50.9 IRON DEFICIENCY ANEMIA, UNSPECIFIED IRON DEFICIENCY ANEMIA TYPE: ICD-10-CM

## 2019-06-10 ENCOUNTER — OFFICE VISIT (OUTPATIENT)
Dept: FAMILY MEDICINE CLINIC | Facility: CLINIC | Age: 79
End: 2019-06-10

## 2019-06-10 VITALS
WEIGHT: 174.2 LBS | SYSTOLIC BLOOD PRESSURE: 142 MMHG | DIASTOLIC BLOOD PRESSURE: 74 MMHG | TEMPERATURE: 98.7 F | BODY MASS INDEX: 23.6 KG/M2 | OXYGEN SATURATION: 97 % | HEIGHT: 72 IN | HEART RATE: 72 BPM | RESPIRATION RATE: 20 BRPM

## 2019-06-10 DIAGNOSIS — M25.551 CHRONIC PAIN OF RIGHT HIP: Primary | ICD-10-CM

## 2019-06-10 DIAGNOSIS — Z79.01 CHRONIC ANTICOAGULATION: ICD-10-CM

## 2019-06-10 DIAGNOSIS — F41.9 ANXIETY: ICD-10-CM

## 2019-06-10 DIAGNOSIS — I48.91 ATRIAL FIBRILLATION, UNSPECIFIED TYPE (HCC): ICD-10-CM

## 2019-06-10 DIAGNOSIS — G47.09 OTHER INSOMNIA: ICD-10-CM

## 2019-06-10 DIAGNOSIS — G89.29 CHRONIC PAIN OF RIGHT HIP: Primary | ICD-10-CM

## 2019-06-10 LAB — INR PPP: 1.6 (ref 0.9–1.1)

## 2019-06-10 PROCEDURE — 99214 OFFICE O/P EST MOD 30 MIN: CPT | Performed by: FAMILY MEDICINE

## 2019-06-10 PROCEDURE — 85610 PROTHROMBIN TIME: CPT | Performed by: FAMILY MEDICINE

## 2019-06-10 RX ORDER — ESCITALOPRAM OXALATE 20 MG/1
20 TABLET ORAL DAILY
Qty: 90 TABLET | Refills: 0 | Status: SHIPPED | OUTPATIENT
Start: 2019-06-10 | End: 2019-08-09 | Stop reason: SDUPTHER

## 2019-06-10 RX ORDER — TEMAZEPAM 15 MG/1
15 CAPSULE ORAL NIGHTLY PRN
Qty: 30 CAPSULE | Refills: 2 | Status: SHIPPED | OUTPATIENT
Start: 2019-06-10 | End: 2019-08-09 | Stop reason: SDUPTHER

## 2019-06-10 RX ORDER — OXYCODONE AND ACETAMINOPHEN 10; 325 MG/1; MG/1
1 TABLET ORAL EVERY 6 HOURS PRN
Qty: 100 TABLET | Refills: 0 | Status: SHIPPED | OUTPATIENT
Start: 2019-06-10 | End: 2019-07-11 | Stop reason: SDUPTHER

## 2019-06-10 RX ORDER — CYCLOBENZAPRINE HCL 10 MG
10 TABLET ORAL 3 TIMES DAILY PRN
Qty: 90 TABLET | Refills: 0 | Status: SHIPPED | OUTPATIENT
Start: 2019-06-10 | End: 2019-07-11 | Stop reason: SDUPTHER

## 2019-06-10 NOTE — PROGRESS NOTES
Subjective cc: pain medication refill  Cabrera Avina is a 79 y.o. male who presents to get a refill on his pain medication. Pain is most prominent in his right hip.  With the pain medication it makes his pain bearable and he is able to perform activities around his house.  He has been having increased muscle spasms in his low back.  He reports this makes it difficult for him to stand up straight at times.  Patient requests trial of muscle relaxer.    He reports that his depression is about the same, he cannot tell a big difference with the lexapro but he seems better in our interaction today.  No thoughts of harming himself.     Patient INR is subtherapeutic today.  Patient denies any bleeding at this time. He is currently alternating 7MG, 8MG and 8MG.     Atrial fib is rate controlled.     He is currently taking restoril PRN for insomnia which controls his symptoms and allows him to get restful sleep.  Patient needs refill.    Pain   This is a chronic problem. The current episode started more than 1 year ago. The problem occurs constantly. The problem has been unchanged. Associated symptoms include arthralgias, myalgias, numbness and weakness. Pertinent negatives include no abdominal pain, anorexia, chest pain, chills, coughing, diaphoresis, fatigue, fever or headaches. The symptoms are aggravated by exertion, standing and walking. He has tried rest, walking, position changes and oral narcotics (Percocet, patient cannot tolerate NSAIDs secondary to Coumadin) for the symptoms. The treatment provided moderate relief.   Atrial Fibrillation   Presents for follow-up visit. Symptoms include hypertension and weakness. Symptoms are negative for bradycardia, chest pain, dizziness, hemodynamic instability, palpitations, shortness of breath, syncope and tachycardia. The symptoms have been stable. Past medical history includes atrial fibrillation. There are no medication compliance problems.   Depression   Visit Type:  initial  Onset of symptoms: 1 to 6 months ago  Progression since onset: gradually improving  Patient presents with the following symptoms: anhedonia, decreased concentration, depressed mood, excessive worry, feelings of hopelessness, insomnia, muscle tension and nervousness/anxiety.  Patient is not experiencing: palpitations, shortness of breath, suicidal ideas, suicidal planning and thoughts of death.  Frequency of symptoms: most days   Severity: moderate   Aggravated by: family issues  Sleep quality: fair  Nighttime awakenings: occasional  Patient has a history of: anxiety/panic attacks, arrhythmia and depression  No history of: suicide attempt and substance abuse  Treatment tried: SSRI  Compliance with treatment: good  Improvement on treatment: mild           The following portions of the patient's history were reviewed and updated as appropriate: allergies, current medications, past family history, past medical history, past social history, past surgical history and problem list.        Review of Systems   Constitutional: Negative for activity change, appetite change, chills, diaphoresis, fatigue, fever and unexpected weight change.   Respiratory: Negative for cough, chest tightness and shortness of breath.    Cardiovascular: Negative for chest pain, palpitations, leg swelling and syncope.   Gastrointestinal: Positive for constipation. Negative for abdominal pain and anorexia.   Musculoskeletal: Positive for arthralgias, back pain, gait problem and myalgias.   Neurological: Positive for weakness and numbness. Negative for dizziness, syncope, facial asymmetry, speech difficulty, light-headedness and headaches.   Hematological: Bruises/bleeds easily.   Psychiatric/Behavioral: Positive for decreased concentration, dysphoric mood and sleep disturbance (wakes up in the middle of the night secondary to pain). Negative for self-injury, substance abuse and suicidal ideas. The patient is nervous/anxious and has insomnia.  "   All other systems reviewed and are negative.      Objective   Blood pressure 142/74, pulse 72, temperature 98.7 °F (37.1 °C), temperature source Oral, resp. rate 20, height 182.9 cm (72\"), weight 79 kg (174 lb 3.2 oz), SpO2 97 %.  Physical Exam   Constitutional: He is oriented to person, place, and time. Vital signs are normal. He appears well-developed and well-nourished. He is active and cooperative.  Non-toxic appearance. He does not have a sickly appearance. He does not appear ill. No distress.   HENT:   Head: Normocephalic and atraumatic.   Right Ear: External ear normal.   Left Ear: External ear normal.   Nose: Nose normal.   Mouth/Throat: Oropharynx is clear and moist. Abnormal dentition.   Eyes: Conjunctivae and EOM are normal. Right eye exhibits no discharge. Left eye exhibits no discharge.   Neck: No tracheal deviation present.   Cardiovascular: Normal rate and intact distal pulses. An irregularly irregular rhythm present.   Pulmonary/Chest: Effort normal and breath sounds normal. No accessory muscle usage or stridor. No respiratory distress. He has no wheezes. He has no rales.   Abdominal: Soft. Normal appearance and bowel sounds are normal. He exhibits no distension, no fluid wave, no pulsatile midline mass and no mass. There is no tenderness.   Musculoskeletal: He exhibits no edema.        Lumbar back: He exhibits decreased range of motion, tenderness, pain and spasm. He exhibits no bony tenderness.   Significantly limited ROM in right hip, pain with movement, walks with limp favoring right hip, difficulty changing positions and getting onto the exam table.    Neurological: He is alert and oriented to person, place, and time.   Skin: Skin is warm and dry. He is not diaphoretic.   Psychiatric: His behavior is normal. Judgment and thought content normal. His mood appears not anxious. He does not exhibit a depressed mood.   Nursing note and vitals reviewed.    Lab Results (last 24 hours)     Procedure " Component Value Units Date/Time    POCT INR [[id number]]  (Abnormal) Collected:  06/10/19 1110    Specimen:  Blood Updated:  06/10/19 1110     INR 1.6     Comment: 19.5             Assessment/Plan   Problems Addressed this Visit        Cardiovascular and Mediastinum    Atrial fibrillation (CMS/HCC)       Nervous and Auditory    Chronic hip pain - Primary    Relevant Medications    cyclobenzaprine (FLEXERIL) 10 MG tablet    oxyCODONE-acetaminophen (PERCOCET)  MG per tablet       Other    Anxiety    Relevant Medications    escitalopram (LEXAPRO) 20 MG tablet    Other insomnia    Relevant Medications    temazepam (RESTORIL) 15 MG capsule    Chronic anticoagulation        PLAN:     #1 insomnia: chronic, controlled, continue on current medication of restoril, refill given.     #2 depression/anxiety: chronic, uncontrolled, increased dose of Lexapro, recheck in 1 month    #3 chronic pain in right hip: Chronic, controlled with oral pain medication.  Patient is unable to take NSAIDs secondary to Coumadin.  Nii reviewed and appropriate.  Controlled contract in the chart.  Refill given on medication.  Sent to pharmacy.  Prescription for muscle relaxers to help with muscle spasms, advised on risk and benefits of this medication.  Return in 1 month     #4 chronic anticoagulation: Subtherapeutic INR today, increase Coumadin to 10 mg tonight.  Then resume 8 mg daily.  Recheck in 1 week.    #5 atrial fibrillation: Patient is anticoagulated and rate controlled. Continue on cardizem.      #6 constipation: chronic, stable. continue on regular use of fiber and stool softeners, advised on diet changes, advised this is due to his medication            This document has been electronically signed by Sunita Crawford MD on Gay 10, 2019 11:53 AM

## 2019-06-17 ENCOUNTER — CLINICAL SUPPORT (OUTPATIENT)
Dept: FAMILY MEDICINE CLINIC | Facility: CLINIC | Age: 79
End: 2019-06-17

## 2019-06-17 DIAGNOSIS — I48.91 ATRIAL FIBRILLATION, UNSPECIFIED TYPE (HCC): ICD-10-CM

## 2019-06-17 LAB — INR PPP: 2.3 (ref 0.9–1.1)

## 2019-06-17 PROCEDURE — 85610 PROTHROMBIN TIME: CPT | Performed by: FAMILY MEDICINE

## 2019-06-17 NOTE — PROGRESS NOTES
Pt presents for PT INR.  Allergies reviewed.  No occurrences of abnormal bleeding noted.  2.3/27.5sec

## 2019-06-20 DIAGNOSIS — I48.20 CHRONIC ATRIAL FIBRILLATION (HCC): ICD-10-CM

## 2019-06-20 RX ORDER — DILTIAZEM HYDROCHLORIDE 240 MG/1
CAPSULE, COATED, EXTENDED RELEASE ORAL
Qty: 90 CAPSULE | Refills: 3 | Status: SHIPPED | OUTPATIENT
Start: 2019-06-20 | End: 2020-03-05 | Stop reason: SDUPTHER

## 2019-06-28 ENCOUNTER — CLINICAL SUPPORT (OUTPATIENT)
Dept: FAMILY MEDICINE CLINIC | Facility: CLINIC | Age: 79
End: 2019-06-28

## 2019-06-28 ENCOUNTER — RESULTS ENCOUNTER (OUTPATIENT)
Dept: FAMILY MEDICINE CLINIC | Facility: CLINIC | Age: 79
End: 2019-06-28

## 2019-06-28 DIAGNOSIS — I48.20 CHRONIC ATRIAL FIBRILLATION (HCC): Primary | ICD-10-CM

## 2019-06-28 DIAGNOSIS — I48.20 CHRONIC ATRIAL FIBRILLATION (HCC): ICD-10-CM

## 2019-06-28 DIAGNOSIS — I48.91 ATRIAL FIBRILLATION, UNSPECIFIED TYPE (HCC): Primary | ICD-10-CM

## 2019-06-28 LAB — INR PPP: 2.3 (ref 0.9–1.1)

## 2019-06-28 PROCEDURE — 85610 PROTHROMBIN TIME: CPT | Performed by: NURSE PRACTITIONER

## 2019-06-28 NOTE — PROGRESS NOTES
Pt presents for PT INR   Allergies reviewed  No occurrences of abnormal bleeding reported   2.3/28.1 sec

## 2019-07-11 ENCOUNTER — OFFICE VISIT (OUTPATIENT)
Dept: FAMILY MEDICINE CLINIC | Facility: CLINIC | Age: 79
End: 2019-07-11

## 2019-07-11 VITALS
DIASTOLIC BLOOD PRESSURE: 66 MMHG | TEMPERATURE: 98.1 F | HEART RATE: 66 BPM | HEIGHT: 72 IN | BODY MASS INDEX: 24.38 KG/M2 | SYSTOLIC BLOOD PRESSURE: 132 MMHG | OXYGEN SATURATION: 99 % | RESPIRATION RATE: 20 BRPM | WEIGHT: 180 LBS

## 2019-07-11 DIAGNOSIS — K21.9 GASTROESOPHAGEAL REFLUX DISEASE WITHOUT ESOPHAGITIS: ICD-10-CM

## 2019-07-11 DIAGNOSIS — G89.29 CHRONIC PAIN OF RIGHT HIP: Primary | ICD-10-CM

## 2019-07-11 DIAGNOSIS — M25.551 CHRONIC PAIN OF RIGHT HIP: Primary | ICD-10-CM

## 2019-07-11 DIAGNOSIS — I48.20 CHRONIC ATRIAL FIBRILLATION (HCC): ICD-10-CM

## 2019-07-11 DIAGNOSIS — Z79.01 CHRONIC ANTICOAGULATION: ICD-10-CM

## 2019-07-11 DIAGNOSIS — F41.9 ANXIETY: ICD-10-CM

## 2019-07-11 DIAGNOSIS — Z72.0 TOBACCO USE: ICD-10-CM

## 2019-07-11 DIAGNOSIS — K59.01 SLOW TRANSIT CONSTIPATION: ICD-10-CM

## 2019-07-11 DIAGNOSIS — G47.09 OTHER INSOMNIA: ICD-10-CM

## 2019-07-11 LAB — INR PPP: 2.5 (ref 0.9–1.1)

## 2019-07-11 PROCEDURE — 85610 PROTHROMBIN TIME: CPT | Performed by: FAMILY MEDICINE

## 2019-07-11 PROCEDURE — 99214 OFFICE O/P EST MOD 30 MIN: CPT | Performed by: FAMILY MEDICINE

## 2019-07-11 RX ORDER — OXYCODONE AND ACETAMINOPHEN 10; 325 MG/1; MG/1
1 TABLET ORAL EVERY 6 HOURS PRN
Qty: 100 TABLET | Refills: 0 | Status: SHIPPED | OUTPATIENT
Start: 2019-07-11 | End: 2019-08-09 | Stop reason: SDUPTHER

## 2019-07-11 RX ORDER — ESCITALOPRAM OXALATE 20 MG/1
TABLET ORAL
Qty: 90 TABLET | Refills: 0 | OUTPATIENT
Start: 2019-07-11

## 2019-07-11 RX ORDER — CYCLOBENZAPRINE HCL 10 MG
10 TABLET ORAL 3 TIMES DAILY PRN
Qty: 90 TABLET | Refills: 0 | Status: SHIPPED | OUTPATIENT
Start: 2019-07-11 | End: 2019-12-05 | Stop reason: SDUPTHER

## 2019-07-11 NOTE — PROGRESS NOTES
Subjective cc: pain medication refill  Cabrera Avina is a 79 y.o. male who presents to get a refill on his pain medication. Pain is most prominent in his right hip.  With the pain medication it makes his pain bearable and he is able to perform activities around his house.  He reports significant improvement with muscle relaxer - he would like to continue this medication.     Depression and anxiety improved - still taking lexapro.      Patient INR is normal today.  Patient denies any bleeding at this time. He is currently alternating 7MG, 8MG and 8MG.     Atrial fib is rate controlled.     He is currently taking restoril PRN for insomnia which controls his symptoms and allows him to get restful sleep.  Patient needs refill.    Pain   This is a chronic problem. The current episode started more than 1 year ago. The problem occurs constantly. The problem has been unchanged. Associated symptoms include arthralgias, myalgias, numbness and weakness. Pertinent negatives include no abdominal pain, anorexia, chest pain, chills, coughing, diaphoresis, fatigue, fever or headaches. The symptoms are aggravated by exertion, standing and walking. He has tried rest, walking, position changes and oral narcotics (Percocet, patient cannot tolerate NSAIDs secondary to Coumadin) for the symptoms. The treatment provided moderate relief.   Atrial Fibrillation   Presents for follow-up visit. Symptoms include hypertension and weakness. Symptoms are negative for bradycardia, chest pain, dizziness, hemodynamic instability, palpitations, shortness of breath, syncope and tachycardia. The symptoms have been stable. Past medical history includes atrial fibrillation. There are no medication compliance problems.   Depression   Visit Type: follow-up  Patient presents with the following symptoms: insomnia, muscle tension and nervousness/anxiety.  Patient is not experiencing: anhedonia, decreased concentration, depressed mood, excessive worry,  "feelings of hopelessness, palpitations, shortness of breath, suicidal ideas, suicidal planning and thoughts of death.  Frequency of symptoms: occasionally   Severity: mild   Sleep quality: fair  Nighttime awakenings: occasional         The following portions of the patient's history were reviewed and updated as appropriate: allergies, current medications, past family history, past medical history, past social history, past surgical history and problem list.        Review of Systems   Constitutional: Negative for activity change, appetite change, chills, diaphoresis, fatigue, fever and unexpected weight change.   Respiratory: Negative for cough, chest tightness and shortness of breath.    Cardiovascular: Negative for chest pain, palpitations, leg swelling and syncope.   Gastrointestinal: Positive for constipation. Negative for abdominal pain and anorexia.   Musculoskeletal: Positive for arthralgias, back pain, gait problem and myalgias.   Neurological: Positive for weakness and numbness. Negative for dizziness, syncope, facial asymmetry, speech difficulty, light-headedness and headaches.   Hematological: Bruises/bleeds easily.   Psychiatric/Behavioral: Positive for dysphoric mood and sleep disturbance (wakes up in the middle of the night secondary to pain). Negative for decreased concentration, self-injury and suicidal ideas. The patient is nervous/anxious and has insomnia.    All other systems reviewed and are negative.      Objective   Blood pressure 132/66, pulse 66, temperature 98.1 °F (36.7 °C), temperature source Oral, resp. rate 20, height 182.9 cm (72\"), weight 81.6 kg (180 lb), SpO2 99 %.  Physical Exam   Constitutional: He is oriented to person, place, and time. Vital signs are normal. He appears well-developed and well-nourished. He is active and cooperative.  Non-toxic appearance. He does not have a sickly appearance. He does not appear ill. No distress.   HENT:   Head: Normocephalic and atraumatic. "   Right Ear: External ear normal.   Left Ear: External ear normal.   Nose: Nose normal.   Mouth/Throat: Oropharynx is clear and moist. Abnormal dentition.   Eyes: Conjunctivae and EOM are normal. Right eye exhibits no discharge. Left eye exhibits no discharge.   Neck: No tracheal deviation present.   Cardiovascular: Normal rate and intact distal pulses. An irregularly irregular rhythm present.   Pulmonary/Chest: Effort normal and breath sounds normal. No accessory muscle usage or stridor. No respiratory distress. He has no wheezes. He has no rales.   Abdominal: Soft. Normal appearance and bowel sounds are normal. He exhibits no distension, no fluid wave, no pulsatile midline mass and no mass. There is no tenderness.   Musculoskeletal: He exhibits no edema.        Lumbar back: He exhibits decreased range of motion, tenderness, pain and spasm. He exhibits no bony tenderness.   Significantly limited ROM in right hip, pain with movement, walks with limp favoring right hip, difficulty changing positions and getting onto the exam table.    Neurological: He is alert and oriented to person, place, and time.   Skin: Skin is warm and dry. He is not diaphoretic.   Psychiatric: His behavior is normal. Judgment and thought content normal. His mood appears not anxious. He does not exhibit a depressed mood.   Nursing note and vitals reviewed.    Lab Results (last 24 hours)     Procedure Component Value Units Date/Time    POCT INR [[id number]]  (Abnormal) Collected:  07/11/19 1105    Specimen:  Blood Updated:  07/11/19 1106     INR 2.5     Comment: 30.2             Assessment/Plan   Problems Addressed this Visit        Cardiovascular and Mediastinum    Atrial fibrillation (CMS/HCC)    Relevant Orders    POCT INR (Completed)       Digestive    Slow transit constipation    Gastroesophageal reflux disease without esophagitis       Nervous and Auditory    Chronic hip pain - Primary    Relevant Medications    oxyCODONE-acetaminophen  (PERCOCET)  MG per tablet    cyclobenzaprine (FLEXERIL) 10 MG tablet       Other    Anxiety    Other insomnia    Tobacco use    Chronic anticoagulation        PLAN:     #1 insomnia: chronic, controlled, continue on current medication of restoril.    #2 depression/anxiety: chronic, controlled, continue Lexapro    #3 chronic pain in right hip: Chronic, controlled with oral pain medication.  Patient is unable to take NSAIDs secondary to Coumadin.  Nii reviewed and appropriate.  Controlled contract in the chart.  Refill given on medication.  Sent to pharmacy.  Prescription for muscle relaxers to help with muscle spasms, advised on risk and benefits of this medication.  Return in 1 month     #4 chronic anticoagulation: normal INR today.     #5 atrial fibrillation: Patient is anticoagulated and rate controlled. Continue on cardizem.      #6 constipation: chronic, stable. continue on regular use of fiber and stool softeners, advised on diet changes, advised this is due to his medication            This document has been electronically signed by Sunita Crawford MD on July 11, 2019 4:56 PM

## 2019-07-23 ENCOUNTER — PROCEDURE VISIT (OUTPATIENT)
Dept: FAMILY MEDICINE CLINIC | Facility: CLINIC | Age: 79
End: 2019-07-23

## 2019-07-23 ENCOUNTER — APPOINTMENT (OUTPATIENT)
Dept: ULTRASOUND IMAGING | Facility: HOSPITAL | Age: 79
End: 2019-07-23

## 2019-07-23 VITALS
RESPIRATION RATE: 18 BRPM | HEART RATE: 62 BPM | HEIGHT: 72 IN | DIASTOLIC BLOOD PRESSURE: 50 MMHG | TEMPERATURE: 97.6 F | BODY MASS INDEX: 24.73 KG/M2 | OXYGEN SATURATION: 98 % | WEIGHT: 182.6 LBS | SYSTOLIC BLOOD PRESSURE: 106 MMHG

## 2019-07-23 DIAGNOSIS — I48.20 CHRONIC ATRIAL FIBRILLATION (HCC): ICD-10-CM

## 2019-07-23 DIAGNOSIS — H61.22 IMPACTED CERUMEN OF LEFT EAR: Primary | ICD-10-CM

## 2019-07-23 DIAGNOSIS — Z79.01 CHRONIC ANTICOAGULATION: ICD-10-CM

## 2019-07-23 LAB — INR PPP: 3.6 (ref 0.9–1.1)

## 2019-07-23 PROCEDURE — 85610 PROTHROMBIN TIME: CPT | Performed by: FAMILY MEDICINE

## 2019-07-23 PROCEDURE — 69209 REMOVE IMPACTED EAR WAX UNI: CPT | Performed by: FAMILY MEDICINE

## 2019-07-23 PROCEDURE — 99213 OFFICE O/P EST LOW 20 MIN: CPT | Performed by: FAMILY MEDICINE

## 2019-07-31 ENCOUNTER — CLINICAL SUPPORT (OUTPATIENT)
Dept: FAMILY MEDICINE CLINIC | Facility: CLINIC | Age: 79
End: 2019-07-31

## 2019-07-31 VITALS — DIASTOLIC BLOOD PRESSURE: 78 MMHG | SYSTOLIC BLOOD PRESSURE: 130 MMHG

## 2019-07-31 DIAGNOSIS — Z79.01 CHRONIC ANTICOAGULATION: Primary | ICD-10-CM

## 2019-07-31 LAB — INR PPP: 2.4 (ref 0.9–1.1)

## 2019-07-31 PROCEDURE — 85610 PROTHROMBIN TIME: CPT | Performed by: NURSE PRACTITIONER

## 2019-07-31 NOTE — PROGRESS NOTES
Pt presents for PT INR and BP check.  No episodes of abnormal bleeding.  Allergies reviewed.  Pt states that his BP at home was low the other day 106/60.  PT 2.4  /78  Pt wanted  to know that BP was low the other day at home.

## 2019-08-06 NOTE — PROGRESS NOTES
"Subjective  cc: left ear clogged up   Cabrera Avina is a 79 y.o. male who presents with complaint of his left ear being clogged up and he would like it flushed out.  He would also like to check his INR while here today.      Ear Fullness    There is pain in the left ear. This is a new problem. The current episode started today. The problem occurs constantly. The problem has been unchanged. There has been no fever. The pain is at a severity of 0/10. The patient is experiencing no pain. Associated symptoms include hearing loss. Pertinent negatives include no abdominal pain, coughing, diarrhea, ear discharge, headaches, neck pain, rash, rhinorrhea, sore throat or vomiting. He has tried nothing for the symptoms. The treatment provided no relief. His past medical history is significant for hearing loss. There is no history of a chronic ear infection or a tympanostomy tube.        The following portions of the patient's history were reviewed and updated as appropriate: allergies, current medications, past family history, past medical history, past social history, past surgical history and problem list.        Review of Systems   Constitutional: Negative for activity change, appetite change, chills and fever.   HENT: Positive for hearing loss. Negative for congestion, ear discharge, ear pain, rhinorrhea and sore throat.    Respiratory: Negative for cough.    Gastrointestinal: Negative for abdominal pain, diarrhea and vomiting.   Musculoskeletal: Positive for arthralgias, back pain, gait problem, joint swelling and myalgias. Negative for neck pain.   Skin: Negative for rash.   Neurological: Negative for headaches.   Hematological: Bruises/bleeds easily.   All other systems reviewed and are negative.      Objective   Blood pressure 106/50, pulse 62, temperature 97.6 °F (36.4 °C), temperature source Oral, resp. rate 18, height 182.9 cm (72\"), weight 82.8 kg (182 lb 9.6 oz), SpO2 98 %.    Physical Exam   Constitutional: " He is oriented to person, place, and time. He appears well-developed and well-nourished. No distress.   HENT:   Head: Normocephalic and atraumatic.   Right Ear: Tympanic membrane, external ear and ear canal normal. Decreased hearing is noted.   Left Ear: External ear normal. A foreign body (cerumen ) is present. Decreased hearing is noted.   Nose: Nose normal.   Eyes: Conjunctivae and EOM are normal. Right eye exhibits no discharge. Left eye exhibits no discharge.   Neck: Normal range of motion. Neck supple. No JVD present. No tracheal deviation present. No thyromegaly present.   Cardiovascular: Normal rate.   Pulmonary/Chest: Effort normal. No respiratory distress.   Musculoskeletal: Normal range of motion. He exhibits no edema.   Lymphadenopathy:     He has no cervical adenopathy.   Neurological: He is alert and oriented to person, place, and time.   Skin: Skin is warm and dry. He is not diaphoretic.   Psychiatric: He has a normal mood and affect. His behavior is normal. Judgment and thought content normal.   Nursing note and vitals reviewed.    Ear Cerumen Removal  Date/Time: 7/23/2019 10:52 AM  Performed by: Sunita Crawford MD  Authorized by: Sunita Crawford MD   Consent: Verbal consent obtained. Written consent not obtained.  Risks and benefits: risks, benefits and alternatives were discussed  Consent given by: patient  Patient understanding: patient states understanding of the procedure being performed  Patient consent: the patient's understanding of the procedure matches consent given  Procedure consent: procedure consent matches procedure scheduled  Patient identity confirmed: verbally with patient    Anesthesia:  Local Anesthetic: none  Location details: left ear  Patient tolerance: Patient tolerated the procedure well with no immediate complications  Comments: Complete removal of cerumen - TM visualized and normal   Procedure type: irrigation   Sedation:  Patient sedated:  no            Assessment/Plan   Problems Addressed this Visit        Cardiovascular and Mediastinum    Atrial fibrillation (CMS/HCC)    Relevant Orders    POCT INR (Completed)       Other    Chronic anticoagulation      Other Visit Diagnoses     Impacted cerumen of left ear    -  Primary          PLAN:     #1 impacted cerumen: new, removed with irrigation, pt did well, all questions answered, return if new concerns     #2 a fib: INR in office today, supratherapeutic - advised to hold coumadin 2 days, then resume normal dosing, return if any concerns           This document has been electronically signed by Sunita Crawford MD on August 6, 2019 10:54 AM

## 2019-08-08 ENCOUNTER — APPOINTMENT (OUTPATIENT)
Dept: LAB | Facility: HOSPITAL | Age: 79
End: 2019-08-08

## 2019-08-08 LAB
ALBUMIN SERPL-MCNC: 4.8 G/DL (ref 3.5–5)
ALBUMIN/GLOB SERPL: 1.1 G/DL (ref 1.1–2.5)
ALP SERPL-CCNC: 70 U/L (ref 24–120)
ALT SERPL W P-5'-P-CCNC: 20 U/L (ref 0–54)
ANION GAP SERPL CALCULATED.3IONS-SCNC: 11 MMOL/L (ref 4–13)
AST SERPL-CCNC: 26 U/L (ref 7–45)
BASOPHILS # BLD AUTO: 0.05 10*3/MM3 (ref 0–0.2)
BASOPHILS NFR BLD AUTO: 0.9 % (ref 0–1.5)
BILIRUB SERPL-MCNC: 0.6 MG/DL (ref 0.1–1)
BUN BLD-MCNC: 20 MG/DL (ref 5–21)
BUN/CREAT SERPL: 18.7 (ref 7–25)
CALCIUM SPEC-SCNC: 9.5 MG/DL (ref 8.4–10.4)
CHLORIDE SERPL-SCNC: 101 MMOL/L (ref 98–110)
CO2 SERPL-SCNC: 29 MMOL/L (ref 24–31)
CREAT BLD-MCNC: 1.07 MG/DL (ref 0.5–1.4)
DEPRECATED RDW RBC AUTO: 46 FL (ref 37–54)
EOSINOPHIL # BLD AUTO: 0.19 10*3/MM3 (ref 0–0.4)
EOSINOPHIL NFR BLD AUTO: 3.5 % (ref 0.3–6.2)
ERYTHROCYTE [DISTWIDTH] IN BLOOD BY AUTOMATED COUNT: 13.3 % (ref 12.3–15.4)
FERRITIN SERPL-MCNC: 121 NG/ML (ref 17.9–464)
FOLATE SERPL-MCNC: 11.6 NG/ML (ref 4.78–24.2)
GFR SERPL CREATININE-BSD FRML MDRD: 67 ML/MIN/1.73
GLOBULIN UR ELPH-MCNC: 4.2 GM/DL
GLUCOSE BLD-MCNC: 100 MG/DL (ref 70–100)
HCT VFR BLD AUTO: 43.9 % (ref 37.5–51)
HGB BLD-MCNC: 14.4 G/DL (ref 13–17.7)
IMM GRANULOCYTES # BLD AUTO: 0.02 10*3/MM3 (ref 0–0.05)
IMM GRANULOCYTES NFR BLD AUTO: 0.4 % (ref 0–0.5)
IRON 24H UR-MRATE: 87 MCG/DL (ref 42–180)
IRON SATN MFR SERPL: 25 % (ref 20–45)
LDH SERPL-CCNC: 552 U/L (ref 265–665)
LYMPHOCYTES # BLD AUTO: 0.98 10*3/MM3 (ref 0.7–3.1)
LYMPHOCYTES NFR BLD AUTO: 18 % (ref 19.6–45.3)
MCH RBC QN AUTO: 30.3 PG (ref 26.6–33)
MCHC RBC AUTO-ENTMCNC: 32.8 G/DL (ref 31.5–35.7)
MCV RBC AUTO: 92.4 FL (ref 79–97)
MONOCYTES # BLD AUTO: 0.65 10*3/MM3 (ref 0.1–0.9)
MONOCYTES NFR BLD AUTO: 11.9 % (ref 5–12)
NEUTROPHILS # BLD AUTO: 3.55 10*3/MM3 (ref 1.7–7)
NEUTROPHILS NFR BLD AUTO: 65.3 % (ref 42.7–76)
NRBC BLD AUTO-RTO: 0 /100 WBC (ref 0–0.2)
PLATELET # BLD AUTO: 224 10*3/MM3 (ref 140–450)
PMV BLD AUTO: 9.7 FL (ref 6–12)
POTASSIUM BLD-SCNC: 4.4 MMOL/L (ref 3.5–5.3)
PROT SERPL-MCNC: 9 G/DL (ref 6.3–8.7)
RBC # BLD AUTO: 4.75 10*6/MM3 (ref 4.14–5.8)
SODIUM BLD-SCNC: 141 MMOL/L (ref 135–145)
TIBC SERPL-MCNC: 355 MCG/DL (ref 225–420)
VIT B12 BLD-MCNC: 480 PG/ML (ref 239–931)
WBC NRBC COR # BLD: 5.44 10*3/MM3 (ref 3.4–10.8)

## 2019-08-08 PROCEDURE — 80053 COMPREHEN METABOLIC PANEL: CPT | Performed by: INTERNAL MEDICINE

## 2019-08-08 PROCEDURE — 82607 VITAMIN B-12: CPT | Performed by: INTERNAL MEDICINE

## 2019-08-08 PROCEDURE — 83615 LACTATE (LD) (LDH) ENZYME: CPT | Performed by: INTERNAL MEDICINE

## 2019-08-08 PROCEDURE — 83540 ASSAY OF IRON: CPT | Performed by: INTERNAL MEDICINE

## 2019-08-08 PROCEDURE — 83550 IRON BINDING TEST: CPT | Performed by: INTERNAL MEDICINE

## 2019-08-08 PROCEDURE — 82728 ASSAY OF FERRITIN: CPT | Performed by: INTERNAL MEDICINE

## 2019-08-08 PROCEDURE — 85025 COMPLETE CBC W/AUTO DIFF WBC: CPT | Performed by: INTERNAL MEDICINE

## 2019-08-08 PROCEDURE — 36415 COLL VENOUS BLD VENIPUNCTURE: CPT | Performed by: INTERNAL MEDICINE

## 2019-08-08 PROCEDURE — 82232 ASSAY OF BETA-2 PROTEIN: CPT | Performed by: INTERNAL MEDICINE

## 2019-08-08 PROCEDURE — 82746 ASSAY OF FOLIC ACID SERUM: CPT | Performed by: INTERNAL MEDICINE

## 2019-08-09 ENCOUNTER — OFFICE VISIT (OUTPATIENT)
Dept: FAMILY MEDICINE CLINIC | Facility: CLINIC | Age: 79
End: 2019-08-09

## 2019-08-09 VITALS
OXYGEN SATURATION: 97 % | WEIGHT: 178.6 LBS | RESPIRATION RATE: 18 BRPM | HEART RATE: 62 BPM | DIASTOLIC BLOOD PRESSURE: 58 MMHG | TEMPERATURE: 98.9 F | HEIGHT: 72 IN | SYSTOLIC BLOOD PRESSURE: 120 MMHG | BODY MASS INDEX: 24.19 KG/M2

## 2019-08-09 DIAGNOSIS — G89.29 CHRONIC PAIN OF RIGHT HIP: Primary | ICD-10-CM

## 2019-08-09 DIAGNOSIS — F41.9 ANXIETY: ICD-10-CM

## 2019-08-09 DIAGNOSIS — G47.09 OTHER INSOMNIA: ICD-10-CM

## 2019-08-09 DIAGNOSIS — I48.20 CHRONIC ATRIAL FIBRILLATION (HCC): ICD-10-CM

## 2019-08-09 DIAGNOSIS — M25.551 CHRONIC PAIN OF RIGHT HIP: Primary | ICD-10-CM

## 2019-08-09 DIAGNOSIS — Z79.01 CHRONIC ANTICOAGULATION: ICD-10-CM

## 2019-08-09 LAB
B2 MICROGLOB SERPL-MCNC: 2.6 MG/L (ref 0.6–2.4)
INR PPP: 2.1 (ref 0.9–1.1)

## 2019-08-09 PROCEDURE — 85610 PROTHROMBIN TIME: CPT | Performed by: FAMILY MEDICINE

## 2019-08-09 PROCEDURE — 99214 OFFICE O/P EST MOD 30 MIN: CPT | Performed by: FAMILY MEDICINE

## 2019-08-09 RX ORDER — ESCITALOPRAM OXALATE 20 MG/1
20 TABLET ORAL DAILY
Qty: 90 TABLET | Refills: 3 | Status: SHIPPED | OUTPATIENT
Start: 2019-08-09 | End: 2020-03-05 | Stop reason: SDUPTHER

## 2019-08-09 RX ORDER — TEMAZEPAM 15 MG/1
15 CAPSULE ORAL NIGHTLY PRN
Qty: 30 CAPSULE | Refills: 2 | Status: SHIPPED | OUTPATIENT
Start: 2019-08-09 | End: 2019-11-05 | Stop reason: SDUPTHER

## 2019-08-09 RX ORDER — OXYCODONE AND ACETAMINOPHEN 10; 325 MG/1; MG/1
1 TABLET ORAL EVERY 6 HOURS PRN
Qty: 100 TABLET | Refills: 0 | Status: SHIPPED | OUTPATIENT
Start: 2019-08-09 | End: 2019-09-09 | Stop reason: SDUPTHER

## 2019-08-09 RX ORDER — ONDANSETRON 4 MG/1
4 TABLET, FILM COATED ORAL EVERY 6 HOURS PRN
Qty: 10 TABLET | Refills: 1 | Status: SHIPPED | OUTPATIENT
Start: 2019-08-09 | End: 2019-10-30

## 2019-08-09 NOTE — PROGRESS NOTES
Subjective cc: pain medication refill  Cabrera Avina is a 79 y.o. male who presents to get a refill on his pain medication. Pain is most prominent in his right hip.  With the pain medication it makes his pain bearable and he is able to perform activities around his house.  He reports significant improvement with muscle relaxer - he would like to continue this medication.     Depression and anxiety improved - still taking lexapro.      Patient INR is normal today.  Patient denies any bleeding at this time. He is currently alternating 7MG, 8MG and 8MG.     Atrial fib is rate controlled.     He is currently taking restoril PRN for insomnia which controls his symptoms and allows him to get restful sleep.  Patient needs refill.    He developed chills and nausea last night and went to bed early, still having slight nausea today but feeling better. Denies any other symptoms.     Pain   This is a chronic problem. The current episode started more than 1 year ago. The problem occurs constantly. The problem has been unchanged. Associated symptoms include arthralgias, chills, myalgias, nausea, numbness and weakness. Pertinent negatives include no abdominal pain, anorexia, chest pain, coughing, diaphoresis, fatigue, fever, headaches or vomiting. The symptoms are aggravated by exertion, standing and walking. He has tried rest, walking, position changes and oral narcotics (Percocet, patient cannot tolerate NSAIDs secondary to Coumadin) for the symptoms. The treatment provided moderate relief.   Atrial Fibrillation   Presents for follow-up visit. Symptoms include hypertension and weakness. Symptoms are negative for bradycardia, chest pain, dizziness, hemodynamic instability, palpitations, shortness of breath, syncope and tachycardia. The symptoms have been stable. Past medical history includes atrial fibrillation. There are no medication compliance problems.   Depression   Visit Type: follow-up  Patient presents with the  "following symptoms: insomnia, muscle tension and nervousness/anxiety.  Patient is not experiencing: anhedonia, decreased concentration, depressed mood, excessive worry, feelings of hopelessness, palpitations, shortness of breath, suicidal ideas, suicidal planning and thoughts of death.  Frequency of symptoms: occasionally   Severity: mild   Sleep quality: fair  Nighttime awakenings: occasional         The following portions of the patient's history were reviewed and updated as appropriate: allergies, current medications, past family history, past medical history, past social history, past surgical history and problem list.        Review of Systems   Constitutional: Positive for appetite change and chills. Negative for activity change, diaphoresis, fatigue, fever and unexpected weight change.   Respiratory: Negative for cough, chest tightness and shortness of breath.    Cardiovascular: Negative for chest pain, palpitations, leg swelling and syncope.   Gastrointestinal: Positive for constipation and nausea. Negative for abdominal pain, anorexia, blood in stool and vomiting.   Musculoskeletal: Positive for arthralgias, back pain, gait problem and myalgias.   Neurological: Positive for weakness and numbness. Negative for dizziness, syncope, facial asymmetry, speech difficulty, light-headedness and headaches.   Hematological: Bruises/bleeds easily.   Psychiatric/Behavioral: Positive for dysphoric mood and sleep disturbance (wakes up in the middle of the night secondary to pain). Negative for decreased concentration, self-injury and suicidal ideas. The patient is nervous/anxious and has insomnia.    All other systems reviewed and are negative.      Objective   Blood pressure 120/58, pulse 62, temperature 98.9 °F (37.2 °C), temperature source Oral, resp. rate 18, height 182.9 cm (72\"), weight 81 kg (178 lb 9.6 oz), SpO2 97 %.  Physical Exam   Constitutional: He is oriented to person, place, and time. Vital signs are " normal. He appears well-developed and well-nourished. He is active and cooperative.  Non-toxic appearance. He does not have a sickly appearance. He does not appear ill. No distress.   HENT:   Head: Normocephalic and atraumatic.   Right Ear: External ear normal.   Left Ear: External ear normal.   Nose: Nose normal.   Mouth/Throat: Oropharynx is clear and moist. Abnormal dentition.   Eyes: Conjunctivae and EOM are normal. Right eye exhibits no discharge. Left eye exhibits no discharge.   Neck: No tracheal deviation present.   Cardiovascular: Normal rate and intact distal pulses. An irregularly irregular rhythm present.   Pulmonary/Chest: Effort normal and breath sounds normal. No accessory muscle usage or stridor. No respiratory distress. He has no wheezes. He has no rales.   Abdominal: Soft. Normal appearance and bowel sounds are normal. He exhibits no distension, no fluid wave, no pulsatile midline mass and no mass. There is no tenderness.   Musculoskeletal: He exhibits no edema.        Lumbar back: He exhibits decreased range of motion, tenderness, pain and spasm. He exhibits no bony tenderness.   Significantly limited ROM in right hip, pain with movement, walks with limp favoring right hip, difficulty changing positions and getting onto the exam table.    Neurological: He is alert and oriented to person, place, and time.   Skin: Skin is warm and dry. He is not diaphoretic.   Psychiatric: His behavior is normal. Judgment and thought content normal. His mood appears not anxious. He does not exhibit a depressed mood.   Nursing note and vitals reviewed.    Lab Results (last 24 hours)     Procedure Component Value Units Date/Time    POCT INR [[id number]]  (Abnormal) Collected:  08/09/19 1052    Specimen:  Blood Updated:  08/09/19 1053     INR 2.1     Comment: 24.7             Assessment/Plan   Problems Addressed this Visit        Cardiovascular and Mediastinum    Atrial fibrillation (CMS/HCC)    Relevant Orders    POCT  INR (Completed)       Nervous and Auditory    Chronic hip pain - Primary    Relevant Medications    oxyCODONE-acetaminophen (PERCOCET)  MG per tablet       Other    Anxiety    Relevant Medications    escitalopram (LEXAPRO) 20 MG tablet    Other insomnia    Relevant Medications    temazepam (RESTORIL) 15 MG capsule    Chronic anticoagulation        PLAN:     #1 insomnia: chronic, controlled, continue on current medication of restoril.    #2 depression/anxiety: chronic, controlled, continue Lexapro    #3 chronic pain in right hip: Chronic, controlled with oral pain medication.  Patient is unable to take NSAIDs secondary to Coumadin.  Nii reviewed and appropriate.  Controlled contract in the chart.  Refill given on medication.  Sent to pharmacy.  Prescription for muscle relaxers to help with muscle spasms, advised on risk and benefits of this medication.  Return in 1 month     #4 chronic anticoagulation: normal INR today.     #5 atrial fibrillation: Patient is anticoagulated and rate controlled. Continue on cardizem.      #6 constipation: chronic, stable. continue on regular use of fiber and stool softeners, advised on diet changes, advised this is due to his medication    #7 nausea: new, likely viral illness, zofran PRN, bland diet, return if not improving           This document has been electronically signed by Sunita Crawford MD on August 9, 2019 11:19 AM

## 2019-08-13 PROBLEM — I50.9 CONGESTIVE HEART FAILURE: Status: ACTIVE | Noted: 2019-08-13

## 2019-08-13 PROBLEM — I73.9 PAD (PERIPHERAL ARTERY DISEASE) (HCC): Status: ACTIVE | Noted: 2019-08-13

## 2019-08-14 ENCOUNTER — OFFICE VISIT (OUTPATIENT)
Dept: ONCOLOGY | Facility: CLINIC | Age: 79
End: 2019-08-14

## 2019-08-14 VITALS
HEIGHT: 72 IN | TEMPERATURE: 98 F | OXYGEN SATURATION: 96 % | BODY MASS INDEX: 24.38 KG/M2 | WEIGHT: 180 LBS | SYSTOLIC BLOOD PRESSURE: 150 MMHG | HEART RATE: 87 BPM | RESPIRATION RATE: 16 BRPM | DIASTOLIC BLOOD PRESSURE: 88 MMHG

## 2019-08-14 DIAGNOSIS — I50.9 CONGESTIVE HEART FAILURE, UNSPECIFIED HF CHRONICITY, UNSPECIFIED HEART FAILURE TYPE (HCC): ICD-10-CM

## 2019-08-14 DIAGNOSIS — I73.9 PAD (PERIPHERAL ARTERY DISEASE) (HCC): ICD-10-CM

## 2019-08-14 DIAGNOSIS — C85.90 LYMPHOMA IN REMISSION (HCC): ICD-10-CM

## 2019-08-14 PROCEDURE — 99214 OFFICE O/P EST MOD 30 MIN: CPT | Performed by: INTERNAL MEDICINE

## 2019-08-14 NOTE — PROGRESS NOTES
[facility]  [address]  [facility] [place] [address] [phone]    Patient Name: Cabrera Avina  Encounter Date: 08/14/2019  YOB: 1940  Patient Number: [id number]        REASON FOR FOLLOWUP:  Mr. Cabrera Avina is a 79-year-old male who returns in follow-up of intermediate grade B-cell lymphoma.  He is seen 169 months following the completion of R-CHOP chemotherapy and 117 months after the completion of Rituxan maintenance.  He is also followed for an iron deficiency anemia and for chronic anticoagulation. He is seen nearly 33 months after the most recent dose of INFeD.  The patient is here alone. History is obtained from the patient who is considered to be a reliable historian.       Problem List Items Addressed This Visit        Cardiovascular and Mediastinum    PAD (peripheral artery disease) (CMS/HCC)    Congestive heart failure (CMS/HCC)       Hematopoietic and Hemostatic    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)        Oncology/Hematology History    DIAGNOSTIC ABNORMALITIES: B-cell Lymphoma.  Periportal lymph node biopsy, 02/01/2005.  Findings consistent with large B-cell lymphoma with an intermediate to high proliferative rate.  CT of the chest, 02/03/2005.   Mediastinal, left hilar, and upper abdominal lymphadenopathy consistent with the patient's history of lymphoma.  PREVIOUS INTERVENTIONS:   B-cell lymphoma  Definitive Rituxan, 375 mg/m2, 02/05/2005 through 02/25/2005. Four weeks completed.  Definitive R-CHOP, from 01/04/2005 through 07/07/2005.  Five cycles completed.  Cycle #2 delayed by admission for management of deep perirectal abscess.  Maintenance Rituxan (every 3 months), 10/14/2005 through 11/11/2007.  Six cycles completed.  DIAGNOSTIC ABNORMALITIES:   Anemia, iron deficiency  Anemia substrates on 05/27/2008: Iron 43, %saturation 12, TIBC 366, UIBC 323, ferritin 27, vitamin B12 of 295, folate  7.2.  PREVIOUS INTERVENTIONS:   Anemia.  INFeD, 1000 mg IVPB, 06/04/2008.  INFeD, 1000 mg IVPB, 09/02/2010 and 09/09/2010.  INFeD 500 mg, 08/25/2016; 11/28/2016.        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[facility])    4/28/2016 Initial Diagnosis     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[facility])            PAST MEDICAL HISTORY:  ALLERGIES:  No Known Allergies  CURRENT MEDICATIONS:  Outpatient Encounter Medications as of 8/14/2019   Medication Sig Dispense Refill   • aspirin 81 MG EC tablet Take 81 mg by mouth daily.     • cyclobenzaprine (FLEXERIL) 10 MG tablet Take 1 tablet by mouth 3 (Three) Times a Day As Needed for Muscle Spasms. 90 tablet 0   • digoxin (LANOXIN) 250 MCG tablet Take 1 tablet by mouth Daily. 90 tablet 3   • diltiaZEM CD (CARDIZEM CD) 240 MG 24 hr capsule Take 1 capsule by mouth Daily. 90 capsule 3   • diltiaZEM CD (CARDIZEM CD) 240 MG 24 hr capsule TAKE ONE CAPSULE BY MOUTH DAILY 90 capsule 3   • escitalopram (LEXAPRO) 20 MG tablet Take 1 tablet by mouth Daily. 90 tablet 3   • ondansetron (ZOFRAN) 4 MG tablet Take 1 tablet by mouth Every 6 (Six) Hours As Needed for Nausea or Vomiting. 10 tablet 1   • oxyCODONE-acetaminophen (PERCOCET)  MG per tablet Take 1 tablet by mouth Every 6 (Six) Hours As Needed for Severe Pain . Must last 30 days 100 tablet 0   • polyethylene glycol (MIRALAX) packet Take 17 g by mouth Daily. 100 packet 3   • temazepam (RESTORIL) 15 MG capsule Take 1 capsule by mouth At Night As Needed for Sleep. 30 capsule 2   • warfarin (COUMADIN) 2 MG tablet Take 3.5 tablets by mouth Daily. 7MG, 7MG, 8MG, then repeat 360 tablet 5     No facility-administered encounter medications on file as of 8/14/2019.      ADULT ILLNESSES:  Patient Active Problem List   Diagnosis Code   • Slow transit constipation K59.01   • Gastroesophageal reflux disease without esophagitis K21.9   • 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[facility]) C85.90   • Anxiety F41.9   • Chronic hip pain M25.559, G89.29   • Atrial fibrillation (CMS/[facility]) I48.91   •  Other insomnia G47.09   • Adenomatous polyp of colon D12.6   • Tobacco use Z72.0   • Chronic anticoagulation Z79.01   • PAD (peripheral artery disease) (CMS/HCC) I73.9   • Congestive heart failure (CMS/[facility]) I50.9     SURGERIES:  Past Surgical History:   Procedure Laterality Date   • CHOLECYSTECTOMY     • COLONOSCOPY  05/2012    3 yr recall   • COLONOSCOPY  09/18/2015    5 yr recall   • KIDNEY STONE SURGERY     • RECTAL SURGERY      X 2   • TOTAL HIP ARTHROPLASTY       HEALTH MAINTENANCE ITEMS:  Health Maintenance Due   Topic Date Due   • ZOSTER VACCINE (2 of 2) 02/13/2017   • PNEUMOCOCCAL VACCINES (65+ LOW/MEDIUM RISK) (2 of 2 - PPSV23) 10/26/2017   • INFLUENZA VACCINE  08/01/2019       <no information>  Last Completed Colonoscopy       Status Date      COLONOSCOPY Done 12/1/2015      Patient has more history with this topic...          There is no immunization history on file for this patient.  Last Completed Mammogram     Patient has no health maintenance due at this time            FAMILY HISTORY:  Family History   Problem Relation Age of Onset   • Colon cancer Mother    • No Known Problems Father    • Prostate cancer Brother    • No Known Problems Daughter    • No Known Problems Son    • No Known Problems Maternal Grandmother    • No Known Problems Maternal Grandfather    • No Known Problems Paternal Grandmother    • No Known Problems Paternal Grandfather    • Prostate cancer Brother    • Colon polyps Neg Hx      SOCIAL HISTORY:  Social History     Socioeconomic History   • Marital status:      Spouse name: Not on file   • Number of children: Not on file   • Years of education: Not on file   • Highest education level: Not on file   Tobacco Use   • Smoking status: Never Smoker   • Smokeless tobacco: Never Used   Substance and Sexual Activity   • Alcohol use: No   • Drug use: No   • Sexual activity: Defer       CHANGES IN PAST MEDICAL/SOCIAL/FAMILY HISTORY:   Since the last visit, Cabrera Avina states  "that he has had no changes in family history or social history.     PRIMARY COMPLAINT:  \"Nothing really.\"    REVIEW OF SYSTEMS:  PS:  ECOG 1-2.   Constitutional:  His appetite is fairly good \"really better.\"  His energy remains low to fair.  \"Not any different.\"  He is as active and manages all his ADLs including chores, running errands, and driving.  Says his hip and lower back pains still inhibit his activities, this in spite of hip replacement.  Again says he still manages to walk at least 1/2 mile to 1 mile on most days.  \"I try.\" He has no fevers, chills, or drenching night sweats.  He has regained 3.9 pounds (had lost 3 pounds at his prior visit) over the last 3 months. Still says he wants to stay around 180 pounds.  He sleeps restlessly, waking frequently.  Ear/Nose/Throat/Mouth:   The patient reports no ear pains, sinus symptoms, sore throat, nosebleeds.  No headaches. The patient denies any hoarseness, change in voice quality, or hemoptysis.   Ocular:   The patient reports no eye pain, significant change in visual acuity, double vision, or blurry vision.   Respiratory:  He reports no chronic cough, shortness of breathing, phlegm production, or chest wall pain.  Cardiovascular:  He reports no exertional chest pain, chest pressure, or chest heaviness.  He reports no claudication. He reports no palpitations or symptomatic orthostasis.  Gastrointestinal:  He has no pica, dysphagia, nausea, vomiting, postprandial abdominal pain, bloating, cramping, change in bowel habits, or discoloration of the stool.  He has had no rectal bleeding.  He has intermittent bouts of constipation modulated by daily stool softeners (Dulcolax).  Says he is still using Metamucil daily.  Says he moves his bowels every 3-4 days, \"been like that forever.\" He has no diarrhea. Last colonoscopy, 09/2015.  Polyps. Repeat 5 years. Is oral iron intolerant (worsening constipation).  Genitourinary:    He reports no urinary burning, frequency, " "dribbling, or discoloration.  He reports no difficulty controlling his bladder. He reports no need to urinate frequently through the night.  Musculoskeletal:  He continues to have chronic trouble with right hip pain.  \"24/7.\"  He still uses maintenance Percocet, 2-3 doses daily.  He reports no unexplained arthralgias, myalgias, but has noted more frequent bouts of nighttime leg cramping.  Extremities:  He reports no trouble with fluid retention or significant leg swelling.  Endocrine:  He reports no problems with excess thirst, excessive urination, vasomotor instability, or unexplained fatigue.  Heme/Lymphatic:  He reports no bleeding, petechial rashes, or swollen glands.  Skin:  He reports no itching, rashes, or lesions which won't heal.  Neuro:  He reports night-time stocking-glove pain in his lower extremities.  \"Same.\" Says he has been seen by Dr. Kinney previously. \"He found no problems.\"  Says Dr. Rosario says he does not have significant vascular disease requiring surgery.  He reports no loss of consciousness, seizures, fainting spells, or dizziness. He reports no weakness of his face, arms, or legs.  He reports no difficulty with speech.  He reports no tremors.  Psych:  He seems generally satisfied with life.  He reports no depression.  He reports no mood swings.        VITAL SIGNS: /88   Pulse 87   Temp 98 °F (36.7 °C)   Resp 16   Ht 182.9 cm (72\")   Wt 81.6 kg (180 lb)   SpO2 96%   BMI 24.41 kg/m²       PHYSICAL EXAMINATION:  General:  He is a well-developed, well-nourished, and well-kept elderly male in no distress.  He arrived in the exam room ambulatory. He appears to be his stated age.  His skin color is pale.  Head/Neck:  The patient is anicteric and atraumatic.  The neck is supple without evidence of jugular venous distention or cervical adenopathy.  Eyes:  The pupils are equal, round, and reactive to light.  The extraocular movements are full.  There is no scleral jaundice or " erythema.  Chest:  The respiratory efforts are normal and unhindered.  The chest is clear to auscultation.  There are no wheezes, rales, or asymmetry of breath sounds.  The port in the right anterior chest wall has been removed and the site has healed. No tenderness or erythema.   Cardiac/Vascular:  The patient has an irregularly irregular cardiac rate and rhythm without murmurs.  The peripheral pulses are equal and full.  Abdomen:  The belly is soft and flat.  There is no rebound or guarding. There is no organomegaly, mass-effect, or tenderness.  Bowel sounds are normal.  Ortho:  There is no overt joint stiffness.  There is no overt foreshortening of height.  There are no overt joint changes which suggest degenerative arthritis.  Extremities:  There is no evidence of cyanosis, clubbing, or edema.  Rheumatologic:  There is no overt evidence of rheumatoid deformities of the hands.  There is no sausaging of the fingers.  There is no sign of active synovitis.  Cutaneous:  Few areas of senile purpuras are present on his forearms.  There are no overt rashes, disseminated lesions, purpura, or petechiae.  Lymphatics:  There is no evidence of adenopathy in the cervical, supraclavicular, or axillary areas.  Neurologic:  The patient is alert, oriented, cooperative, and pleasant.  He is appropriately conversant.  He ambulated into the exam room and transferred from chair to exam table aided.  There is no overt dysfunction of the motor, sensory, or cerebellar systems.  Psych:  Impatient.  Mood and affect are appropriate for circumstance.  Eye contact is appropriate.        LABS    Lab Results - Last 18 Months   Lab Units 08/08/19  1014 05/09/19  1055 03/11/19  1032 02/08/19  1029 10/05/18  1340 10/01/18  1107   HEMOGLOBIN g/dL 14.4 13.3* 13.7* 13.9* 13.6* 13.8*   HEMATOCRIT % 43.9 41.0 42.7 42.7 43.0 42.6   MCV fL 92.4 92.8 92.8 92.0 96.2* 93.8   WBC 10*3/mm3 5.44 4.61* 3.96* 3.99* 4.79* 12.22*   RDW % 13.3 14.1 13.2 13.0 13.4  13.6   MPV fL 9.7 9.9  --  9.8  --   --    PLATELETS 10*3/mm3 224 209 220 202 276 207   IMM GRAN % % 0.4 0.2  --  0.0  --   --    NEUTROS ABS 10*3/mm3 3.55 2.84 2.40 2.56 3.06  --    LYMPHS ABS 10*3/mm3 0.98 1.02 0.86 0.82 0.90  --    MONOS ABS 10*3/mm3 0.65 0.55 0.59 0.44 0.54  --    EOS ABS 10*3/mm3 0.19 0.14 0.08 0.13 0.23  --    BASOS ABS 10*3/mm3 0.05 0.05 0.02 0.04 0.05  --    IMMATURE GRANS (ABS) 10*3/mm3 0.02 0.01  --  0.00  --   --    NRBC /100 WBC 0.0 0.0 0.0 0.0 0.0  --        Lab Results - Last 18 Months   Lab Units 08/08/19  1014 05/09/19  1055 03/11/19  1032 02/08/19  1029 10/05/18  1340   GLUCOSE mg/dL 100 96  --  110*  --    SODIUM mmol/L 141 140 140 140 139   POTASSIUM mmol/L 4.4 4.4 4.5 4.5 3.9   TOTAL CO2 mmol/L  --   --  30.0  --  33.0*   CO2 mmol/L 29.0 31.0  --  31.0  --    CHLORIDE mmol/L 101 100 98 99 97*   ANION GAP mmol/L 11.0 9.0  --  10.0  --    CREATININE mg/dL 1.07 0.89 0.84 0.94 0.91   BUN mg/dL 20 16 15 17 17   BUN / CREAT RATIO  18.7 18.0 17.9 18.1 18.7   CALCIUM mg/dL 9.5 9.9 9.7 9.8 9.6   EGFR IF NONAFRICN AM mL/min/1.73 67 83 89 78 81   ALK PHOS U/L 70 71 79 71 69   TOTAL PROTEIN g/dL 9.0* 8.8*  --  8.8*  --    ALT (SGPT) U/L 20 16 28 17 24   AST (SGOT) U/L 26 26 24 25 29   BILIRUBIN mg/dL 0.6 0.6 0.8 0.7 0.5   ALBUMIN g/dL 4.80 4.70 4.10 4.60 3.70   GLOBULIN gm/dL 4.2 4.1  --  4.2  --        Lab Results - Last 18 Months   Lab Units 08/08/19  1014 05/09/19  1055 02/08/19  1029   LDH U/L 552 467 425       Lab Results - Last 18 Months   Lab Units 08/08/19  1014 05/09/19  1055 02/08/19  1029   IRON mcg/dL 87 84 100   TIBC mcg/dL 355 348 345   IRON SATURATION % 25 24 29   FERRITIN ng/mL 121.00 143.00 146.00   FOLATE ng/mL 11.60 8.16 7.40       ASSESSMENT:  1.    Intermediate to high-grade lymphoma.  History of. No evidence of disease.  05/16/2005:          Stage IV-E.          Baseline Tumor [place]:   Mediastinum, left hilum, upper abdomen, and liver (clinically).           Complications of Tumor:   Hyperbilirubinemia. Improved.          Response to Therapy:  Clinical remission per CT scan 05/16/2005.          Prognosis:  Fair.          IPI at baseline:  3.  2.    Normocytic anemia from iron deficiency and chronic disease. Last INFeD 09/2010.  Stable, Hgb 14.4 on 08/08/2019 (prior range:  Hgb 12.9 - 14.7).  3.    Leukopenia, NOS.  Mild with normal diff.  Observation warranted.  4.    Iron deficiency and oral iron intolerant (constipation).  Repleted since receipt of INFeD  5.    Variable B2M.  Stable, 2.6 on 08/08/2019 (prior range:  1.6 - 3.27).  6.    Adenomatous colon polyp, colonoscopy 09/18/2015.  7.    Atrial fibrillation on chronic anticoagulation.  8.    Microscopic hematuria.  Management per Dr. Mayes.  9.    Anxiety, chronic, stable on medications (Celexa).  10.  Lower extremity neuropathy.  Mild.  Not a problem of late.  11.  Hypertension.  Fair control on Cardizem.  12.  Low B12, 203 on 08/17/2017.  On B12 replacement beginning 08/23/2017.  Repleted.  13.  Peripheral vascular disease.  Arterial studies and has been seen by Dr. Rosario of vascular surgery.  a.    Ultrasound ankle/brachial performed on 07/17/2018 at [facility]. Suspected atherosclerosis in the lower extremity arteries, supported by noncompressible posterior tibialis and dorsalis pedis arteries.  b.    Abdominal aortic ultrasound performed on 08/10/2018 at [facility].  No abdominal aortic aneurysm.    PLAN:  1.      Re:  Apprised of labs from 08/08/2019 with normal WBC, improved anemia, normoglycemia with slightly elevated total protein, (stable) otherwise normal CMP, normal LDH, normal B2M, repleted serum iron, repleted Fe sat, ferritin, folate, and B12.   2.    Stay off B12, 1000 mcg IM monthly.   3.    Observe  4.    Continue other currently identified medications.  5.    Continue Coumadin.  PT/INR followed by Dr. Crawford.  6.    Continue ongoing management per primary care physician and  other specialists.    7.    Advance Directive is discussed and questions answered - initially discussed for this year on 02/15/2019. Document available for review.  8.    Return to office in 3 months with pre-office CBC with differential, iron, TIBC, iron saturation, ferritin, B12, folate, CMP, B2M, and LDH.  9.    Please give patient copy of blood work.    MEDICAL DECISION MAKING: Moderate Complexity  AMOUNT OF DATA: Moderate    TIME SPENT:  Face-to-face time on this encounter, as defined by the American Medical Association in the 2019 Current Procedural Terminology codebook; assessment, record review, lab review, planning and education - 26 minutes (greater than 50% face to face).    Paul Rachel MD   [facility]    cc:   Azar Mayes MD          [facility]          Hawthorne Amber Crawford MD [facility]          Jorge Alberto Rosario MD

## 2019-08-23 ENCOUNTER — CLINICAL SUPPORT (OUTPATIENT)
Dept: FAMILY MEDICINE CLINIC | Facility: CLINIC | Age: 79
End: 2019-08-23

## 2019-08-23 DIAGNOSIS — I48.20 CHRONIC ATRIAL FIBRILLATION (HCC): Primary | ICD-10-CM

## 2019-08-23 LAB
INR PPP: 2.3 (ref 0.9–1.1)
PROTHROMBIN TIME: 27.3 SECONDS

## 2019-08-23 PROCEDURE — 85610 PROTHROMBIN TIME: CPT | Performed by: NURSE PRACTITIONER

## 2019-09-09 ENCOUNTER — OFFICE VISIT (OUTPATIENT)
Dept: FAMILY MEDICINE CLINIC | Facility: CLINIC | Age: 79
End: 2019-09-09

## 2019-09-09 VITALS
HEIGHT: 72 IN | DIASTOLIC BLOOD PRESSURE: 72 MMHG | HEART RATE: 58 BPM | OXYGEN SATURATION: 98 % | BODY MASS INDEX: 23.81 KG/M2 | WEIGHT: 175.8 LBS | RESPIRATION RATE: 20 BRPM | SYSTOLIC BLOOD PRESSURE: 136 MMHG | TEMPERATURE: 98.5 F

## 2019-09-09 DIAGNOSIS — K59.01 SLOW TRANSIT CONSTIPATION: ICD-10-CM

## 2019-09-09 DIAGNOSIS — G89.29 CHRONIC PAIN OF RIGHT HIP: Primary | ICD-10-CM

## 2019-09-09 DIAGNOSIS — Z79.01 CHRONIC ANTICOAGULATION: ICD-10-CM

## 2019-09-09 DIAGNOSIS — I48.20 CHRONIC ATRIAL FIBRILLATION (HCC): ICD-10-CM

## 2019-09-09 DIAGNOSIS — C85.90 LYMPHOMA IN REMISSION (HCC): ICD-10-CM

## 2019-09-09 DIAGNOSIS — F41.9 ANXIETY: ICD-10-CM

## 2019-09-09 DIAGNOSIS — M25.551 CHRONIC PAIN OF RIGHT HIP: Primary | ICD-10-CM

## 2019-09-09 LAB
INR PPP: 2.5 (ref 0.9–1.1)
PROTHROMBIN TIME: 29.7 SECONDS

## 2019-09-09 PROCEDURE — 85610 PROTHROMBIN TIME: CPT | Performed by: FAMILY MEDICINE

## 2019-09-09 PROCEDURE — 99214 OFFICE O/P EST MOD 30 MIN: CPT | Performed by: FAMILY MEDICINE

## 2019-09-09 RX ORDER — OXYCODONE AND ACETAMINOPHEN 10; 325 MG/1; MG/1
1 TABLET ORAL EVERY 6 HOURS PRN
Qty: 100 TABLET | Refills: 0 | Status: SHIPPED | OUTPATIENT
Start: 2019-09-09 | End: 2019-10-08 | Stop reason: SDUPTHER

## 2019-09-09 NOTE — PROGRESS NOTES
Subjective cc: pain medication refill  Cabrera Avina is a 79 y.o. male who presents to get a refill on his pain medication. Pain is most prominent in his right hip.  With the pain medication it makes his pain bearable and he is able to perform activities around his house.  He reports significant improvement with muscle relaxer - he would like to continue this medication.     He continues to have constipation. He stopped the lexapro because he thought it could be makign it worse but instead his anxiety flared up. He is using OTC constipation medication.     Depression and anxiety worse - not taking lexapro.      Patient INR is normal today.  Patient denies any bleeding at this time. He is currently alternating 7MG, 8MG and 8MG.     Atrial fib is rate controlled.     He is currently taking restoril PRN for insomnia which controls his symptoms and allows him to get restful sleep.  Patient needs refill.    He developed chills and nausea last night and went to bed early, still having slight nausea today but feeling better. Denies any other symptoms.     Pain   This is a chronic problem. The current episode started more than 1 year ago. The problem occurs constantly. The problem has been unchanged. Associated symptoms include arthralgias, myalgias, numbness and weakness. Pertinent negatives include no abdominal pain, anorexia, chest pain, chills, coughing, diaphoresis, fatigue, fever, headaches, nausea or vomiting. The symptoms are aggravated by exertion, standing and walking. He has tried rest, walking, position changes and oral narcotics (Percocet, patient cannot tolerate NSAIDs secondary to Coumadin) for the symptoms. The treatment provided moderate relief.   Atrial Fibrillation   Presents for follow-up visit. Symptoms include hypertension and weakness. Symptoms are negative for bradycardia, chest pain, dizziness, hemodynamic instability, palpitations, shortness of breath, syncope and tachycardia. The symptoms  have been stable. Past medical history includes atrial fibrillation. There are no medication compliance problems.   Depression   Visit Type: follow-up  Patient presents with the following symptoms: insomnia, muscle tension and nervousness/anxiety.  Patient is not experiencing: anhedonia, decreased concentration, depressed mood, excessive worry, feelings of hopelessness, palpitations, shortness of breath, suicidal ideas, suicidal planning and thoughts of death.  Frequency of symptoms: occasionally   Severity: mild   Sleep quality: fair  Nighttime awakenings: occasional         The following portions of the patient's history were reviewed and updated as appropriate: allergies, current medications, past family history, past medical history, past social history, past surgical history and problem list.        Review of Systems   Constitutional: Negative for activity change, appetite change, chills, diaphoresis, fatigue, fever and unexpected weight change.   Respiratory: Negative for cough, chest tightness and shortness of breath.    Cardiovascular: Negative for chest pain, palpitations, leg swelling and syncope.   Gastrointestinal: Positive for constipation. Negative for abdominal pain, anorexia, blood in stool, nausea and vomiting.   Musculoskeletal: Positive for arthralgias, back pain, gait problem and myalgias.   Neurological: Positive for weakness and numbness. Negative for dizziness, syncope, facial asymmetry, speech difficulty, light-headedness and headaches.   Hematological: Bruises/bleeds easily.   Psychiatric/Behavioral: Positive for dysphoric mood and sleep disturbance (wakes up in the middle of the night secondary to pain). Negative for decreased concentration, self-injury and suicidal ideas. The patient is nervous/anxious and has insomnia.    All other systems reviewed and are negative.      Objective   Blood pressure 136/72, pulse 58, temperature 98.5 °F (36.9 °C), temperature source Oral, resp. rate 20,  "height 182.9 cm (72\"), weight 79.7 kg (175 lb 12.8 oz), SpO2 98 %.  Physical Exam   Constitutional: He is oriented to person, place, and time. Vital signs are normal. He appears well-developed and well-nourished. He is active and cooperative.  Non-toxic appearance. He does not have a sickly appearance. He does not appear ill. No distress.   HENT:   Head: Normocephalic and atraumatic.   Right Ear: External ear normal.   Left Ear: External ear normal.   Nose: Nose normal.   Mouth/Throat: Oropharynx is clear and moist. Abnormal dentition.   Eyes: Conjunctivae and EOM are normal. Right eye exhibits no discharge. Left eye exhibits no discharge.   Neck: No tracheal deviation present.   Cardiovascular: Normal rate and intact distal pulses. An irregularly irregular rhythm present.   Pulmonary/Chest: Effort normal and breath sounds normal. No accessory muscle usage or stridor. No respiratory distress. He has no wheezes. He has no rales.   Abdominal: Soft. Normal appearance and bowel sounds are normal. He exhibits no distension, no fluid wave, no pulsatile midline mass and no mass. There is no tenderness.   Musculoskeletal: He exhibits no edema.        Lumbar back: He exhibits decreased range of motion, tenderness, pain and spasm. He exhibits no bony tenderness.   Significantly limited ROM in right hip, pain with movement, walks with limp favoring right hip, difficulty changing positions and getting onto the exam table.    Neurological: He is alert and oriented to person, place, and time.   Skin: Skin is warm and dry. He is not diaphoretic.   Psychiatric: His behavior is normal. Judgment and thought content normal. His mood appears not anxious. He does not exhibit a depressed mood.   Nursing note and vitals reviewed.    Lab Results (last 24 hours)     Procedure Component Value Units Date/Time    POC Protime / INR [[id number]]  (Abnormal) Collected:  09/09/19 1125    Specimen:  Blood Updated:  09/09/19 1125     Protime 29.7 " seconds      INR 2.5          Assessment/Plan   Problems Addressed this Visit        Cardiovascular and Mediastinum    Atrial fibrillation (CMS/HCC)    Relevant Orders    POC Protime / INR (Completed)       Digestive    Slow transit constipation       Nervous and Auditory    Chronic hip pain - Primary    Relevant Medications    oxyCODONE-acetaminophen (PERCOCET)  MG per tablet       Hematopoietic and Hemostatic    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    #2 depression/anxiety: chronic, uncontrolled, restart Lexapro    #3 chronic pain in right hip: Chronic, controlled with oral pain medication.  Patient is unable to take NSAIDs secondary to Coumadin.  Nii reviewed and appropriate.  Controlled contract in the chart.  Refill given on medication.  Sent to pharmacy.  Prescription for muscle relaxers to help with muscle spasms, advised on risk and benefits of this medication.  Return in 1 month     #4 chronic anticoagulation: normal INR today.     #5 atrial fibrillation: Patient is anticoagulated and rate controlled. Continue on cardizem.      #6 constipation: chronic, stable. continue on regular use of fiber and stool softeners, advised on diet changes, advised this is due to his medication            This document has been electronically signed by Sunita Crawford MD on September 9, 2019 11:31 AM

## 2019-10-07 NOTE — PROGRESS NOTES
Subjective cc: pain medication refill  Cabrera Avina is a 79 y.o. male who presents to get a refill on his pain medication. Pain is most prominent in his right hip.  With the pain medication it makes his pain bearable and he is able to perform activities around his house.  He reports significant improvement with muscle relaxer.     He continues to have constipation. He is using OTC constipation medication.     Depression and anxiety stable - restarted lexapro.      Patient INR is normal today.  Patient denies any bleeding at this time. He is currently alternating 7MG, 8MG and 8MG.     Atrial fib is rate controlled.     He is currently taking restoril PRN for insomnia which controls his symptoms and allows him to get restful sleep.  Patient needs refill.    No acute complaints   Will get flu and PNA shot at next visit.     Pain   This is a chronic problem. The current episode started more than 1 year ago. The problem occurs constantly. The problem has been unchanged. Associated symptoms include arthralgias, myalgias, numbness and weakness. Pertinent negatives include no abdominal pain, anorexia, chest pain, chills, coughing, diaphoresis, fatigue, fever, headaches, nausea or vomiting. The symptoms are aggravated by exertion, standing and walking. He has tried rest, walking, position changes and oral narcotics (Percocet, patient cannot tolerate NSAIDs secondary to Coumadin) for the symptoms. The treatment provided moderate relief.   Atrial Fibrillation   Presents for follow-up visit. Symptoms include hypertension and weakness. Symptoms are negative for bradycardia, chest pain, dizziness, hemodynamic instability, palpitations, shortness of breath, syncope and tachycardia. The symptoms have been stable. Past medical history includes atrial fibrillation. There are no medication compliance problems.   Depression   Visit Type: follow-up  Patient presents with the following symptoms: insomnia, muscle tension and  "nervousness/anxiety.  Patient is not experiencing: anhedonia, decreased concentration, depressed mood, excessive worry, feelings of hopelessness, palpitations, shortness of breath, suicidal ideas, suicidal planning and thoughts of death.  Frequency of symptoms: occasionally   Severity: mild   Sleep quality: fair  Nighttime awakenings: occasional         The following portions of the patient's history were reviewed and updated as appropriate: allergies, current medications, past family history, past medical history, past social history, past surgical history and problem list.        Review of Systems   Constitutional: Negative for activity change, appetite change, chills, diaphoresis, fatigue, fever and unexpected weight change.   Respiratory: Negative for cough, chest tightness and shortness of breath.    Cardiovascular: Negative for chest pain, palpitations, leg swelling and syncope.   Gastrointestinal: Positive for constipation. Negative for abdominal pain, anorexia, blood in stool, nausea and vomiting.   Musculoskeletal: Positive for arthralgias, back pain, gait problem and myalgias.   Neurological: Positive for weakness and numbness. Negative for dizziness, syncope, facial asymmetry, speech difficulty, light-headedness and headaches.   Hematological: Bruises/bleeds easily.   Psychiatric/Behavioral: Positive for dysphoric mood and sleep disturbance (wakes up in the middle of the night secondary to pain). Negative for decreased concentration, self-injury and suicidal ideas. The patient is nervous/anxious and has insomnia.    All other systems reviewed and are negative.      Objective   Blood pressure 158/86, pulse 58, temperature 98.1 °F (36.7 °C), temperature source Oral, resp. rate 18, height 182.9 cm (72\"), weight 79.4 kg (175 lb), SpO2 97 %.  Physical Exam   Constitutional: He is oriented to person, place, and time. Vital signs are normal. He appears well-developed and well-nourished. He is active and " cooperative.  Non-toxic appearance. He does not have a sickly appearance. He does not appear ill. No distress.   HENT:   Head: Normocephalic and atraumatic.   Right Ear: External ear normal.   Left Ear: External ear normal.   Nose: Nose normal.   Mouth/Throat: Oropharynx is clear and moist. Abnormal dentition.   Eyes: Conjunctivae and EOM are normal. Right eye exhibits no discharge. Left eye exhibits no discharge.   Neck: No tracheal deviation present.   Cardiovascular: Normal rate and intact distal pulses. An irregularly irregular rhythm present.   Pulmonary/Chest: Effort normal and breath sounds normal. No accessory muscle usage or stridor. No respiratory distress. He has no wheezes. He has no rales.   Abdominal: Soft. Normal appearance and bowel sounds are normal. He exhibits no distension, no fluid wave, no pulsatile midline mass and no mass. There is no tenderness.   Musculoskeletal: He exhibits no edema.        Lumbar back: He exhibits decreased range of motion, tenderness, pain and spasm. He exhibits no bony tenderness.   Significantly limited ROM in right hip, pain with movement, walks with limp favoring right hip, difficulty changing positions and getting onto the exam table.    Neurological: He is alert and oriented to person, place, and time.   Skin: Skin is warm and dry. He is not diaphoretic.   Psychiatric: His behavior is normal. Judgment and thought content normal. His mood appears not anxious. He does not exhibit a depressed mood.   Nursing note and vitals reviewed.    Lab Results (last 24 hours)     Procedure Component Value Units Date/Time    POCT INR [[id number]]  (Abnormal) Collected:  10/08/19 1111    Specimen:  Blood Updated:  10/08/19 1111     INR 2.2     Comment: 27.0             Assessment/Plan   Problems Addressed this Visit        Cardiovascular and Mediastinum    Atrial fibrillation (CMS/[facility])    Relevant Orders    POCT INR (Completed)    PAD (peripheral artery disease) (CMS/[facility])        Digestive    Slow transit constipation    Gastroesophageal reflux disease without esophagitis       Nervous and Auditory    Chronic hip pain - Primary    Relevant Medications    oxyCODONE-acetaminophen (PERCOCET)  MG per tablet       Hematopoietic and Hemostatic    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    #2 depression/anxiety: chronic, controlled, continue on Lexapro    #3 chronic pain in right hip: Chronic, controlled with oral pain medication.  Patient is unable to take NSAIDs secondary to Coumadin.  Nii reviewed and appropriate.  Controlled contract in the chart.  Refill given on medication.  Sent to pharmacy.  Prescription for muscle relaxers to help with muscle spasms, advised on risk and benefits of this medication.  Return in 1 month     #4 chronic anticoagulation: normal INR today.     #5 atrial fibrillation: Patient is anticoagulated and rate controlled. Continue on cardizem.      #6 constipation: chronic, stable. continue on regular use of fiber and stool softeners, advised on diet changes, advised this is due to his medication            This document has been electronically signed by Sunita Crawford MD on October 8, 2019 12:20 PM

## 2019-10-07 NOTE — PROGRESS NOTES
The ABCs of the Annual Wellness Visit  Subsequent Medicare Wellness Visit    Chief Complaint   Patient presents with   • Medicare Wellness-subsequent     Pt here for medicare wellness exam       Subjective   History of Present Illness:  Cabrera Avina is a 79 y.o. male who presents for a Subsequent Medicare Wellness Visit.    HEALTH RISK ASSESSMENT    Recent Hospitalizations:  No hospitalization(s) within the last year.    Current Medical Providers:  Patient Care Team:  Sunita Crawford MD as PCP - General (Family Medicine)  Arnaldo Colin MD as Cardiologist (Cardiology)  West Jeff MD as Consulting Physician (Urology)  Paul Rachel MD as Consulting Physician (Hematology and Oncology)  Ander Miguel MD as Consulting Physician (Gastroenterology)    Smoking Status:  Social History     Tobacco Use   Smoking Status Never Smoker   Smokeless Tobacco Never Used       Alcohol Consumption:  Social History     Substance and Sexual Activity   Alcohol Use No       Depression Screen:   PHQ-2/PHQ-9 Depression Screening 10/8/2019   Little interest or pleasure in doing things 0   Feeling down, depressed, or hopeless 1   Trouble falling or staying asleep, or sleeping too much -   Feeling tired or having little energy -   Poor appetite or overeating -   Feeling bad about yourself - or that you are a failure or have let yourself or your family down -   Trouble concentrating on things, such as reading the newspaper or watching television -   Moving or speaking so slowly that other people could have noticed. Or the opposite - being so fidgety or restless that you have been moving around a lot more than usual -   Thoughts that you would be better off dead, or of hurting yourself in some way -   Total Score 1   If you checked off any problems, how difficult have these problems made it for you to do your work, take care of things at home, or get along with other people? -       Fall Risk  Screen:  [facility] Fall Risk Assessment was completed, and patient is at LOW risk for falls.Assessment completed on:10/8/2019    Health Habits and Functional and Cognitive Screening:  Functional & Cognitive Status 10/8/2019   Do you have difficulty preparing food and eating? No   Do you have difficulty bathing yourself, getting dressed or grooming yourself? No   Do you have difficulty using the toilet? No   Do you have difficulty moving around from place to place? No   Do you have trouble with steps or getting out of a bed or a chair? No   Current Diet Unhealthy Diet   Dental Exam Not up to date   Eye Exam Not up to date   Exercise (times per week) 6 times per week   Current Exercise Activities Include Walking   Do you need help using the phone?  No   Are you deaf or do you have serious difficulty hearing?  No   Do you need help with transportation? No   Do you need help shopping? No   Do you need help preparing meals?  No   Do you need help with housework?  -   Do you need help with laundry? No   Do you need help taking your medications? No   Do you need help managing money? No   Do you ever drive or ride in a car without wearing a seat belt? No   Have you felt unusual stress, anger or loneliness in the last month? Yes   Who do you live with? Spouse   If you need help, do you have trouble finding someone available to you? No   Have you been bothered in the last four weeks by sexual problems? No   Do you have difficulty concentrating, remembering or making decisions? No         Does the patient have evidence of cognitive impairment? No    Asprin use counseling:Taking ASA appropriately as indicated    Age-appropriate Screening Schedule:  Refer to the list below for future screening recommendations based on patient's age, sex and/or medical conditions. Orders for these recommended tests are listed in the plan section. The patient has been provided with a written plan.    Health Maintenance   Topic Date Due   • ZOSTER  VACCINE (2 of 2) 02/13/2017   • PNEUMOCOCCAL VACCINES (65+ LOW/MEDIUM RISK) (2 of 2 - PPSV23) 10/26/2017   • INFLUENZA VACCINE  08/01/2019   • COLONOSCOPY  12/01/2025   • TDAP/TD VACCINES (2 - Td) 10/26/2026          The following portions of the patient's history were reviewed and updated as appropriate: allergies, current medications, past family history, past medical history, past social history, past surgical history and problem list.    Outpatient Medications Prior to Visit   Medication Sig Dispense Refill   • aspirin 81 MG EC tablet Take 81 mg by mouth daily.     • cyclobenzaprine (FLEXERIL) 10 MG tablet Take 1 tablet by mouth 3 (Three) Times a Day As Needed for Muscle Spasms. 90 tablet 0   • digoxin (LANOXIN) 250 MCG tablet Take 1 tablet by mouth Daily. 90 tablet 3   • diltiaZEM CD (CARDIZEM CD) 240 MG 24 hr capsule Take 1 capsule by mouth Daily. 90 capsule 3   • diltiaZEM CD (CARDIZEM CD) 240 MG 24 hr capsule TAKE ONE CAPSULE BY MOUTH DAILY 90 capsule 3   • escitalopram (LEXAPRO) 20 MG tablet Take 1 tablet by mouth Daily. 90 tablet 3   • ondansetron (ZOFRAN) 4 MG tablet Take 1 tablet by mouth Every 6 (Six) Hours As Needed for Nausea or Vomiting. 10 tablet 1   • oxyCODONE-acetaminophen (PERCOCET)  MG per tablet Take 1 tablet by mouth Every 6 (Six) Hours As Needed for Severe Pain . Must last 30 days 100 tablet 0   • polyethylene glycol (MIRALAX) packet Take 17 g by mouth Daily. 100 packet 3   • temazepam (RESTORIL) 15 MG capsule Take 1 capsule by mouth At Night As Needed for Sleep. 30 capsule 2   • warfarin (COUMADIN) 2 MG tablet Take 3.5 tablets by mouth Daily. 7MG, 7MG, 8MG, then repeat 360 tablet 5     No facility-administered medications prior to visit.        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  Compared to one year ago, the patient feels his mental health is the same.    Reviewed chart for potential of high risk medication in the elderly: yes  Reviewed chart for potential of harmful drug interactions in the elderly:yes    Objective         Vitals:    10/08/19 1103   BP: 158/86   BP Location: Left arm   Patient Position: Sitting   Cuff Size: Adult   Pulse: 58   Resp: 18   Temp: 98.1 °F (36.7 °C)   TempSrc: Oral   SpO2: 97%   Weight: 79.4 kg (175 lb)   Height: 182.9 cm (72\")   PainSc:   8   PainLoc: Generalized       Body mass index is 23.73 kg/m².  Discussed the patient's BMI with him. The BMI is in the acceptable range.    Physical Exam          Assessment/Plan   Medicare Risks and Personalized Health Plan  CMS Preventative Services Quick Reference  Advance Directive Discussion  Cardiovascular risk  Chronic Pain   Dementia/Memory   Diabetic Lab Screening   Immunizations Discussed/Encouraged (specific immunizations; Influenza, Pneumococcal 23, Prevnar and Shingrix )  Lung Cancer Risk  Polypharmacy  Prostate Cancer Screening     The above risks/problems have been discussed with the patient.  Pertinent information has been shared with the patient in the After Visit Summary.  Follow up plans and orders are seen below in the Assessment/Plan Section.    Diagnoses and all orders for this visit:    1. Medicare annual wellness visit, subsequent (Primary)      Follow Up:  Return in about 1 year (around 10/8/2020) for Medicare Wellness.     An After Visit Summary and PPPS were given to the patient.             "

## 2019-10-08 ENCOUNTER — OFFICE VISIT (OUTPATIENT)
Dept: FAMILY MEDICINE CLINIC | Facility: CLINIC | Age: 79
End: 2019-10-08

## 2019-10-08 VITALS
SYSTOLIC BLOOD PRESSURE: 158 MMHG | TEMPERATURE: 98.1 F | DIASTOLIC BLOOD PRESSURE: 86 MMHG | HEIGHT: 72 IN | WEIGHT: 175 LBS | RESPIRATION RATE: 18 BRPM | DIASTOLIC BLOOD PRESSURE: 86 MMHG | WEIGHT: 175 LBS | OXYGEN SATURATION: 97 % | BODY MASS INDEX: 23.7 KG/M2 | HEIGHT: 72 IN | BODY MASS INDEX: 23.7 KG/M2 | OXYGEN SATURATION: 97 % | HEART RATE: 58 BPM | SYSTOLIC BLOOD PRESSURE: 158 MMHG | HEART RATE: 58 BPM | TEMPERATURE: 98.1 F | RESPIRATION RATE: 18 BRPM

## 2019-10-08 DIAGNOSIS — G89.29 CHRONIC PAIN OF RIGHT HIP: Primary | ICD-10-CM

## 2019-10-08 DIAGNOSIS — M25.551 CHRONIC PAIN OF RIGHT HIP: Primary | ICD-10-CM

## 2019-10-08 DIAGNOSIS — K21.9 GASTROESOPHAGEAL REFLUX DISEASE WITHOUT ESOPHAGITIS: ICD-10-CM

## 2019-10-08 DIAGNOSIS — F41.9 ANXIETY: ICD-10-CM

## 2019-10-08 DIAGNOSIS — Z79.01 CHRONIC ANTICOAGULATION: ICD-10-CM

## 2019-10-08 DIAGNOSIS — Z00.00 MEDICARE ANNUAL WELLNESS VISIT, SUBSEQUENT: Primary | ICD-10-CM

## 2019-10-08 DIAGNOSIS — I48.91 ATRIAL FIBRILLATION, UNSPECIFIED TYPE (HCC): ICD-10-CM

## 2019-10-08 DIAGNOSIS — Z72.0 TOBACCO USE: ICD-10-CM

## 2019-10-08 DIAGNOSIS — C85.90 LYMPHOMA IN REMISSION (HCC): ICD-10-CM

## 2019-10-08 DIAGNOSIS — G47.09 OTHER INSOMNIA: ICD-10-CM

## 2019-10-08 DIAGNOSIS — I73.9 PAD (PERIPHERAL ARTERY DISEASE) (HCC): ICD-10-CM

## 2019-10-08 DIAGNOSIS — K59.01 SLOW TRANSIT CONSTIPATION: ICD-10-CM

## 2019-10-08 LAB — INR PPP: 2.2 (ref 0.9–1.1)

## 2019-10-08 PROCEDURE — 99214 OFFICE O/P EST MOD 30 MIN: CPT | Performed by: FAMILY MEDICINE

## 2019-10-08 PROCEDURE — 36416 COLLJ CAPILLARY BLOOD SPEC: CPT | Performed by: FAMILY MEDICINE

## 2019-10-08 PROCEDURE — 85610 PROTHROMBIN TIME: CPT | Performed by: FAMILY MEDICINE

## 2019-10-08 PROCEDURE — 96160 PT-FOCUSED HLTH RISK ASSMT: CPT | Performed by: FAMILY MEDICINE

## 2019-10-08 PROCEDURE — G0439 PPPS, SUBSEQ VISIT: HCPCS | Performed by: FAMILY MEDICINE

## 2019-10-08 RX ORDER — OXYCODONE AND ACETAMINOPHEN 10; 325 MG/1; MG/1
1 TABLET ORAL EVERY 6 HOURS PRN
Qty: 100 TABLET | Refills: 0 | Status: SHIPPED | OUTPATIENT
Start: 2019-10-08 | End: 2019-11-05 | Stop reason: SDUPTHER

## 2019-10-25 ENCOUNTER — CLINICAL SUPPORT (OUTPATIENT)
Dept: FAMILY MEDICINE CLINIC | Facility: CLINIC | Age: 79
End: 2019-10-25

## 2019-10-25 DIAGNOSIS — Z79.01 CHRONIC ANTICOAGULATION: Primary | ICD-10-CM

## 2019-10-25 LAB — INR PPP: 1.9 (ref 0.9–1.1)

## 2019-10-25 PROCEDURE — 85610 PROTHROMBIN TIME: CPT | Performed by: FAMILY MEDICINE

## 2019-10-30 ENCOUNTER — OFFICE VISIT (OUTPATIENT)
Dept: CARDIOLOGY | Facility: CLINIC | Age: 79
End: 2019-10-30

## 2019-10-30 VITALS
HEART RATE: 77 BPM | SYSTOLIC BLOOD PRESSURE: 124 MMHG | WEIGHT: 178 LBS | BODY MASS INDEX: 24.11 KG/M2 | HEIGHT: 72 IN | DIASTOLIC BLOOD PRESSURE: 84 MMHG

## 2019-10-30 DIAGNOSIS — I48.21 PERMANENT ATRIAL FIBRILLATION (HCC): Primary | ICD-10-CM

## 2019-10-30 DIAGNOSIS — Z79.01 CHRONIC ANTICOAGULATION: ICD-10-CM

## 2019-10-30 PROCEDURE — 99213 OFFICE O/P EST LOW 20 MIN: CPT | Performed by: NURSE PRACTITIONER

## 2019-10-30 PROCEDURE — 93000 ELECTROCARDIOGRAM COMPLETE: CPT | Performed by: NURSE PRACTITIONER

## 2019-10-30 NOTE — PROGRESS NOTES
Subjective:     Encounter Date:10/30/2019      Patient ID: Cabrera Avina is a 79 y.o. male with a history of Atrial Fibrillation and long-term use of anticoagulation     Chief Complaint: routine yearly follow up    Atrial Fibrillation   Presents for follow-up visit. Symptoms are negative for bradycardia, chest pain, dizziness, hypertension, hypotension, palpitations, shortness of breath, syncope, tachycardia and weakness. The symptoms have been stable. Past medical history includes atrial fibrillation. There are no medication compliance problems.     Patient presents to office for routine follow up. He has a history of atrial fibrillation and is chronically anticoagulated. He is on Coumadin which is managed by his PCP; Dr Crawford and denies any bleeding issues. He denies any significant palpitations; he reports that he will feel some occasionally when laying down. He denies any chest pain, shortness of breath, dyspnea on exertion.  He also denies any lower extremity edema, orhtopnea, PND, dizziness or near syncope. He reports some chronic right hip pain and stiffness that is unchanged.      The following portions of the patient's history were reviewed and updated as appropriate: allergies, current medications, past family history, past medical history, past social history, past surgical history and problem list.      Current Outpatient Medications:   •  aspirin 81 MG EC tablet, Take 81 mg by mouth daily., Disp: , Rfl:   •  cyclobenzaprine (FLEXERIL) 10 MG tablet, Take 1 tablet by mouth 3 (Three) Times a Day As Needed for Muscle Spasms., Disp: 90 tablet, Rfl: 0  •  digoxin (LANOXIN) 250 MCG tablet, Take 1 tablet by mouth Daily., Disp: 90 tablet, Rfl: 3  •  diltiaZEM CD (CARDIZEM CD) 240 MG 24 hr capsule, TAKE ONE CAPSULE BY MOUTH DAILY, Disp: 90 capsule, Rfl: 3  •  escitalopram (LEXAPRO) 20 MG tablet, Take 1 tablet by mouth Daily. (Patient taking differently: Take 20 mg by mouth As Needed.), Disp: 90  tablet, Rfl: 3  •  oxyCODONE-acetaminophen (PERCOCET)  MG per tablet, Take 1 tablet by mouth Every 6 (Six) Hours As Needed for Severe Pain . Must last 30 days, Disp: 100 tablet, Rfl: 0  •  polyethylene glycol (MIRALAX) packet, Take 17 g by mouth Daily. (Patient taking differently: Take 17 g by mouth Daily As Needed.), Disp: 100 packet, Rfl: 3  •  temazepam (RESTORIL) 15 MG capsule, Take 1 capsule by mouth At Night As Needed for Sleep., Disp: 30 capsule, Rfl: 2  •  warfarin (COUMADIN) 2 MG tablet, Take 3.5 tablets by mouth Daily. 7MG, 7MG, 8MG, then repeat, Disp: 360 tablet, Rfl: 5    Past Medical History:   Diagnosis Date   • Anemia    • Anxiety     chronic   • Atrial fibrillation (CMS/HCC)    • Cancer (CMS/HCC)     non-hodgkins lymphoma   • Cholecystitis    • Colon polyp    • Colon polyp    • Congestive heart failure (CMS/HCC) 8/13/2019   • Constipation    • GERD (gastroesophageal reflux disease)    • History of ERCP 2005   • Nephrolithiasis     stones removed   • Jaylyn-rectal abscess    • Rectal abscess      No Known Allergies    Social History     Socioeconomic History   • Marital status:      Spouse name: Not on file   • Number of children: Not on file   • Years of education: Not on file   • Highest education level: Not on file   Tobacco Use   • Smoking status: Never Smoker   • Smokeless tobacco: Never Used   Substance and Sexual Activity   • Alcohol use: No   • Drug use: No   • Sexual activity: Defer       Review of Systems   Constitution: Negative for decreased appetite, diaphoresis, weakness, malaise/fatigue and night sweats.   HENT: Negative for congestion and nosebleeds.    Eyes: Negative for visual disturbance.   Cardiovascular: Negative for chest pain, dyspnea on exertion, irregular heartbeat, leg swelling, near-syncope, orthopnea, palpitations, paroxysmal nocturnal dyspnea and syncope.   Respiratory: Negative for cough, shortness of breath and wheezing.    Hematologic/Lymphatic: Does not  "bruise/bleed easily.   Musculoskeletal: Positive for joint pain (chronic right hip pain). Negative for arthritis.   Gastrointestinal: Negative for abdominal pain and change in bowel habit.   Genitourinary: Negative for hematuria and urgency.   Neurological: Negative for dizziness, headaches, light-headedness and loss of balance.   Psychiatric/Behavioral: Negative for altered mental status. The patient is not nervous/anxious.          ECG 12 Lead  Date/Time: 10/30/2019 3:06 PM  Performed by: Vidal Armstrong APRN  Authorized by: Vidal Armstrong APRN   Comparison: compared with previous ECG from 6/11/2018  Similar to previous ECG  Rhythm: atrial fibrillation  Rate: normal  BPM: 77    Clinical impression: abnormal EKG               Objective:     Physical Exam   Constitutional: He is oriented to person, place, and time. He appears well-developed and well-nourished. No distress.   HENT:   Head: Normocephalic and atraumatic.   Nose: Nose normal.   Eyes: Pupils are equal, round, and reactive to light.   Neck: Normal range of motion. Neck supple. Carotid bruit is not present.   Cardiovascular: Normal rate and normal heart sounds. An irregularly irregular rhythm present.   No murmur heard.  Pulmonary/Chest: Effort normal and breath sounds normal. No respiratory distress. He has no wheezes. He has no rales.   Abdominal: Soft. Normal appearance. He exhibits no distension.   Musculoskeletal: Normal range of motion. He exhibits no edema.   Neurological: He is alert and oriented to person, place, and time.   Skin: Skin is warm. No rash noted. No erythema.   Psychiatric: He has a normal mood and affect. His speech is normal and behavior is normal. Judgment and thought content normal.   Vitals reviewed.    /84   Pulse 77   Ht 182.9 cm (72\")   Wt 80.7 kg (178 lb)   BMI 24.14 kg/m²     Lab Review:     Lab Results   Component Value Date    INR 1.9 (A) 10/25/2019    INR 2.2 (A) 10/08/2019    INR 2.5 (A) 09/09/2019    " PROTIME 29.7 09/09/2019    PROTIME 27.3 08/23/2019    PROTIME 44.0 08/10/2018         Assessment:          Diagnosis Plan   1. Permanent atrial fibrillation     2. Chronic anticoagulation     3. BMI 24.0-24.9, adult            Plan:       1. Permanent Atrial Fibrillation: Clinically stable. Remains in Atrial Fibrillation. Rate is well controlled. Chronically anticoagulated and tolerating Coumadin with no bleeding issues.     2. Chronic anticoagulation: On coumadin. INR managed by PCP Dr Crawford; Patient denies any bleeding issues.    3. BMI 24: Patient's Body mass index is 24.14 kg/m². BMI is within normal parameters. No follow-up required.     Patient is to follow up in 1 year or sooner if needed

## 2019-10-30 NOTE — PATIENT INSTRUCTIONS
Atrial Fibrillation  Atrial fibrillation is a type of irregular or rapid heartbeat (arrhythmia). In atrial fibrillation, the top part of the heart (atria) quivers in a chaotic pattern. This makes the heart unable to pump blood normally. Having atrial fibrillation can increase your risk for other health problems, such as:  · Blood can pool in the atria and form clots. If a clot travels to the brain, it can cause a stroke.  · The heart muscle may weaken from the irregular blood flow. This can cause heart failure.  Atrial fibrillation may start suddenly and stop on its own, or it may become a long-lasting problem.  What are the causes?  This condition is caused by some heart-related conditions or procedures, including:  · High blood pressure. This is the most common cause.  · Heart failure.  · Heart valve conditions.  · Inflammation of the sac that surrounds the heart (pericarditis).  · Heart surgery.  · Coronary artery disease.  · Certain heart rhythm disorders, such as Oliver-Parkinson-White syndrome.  Other causes include:  · Pneumonia.  · Obstructive sleep apnea.  · Lung cancer.  · Thyroid problems, especially if the thyroid is overactive (hyperthyroidism).  · Excessive alcohol or drug use.  Sometimes, the cause of this condition is not known.  What increases the risk?  This condition is more likely to develop in:  · Older people.  · People who smoke.  · People who have diabetes mellitus.  · People who are overweight (obese).  · Athletes who exercise vigorously.  · People who have a family history.  What are the signs or symptoms?  Symptoms of this condition include:  · A feeling that your heart is beating rapidly or irregularly.  · A feeling of discomfort or pain in your chest.  · Shortness of breath.  · Sudden light-headedness or weakness.  · Getting tired easily during exercise.  In some cases, there are no symptoms.  How is this diagnosed?  Your health care provider may be able to detect atrial fibrillation when  taking your pulse. If detected, this condition may be diagnosed with:  · Electrocardiogram (ECG).  · Ambulatory cardiac monitor. This device records your heartbeats for 24 hours or more.  · Transthoracic echocardiogram (TTE) to evaluate how blood flows through your heart.  · Transesophageal echocardiogram (REBEKAH) to view more detailed images of your heart.  · A stress test.  · Imaging tests, such as a CT scan or chest X-ray.  · Blood tests.  How is this treated?  This condition may be treated with:  · Medicines to slow down the heart rate or bring the heart's rhythm back to normal.  · Medicines to prevent blood clots from forming.  · Electrical cardioversion. This delivers a low-energy shock to the heart to reset its rhythm.  · Ablation. This procedure destroys the part of the heart tissue that sends abnormal signals.  · Left atrial appendage occlusion/excision. This seals off a common place in the atria where blood clots can form (left atrial appendage).  The goal of treatment is to prevent blood clots from forming and to keep your heart beating at a normal rate and rhythm. Treatment depends on underlying medical conditions and how you feel when you are experiencing fibrillation.  Follow these instructions at home:  Medicines  · Take over-the counter and prescription medicines only as told by your health care provider.  · If your health care provider prescribed a blood-thinning medicine (anticoagulant), take it exactly as told. Taking too much blood-thinning medicine can cause bleeding. Taking too little can enable a blood clot to form and travel to the brain, causing a stroke.  Lifestyle         · Do not use any products that contain nicotine or tobacco, such as cigarettes and e-cigarettes. If you need help quitting, ask your health care provider.  · Do not drink beverages that contain caffeine, such as coffee, soda, and tea.  · Follow diet instructions as told by your health care provider.  · Exercise regularly as  "told by your health care provider.  · Do not drink alcohol.  General instructions  · If you have obstructive sleep apnea, manage your condition as told by your health care provider.  · Maintain a healthy weight. Do not use diet pills unless your health care provider approves. Diet pills may make heart problems worse.  · Keep all follow-up visits as told by your health care provider. This is important.  Contact a health care provider if you:  · Notice a change in the rate, rhythm, or strength of your heartbeat.  · Are taking an anticoagulant and you notice increased bruising.  · Tire more easily when you exercise or exert yourself.  · Have a sudden change in weight.  Get help right away if you have:    · Chest pain, abdominal pain, sweating, or weakness.  · Difficulty breathing.  · Blood in your vomit, stool (feces), or urine.  · Any symptoms of a stroke. \"BE FAST\" is an easy way to remember the main warning signs of a stroke:  ? B - Balance. Signs are dizziness, sudden trouble walking, or loss of balance.  ? E - Eyes. Signs are trouble seeing or a sudden change in vision.  ? F - Face. Signs are sudden weakness or numbness of the face, or the face or eyelid drooping on one side.  ? A - Arms. Signs are weakness or numbness in an arm. This happens suddenly and usually on one side of the body.  ? S - Speech. Signs are sudden trouble speaking, slurred speech, or trouble understanding what people say.  ? T - Time. Time to call emergency services. Write down what time symptoms started.  · Other signs of a stroke, such as:  ? A sudden, severe headache with no known cause.  ? Nausea or vomiting.  ? Seizure.  These symptoms may represent a serious problem that is an emergency. Do not wait to see if the symptoms will go away. Get medical help right away. Call your local emergency services (911 in the [place] Do not drive yourself to the hospital.  Summary  · Atrial fibrillation is a type of irregular or rapid heartbeat " (arrhythmia).  · Symptoms include a feeling that your heart is beating fast or irregularly. In some cases, you may not have symptoms.  · The condition is treated with medicines to slow down the heart rate or bring the heart's rhythm back to normal. You may also need blood-thinning medicines to prevent blood clots.  · Get help right away if you have symptoms or signs of a stroke.  This information is not intended to replace advice given to you by your health care provider. Make sure you discuss any questions you have with your health care provider.  Document Released: 12/18/2006 Document Revised: 02/08/2019 Document Reviewed: 02/08/2019  [employer] Interactive Patient Education © 2019 [employer]

## 2019-11-05 ENCOUNTER — OFFICE VISIT (OUTPATIENT)
Dept: FAMILY MEDICINE CLINIC | Facility: CLINIC | Age: 79
End: 2019-11-05

## 2019-11-05 VITALS
SYSTOLIC BLOOD PRESSURE: 152 MMHG | TEMPERATURE: 98.5 F | DIASTOLIC BLOOD PRESSURE: 80 MMHG | HEIGHT: 72 IN | WEIGHT: 176.6 LBS | OXYGEN SATURATION: 98 % | RESPIRATION RATE: 18 BRPM | HEART RATE: 64 BPM | BODY MASS INDEX: 23.92 KG/M2

## 2019-11-05 DIAGNOSIS — Z23 NEEDS FLU SHOT: ICD-10-CM

## 2019-11-05 DIAGNOSIS — G47.09 OTHER INSOMNIA: ICD-10-CM

## 2019-11-05 DIAGNOSIS — G89.29 CHRONIC PAIN OF RIGHT HIP: Primary | ICD-10-CM

## 2019-11-05 DIAGNOSIS — C85.90 LYMPHOMA IN REMISSION (HCC): ICD-10-CM

## 2019-11-05 DIAGNOSIS — M25.551 CHRONIC PAIN OF RIGHT HIP: Primary | ICD-10-CM

## 2019-11-05 DIAGNOSIS — I48.20 CHRONIC ATRIAL FIBRILLATION (HCC): ICD-10-CM

## 2019-11-05 DIAGNOSIS — F41.9 ANXIETY: ICD-10-CM

## 2019-11-05 DIAGNOSIS — I48.21 PERMANENT ATRIAL FIBRILLATION (HCC): ICD-10-CM

## 2019-11-05 DIAGNOSIS — K21.9 GASTROESOPHAGEAL REFLUX DISEASE WITHOUT ESOPHAGITIS: ICD-10-CM

## 2019-11-05 DIAGNOSIS — Z79.01 CHRONIC ANTICOAGULATION: ICD-10-CM

## 2019-11-05 DIAGNOSIS — K59.01 SLOW TRANSIT CONSTIPATION: ICD-10-CM

## 2019-11-05 LAB — INR PPP: 2.1 (ref 0.9–1.1)

## 2019-11-05 PROCEDURE — 85610 PROTHROMBIN TIME: CPT | Performed by: FAMILY MEDICINE

## 2019-11-05 PROCEDURE — 90670 PCV13 VACCINE IM: CPT | Performed by: FAMILY MEDICINE

## 2019-11-05 PROCEDURE — G0009 ADMIN PNEUMOCOCCAL VACCINE: HCPCS | Performed by: FAMILY MEDICINE

## 2019-11-05 PROCEDURE — 90653 IIV ADJUVANT VACCINE IM: CPT | Performed by: FAMILY MEDICINE

## 2019-11-05 PROCEDURE — G0008 ADMIN INFLUENZA VIRUS VAC: HCPCS | Performed by: FAMILY MEDICINE

## 2019-11-05 PROCEDURE — 99214 OFFICE O/P EST MOD 30 MIN: CPT | Performed by: FAMILY MEDICINE

## 2019-11-05 RX ORDER — OXYCODONE AND ACETAMINOPHEN 10; 325 MG/1; MG/1
1 TABLET ORAL EVERY 6 HOURS PRN
Qty: 100 TABLET | Refills: 0 | Status: SHIPPED | OUTPATIENT
Start: 2019-11-05 | End: 2019-12-05 | Stop reason: SDUPTHER

## 2019-11-05 RX ORDER — WARFARIN SODIUM 2 MG/1
7 TABLET ORAL
Qty: 360 TABLET | Refills: 5 | Status: SHIPPED | OUTPATIENT
Start: 2019-11-05 | End: 2020-02-20

## 2019-11-05 RX ORDER — TEMAZEPAM 15 MG/1
15 CAPSULE ORAL NIGHTLY PRN
Qty: 30 CAPSULE | Refills: 2 | Status: SHIPPED | OUTPATIENT
Start: 2019-11-05 | End: 2020-01-06 | Stop reason: SDUPTHER

## 2019-11-05 NOTE — PROGRESS NOTES
Subjective cc: pain medication refill  Cabrera Avina is a 79 y.o. male who presents to get a refill on his pain medication. Pain is most prominent in his right hip.  With the pain medication it makes his pain bearable and he is able to perform activities around his house.  He reports significant improvement with muscle relaxer.     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   He continues to have constipation. He is using OTC constipation medication.   Depression and anxiety stable - taking lexapro.   BP with borderline control.      Patient INR is normal today 2.1.  Patient denies any bleeding at this time. He is currently alternating 7MG, 8MG and 8MG.     Atrial fib is rate controlled.     He is currently taking restoril PRN for insomnia which controls his symptoms and allows him to get restful sleep.  Patient needs refill.    Will get flu and PNA shot today.     Pain   This is a chronic problem. The current episode started more than 1 year ago. The problem occurs constantly. The problem has been unchanged. Associated symptoms include arthralgias, myalgias, numbness and weakness. Pertinent negatives include no abdominal pain, anorexia, chest pain, chills, coughing, diaphoresis, fatigue, fever, headaches, nausea or vomiting. The symptoms are aggravated by exertion, standing and walking. He has tried rest, walking, position changes and oral narcotics (Percocet, patient cannot tolerate NSAIDs secondary to Coumadin) for the symptoms. The treatment provided moderate relief.   Atrial Fibrillation   Presents for follow-up visit. Symptoms include hypertension and weakness. Symptoms are negative for bradycardia, chest pain, dizziness, hemodynamic instability, palpitations, shortness of breath, syncope and tachycardia. The symptoms have been stable. Past medical history includes atrial fibrillation. There are no medication compliance problems.   Depression   Visit Type: follow-up  Patient presents  "with the following symptoms: insomnia, muscle tension and nervousness/anxiety.  Patient is not experiencing: anhedonia, decreased concentration, depressed mood, excessive worry, feelings of hopelessness, palpitations, shortness of breath, suicidal ideas, suicidal planning and thoughts of death.  Frequency of symptoms: occasionally   Severity: mild   Sleep quality: fair  Nighttime awakenings: occasional         The following portions of the patient's history were reviewed and updated as appropriate: allergies, current medications, past family history, past medical history, past social history, past surgical history and problem list.        Review of Systems   Constitutional: Negative for activity change, appetite change, chills, diaphoresis, fatigue, fever and unexpected weight change.   Respiratory: Negative for cough, chest tightness and shortness of breath.    Cardiovascular: Negative for chest pain, palpitations, leg swelling and syncope.   Gastrointestinal: Positive for constipation. Negative for abdominal pain, anorexia, blood in stool, nausea and vomiting.   Musculoskeletal: Positive for arthralgias, back pain, gait problem and myalgias.   Neurological: Positive for weakness and numbness. Negative for dizziness, syncope, facial asymmetry, speech difficulty, light-headedness and headaches.   Hematological: Bruises/bleeds easily.   Psychiatric/Behavioral: Positive for dysphoric mood and sleep disturbance (wakes up in the middle of the night secondary to pain). Negative for decreased concentration, self-injury and suicidal ideas. The patient is nervous/anxious and has insomnia.    All other systems reviewed and are negative.      Objective   Blood pressure 152/80, pulse 64, temperature 98.5 °F (36.9 °C), temperature source Oral, resp. rate 18, height 182.9 cm (72\"), weight 80.1 kg (176 lb 9.6 oz), SpO2 98 %.  Physical Exam   Constitutional: He is oriented to person, place, and time. Vital signs are normal. He " appears well-developed and well-nourished. He is active and cooperative.  Non-toxic appearance. He does not have a sickly appearance. He does not appear ill. No distress.   HENT:   Head: Normocephalic and atraumatic.   Right Ear: External ear normal.   Left Ear: External ear normal.   Nose: Nose normal.   Mouth/Throat: Oropharynx is clear and moist. Abnormal dentition.   Eyes: Conjunctivae and EOM are normal. Right eye exhibits no discharge. Left eye exhibits no discharge.   Neck: No tracheal deviation present.   Cardiovascular: Normal rate and intact distal pulses. An irregularly irregular rhythm present.   Pulmonary/Chest: Effort normal and breath sounds normal. No accessory muscle usage or stridor. No respiratory distress. He has no wheezes. He has no rales.   Abdominal: Soft. Normal appearance and bowel sounds are normal. He exhibits no distension, no fluid wave, no pulsatile midline mass and no mass. There is no tenderness.   Musculoskeletal: He exhibits no edema.        Lumbar back: He exhibits decreased range of motion, tenderness, pain and spasm. He exhibits no bony tenderness.   Significantly limited ROM in right hip, pain with movement, walks with limp favoring right hip, difficulty changing positions and getting onto the exam table.    Neurological: He is alert and oriented to person, place, and time.   Skin: Skin is warm and dry. He is not diaphoretic.   Psychiatric: His behavior is normal. Judgment and thought content normal. His mood appears not anxious. He does not exhibit a depressed mood.   Nursing note and vitals reviewed.    Lab Results (last 24 hours)     Procedure Component Value Units Date/Time    POCT INR [[id number]]  (Abnormal) Collected:  11/05/19 1410    Specimen:  Blood Updated:  11/05/19 1411     INR 2.1     Comment: 25.3             Assessment/Plan   Problems Addressed this Visit        Cardiovascular and Mediastinum    Permanent atrial fibrillation    Relevant Medications    warfarin  (COUMADIN) 2 MG tablet    Other Relevant Orders    POCT INR (Completed)       Digestive    Slow transit constipation    Gastroesophageal reflux disease without esophagitis       Nervous and Auditory    Chronic hip pain - Primary    Relevant Medications    oxyCODONE-acetaminophen (PERCOCET)  MG per tablet       Hematopoietic and Hemostatic    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    #2 depression/anxiety: chronic, controlled, continue on Lexapro    #3 chronic pain in right hip: Chronic, controlled with oral pain medication.  Patient is unable to take NSAIDs secondary to Coumadin.  Nii reviewed and appropriate.  Controlled contract in the chart.  Refill given on medication.  Sent to pharmacy.  Prescription for muscle relaxers to help with muscle spasms, advised on risk and benefits of this medication.  Return in 1 month     #4 chronic anticoagulation: normal INR today.     #5 atrial fibrillation: Patient is anticoagulated and rate controlled. Continue on cardizem.      #6 constipation: chronic, stable. continue on regular use of fiber and stool softeners, advised on diet changes, advised this is due to his medication            This document has been electronically signed by Sunita Crawford MD on November 5, 2019 2:29 PM

## 2019-11-08 ENCOUNTER — LAB (OUTPATIENT)
Dept: LAB | Facility: HOSPITAL | Age: 79
End: 2019-11-08

## 2019-11-08 DIAGNOSIS — C85.90 LYMPHOMA IN REMISSION (HCC): ICD-10-CM

## 2019-11-08 DIAGNOSIS — I73.9 PAD (PERIPHERAL ARTERY DISEASE) (HCC): ICD-10-CM

## 2019-11-08 DIAGNOSIS — I50.9 CONGESTIVE HEART FAILURE, UNSPECIFIED HF CHRONICITY, UNSPECIFIED HEART FAILURE TYPE (HCC): ICD-10-CM

## 2019-11-08 LAB
ALBUMIN SERPL-MCNC: 4.3 G/DL (ref 3.5–5.2)
ALBUMIN/GLOB SERPL: 1.1 G/DL
ALP SERPL-CCNC: 71 U/L (ref 39–117)
ALT SERPL W P-5'-P-CCNC: 11 U/L (ref 1–41)
ANION GAP SERPL CALCULATED.3IONS-SCNC: 8 MMOL/L (ref 5–15)
AST SERPL-CCNC: 17 U/L (ref 1–40)
B2 MICROGLOB SERPL-MCNC: 2.7 MG/L (ref 0.8–2.2)
BASOPHILS # BLD AUTO: 0.04 10*3/MM3 (ref 0–0.2)
BASOPHILS NFR BLD AUTO: 0.9 % (ref 0–1.5)
BILIRUB SERPL-MCNC: 0.4 MG/DL (ref 0.2–1.2)
BUN BLD-MCNC: 14 MG/DL (ref 8–23)
BUN/CREAT SERPL: 14.9 (ref 7–25)
CALCIUM SPEC-SCNC: 9.8 MG/DL (ref 8.6–10.5)
CHLORIDE SERPL-SCNC: 99 MMOL/L (ref 98–107)
CO2 SERPL-SCNC: 31 MMOL/L (ref 22–29)
CREAT BLD-MCNC: 0.94 MG/DL (ref 0.76–1.27)
DEPRECATED RDW RBC AUTO: 46 FL (ref 37–54)
EOSINOPHIL # BLD AUTO: 0.18 10*3/MM3 (ref 0–0.4)
EOSINOPHIL NFR BLD AUTO: 4 % (ref 0.3–6.2)
ERYTHROCYTE [DISTWIDTH] IN BLOOD BY AUTOMATED COUNT: 13.5 % (ref 12.3–15.4)
FERRITIN SERPL-MCNC: 199.2 NG/ML (ref 30–400)
FOLATE SERPL-MCNC: 9.68 NG/ML (ref 4.78–24.2)
GFR SERPL CREATININE-BSD FRML MDRD: 78 ML/MIN/1.73
GLOBULIN UR ELPH-MCNC: 3.9 GM/DL
GLUCOSE BLD-MCNC: 108 MG/DL (ref 65–99)
HCT VFR BLD AUTO: 42.4 % (ref 37.5–51)
HGB BLD-MCNC: 13.9 G/DL (ref 13–17.7)
IMM GRANULOCYTES # BLD AUTO: 0.01 10*3/MM3 (ref 0–0.05)
IMM GRANULOCYTES NFR BLD AUTO: 0.2 % (ref 0–0.5)
IRON 24H UR-MRATE: 45 MCG/DL (ref 59–158)
IRON SATN MFR SERPL: 12 % (ref 20–50)
LDH SERPL-CCNC: 177 U/L (ref 135–225)
LYMPHOCYTES # BLD AUTO: 0.8 10*3/MM3 (ref 0.7–3.1)
LYMPHOCYTES NFR BLD AUTO: 17.9 % (ref 19.6–45.3)
MCH RBC QN AUTO: 30.7 PG (ref 26.6–33)
MCHC RBC AUTO-ENTMCNC: 32.8 G/DL (ref 31.5–35.7)
MCV RBC AUTO: 93.6 FL (ref 79–97)
MONOCYTES # BLD AUTO: 0.6 10*3/MM3 (ref 0.1–0.9)
MONOCYTES NFR BLD AUTO: 13.5 % (ref 5–12)
NEUTROPHILS # BLD AUTO: 2.83 10*3/MM3 (ref 1.7–7)
NEUTROPHILS NFR BLD AUTO: 63.5 % (ref 42.7–76)
NRBC BLD AUTO-RTO: 0 /100 WBC (ref 0–0.2)
PLATELET # BLD AUTO: 205 10*3/MM3 (ref 140–450)
PMV BLD AUTO: 9.4 FL (ref 6–12)
POTASSIUM BLD-SCNC: 4 MMOL/L (ref 3.5–5.2)
PROT SERPL-MCNC: 8.2 G/DL (ref 6–8.5)
RBC # BLD AUTO: 4.53 10*6/MM3 (ref 4.14–5.8)
SODIUM BLD-SCNC: 138 MMOL/L (ref 136–145)
TIBC SERPL-MCNC: 374 MCG/DL (ref 298–536)
TRANSFERRIN SERPL-MCNC: 251 MG/DL (ref 200–360)
VIT B12 BLD-MCNC: 464 PG/ML (ref 211–946)
WBC NRBC COR # BLD: 4.46 10*3/MM3 (ref 3.4–10.8)

## 2019-11-08 PROCEDURE — 85025 COMPLETE CBC W/AUTO DIFF WBC: CPT

## 2019-11-08 PROCEDURE — 36415 COLL VENOUS BLD VENIPUNCTURE: CPT

## 2019-11-08 PROCEDURE — 82746 ASSAY OF FOLIC ACID SERUM: CPT

## 2019-11-08 PROCEDURE — 82728 ASSAY OF FERRITIN: CPT

## 2019-11-08 PROCEDURE — 84466 ASSAY OF TRANSFERRIN: CPT

## 2019-11-08 PROCEDURE — 82232 ASSAY OF BETA-2 PROTEIN: CPT

## 2019-11-08 PROCEDURE — 82607 VITAMIN B-12: CPT

## 2019-11-08 PROCEDURE — 80053 COMPREHEN METABOLIC PANEL: CPT

## 2019-11-08 PROCEDURE — 83615 LACTATE (LD) (LDH) ENZYME: CPT

## 2019-11-08 PROCEDURE — 83540 ASSAY OF IRON: CPT

## 2019-11-13 NOTE — PROGRESS NOTES
[facility] ON[facility] HEMATOLOGY AND ONCOLOGY  [address]  [facility] [place] [address] [phone]    Patient Name: Cabrera Avina  Encounter Date: 11/15/2019  YOB: 1940  Patient Number: [id number]    REASON FOR FOLLOWUP:  Mr. Cabrera Avina is a 79-year-old male who returns in follow-up of intermediate grade B-cell lymphoma.  He is seen 172 months following the completion of R-CHOP chemotherapy and 120 months after the completion of Rituxan maintenance.  He is also followed for an iron deficiency anemia and for chronic anticoagulation. He is seen nearly 36 months after the most recent dose of INFeD.  The patient is here alone. History is obtained from the patient who is considered to be a reliable historian.     DIAGNOSTIC ABNORMALITIES: B-cell Lymphoma.   1. Periportal lymph node biopsy, 02/01/2005. Findings consistent with large B-cell lymphoma with an intermediate to high proliferative rate.  2. CT of the chest, 02/03/2005. Mediastinal, left hilar, and upper abdominal lymphadenopathy consistent with the patient's history of lymphoma.    PREVIOUS INTERVENTIONS: B-cell lymphoma   1. Definitive Rituxan, 375 mg/m2, 02/05/2005 through 02/25/2005. Four weeks completed.  2. Definitive R-CHOP, from 01/04/2005 through 07/07/2005. Five cycles completed. Cycle #2 delayed by admission for management of deep perirectal abscess.  3. Maintenance Rituxan (every 3 months), 10/14/2005 through 11/11/2007. Six cycles completed.    DIAGNOSTIC ABNORMALITIES: Anemia, iron deficiency   1. Anemia substrates on 05/27/2008: Iron 43, %saturation 12, TIBC 366, UIBC 323, ferritin 27, vitamin B12 of 295, folate 7.2.    PREVIOUS INTERVENTIONS: Anemia.   1. INFeD, 1000 mg IVPB, 06/04/2008.  2. INFeD, 1000 mg IVPB, 09/02/2010 and 09/09/2010.  3. INFeD 500 mg, 08/25/2016; 11/28/2016.    Problem List Items Addressed This Visit        Immune and Lymphatic    Leukopenia        Hematopoietic and Hemostatic    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) - Primary    Anemia        Oncology/Hematology History    DIAGNOSTIC ABNORMALITIES: B-cell Lymphoma.  Periportal lymph node biopsy, 02/01/2005.  Findings consistent with large B-cell lymphoma with an intermediate to high proliferative rate.  CT of the chest, 02/03/2005.   Mediastinal, left hilar, and upper abdominal lymphadenopathy consistent with the patient's history of lymphoma.  PREVIOUS INTERVENTIONS:   B-cell lymphoma  Definitive Rituxan, 375 mg/m2, 02/05/2005 through 02/25/2005. Four weeks completed.  Definitive R-CHOP, from 01/04/2005 through 07/07/2005.  Five cycles completed.  Cycle #2 delayed by admission for management of deep perirectal abscess.  Maintenance Rituxan (every 3 months), 10/14/2005 through 11/11/2007.  Six cycles completed.  DIAGNOSTIC ABNORMALITIES:   Anemia, iron deficiency  Anemia substrates on 05/27/2008: Iron 43, %saturation 12, TIBC 366, UIBC 323, ferritin 27, vitamin B12 of 295, folate 7.2.  PREVIOUS INTERVENTIONS:   Anemia.  INFeD, 1000 mg IVPB, 06/04/2008.  INFeD, 1000 mg IVPB, 09/02/2010 and 09/09/2010.  INFeD 500 mg, 08/25/2016; 11/28/2016.        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)    4/28/2016 Initial Diagnosis     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)            PAST MEDICAL HISTORY:  ALLERGIES:  No Known Allergies  CURRENT MEDICATIONS:  Outpatient Encounter Medications as of 11/15/2019   Medication Sig Dispense Refill   • aspirin 81 MG EC tablet Take 81 mg by mouth daily.     • cyclobenzaprine (FLEXERIL) 10 MG tablet Take 1 tablet by mouth 3 (Three) Times a Day As Needed for Muscle Spasms. 90 tablet 0   • digoxin (LANOXIN) 250 MCG tablet Take 1 tablet by mouth Daily. 90 tablet 3   • diltiaZEM CD (CARDIZEM CD) 240 MG 24 hr capsule TAKE ONE CAPSULE BY MOUTH DAILY 90 capsule 3   • escitalopram (LEXAPRO) 20 MG tablet Take 1 tablet by mouth Daily. (Patient taking differently: Take 20 mg by mouth As Needed.) 90 tablet 3   •  oxyCODONE-acetaminophen (PERCOCET)  MG per tablet Take 1 tablet by mouth Every 6 (Six) Hours As Needed for Severe Pain . Must last 30 days 100 tablet 0   • polyethylene glycol (MIRALAX) packet Take 17 g by mouth Daily. (Patient taking differently: Take 17 g by mouth Daily As Needed.) 100 packet 3   • temazepam (RESTORIL) 15 MG capsule Take 1 capsule by mouth At Night As Needed for Sleep. 30 capsule 2   • warfarin (COUMADIN) 2 MG tablet Take 3.5 tablets by mouth Daily. 7MG, 7MG, 8MG, then repeat 360 tablet 5   • [DISCONTINUED] digoxin (LANOXIN) 250 MCG tablet Take 1 tablet by mouth Daily. 90 tablet 3     No facility-administered encounter medications on file as of 11/15/2019.      ADULT ILLNESSES:   History of malignant lymphoma (situation) ( ICD-10:Z85.72 ;Personal history of non-Hodgkin lymphomas   Adenomatous colonic polyps ( ICD-10:D12.6 ;Benign neoplasm of colon, unspecified   Anemia ( ICD-10:D64.9 ;Anemia, unspecified   Anxiety ( chronic; ICD-10:F41.9 ;Anxiety disorder, unspecified )   Atrial fibrillation ( on chronic anticoagulation; ICD-10:I48.91 ;Unspecified atrial fibrillation )   Degenerative joint disease ( ICD-10:M16.10 ;Unilateral primary osteoarthritis, unspecified hip   Drug intolerance ( oral iron; ICD-10:T45.4x5D ;Adverse effect of iron and its compounds, subsequent encounter )   Hypertension ( ICD-10:I10 ;Essential (primary) hypertension   Iron deficiency anemia ( ICD-10:D50.9 ;Iron deficiency anemia, unspecified   Microscopic hematuria ( ICD-10:R31.29 ;Other microscopic hematuria   Nephrolithiasis ( ICD-10:N20.0 ;Calculus of kidney   Neuropathy ( ICD-10:G62.9 ;Polyneuropathy, unspecified   Orthostatic hypotension ( ICD-10:I95.1 ;Orthostatic hypotension   Perirectal abscess ( deep, severe; ICD-10:K61.1 ;Rectal abscess )   Polycystic kidney disease ( ICD-10:Q61.3 ;Polycystic kidney, unspecified   Vitamin B12 deficiency ( ICD-10:E53.8 ;Deficiency of other specified B group  vitamins    SURGERIES:   Punch biopsy of skin lesion, right lower lip, 01/21/2015: 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 Dr. Hatch   Drainage of abscess, recurrent rectal abscess, 06/01/2006   Laparoscopic cholecystectomy and lymph node biopsy   Hip replacement, right, 06/15/2010      ADULT ILLNESSES:  Patient Active Problem List   Diagnosis Code   • Slow transit constipation K59.01   • Gastroesophageal reflux disease without esophagitis K21.9   • 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[facility]) C85.90   • Anxiety F41.9   • Chronic hip pain M25.559, G89.29   • Permanent atrial fibrillation I48.21   • Other insomnia G47.09   • Adenomatous polyp of colon D12.6   • Tobacco use Z72.0   • Chronic anticoagulation Z79.01   • PAD (peripheral artery disease) (CMS/[facility]) I73.9   • Congestive heart failure (CMS/[facility]) I50.9   • BMI 24.0-24.9, adult Z68.24   • Anemia D64.9   • Leukopenia D72.819     SURGERIES:  Past Surgical History:   Procedure Laterality Date   • CHOLECYSTECTOMY     • COLONOSCOPY  05/2012    3 yr recall   • COLONOSCOPY  09/18/2015    5 yr recall   • KIDNEY STONE SURGERY     • RECTAL SURGERY      X 2   • TOTAL HIP ARTHROPLASTY       HEALTH MAINTENANCE ITEMS:  Health Maintenance Due   Topic Date Due   • ZOSTER VACCINE (2 of 2) 02/13/2017       <no information>  Last Completed Colonoscopy       Status Date      COLONOSCOPY Done 12/1/2015      Patient has more history with this topic...        Immunization History   Administered Date(s) Administered   • Fluad Quad 11/05/2019   • Pneumococcal Conjugate 13-Valent (PCV13) 11/05/2019     Last Completed Mammogram     Patient has no health maintenance due at this time            FAMILY HISTORY:  Family History   Problem Relation Age of Onset   • Colon cancer Mother    • No Known Problems Father    • Prostate cancer Brother    • No Known Problems Daughter    • No Known Problems  "Son    • No Known Problems Maternal Grandmother    • No Known Problems Maternal Grandfather    • No Known Problems Paternal Grandmother    • No Known Problems Paternal Grandfather    • Prostate cancer Brother    • Colon polyps Neg Hx      SOCIAL HISTORY:  Social History     Socioeconomic History   • Marital status:      Spouse name: Not on file   • Number of children: Not on file   • Years of education: Not on file   • Highest education level: Not on file   Tobacco Use   • Smoking status: Never Smoker   • Smokeless tobacco: Never Used   Substance and Sexual Activity   • Alcohol use: No   • Drug use: No   • Sexual activity: Defer       REVIEW OF SYSTEMS:  PS:  ECOG 1-2.   Constitutional:  His appetite is fairly good \"same.\"  His energy remains low to fair.  \"I get worn out easy.\"  He is as active and manages all his ADLs including chores, running errands, and driving.  Says his hip and lower back pains still inhibit his activities, this in spite of hip replacement.  Again says he still manages to walk at least 1/2 mile to 1 mile on most days.  \"Yes sir.\" He has no fevers, chills, or drenching night sweats.  He has lost 5 pounds (had gained 3.9 pounds at his prior visit) over the last 3 months. Still says he wants to stay around 175-180 pounds.  He sleeps restlessly, waking frequently.  Ear/Nose/Throat/Mouth:   The patient reports no ear pains, sinus symptoms, sore throat, nosebleeds.  No headaches. The patient denies any hoarseness, change in voice quality, or hemoptysis.   Ocular:   The patient reports no eye pain, significant change in visual acuity, double vision, or blurry vision.   Respiratory:  He reports no chronic cough, shortness of breathing, phlegm production, or chest wall pain.  Cardiovascular:  He reports no exertional chest pain, chest pressure, or chest heaviness.  He reports no claudication. He reports no palpitations or symptomatic orthostasis.  Gastrointestinal:  He has no pica, dysphagia, " "nausea, vomiting, postprandial abdominal pain, bloating, cramping, change in bowel habits, or discoloration of the stool.  He has had no rectal bleeding.  He has intermittent bouts of constipation modulated by daily stool softeners (Dulcolax).  Says he is still using Metamucil daily.  Says he moves his bowels every 3-4 days, \"been like that since chemo years ago.\" He has no diarrhea. Last colonoscopy, 09/2015.  Polyps. Repeat 5 years. Is oral iron intolerant (worsening constipation).  Genitourinary:    He reports no urinary burning, frequency, dribbling, or discoloration.  He reports no difficulty controlling his bladder. He reports no need to urinate frequently through the night.  Musculoskeletal:  He continues to have chronic trouble with right hip pain.  \"24/7.\"  He still uses maintenance Percocet, 2-3 doses daily.  He reports no unexplained arthralgias, myalgias, but has noted more frequent bouts of nighttime leg cramping.  Extremities:  He reports no trouble with fluid retention or significant leg swelling.  Endocrine:  He reports no problems with excess thirst, excessive urination, vasomotor instability, or unexplained fatigue.  Heme/Lymphatic:  He reports no bleeding, petechial rashes, or swollen glands.  Skin:  He reports no itching, rashes, or lesions which won't heal.  Neuro:  He reports night-time stocking-glove pain in his lower extremities.  \"Same.\" Says he has been seen by Dr. Kinney previously. \"He found no problems.\"  Says Dr. Rosario says he does not have significant vascular disease requiring surgery.  He reports no loss of consciousness, seizures, fainting spells, or dizziness. He reports no weakness of his face, arms, or legs.  He reports no difficulty with speech.  He reports no tremors.  Psych:  He seems generally satisfied with life.  He reports no depression.  He reports no mood swings.      VITAL SIGNS: /90   Pulse 87   Temp 97.4 °F (36.3 °C)   Resp 16   Ht 182.9 cm (72\")   Wt " 79.6 kg (175 lb 6.4 oz)   SpO2 97%   BMI 23.79 kg/m²       PHYSICAL EXAMINATION:  General:  He is a well-developed, well-nourished, and well-kept elderly male in no distress.  He arrived in the exam room ambulatory. He appears to be his stated age.  His skin color is pale.  Head/Neck:  The patient is anicteric and atraumatic.  The neck is supple without evidence of jugular venous distention or cervical adenopathy.  Eyes:  The pupils are equal, round, and reactive to light.  The extraocular movements are full.  There is no scleral jaundice or erythema.  Chest:  The respiratory efforts are normal and unhindered.  The chest is clear to auscultation.  There are no wheezes, rales, or asymmetry of breath sounds.  The port in the right anterior chest wall has been removed and the site has healed. No tenderness or erythema.   Cardiac/Vascular:  The patient has an irregularly irregular cardiac rate and rhythm without murmurs.  The peripheral pulses are equal and full.  Abdomen:  The belly is soft and flat.  There is no rebound or guarding. There is no organomegaly, mass-effect, or tenderness.  Bowel sounds are normal.  Ortho:  There is no overt joint stiffness.  There is no overt foreshortening of height.  There are no overt joint changes which suggest degenerative arthritis.  Extremities:  There is no evidence of cyanosis, clubbing, or edema.  Rheumatologic:  There is no overt evidence of rheumatoid deformities of the hands.  There is no sausaging of the fingers.  There is no sign of active synovitis.  Cutaneous:  Few areas of senile purpuras are present on his forearms.  There are no overt rashes, disseminated lesions, purpura, or petechiae.  Lymphatics:  There is no evidence of adenopathy in the cervical, supraclavicular, or axillary areas.  Neurologic:  The patient is alert, oriented, cooperative, and pleasant.  He is appropriately conversant.  He ambulated into the exam room and transferred from chair to exam table  aided.  There is no overt dysfunction of the motor, sensory, or cerebellar systems.  Psych:  Impatient.  Mood and affect are appropriate for circumstance.  Eye contact is appropriate.        LABS    Lab Results - Last 18 Months   Lab Units 11/08/19  0854 08/08/19  1014 05/09/19  1055 03/11/19  1032 02/08/19  1029 10/05/18  1340   HEMOGLOBIN g/dL 13.9 14.4 13.3* 13.7* 13.9* 13.6*   HEMATOCRIT % 42.4 43.9 41.0 42.7 42.7 43.0   MCV fL 93.6 92.4 92.8 92.8 92.0 96.2*   WBC 10*3/mm3 4.46 5.44 4.61* 3.96* 3.99* 4.79*   RDW % 13.5 13.3 14.1 13.2 13.0 13.4   MPV fL 9.4 9.7 9.9  --  9.8  --    PLATELETS 10*3/mm3 205 224 209 220 202 276   IMM GRAN % % 0.2 0.4 0.2  --  0.0  --    NEUTROS ABS 10*3/mm3 2.83 3.55 2.84 2.40 2.56 3.06   LYMPHS ABS 10*3/mm3 0.80 0.98 1.02 0.86 0.82 0.90   MONOS ABS 10*3/mm3 0.60 0.65 0.55 0.59 0.44 0.54   EOS ABS 10*3/mm3 0.18 0.19 0.14 0.08 0.13 0.23   BASOS ABS 10*3/mm3 0.04 0.05 0.05 0.02 0.04 0.05   IMMATURE GRANS (ABS) 10*3/mm3 0.01 0.02 0.01  --  0.00  --    NRBC /100 WBC 0.0 0.0 0.0 0.0 0.0 0.0       Lab Results - Last 18 Months   Lab Units 11/08/19  0854 08/08/19  1014 05/09/19  1055 03/11/19  1032 02/08/19  1029 10/05/18  1340   GLUCOSE mg/dL 108* 100 96  --  110*  --    SODIUM mmol/L 138 141 140 140 140 139   POTASSIUM mmol/L 4.0 4.4 4.4 4.5 4.5 3.9   TOTAL CO2 mmol/L  --   --   --  30.0  --  33.0*   CO2 mmol/L 31.0* 29.0 31.0  --  31.0  --    CHLORIDE mmol/L 99 101 100 98 99 97*   ANION GAP mmol/L 8.0 11.0 9.0  --  10.0  --    CREATININE mg/dL 0.94 1.07 0.89 0.84 0.94 0.91   BUN mg/dL 14 20 16 15 17 17   BUN / CREAT RATIO  14.9 18.7 18.0 17.9 18.1 18.7   CALCIUM mg/dL 9.8 9.5 9.9 9.7 9.8 9.6   EGFR IF NONAFRICN AM mL/min/1.73 78 67 83 89 78 81   ALK PHOS U/L 71 70 71 79 71 69   TOTAL PROTEIN g/dL 8.2 9.0* 8.8*  --  8.8*  --    ALT (SGPT) U/L 11 20 16 28 17 24   AST (SGOT) U/L 17 26 26 24 25 29   BILIRUBIN mg/dL 0.4 0.6 0.6 0.8 0.7 0.5   ALBUMIN g/dL 4.30 4.80 4.70 4.10 4.60 3.70   GLOBULIN  gm/dL 3.9 4.2 4.1  --  4.2  --        Lab Results - Last 18 Months   Lab Units 11/08/19  0854 08/08/19  1014 05/09/19  1055 02/08/19  1029   LDH U/L 177 552 467 425       Lab Results - Last 18 Months   Lab Units 11/08/19  0854 08/08/19  1014 05/09/19  1055 02/08/19  1029   IRON mcg/dL 45* 87 84 100   TIBC mcg/dL 374 355 348 345   IRON SATURATION % 12* 25 24 29   FERRITIN ng/mL 199.20 121.00 143.00 146.00   FOLATE ng/mL 9.68 11.60 8.16 7.40       ASSESSMENT:  1.    Intermediate to high-grade lymphoma.  History of. No evidence of disease.  05/16/2005:          Stage IV-E.          Baseline Tumor [place]:   Mediastinum, left hilum, upper abdomen, and liver (clinically).          Complications of Tumor:   Hyperbilirubinemia. Improved.          Response to Therapy:  Clinical remission per CT scan 05/16/2005.          Prognosis:  Fair.          IPI at baseline:  3.  2.    Normocytic anemia from iron deficiency and chronic disease. Last INFeD 09/2010.  Stable, Hgb 13.9 on 11/08/2019 (prior range:  Hgb 12.9 - 14.7).  3.    Leukopenia, NOS.  Mild/low normal with normal diff.  Observation warranted.  4.    Iron deficiency and oral iron intolerant (constipation).  Repleted since receipt of INFeD  5.    Variable B2M.  Stable, 2.6 on 08/08/2019 (prior range:  1.6 - 3.27).  6.    Adenomatous colon polyp, colonoscopy 09/18/2015.  7.    Atrial fibrillation on chronic anticoagulation.  8.    Microscopic hematuria.  Management per Dr. Mayes.  9.    Anxiety, chronic, stable on medications (Celexa).  10.  Lower extremity neuropathy.  Mild.  Not a problem of late.  11.  Hypertension.  Fair control on Cardizem.  12.  Low B12, 203 on 08/17/2017.  On B12 replacement beginning 08/23/2017.  Repleted.  13.  Peripheral vascular disease.  Arterial studies and has been seen by Dr. Rosario of vascular surgery.              a.    Ultrasound ankle/brachial performed on 07/17/2018 at [facility]. Suspected atherosclerosis in the lower extremity  arteries, supported by noncompressible posterior tibialis and dorsalis pedis arteries.              b.    Abdominal aortic ultrasound performed on 08/10/2018 at [facility].  No abdominal aortic aneurysm.    PLAN:  1.      Re:  Apprised of labs from 11/08/2019 with low normal WBC with normal diff, stable anemia, normoglycemia with normal (previously slightly elevated) total protein, (stable) otherwise normal CMP, normal LDH, normal B2M, depleted serum iron, depleted (< 20%) Fe sat, repleted (> 100) ferritin, repleted folate, and repleted B12.   2.    Stay off B12, 1000 mcg IM monthly.   3.    Schedule Injectafer 750 mg IV x 1 at [facility]  4.    Continue other currently identified medications.  5.    Continue Coumadin.  PT/INR followed by Dr. Crawford.  6.    Continue ongoing management per primary care physician and other specialists.    7.    Advance Directive is discussed and questions answered - initially discussed for this year on 02/15/2019. Document available for review.  8.    Return to office in 3 months with pre-office CBC with differential, iron, TIBC, iron saturation, ferritin, B12, folate, CMP, B2M, and LDH.  9.    Please give patient copy of blood work.    MEDICAL DECISION MAKING: Moderate Complexity  AMOUNT OF DATA: Moderate    TIME SPENT:  Face-to-face time on this encounter, as defined by the American Medical Association in the 2019 Current Procedural Terminology codebook; assessment, record review, lab review, planning and education - 25 minutes (greater than 50% face to face).      cc:   Azar Mayes MD          [facility]          Sunita Crawford MD - [place]          Jorge Alberto Rosario MD

## 2019-11-15 ENCOUNTER — TELEPHONE (OUTPATIENT)
Dept: ONCOLOGY | Facility: CLINIC | Age: 79
End: 2019-11-15

## 2019-11-15 ENCOUNTER — OFFICE VISIT (OUTPATIENT)
Dept: ONCOLOGY | Facility: CLINIC | Age: 79
End: 2019-11-15

## 2019-11-15 VITALS
HEIGHT: 72 IN | WEIGHT: 175.4 LBS | DIASTOLIC BLOOD PRESSURE: 90 MMHG | HEART RATE: 87 BPM | BODY MASS INDEX: 23.76 KG/M2 | SYSTOLIC BLOOD PRESSURE: 138 MMHG | TEMPERATURE: 97.4 F | OXYGEN SATURATION: 97 % | RESPIRATION RATE: 16 BRPM

## 2019-11-15 DIAGNOSIS — D64.9 ANEMIA, UNSPECIFIED TYPE: ICD-10-CM

## 2019-11-15 DIAGNOSIS — C85.90 LYMPHOMA IN REMISSION (HCC): Primary | ICD-10-CM

## 2019-11-15 DIAGNOSIS — D72.819 LEUKOPENIA, UNSPECIFIED TYPE: ICD-10-CM

## 2019-11-15 DIAGNOSIS — I48.20 CHRONIC ATRIAL FIBRILLATION (HCC): ICD-10-CM

## 2019-11-15 PROCEDURE — 99214 OFFICE O/P EST MOD 30 MIN: CPT | Performed by: INTERNAL MEDICINE

## 2019-11-15 RX ORDER — DIGOXIN 250 MCG
250 TABLET ORAL DAILY
Qty: 90 TABLET | Refills: 3 | Status: SHIPPED | OUTPATIENT
Start: 2019-11-15 | End: 2020-03-05 | Stop reason: SDUPTHER

## 2019-11-15 NOTE — TELEPHONE ENCOUNTER
Attempted to call both numbers listed on the chart. No answer and no voicemail. I am needing to let patient know that he is scheduled [facility]ron [facility]

## 2019-11-15 NOTE — TELEPHONE ENCOUNTER
----- Message from Kriss Cerna MA sent at 11/15/2019 11:46 AM CST -----  Regarding: schedule  Schedule Injectafer 750 mg IV x 1 at [facility]

## 2019-11-18 NOTE — TELEPHONE ENCOUNTER
Spoke with wife and let her know that the patient is scheduled for iron infusion on 11/20/2019 at 2:[facility].

## 2019-11-19 ENCOUNTER — CLINICAL SUPPORT (OUTPATIENT)
Dept: FAMILY MEDICINE CLINIC | Facility: CLINIC | Age: 79
End: 2019-11-19

## 2019-11-19 DIAGNOSIS — D50.9 IRON DEFICIENCY ANEMIA, UNSPECIFIED IRON DEFICIENCY ANEMIA TYPE: ICD-10-CM

## 2019-11-19 DIAGNOSIS — I48.21 PERMANENT ATRIAL FIBRILLATION (HCC): Primary | ICD-10-CM

## 2019-11-19 LAB — INR PPP: 1.7 (ref 0.9–1.1)

## 2019-11-19 PROCEDURE — 85610 PROTHROMBIN TIME: CPT | Performed by: FAMILY MEDICINE

## 2019-11-19 RX ORDER — DIPHENHYDRAMINE HYDROCHLORIDE 50 MG/ML
50 INJECTION INTRAMUSCULAR; INTRAVENOUS AS NEEDED
Status: CANCELLED | OUTPATIENT
Start: 2019-11-20

## 2019-11-19 RX ORDER — FAMOTIDINE 10 MG/ML
20 INJECTION, SOLUTION INTRAVENOUS AS NEEDED
Status: CANCELLED | OUTPATIENT
Start: 2019-11-20

## 2019-11-19 RX ORDER — SODIUM CHLORIDE 9 MG/ML
250 INJECTION, SOLUTION INTRAVENOUS ONCE
Status: CANCELLED | OUTPATIENT
Start: 2019-11-20

## 2019-11-20 ENCOUNTER — INFUSION (OUTPATIENT)
Dept: ONCOLOGY | Facility: HOSPITAL | Age: 79
End: 2019-11-20

## 2019-11-20 VITALS
DIASTOLIC BLOOD PRESSURE: 88 MMHG | HEIGHT: 72 IN | BODY MASS INDEX: 23.7 KG/M2 | TEMPERATURE: 98.6 F | SYSTOLIC BLOOD PRESSURE: 153 MMHG | WEIGHT: 175 LBS | HEART RATE: 75 BPM | RESPIRATION RATE: 20 BRPM

## 2019-11-20 DIAGNOSIS — D50.9 IRON DEFICIENCY ANEMIA, UNSPECIFIED IRON DEFICIENCY ANEMIA TYPE: Primary | ICD-10-CM

## 2019-11-20 PROCEDURE — 96365 THER/PROPH/DIAG IV INF INIT: CPT

## 2019-11-20 PROCEDURE — 25010000002 FERRIC CARBOXYMALTOSE 750 MG/15ML SOLUTION 15 ML VIAL: Performed by: INTERNAL MEDICINE

## 2019-11-20 RX ORDER — DIPHENHYDRAMINE HYDROCHLORIDE 50 MG/ML
50 INJECTION INTRAMUSCULAR; INTRAVENOUS AS NEEDED
Status: DISCONTINUED | OUTPATIENT
Start: 2019-11-20 | End: 2019-11-20 | Stop reason: HOSPADM

## 2019-11-20 RX ORDER — FAMOTIDINE 10 MG/ML
20 INJECTION, SOLUTION INTRAVENOUS AS NEEDED
Status: DISCONTINUED | OUTPATIENT
Start: 2019-11-20 | End: 2019-11-20 | Stop reason: HOSPADM

## 2019-11-20 RX ORDER — SODIUM CHLORIDE 9 MG/ML
250 INJECTION, SOLUTION INTRAVENOUS ONCE
Status: COMPLETED | OUTPATIENT
Start: 2019-11-20 | End: 2019-11-20

## 2019-11-20 RX ADMIN — SODIUM CHLORIDE 250 ML: 9 INJECTION, SOLUTION INTRAVENOUS at 14:15

## 2019-11-20 RX ADMIN — FERRIC CARBOXYMALTOSE INJECTION 750 MG: 50 INJECTION, SOLUTION INTRAVENOUS at 14:20

## 2019-12-05 ENCOUNTER — OFFICE VISIT (OUTPATIENT)
Dept: FAMILY MEDICINE CLINIC | Facility: CLINIC | Age: 79
End: 2019-12-05

## 2019-12-05 VITALS
SYSTOLIC BLOOD PRESSURE: 124 MMHG | TEMPERATURE: 98.4 F | OXYGEN SATURATION: 97 % | HEIGHT: 72 IN | WEIGHT: 177 LBS | HEART RATE: 72 BPM | RESPIRATION RATE: 18 BRPM | DIASTOLIC BLOOD PRESSURE: 60 MMHG | BODY MASS INDEX: 23.98 KG/M2

## 2019-12-05 DIAGNOSIS — K59.01 SLOW TRANSIT CONSTIPATION: ICD-10-CM

## 2019-12-05 DIAGNOSIS — I48.21 PERMANENT ATRIAL FIBRILLATION (HCC): ICD-10-CM

## 2019-12-05 DIAGNOSIS — C85.90 LYMPHOMA IN REMISSION (HCC): ICD-10-CM

## 2019-12-05 DIAGNOSIS — Z79.01 CHRONIC ANTICOAGULATION: ICD-10-CM

## 2019-12-05 DIAGNOSIS — F41.9 ANXIETY: ICD-10-CM

## 2019-12-05 DIAGNOSIS — Z72.0 TOBACCO USE: ICD-10-CM

## 2019-12-05 DIAGNOSIS — G47.09 OTHER INSOMNIA: ICD-10-CM

## 2019-12-05 DIAGNOSIS — G89.29 CHRONIC PAIN OF RIGHT HIP: Primary | ICD-10-CM

## 2019-12-05 DIAGNOSIS — M25.551 CHRONIC PAIN OF RIGHT HIP: Primary | ICD-10-CM

## 2019-12-05 LAB — INR PPP: 1.9 (ref 0.9–1.1)

## 2019-12-05 PROCEDURE — 99214 OFFICE O/P EST MOD 30 MIN: CPT | Performed by: FAMILY MEDICINE

## 2019-12-05 PROCEDURE — 85610 PROTHROMBIN TIME: CPT | Performed by: FAMILY MEDICINE

## 2019-12-05 RX ORDER — OXYCODONE AND ACETAMINOPHEN 10; 325 MG/1; MG/1
1 TABLET ORAL EVERY 6 HOURS PRN
Qty: 100 TABLET | Refills: 0 | Status: SHIPPED | OUTPATIENT
Start: 2019-12-05 | End: 2020-01-06 | Stop reason: SDUPTHER

## 2019-12-05 RX ORDER — CYCLOBENZAPRINE HCL 10 MG
10 TABLET ORAL 3 TIMES DAILY PRN
Qty: 90 TABLET | Refills: 0 | Status: SHIPPED | OUTPATIENT
Start: 2019-12-05 | End: 2020-03-05 | Stop reason: SDUPTHER

## 2019-12-05 NOTE — PROGRESS NOTES
Subjective cc: pain medication refill  Cabrera Avina is a 79 y.o. male who presents to get a refill on his pain medication.   Pain mostly in his right hip.  Pain improved with pain medication without side effects.  He reports significant improvement with muscle relaxer - takes it PRN - needs refills.   No new complaints today.   He continues to have constipation. He is using OTC constipation medication.   Depression and anxiety stable - taking lexapro.   BP with borderline control.      Patient INR is low today 1.9.  Patient denies any bleeding at this time. He is currently alternating 7MG and 8MG     Atrial fib is rate controlled.     He is currently taking restoril PRN for insomnia which controls his symptoms and allows him to get restful sleep.  Refill sent last monht.     Past information reviewed and updated as appropriate     Pain   This is a chronic problem. The current episode started more than 1 year ago. The problem occurs constantly. The problem has been unchanged. Associated symptoms include arthralgias, myalgias, numbness and weakness. Pertinent negatives include no abdominal pain, anorexia, chest pain, chills, coughing, diaphoresis, fatigue, fever, headaches, nausea or vomiting. The symptoms are aggravated by exertion, standing and walking. He has tried rest, walking, position changes and oral narcotics (Percocet, patient cannot tolerate NSAIDs secondary to Coumadin) for the symptoms. The treatment provided moderate relief.   Atrial Fibrillation   Presents for follow-up visit. Symptoms include hypertension and weakness. Symptoms are negative for bradycardia, chest pain, dizziness, hemodynamic instability, palpitations, shortness of breath, syncope and tachycardia. The symptoms have been stable. Past medical history includes atrial fibrillation. There are no medication compliance problems.   Depression   Visit Type: follow-up  Patient presents with the following symptoms: insomnia, muscle tension  "and nervousness/anxiety.  Patient is not experiencing: anhedonia, decreased concentration, depressed mood, excessive worry, feelings of hopelessness, palpitations, shortness of breath, suicidal ideas, suicidal planning and thoughts of death.  Frequency of symptoms: occasionally   Severity: mild   Sleep quality: fair  Nighttime awakenings: occasional         The following portions of the patient's history were reviewed and updated as appropriate: allergies, current medications, past family history, past medical history, past social history, past surgical history and problem list.        Review of Systems   Constitutional: Negative for activity change, appetite change, chills, diaphoresis, fatigue, fever and unexpected weight change.   Respiratory: Negative for cough, chest tightness and shortness of breath.    Cardiovascular: Negative for chest pain, palpitations, leg swelling and syncope.   Gastrointestinal: Positive for constipation. Negative for abdominal pain, anorexia, blood in stool, nausea and vomiting.   Musculoskeletal: Positive for arthralgias, back pain, gait problem and myalgias.   Neurological: Positive for weakness and numbness. Negative for dizziness, syncope, facial asymmetry, speech difficulty, light-headedness and headaches.   Hematological: Bruises/bleeds easily.   Psychiatric/Behavioral: Positive for dysphoric mood and sleep disturbance (wakes up in the middle of the night secondary to pain). Negative for decreased concentration, self-injury and suicidal ideas. The patient is nervous/anxious and has insomnia.    All other systems reviewed and are negative.      Objective   Blood pressure 124/60, pulse 72, temperature 98.4 °F (36.9 °C), temperature source Oral, resp. rate 18, height 182.9 cm (72\"), weight 80.3 kg (177 lb), SpO2 97 %.  Physical Exam   Constitutional: He is oriented to person, place, and time. Vital signs are normal. He appears well-developed and well-nourished. He is active and " cooperative.  Non-toxic appearance. He does not have a sickly appearance. He does not appear ill. No distress.   HENT:   Head: Normocephalic and atraumatic.   Right Ear: External ear normal.   Left Ear: External ear normal.   Nose: Nose normal.   Mouth/Throat: Oropharynx is clear and moist. Abnormal dentition.   Eyes: Conjunctivae and EOM are normal. Right eye exhibits no discharge. Left eye exhibits no discharge.   Neck: No tracheal deviation present.   Cardiovascular: Normal rate and intact distal pulses. An irregularly irregular rhythm present.   Pulmonary/Chest: Effort normal and breath sounds normal. No accessory muscle usage or stridor. No respiratory distress. He has no wheezes. He has no rales.   Abdominal: Soft. Normal appearance and bowel sounds are normal. He exhibits no distension, no fluid wave, no pulsatile midline mass and no mass. There is no tenderness.   Musculoskeletal: He exhibits no edema.        Lumbar back: He exhibits decreased range of motion, tenderness, pain and spasm. He exhibits no bony tenderness.   Significantly limited ROM in right hip, pain with movement, walks with limp favoring right hip, difficulty changing positions and getting onto the exam table.         Neurological: He is alert and oriented to person, place, and time.   Skin: Skin is warm and dry. He is not diaphoretic.   Psychiatric: His behavior is normal. Judgment and thought content normal. His mood appears not anxious. He does not exhibit a depressed mood.   Nursing note and vitals reviewed.    Lab Results (last 24 hours)     Procedure Component Value Units Date/Time    POCT INR [[id number]]  (Abnormal) Collected:  12/05/19 1141    Specimen:  Blood Updated:  12/05/19 1141     INR 1.9     Comment: 23.0             Assessment/Plan   Problems Addressed this Visit        Cardiovascular and Mediastinum    Permanent atrial fibrillation    Relevant Orders    POCT INR (Completed)       Digestive    Slow transit constipation        Nervous and Auditory    Chronic hip pain - Primary    Relevant Medications    oxyCODONE-acetaminophen (PERCOCET)  MG per tablet    cyclobenzaprine (FLEXERIL) 10 MG tablet       Hematopoietic and Hemostatic    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    #2 depression/anxiety: chronic, controlled, continue on Lexapro    #3 chronic pain in right hip: Chronic, controlled with oral pain medication.  Patient is unable to take NSAIDs secondary to Coumadin.  Nii reviewed and appropriate.  Controlled contract in the chart.  Refill given on medication.  Sent to pharmacy.  Prescription for muscle relaxers to help with muscle spasms, advised on risk and benefits of this medication.  Return in 1 month     #4 chronic anticoagulation: low INR today. Increase to 7-8-8-7-8-8 - return in 2 weeks for recheck     #5 atrial fibrillation: Patient is anticoagulated and rate controlled. Continue on cardizem.      #6 constipation: chronic, stable. continue on regular use of fiber and stool softeners, advised on diet changes, advised this is due to his medication            This document has been electronically signed by Sunita Crawford MD on December 5, 2019 12:07 PM

## 2019-12-12 ENCOUNTER — CLINICAL SUPPORT (OUTPATIENT)
Dept: FAMILY MEDICINE CLINIC | Facility: CLINIC | Age: 79
End: 2019-12-12

## 2019-12-12 DIAGNOSIS — Z79.01 CHRONIC ANTICOAGULATION: Primary | ICD-10-CM

## 2019-12-12 DIAGNOSIS — D64.9 ANEMIA, UNSPECIFIED TYPE: ICD-10-CM

## 2019-12-12 LAB — INR PPP: 2.1 (ref 0.9–1.1)

## 2019-12-12 PROCEDURE — 85610 PROTHROMBIN TIME: CPT | Performed by: FAMILY MEDICINE

## 2020-01-06 ENCOUNTER — OFFICE VISIT (OUTPATIENT)
Dept: FAMILY MEDICINE CLINIC | Facility: CLINIC | Age: 80
End: 2020-01-06

## 2020-01-06 VITALS
BODY MASS INDEX: 24.19 KG/M2 | HEART RATE: 76 BPM | SYSTOLIC BLOOD PRESSURE: 142 MMHG | WEIGHT: 178.6 LBS | DIASTOLIC BLOOD PRESSURE: 60 MMHG | TEMPERATURE: 98.7 F | OXYGEN SATURATION: 97 % | HEIGHT: 72 IN | RESPIRATION RATE: 18 BRPM

## 2020-01-06 DIAGNOSIS — C85.90 LYMPHOMA IN REMISSION (HCC): ICD-10-CM

## 2020-01-06 DIAGNOSIS — F41.9 ANXIETY: ICD-10-CM

## 2020-01-06 DIAGNOSIS — K21.9 GASTROESOPHAGEAL REFLUX DISEASE WITHOUT ESOPHAGITIS: ICD-10-CM

## 2020-01-06 DIAGNOSIS — G47.09 OTHER INSOMNIA: ICD-10-CM

## 2020-01-06 DIAGNOSIS — I48.21 PERMANENT ATRIAL FIBRILLATION (HCC): ICD-10-CM

## 2020-01-06 DIAGNOSIS — K59.01 SLOW TRANSIT CONSTIPATION: ICD-10-CM

## 2020-01-06 DIAGNOSIS — M25.551 CHRONIC PAIN OF RIGHT HIP: Primary | ICD-10-CM

## 2020-01-06 DIAGNOSIS — G89.29 CHRONIC PAIN OF RIGHT HIP: Primary | ICD-10-CM

## 2020-01-06 DIAGNOSIS — Z72.0 TOBACCO USE: ICD-10-CM

## 2020-01-06 LAB — INR PPP: 1.9 (ref 0.9–1.1)

## 2020-01-06 PROCEDURE — 85610 PROTHROMBIN TIME: CPT | Performed by: FAMILY MEDICINE

## 2020-01-06 PROCEDURE — 99214 OFFICE O/P EST MOD 30 MIN: CPT | Performed by: FAMILY MEDICINE

## 2020-01-06 RX ORDER — TEMAZEPAM 15 MG/1
15 CAPSULE ORAL NIGHTLY PRN
Qty: 30 CAPSULE | Refills: 2 | Status: SHIPPED | OUTPATIENT
Start: 2020-01-06 | End: 2020-03-05 | Stop reason: SDUPTHER

## 2020-01-06 RX ORDER — OXYCODONE AND ACETAMINOPHEN 10; 325 MG/1; MG/1
1 TABLET ORAL EVERY 6 HOURS PRN
Qty: 100 TABLET | Refills: 0 | Status: SHIPPED | OUTPATIENT
Start: 2020-01-06 | End: 2020-02-06 | Stop reason: SDUPTHER

## 2020-01-06 NOTE — PROGRESS NOTES
Subjective cc: pain medication refill  Cabrera Avina is a 79 y.o. male who presents to get a refill on his pain medication.   Pain mostly in his right hip.  It is a little worse than usual today. He has been taking the muscle relaxer a little more. He thinks he needs a refill on it - it was sent last month but he doesn't think he picked it up.  Pain improved with pain medication without side effects.  He reports significant improvement with muscle relaxer.       He continues to have constipation. He is using OTC constipation medication.   Depression and anxiety stable - taking lexapro.   BP with borderline control.      Patient INR is low today 1.9.  Patient denies any bleeding at this time. He is currently alternating 7MG and 8MG, 8MG.      Atrial fib is rate controlled.     He is currently taking restoril PRN for insomnia which controls his symptoms and allows him to get restful sleep.  Refill due.     Past information reviewed and updated as appropriate     Pain   This is a chronic problem. The current episode started more than 1 year ago. The problem occurs constantly. The problem has been unchanged. Associated symptoms include arthralgias, myalgias, numbness and weakness. Pertinent negatives include no abdominal pain, anorexia, chest pain, chills, coughing, diaphoresis, fatigue, fever, headaches, nausea or vomiting. The symptoms are aggravated by exertion, standing and walking. He has tried rest, walking, position changes and oral narcotics (Percocet, patient cannot tolerate NSAIDs secondary to Coumadin) for the symptoms. The treatment provided moderate relief.   Atrial Fibrillation   Presents for follow-up visit. Symptoms include hypertension and weakness. Symptoms are negative for bradycardia, chest pain, dizziness, hemodynamic instability, palpitations, shortness of breath, syncope and tachycardia. The symptoms have been stable. Past medical history includes atrial fibrillation. There are no  "medication compliance problems.   Depression   Visit Type: follow-up  Patient presents with the following symptoms: insomnia, muscle tension and nervousness/anxiety.  Patient is not experiencing: anhedonia, decreased concentration, depressed mood, excessive worry, feelings of hopelessness, palpitations, shortness of breath, suicidal ideas, suicidal planning and thoughts of death.  Frequency of symptoms: occasionally   Severity: mild   Sleep quality: fair  Nighttime awakenings: occasional         The following portions of the patient's history were reviewed and updated as appropriate: allergies, current medications, past family history, past medical history, past social history, past surgical history and problem list.        Review of Systems   Constitutional: Negative for activity change, appetite change, chills, diaphoresis, fatigue, fever and unexpected weight change.   Respiratory: Negative for cough, chest tightness and shortness of breath.    Cardiovascular: Negative for chest pain, palpitations, leg swelling and syncope.   Gastrointestinal: Positive for constipation. Negative for abdominal pain, anorexia, blood in stool, nausea and vomiting.   Musculoskeletal: Positive for arthralgias, back pain, gait problem and myalgias.   Neurological: Positive for weakness and numbness. Negative for dizziness, syncope, facial asymmetry, speech difficulty, light-headedness and headaches.   Hematological: Bruises/bleeds easily.   Psychiatric/Behavioral: Positive for dysphoric mood and sleep disturbance (wakes up in the middle of the night secondary to pain). Negative for decreased concentration, self-injury and suicidal ideas. The patient is nervous/anxious and has insomnia.    All other systems reviewed and are negative.      Objective   Blood pressure 142/60, pulse 76, temperature 98.7 °F (37.1 °C), temperature source Oral, resp. rate 18, height 182.9 cm (72\"), weight 81 kg (178 lb 9.6 oz), SpO2 97 %.  Physical Exam "   Constitutional: He is oriented to person, place, and time. Vital signs are normal. He appears well-developed and well-nourished. He is active and cooperative.  Non-toxic appearance. He does not have a sickly appearance. He does not appear ill. No distress.   HENT:   Head: Normocephalic and atraumatic.   Right Ear: External ear normal.   Left Ear: External ear normal.   Nose: Nose normal.   Mouth/Throat: Oropharynx is clear and moist. Abnormal dentition.   Eyes: Conjunctivae and EOM are normal. Right eye exhibits no discharge. Left eye exhibits no discharge.   Neck: No tracheal deviation present.   Cardiovascular: Normal rate and intact distal pulses. An irregularly irregular rhythm present.   Pulmonary/Chest: Effort normal and breath sounds normal. No accessory muscle usage or stridor. No respiratory distress. He has no wheezes. He has no rales.   Abdominal: Soft. Normal appearance and bowel sounds are normal. He exhibits no distension, no fluid wave, no pulsatile midline mass and no mass. There is no tenderness.   Musculoskeletal: He exhibits no edema.        Lumbar back: He exhibits decreased range of motion, tenderness, pain and spasm. He exhibits no bony tenderness.   Significantly limited ROM in right hip, pain with movement, walks with limp favoring right hip, difficulty changing positions and getting onto the exam table.         Neurological: He is alert and oriented to person, place, and time.   Skin: Skin is warm and dry. He is not diaphoretic.   Psychiatric: His behavior is normal. Judgment and thought content normal. His mood appears not anxious. He does not exhibit a depressed mood.   Nursing note and vitals reviewed.    Lab Results (last 24 hours)     Procedure Component Value Units Date/Time    POCT INR [[id number]]  (Abnormal) Collected:  01/06/20 1114    Specimen:  Blood Updated:  01/06/20 1117     INR 1.9     Comment: 23.2             Assessment/Plan   Problems Addressed this Visit         Cardiovascular and Mediastinum    Permanent atrial fibrillation    Relevant Orders    POCT INR (Completed)       Digestive    Slow transit constipation    Gastroesophageal reflux disease without esophagitis       Nervous and Auditory    Chronic hip pain - Primary    Relevant Medications    oxyCODONE-acetaminophen (PERCOCET)  MG per tablet       Hematopoietic and Hemostatic    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    #2 depression/anxiety: chronic, controlled, continue on Lexapro    #3 chronic pain in right hip: Chronic, controlled with oral pain medication.  Patient is unable to take NSAIDs secondary to Coumadin.  Nii reviewed and appropriate.  Controlled contract in the chart.  Refill given on medication.  Sent to pharmacy.  Prescription for muscle relaxers to help with muscle spasms, advised on risk and benefits of this medication.  Return in 1 month     #4 chronic anticoagulation: low INR today. Increase to 8MG - return in 2 weeks for recheck     #5 atrial fibrillation: Patient is anticoagulated and rate controlled. Continue on cardizem.      #6 constipation: chronic, stable. continue on regular use of fiber and stool softeners, advised on diet changes, advised this is due to his medication    #7 GERD: chronic, stable           This document has been electronically signed by Sunita Crawford MD on January 6, 2020 11:38 AM

## 2020-01-20 ENCOUNTER — CLINICAL SUPPORT (OUTPATIENT)
Dept: FAMILY MEDICINE CLINIC | Facility: CLINIC | Age: 80
End: 2020-01-20

## 2020-01-20 DIAGNOSIS — D50.9 IRON DEFICIENCY ANEMIA, UNSPECIFIED IRON DEFICIENCY ANEMIA TYPE: Primary | ICD-10-CM

## 2020-01-20 LAB — INR PPP: 2.1 (ref 0.9–1.1)

## 2020-01-20 PROCEDURE — 85610 PROTHROMBIN TIME: CPT | Performed by: FAMILY MEDICINE

## 2020-01-20 NOTE — PROGRESS NOTES
Pt presents for PT INR   No reports of abnormal bleeding from pt   PT 25.5 sec  INR 2.1  Results sent to Dr.Jessup Castillo recommended pt to continue on current dosage- pt notified via phone- no questions.

## 2020-01-28 ENCOUNTER — HOSPITAL ENCOUNTER (OUTPATIENT)
Dept: GENERAL RADIOLOGY | Facility: HOSPITAL | Age: 80
Discharge: HOME | End: 2020-01-28
Admitting: FAMILY MEDICINE

## 2020-01-28 ENCOUNTER — OFFICE VISIT (OUTPATIENT)
Dept: FAMILY MEDICINE CLINIC | Facility: CLINIC | Age: 80
End: 2020-01-28

## 2020-01-28 VITALS
DIASTOLIC BLOOD PRESSURE: 58 MMHG | HEIGHT: 72 IN | TEMPERATURE: 98 F | BODY MASS INDEX: 23.24 KG/M2 | WEIGHT: 171.6 LBS | SYSTOLIC BLOOD PRESSURE: 124 MMHG | RESPIRATION RATE: 18 BRPM | OXYGEN SATURATION: 94 % | HEART RATE: 55 BPM

## 2020-01-28 DIAGNOSIS — J40 BRONCHITIS: Primary | ICD-10-CM

## 2020-01-28 PROCEDURE — 99213 OFFICE O/P EST LOW 20 MIN: CPT | Performed by: FAMILY MEDICINE

## 2020-01-28 PROCEDURE — 71046 X-RAY EXAM CHEST 2 VIEWS: CPT

## 2020-01-28 RX ORDER — CEFDINIR 300 MG/1
300 CAPSULE ORAL 2 TIMES DAILY
Qty: 14 CAPSULE | Refills: 0 | Status: SHIPPED | OUTPATIENT
Start: 2020-01-28 | End: 2020-02-03

## 2020-01-28 RX ORDER — METHYLPREDNISOLONE 4 MG/1
TABLET ORAL
Qty: 21 TABLET | Refills: 0 | Status: SHIPPED | OUTPATIENT
Start: 2020-01-28 | End: 2020-02-03

## 2020-01-28 NOTE — PROGRESS NOTES
"Subjective cc: CAYETANO Avina is a 79 y.o. male who presents with complaint of cough and congestion.  No fever   No SOA   Has tried cough medication but didn't make much difference.      URI    This is a new problem. The current episode started in the past 7 days. The problem has been unchanged. There has been no fever. Associated symptoms include congestion, coughing, joint pain and rhinorrhea. Pertinent negatives include no abdominal pain, chest pain, diarrhea, dysuria, ear pain, headaches, joint swelling, nausea, neck pain, plugged ear sensation, rash, sinus pain, sneezing, sore throat, swollen glands, vomiting or wheezing. He has tried decongestant and sleep for the symptoms. The treatment provided no relief.        The following portions of the patient's history were reviewed and updated as appropriate: allergies, current medications, past family history, past medical history, past social history, past surgical history and problem list.        Review of Systems   Constitutional: Positive for activity change and fatigue. Negative for chills and fever.   HENT: Positive for congestion and rhinorrhea. Negative for ear pain, sinus pain, sneezing and sore throat.    Respiratory: Positive for cough. Negative for shortness of breath and wheezing.    Cardiovascular: Negative for chest pain.   Gastrointestinal: Negative for abdominal pain, diarrhea, nausea and vomiting.   Genitourinary: Negative for dysuria.   Musculoskeletal: Positive for arthralgias, back pain, gait problem and joint pain. Negative for neck pain.   Skin: Negative for rash.   Neurological: Negative for headaches.   All other systems reviewed and are negative.      Objective   Blood pressure 124/58, pulse 55, temperature 98 °F (36.7 °C), temperature source Oral, resp. rate 18, height 182.9 cm (72\"), weight 77.8 kg (171 lb 9.6 oz), SpO2 94 %.  Physical Exam   Constitutional: He is oriented to person, place, and time. He appears well-developed " and well-nourished. He is cooperative.  Non-toxic appearance. He has a sickly appearance. No distress.   HENT:   Head: Normocephalic and atraumatic.   Right Ear: External ear normal.   Left Ear: External ear normal.   Nose: Nose normal.   Mouth/Throat: Oropharynx is clear and moist. No oropharyngeal exudate.   Eyes: Conjunctivae and EOM are normal. Right eye exhibits no discharge. Left eye exhibits no discharge.   Neck: Normal range of motion. Neck supple. No JVD present. No tracheal deviation present. No thyromegaly present.   Cardiovascular: Normal rate and intact distal pulses.   Pulmonary/Chest: Effort normal and breath sounds normal. No stridor. No respiratory distress. He has no wheezes.   Lymphadenopathy:     He has no cervical adenopathy.   Neurological: He is alert and oriented to person, place, and time.   Skin: Skin is warm and dry. He is not diaphoretic.   Psychiatric: He has a normal mood and affect. His behavior is normal. Judgment and thought content normal.   Nursing note and vitals reviewed.            Assessment/Plan   Problems Addressed this Visit     None      Visit Diagnoses     Bronchitis    -  Primary    Relevant Medications    methylPREDNISolone (MEDROL, ROSE MARY,) 4 MG tablet    cefdinir (OMNICEF) 300 MG capsule            PLAN:     #1 bronchitis: new, CXR negative, pt appears ill, will start on abx and steroid to help clear congestion, advised on warning signs, return if not improving, abx may interfere with INR           This document has been electronically signed by Sunita Crawford MD on January 28, 2020 5:56 PM

## 2020-02-03 ENCOUNTER — OFFICE VISIT (OUTPATIENT)
Dept: FAMILY MEDICINE CLINIC | Facility: CLINIC | Age: 80
End: 2020-02-03

## 2020-02-03 VITALS
TEMPERATURE: 98 F | SYSTOLIC BLOOD PRESSURE: 118 MMHG | RESPIRATION RATE: 18 BRPM | HEIGHT: 72 IN | OXYGEN SATURATION: 97 % | HEART RATE: 58 BPM | DIASTOLIC BLOOD PRESSURE: 64 MMHG | WEIGHT: 169.8 LBS | BODY MASS INDEX: 23 KG/M2

## 2020-02-03 DIAGNOSIS — R63.4 UNINTENTIONAL WEIGHT LOSS: ICD-10-CM

## 2020-02-03 DIAGNOSIS — Z12.5 SCREENING FOR MALIGNANT NEOPLASM OF PROSTATE: ICD-10-CM

## 2020-02-03 DIAGNOSIS — K21.9 GASTROESOPHAGEAL REFLUX DISEASE WITHOUT ESOPHAGITIS: ICD-10-CM

## 2020-02-03 DIAGNOSIS — D50.9 IRON DEFICIENCY ANEMIA, UNSPECIFIED IRON DEFICIENCY ANEMIA TYPE: Primary | ICD-10-CM

## 2020-02-03 DIAGNOSIS — J06.9 URI WITH COUGH AND CONGESTION: ICD-10-CM

## 2020-02-03 PROCEDURE — 99214 OFFICE O/P EST MOD 30 MIN: CPT | Performed by: FAMILY MEDICINE

## 2020-02-03 RX ORDER — AZELASTINE 1 MG/ML
2 SPRAY, METERED NASAL 2 TIMES DAILY
Qty: 30 ML | Refills: 12 | Status: SHIPPED | OUTPATIENT
Start: 2020-02-03 | End: 2020-03-05 | Stop reason: SDUPTHER

## 2020-02-03 RX ORDER — GUAIFENESIN AND CODEINE PHOSPHATE 100; 10 MG/5ML; MG/5ML
10 SOLUTION ORAL 3 TIMES DAILY PRN
Qty: 118 ML | Refills: 0 | Status: SHIPPED | OUTPATIENT
Start: 2020-02-03 | End: 2020-03-05

## 2020-02-03 RX ORDER — PANTOPRAZOLE SODIUM 40 MG/1
40 TABLET, DELAYED RELEASE ORAL DAILY
Qty: 10 TABLET | Refills: 0 | Status: SHIPPED | OUTPATIENT
Start: 2020-02-03 | End: 2020-03-05

## 2020-02-03 NOTE — PROGRESS NOTES
Subjective cc: cough   Cabrera Avina is a 79 y.o. male who presents for follow up on his recent URI.  He was having a lot of cough, congestion and SOA. His CXR was negative for acute infection, he appeared ill and was started on PO steroids and abx. Pt has completed course. He is overall feeling much better. He is not having the cough. However he is still having a lot of nasal drainage. He tried some of his wifes cough medication which did really help him. He continues to lose weight unintentionally - does not really eat well but that is not new. He has follow up with oncology next week. He does report an episode of dark stool x 1.      URI    This is a new problem. The current episode started 1 to 4 weeks ago. The problem has been gradually improving. There has been no fever. Associated symptoms include congestion, coughing, joint pain and rhinorrhea. Pertinent negatives include no abdominal pain, chest pain, diarrhea, dysuria, ear pain, headaches, joint swelling, nausea, neck pain, plugged ear sensation, rash, sinus pain, sneezing, sore throat, swollen glands, vomiting or wheezing. He has tried acetaminophen, decongestant, increased fluids and sleep (steroids, abx ) for the symptoms. The treatment provided mild relief.        The following portions of the patient's history were reviewed and updated as appropriate: allergies, current medications, past family history, past medical history, past social history, past surgical history and problem list.        Review of Systems   Constitutional: Positive for fatigue and unexpected weight change. Negative for activity change, chills, diaphoresis and fever.   HENT: Positive for congestion and rhinorrhea. Negative for ear pain, sinus pain, sneezing and sore throat.    Respiratory: Positive for cough. Negative for shortness of breath and wheezing.    Cardiovascular: Negative for chest pain.   Gastrointestinal: Negative for abdominal pain, diarrhea, nausea and  "vomiting.        Dark stools    Genitourinary: Negative for dysuria.   Musculoskeletal: Positive for joint pain. Negative for neck pain.   Skin: Negative for rash.   Neurological: Negative for headaches.   All other systems reviewed and are negative.      Objective   Blood pressure 118/64, pulse 58, temperature 98 °F (36.7 °C), temperature source Oral, resp. rate 18, height 182.9 cm (72\"), weight 77 kg (169 lb 12.8 oz), SpO2 97 %.  Physical Exam   Constitutional: He is oriented to person, place, and time. He appears well-developed and well-nourished. He is cooperative.  Non-toxic appearance. He has a sickly appearance. No distress.   HENT:   Head: Normocephalic and atraumatic.   Right Ear: External ear normal.   Left Ear: External ear normal.   Nose: Nose normal.   Mouth/Throat: Oropharynx is clear and moist. No oropharyngeal exudate.   Eyes: Conjunctivae and EOM are normal. Right eye exhibits no discharge. Left eye exhibits no discharge.   Neck: Normal range of motion. Neck supple. No JVD present. No tracheal deviation present. No thyromegaly present.   Cardiovascular: Normal rate and intact distal pulses.   Pulmonary/Chest: Effort normal and breath sounds normal. No stridor. No respiratory distress. He has no wheezes.   Abdominal: Soft. Bowel sounds are normal. He exhibits no distension. There is no tenderness.   Lymphadenopathy:     He has no cervical adenopathy.   Neurological: He is alert and oriented to person, place, and time.   Skin: Skin is warm and dry. He is not diaphoretic.   Psychiatric: He has a normal mood and affect. His behavior is normal. Judgment and thought content normal.   Nursing note and vitals reviewed.      Assessment/Plan   Problems Addressed this Visit        Digestive    Gastroesophageal reflux disease without esophagitis    Relevant Medications    pantoprazole (PROTONIX) 40 MG EC tablet       Hematopoietic and Hemostatic    Iron deficiency anemia - Primary    Relevant Orders    CBC & " Differential      Other Visit Diagnoses     Unintentional weight loss        Relevant Orders    CBC & Differential    Comprehensive Metabolic Panel    TSH    T4, Free    CT Chest Without Contrast    CT Abdomen Pelvis With Contrast    Screening for malignant neoplasm of prostate        Relevant Orders    PSA Screen    URI with cough and congestion        Relevant Medications    azelastine (ASTELIN) 0.1 % nasal spray    guaiFENesin-codeine (GUAIFENESIN AC) 100-10 MG/5ML liquid        PLAN:     #1 URI: new, improving, pt has completed course of abx and his symptoms have improved. Advised on conservative measures with nasal spray and cough medication. Advised on warning signs and need to return if not improving     #2 unintentional weight loss: new, will get imaging for further eval, keep appt with oncology as scheduled     #3 GERD: chronic, pt recently on steroids which can irritate the stomach, morena since pt on blood thinners, will start on PPI, will check CBC, advised on warning signs of GI bleed           This document has been electronically signed by Sunita Crawford MD on February 3, 2020 3:16 PM

## 2020-02-04 LAB
ALBUMIN SERPL-MCNC: 4.5 G/DL (ref 3.7–4.7)
ALBUMIN/GLOB SERPL: 1.4 {RATIO} (ref 1.2–2.2)
ALP SERPL-CCNC: 79 IU/L (ref 39–117)
ALT SERPL-CCNC: 24 IU/L (ref 0–44)
AST SERPL-CCNC: 23 IU/L (ref 0–40)
BASOPHILS # BLD AUTO: 0 X10E3/UL (ref 0–0.2)
BASOPHILS NFR BLD AUTO: 0 %
BILIRUB SERPL-MCNC: 0.5 MG/DL (ref 0–1.2)
BUN SERPL-MCNC: 23 MG/DL (ref 8–27)
BUN/CREAT SERPL: 20 (ref 10–24)
CALCIUM SERPL-MCNC: 9.7 MG/DL (ref 8.6–10.2)
CHLORIDE SERPL-SCNC: 97 MMOL/L (ref 96–106)
CO2 SERPL-SCNC: 24 MMOL/L (ref 20–29)
CREAT SERPL-MCNC: 1.16 MG/DL (ref 0.76–1.27)
EOSINOPHIL # BLD AUTO: 0.2 X10E3/UL (ref 0–0.4)
EOSINOPHIL NFR BLD AUTO: 2 %
ERYTHROCYTE [DISTWIDTH] IN BLOOD BY AUTOMATED COUNT: 13.4 % (ref 11.6–15.4)
GLOBULIN SER CALC-MCNC: 3.3 G/DL (ref 1.5–4.5)
GLUCOSE SERPL-MCNC: 86 MG/DL (ref 65–99)
HCT VFR BLD AUTO: 50.6 % (ref 37.5–51)
HGB BLD-MCNC: 16.4 G/DL (ref 13–17.7)
IMM GRANULOCYTES # BLD AUTO: 0.1 X10E3/UL (ref 0–0.1)
IMM GRANULOCYTES NFR BLD AUTO: 1 %
LYMPHOCYTES # BLD AUTO: 1.5 X10E3/UL (ref 0.7–3.1)
LYMPHOCYTES NFR BLD AUTO: 15 %
MCH RBC QN AUTO: 30.5 PG (ref 26.6–33)
MCHC RBC AUTO-ENTMCNC: 32.4 G/DL (ref 31.5–35.7)
MCV RBC AUTO: 94 FL (ref 79–97)
MONOCYTES # BLD AUTO: 1.2 X10E3/UL (ref 0.1–0.9)
MONOCYTES NFR BLD AUTO: 12 %
NEUTROPHILS # BLD AUTO: 7.1 X10E3/UL (ref 1.4–7)
NEUTROPHILS NFR BLD AUTO: 70 %
PLATELET # BLD AUTO: 293 X10E3/UL (ref 150–450)
POTASSIUM SERPL-SCNC: 4.9 MMOL/L (ref 3.5–5.2)
PROT SERPL-MCNC: 7.8 G/DL (ref 6–8.5)
PSA SERPL-MCNC: 1.8 NG/ML (ref 0–4)
RBC # BLD AUTO: 5.37 X10E6/UL (ref 4.14–5.8)
SODIUM SERPL-SCNC: 140 MMOL/L (ref 134–144)
T4 FREE SERPL-MCNC: 1.43 NG/DL (ref 0.82–1.77)
TSH SERPL DL<=0.005 MIU/L-ACNC: 1.11 UIU/ML (ref 0.45–4.5)
WBC # BLD AUTO: 10.1 X10E3/UL (ref 3.4–10.8)

## 2020-02-06 ENCOUNTER — OFFICE VISIT (OUTPATIENT)
Dept: FAMILY MEDICINE CLINIC | Facility: CLINIC | Age: 80
End: 2020-02-06

## 2020-02-06 VITALS
HEART RATE: 73 BPM | HEIGHT: 72 IN | SYSTOLIC BLOOD PRESSURE: 132 MMHG | WEIGHT: 171 LBS | BODY MASS INDEX: 23.16 KG/M2 | OXYGEN SATURATION: 97 % | RESPIRATION RATE: 18 BRPM | DIASTOLIC BLOOD PRESSURE: 64 MMHG | TEMPERATURE: 98.1 F

## 2020-02-06 DIAGNOSIS — K59.01 SLOW TRANSIT CONSTIPATION: ICD-10-CM

## 2020-02-06 DIAGNOSIS — F41.9 ANXIETY: ICD-10-CM

## 2020-02-06 DIAGNOSIS — G47.09 OTHER INSOMNIA: ICD-10-CM

## 2020-02-06 DIAGNOSIS — C85.90 LYMPHOMA IN REMISSION (HCC): ICD-10-CM

## 2020-02-06 DIAGNOSIS — G89.29 CHRONIC PAIN OF RIGHT HIP: ICD-10-CM

## 2020-02-06 DIAGNOSIS — M25.551 CHRONIC PAIN OF RIGHT HIP: ICD-10-CM

## 2020-02-06 DIAGNOSIS — I48.21 PERMANENT ATRIAL FIBRILLATION (HCC): Primary | ICD-10-CM

## 2020-02-06 DIAGNOSIS — Z79.01 CHRONIC ANTICOAGULATION: ICD-10-CM

## 2020-02-06 DIAGNOSIS — K21.9 GASTROESOPHAGEAL REFLUX DISEASE WITHOUT ESOPHAGITIS: ICD-10-CM

## 2020-02-06 PROBLEM — D64.9 ANEMIA: Status: RESOLVED | Noted: 2019-11-15 | Resolved: 2020-02-06

## 2020-02-06 LAB — INR PPP: 3.3 (ref 0.9–1.1)

## 2020-02-06 PROCEDURE — 99214 OFFICE O/P EST MOD 30 MIN: CPT | Performed by: FAMILY MEDICINE

## 2020-02-06 PROCEDURE — 85610 PROTHROMBIN TIME: CPT | Performed by: FAMILY MEDICINE

## 2020-02-06 RX ORDER — OXYCODONE AND ACETAMINOPHEN 10; 325 MG/1; MG/1
1 TABLET ORAL EVERY 6 HOURS PRN
Qty: 100 TABLET | Refills: 0 | Status: SHIPPED | OUTPATIENT
Start: 2020-02-06 | End: 2020-03-05 | Stop reason: SDUPTHER

## 2020-02-06 NOTE — PROGRESS NOTES
Subjective cc: pain medication refill  Cabrera Avina is a 79 y.o. male who presents to get a refill on his pain medication.   Pain in his right hip.  Pain is the same as it usually is today.  He has not been taking his medication as often since he has been sick.  Taking the muscle relaxer PRN.  Pain improved with pain medication without side effects.  He reports significant improvement with muscle relaxer.       He continues to have constipation. He is using OTC constipation medication. Took miralax today - has not had a BM in 4-5 days. Last BM was dark and he had been on abx and steroid recently so started him on PPI.   CBC, CMP, TSH, and PSA all returned normal.   Still feels weak, cough is better.   Has appt for CT on 2/10/20  appt with oncology 2/17/20  Depression and anxiety stable - taking lexapro.   BP controlled.      Patient INR is high today 3.3.  Patient denies any bleeding at this time.       Atrial fib is rate controlled.     He is currently taking restoril PRN for insomnia which controls his symptoms and allows him to get restful sleep.  Refill not needed.     Past information reviewed and updated as appropriate     Pain   This is a chronic problem. The current episode started more than 1 year ago. The problem occurs constantly. The problem has been unchanged. Associated symptoms include arthralgias, myalgias, numbness and weakness. Pertinent negatives include no abdominal pain, anorexia, chest pain, chills, coughing, diaphoresis, fatigue, fever, headaches, nausea or vomiting. The symptoms are aggravated by exertion, standing and walking. He has tried rest, walking, position changes and oral narcotics (Percocet, patient cannot tolerate NSAIDs secondary to Coumadin) for the symptoms. The treatment provided moderate relief.   Atrial Fibrillation   Presents for follow-up visit. Symptoms include hypertension and weakness. Symptoms are negative for bradycardia, chest pain, dizziness, hemodynamic  instability, palpitations, shortness of breath, syncope and tachycardia. The symptoms have been stable. Past medical history includes atrial fibrillation. There are no medication compliance problems.   Depression   Visit Type: follow-up  Patient presents with the following symptoms: insomnia, muscle tension and nervousness/anxiety.  Patient is not experiencing: anhedonia, decreased concentration, depressed mood, excessive worry, feelings of hopelessness, palpitations, shortness of breath, suicidal ideas, suicidal planning and thoughts of death.  Frequency of symptoms: occasionally   Severity: mild   Sleep quality: fair  Nighttime awakenings: occasional         The following portions of the patient's history were reviewed and updated as appropriate: allergies, current medications, past family history, past medical history, past social history, past surgical history and problem list.        Review of Systems   Constitutional: Negative for activity change, appetite change, chills, diaphoresis, fatigue, fever and unexpected weight change.   Respiratory: Negative for cough, chest tightness and shortness of breath.    Cardiovascular: Negative for chest pain, palpitations, leg swelling and syncope.   Gastrointestinal: Positive for constipation. Negative for abdominal pain, anorexia, blood in stool, nausea and vomiting.   Musculoskeletal: Positive for arthralgias, back pain, gait problem and myalgias.   Neurological: Positive for weakness and numbness. Negative for dizziness, syncope, facial asymmetry, speech difficulty, light-headedness and headaches.   Hematological: Bruises/bleeds easily.   Psychiatric/Behavioral: Positive for dysphoric mood and sleep disturbance (wakes up in the middle of the night secondary to pain). Negative for decreased concentration, self-injury and suicidal ideas. The patient is nervous/anxious and has insomnia.    All other systems reviewed and are negative.      Objective   Blood pressure  "132/64, pulse 73, temperature 98.1 °F (36.7 °C), temperature source Oral, resp. rate 18, height 182.9 cm (72\"), weight 77.6 kg (171 lb), SpO2 97 %.  Physical Exam   Constitutional: He is oriented to person, place, and time. Vital signs are normal. He appears well-developed and well-nourished. He is active and cooperative.  Non-toxic appearance. He does not have a sickly appearance. He does not appear ill. No distress.   HENT:   Head: Normocephalic and atraumatic.   Right Ear: External ear normal.   Left Ear: External ear normal.   Nose: Nose normal.   Mouth/Throat: Oropharynx is clear and moist. Abnormal dentition.   Eyes: Conjunctivae and EOM are normal. Right eye exhibits no discharge. Left eye exhibits no discharge.   Neck: No tracheal deviation present.   Cardiovascular: Normal rate and intact distal pulses. An irregularly irregular rhythm present.   Pulmonary/Chest: Effort normal and breath sounds normal. No accessory muscle usage or stridor. No respiratory distress. He has no wheezes. He has no rales.   Abdominal: Soft. Normal appearance and bowel sounds are normal. He exhibits no distension, no fluid wave, no pulsatile midline mass and no mass. There is no tenderness.   Musculoskeletal: He exhibits no edema.        Lumbar back: He exhibits decreased range of motion, tenderness, pain and spasm. He exhibits no bony tenderness.   Significantly limited ROM in right hip, pain with movement, walks with limp favoring right hip, difficulty changing positions and getting onto the exam table.         Neurological: He is alert and oriented to person, place, and time.   Skin: Skin is warm and dry. He is not diaphoretic.   Psychiatric: His behavior is normal. Judgment and thought content normal. His mood appears not anxious. He does not exhibit a depressed mood.   Nursing note and vitals reviewed.    Lab Results (last 24 hours)     Procedure Component Value Units Date/Time    POCT INR [[id number]]  (Abnormal) Collected:  " 02/06/20 1123    Specimen:  Blood Updated:  02/06/20 1123     INR 3.3     Comment: 39.3             Assessment/Plan   Problems Addressed this Visit        Cardiovascular and Mediastinum    Permanent atrial fibrillation - Primary    Relevant Orders    POCT INR (Completed)       Digestive    Slow transit constipation    Gastroesophageal reflux disease without esophagitis       Nervous and Auditory    Chronic hip pain    Relevant Medications    oxyCODONE-acetaminophen (PERCOCET)  MG per tablet       Hematopoietic and Hemostatic    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    #2 depression/anxiety: chronic, controlled, continue on Lexapro    #3 chronic pain in right hip: Chronic, controlled with oral pain medication.  Patient is unable to take NSAIDs secondary to Coumadin.  Nii reviewed and appropriate.  Controlled contract in the chart.  Refill given on medication.  Sent to pharmacy.  Prescription for muscle relaxers to help with muscle spasms, advised on risk and benefits of this medication.  Return in 1 month     #4 chronic anticoagulation: high INR today. Hold x 48 hours and then resume regimen.      #5 atrial fibrillation: Patient is anticoagulated and rate controlled. Continue on cardizem.      #6 constipation: chronic, stable. continue on regular use of fiber and stool softeners, advised on diet changes, advised this is due to his medication    #7 GERD: chronic, stable           This document has been electronically signed by Sunita Crawford MD on February 6, 2020 12:26 PM

## 2020-02-07 ENCOUNTER — LAB (OUTPATIENT)
Dept: LAB | Facility: HOSPITAL | Age: 80
End: 2020-02-07

## 2020-02-07 DIAGNOSIS — D72.819 LEUKOPENIA, UNSPECIFIED TYPE: ICD-10-CM

## 2020-02-07 DIAGNOSIS — D64.9 ANEMIA, UNSPECIFIED TYPE: ICD-10-CM

## 2020-02-07 DIAGNOSIS — C85.90 LYMPHOMA IN REMISSION (HCC): ICD-10-CM

## 2020-02-07 LAB
ALBUMIN SERPL-MCNC: 4.1 G/DL (ref 3.5–5.2)
ALBUMIN/GLOB SERPL: 0.9 G/DL
ALP SERPL-CCNC: 78 U/L (ref 39–117)
ALT SERPL W P-5'-P-CCNC: 15 U/L (ref 1–41)
ANION GAP SERPL CALCULATED.3IONS-SCNC: 11 MMOL/L (ref 5–15)
AST SERPL-CCNC: 17 U/L (ref 1–40)
BASOPHILS # BLD AUTO: 0.03 10*3/MM3 (ref 0–0.2)
BASOPHILS NFR BLD AUTO: 0.4 % (ref 0–1.5)
BILIRUB SERPL-MCNC: 1.1 MG/DL (ref 0.2–1.2)
BUN BLD-MCNC: 17 MG/DL (ref 8–23)
BUN/CREAT SERPL: 19.3 (ref 7–25)
CALCIUM SPEC-SCNC: 9.5 MG/DL (ref 8.6–10.5)
CHLORIDE SERPL-SCNC: 98 MMOL/L (ref 98–107)
CO2 SERPL-SCNC: 28 MMOL/L (ref 22–29)
CREAT BLD-MCNC: 0.88 MG/DL (ref 0.76–1.27)
DEPRECATED RDW RBC AUTO: 46.1 FL (ref 37–54)
EOSINOPHIL # BLD AUTO: 0.13 10*3/MM3 (ref 0–0.4)
EOSINOPHIL NFR BLD AUTO: 1.6 % (ref 0.3–6.2)
ERYTHROCYTE [DISTWIDTH] IN BLOOD BY AUTOMATED COUNT: 13.5 % (ref 12.3–15.4)
FERRITIN SERPL-MCNC: 902 NG/ML (ref 30–400)
GFR SERPL CREATININE-BSD FRML MDRD: 84 ML/MIN/1.73
GLOBULIN UR ELPH-MCNC: 4.4 GM/DL
GLUCOSE BLD-MCNC: 92 MG/DL (ref 65–99)
HCT VFR BLD AUTO: 47.5 % (ref 37.5–51)
HGB BLD-MCNC: 15.6 G/DL (ref 13–17.7)
IMM GRANULOCYTES # BLD AUTO: 0.05 10*3/MM3 (ref 0–0.05)
IMM GRANULOCYTES NFR BLD AUTO: 0.6 % (ref 0–0.5)
IRON 24H UR-MRATE: 68 MCG/DL (ref 59–158)
IRON SATN MFR SERPL: 25 % (ref 20–50)
LDH SERPL-CCNC: 164 U/L (ref 135–225)
LYMPHOCYTES # BLD AUTO: 0.88 10*3/MM3 (ref 0.7–3.1)
LYMPHOCYTES NFR BLD AUTO: 10.5 % (ref 19.6–45.3)
MCH RBC QN AUTO: 30.5 PG (ref 26.6–33)
MCHC RBC AUTO-ENTMCNC: 32.8 G/DL (ref 31.5–35.7)
MCV RBC AUTO: 92.8 FL (ref 79–97)
MONOCYTES # BLD AUTO: 1.24 10*3/MM3 (ref 0.1–0.9)
MONOCYTES NFR BLD AUTO: 14.8 % (ref 5–12)
NEUTROPHILS # BLD AUTO: 6.03 10*3/MM3 (ref 1.7–7)
NEUTROPHILS NFR BLD AUTO: 72.1 % (ref 42.7–76)
NRBC BLD AUTO-RTO: 0 /100 WBC (ref 0–0.2)
PLATELET # BLD AUTO: 244 10*3/MM3 (ref 140–450)
PMV BLD AUTO: 10.1 FL (ref 6–12)
POTASSIUM BLD-SCNC: 3.9 MMOL/L (ref 3.5–5.2)
PROT SERPL-MCNC: 8.5 G/DL (ref 6–8.5)
RBC # BLD AUTO: 5.12 10*6/MM3 (ref 4.14–5.8)
SODIUM BLD-SCNC: 137 MMOL/L (ref 136–145)
TIBC SERPL-MCNC: 274 MCG/DL (ref 298–536)
TRANSFERRIN SERPL-MCNC: 184 MG/DL (ref 200–360)
WBC NRBC COR # BLD: 8.36 10*3/MM3 (ref 3.4–10.8)

## 2020-02-07 PROCEDURE — 85025 COMPLETE CBC W/AUTO DIFF WBC: CPT

## 2020-02-07 PROCEDURE — 83615 LACTATE (LD) (LDH) ENZYME: CPT

## 2020-02-07 PROCEDURE — 82607 VITAMIN B-12: CPT

## 2020-02-07 PROCEDURE — 82746 ASSAY OF FOLIC ACID SERUM: CPT

## 2020-02-07 PROCEDURE — 80053 COMPREHEN METABOLIC PANEL: CPT

## 2020-02-07 PROCEDURE — 82232 ASSAY OF BETA-2 PROTEIN: CPT

## 2020-02-07 PROCEDURE — 83540 ASSAY OF IRON: CPT

## 2020-02-07 PROCEDURE — 82728 ASSAY OF FERRITIN: CPT

## 2020-02-07 PROCEDURE — 84466 ASSAY OF TRANSFERRIN: CPT

## 2020-02-07 PROCEDURE — 36415 COLL VENOUS BLD VENIPUNCTURE: CPT

## 2020-02-08 LAB
B2 MICROGLOB SERPL-MCNC: 2.5 MG/L (ref 0.8–2.2)
FOLATE SERPL-MCNC: 12.9 NG/ML (ref 4.78–24.2)
VIT B12 BLD-MCNC: 494 PG/ML (ref 211–946)

## 2020-02-10 ENCOUNTER — APPOINTMENT (OUTPATIENT)
Dept: LAB | Facility: HOSPITAL | Age: 80
End: 2020-02-10

## 2020-02-10 ENCOUNTER — HOSPITAL ENCOUNTER (OUTPATIENT)
Dept: CT IMAGING | Facility: HOSPITAL | Age: 80
Discharge: HOME | End: 2020-02-10
Admitting: FAMILY MEDICINE

## 2020-02-10 DIAGNOSIS — R63.4 UNINTENTIONAL WEIGHT LOSS: ICD-10-CM

## 2020-02-10 LAB — CREAT BLDA-MCNC: 0.7 MG/DL (ref 0.6–1.3)

## 2020-02-10 PROCEDURE — 25010000002 IOPAMIDOL 61 % SOLUTION: Performed by: FAMILY MEDICINE

## 2020-02-10 PROCEDURE — 82565 ASSAY OF CREATININE: CPT

## 2020-02-10 PROCEDURE — 74177 CT ABD & PELVIS W/CONTRAST: CPT

## 2020-02-10 PROCEDURE — 71250 CT THORAX DX C-: CPT

## 2020-02-10 RX ADMIN — IOPAMIDOL 50 ML: 612 INJECTION, SOLUTION INTRAVENOUS at 10:04

## 2020-02-10 RX ADMIN — IOPAMIDOL 100 ML: 612 INJECTION, SOLUTION INTRAVENOUS at 10:04

## 2020-02-11 ENCOUNTER — TELEPHONE (OUTPATIENT)
Dept: FAMILY MEDICINE CLINIC | Facility: CLINIC | Age: 80
End: 2020-02-11

## 2020-02-13 DIAGNOSIS — N28.1 RENAL CYST: Primary | ICD-10-CM

## 2020-02-14 NOTE — PROGRESS NOTES
[facility] HEMATOLOGY AND ONCOLOGY  [address]  [facility] [address] [phone]    Patient Name: Cabrera Avina  Encounter Date: 02/17/2020  YOB: 1940  Patient Number: [id number]    REASON FOR FOLLOWUP:  Mr. Cabrera Avina is a 79-year-old male who returns in follow-up of intermediate grade B-cell lymphoma.  He is seen 175 months following the completion of R-CHOP chemotherapy and 123 months after the completion of Rituxan maintenance.  He is also followed for an iron deficiency anemia and for chronic anticoagulation. He is seen 3 months after the most recent dose of Injectafer.  The patient is here alone. History is obtained from the patient who is considered to be a reliable historian.     DIAGNOSTIC ABNORMALITIES: B-cell Lymphoma.   1. Periportal lymph node biopsy, 02/01/2005. Findings consistent with large B-cell lymphoma with an intermediate to high proliferative rate.  2. CT of the chest, 02/03/2005. Mediastinal, left hilar, and upper abdominal lymphadenopathy consistent with the patient's history of lymphoma.    PREVIOUS INTERVENTIONS: B-cell lymphoma   1. Definitive Rituxan, 375 mg/m2, 02/05/2005 through 02/25/2005. Four weeks completed.  2. Definitive R-CHOP, from 01/04/2005 through 07/07/2005. Five cycles completed. Cycle #2 delayed by admission for management of deep perirectal abscess.  3. Maintenance Rituxan (every 3 months), 10/14/2005 through 11/11/2007. Six cycles completed.    DIAGNOSTIC ABNORMALITIES: Anemia, iron deficiency   1. Anemia substrates on 05/27/2008: Iron 43, %saturation 12, TIBC 366, UIBC 323, ferritin 27, vitamin B12 of 295, folate 7.2.    PREVIOUS INTERVENTIONS: Anemia.   1. INFeD, 1000 mg IVPB, 06/04/2008.  2. INFeD, 1000 mg IVPB, 09/02/2010 and 09/09/2010.  3. INFeD 500 mg, 08/25/2016; 11/28/2016.  4. Injectafer 750 mg IV, 11/20/2019    Problem List Items Addressed This Visit         Immune and Lymphatic    Leukopenia       Hematopoietic and Hemostatic    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) - Primary        Oncology/Hematology History    DIAGNOSTIC ABNORMALITIES: B-cell Lymphoma.  Periportal lymph node biopsy, 02/01/2005.  Findings consistent with large B-cell lymphoma with an intermediate to high proliferative rate.  CT of the chest, 02/03/2005.   Mediastinal, left hilar, and upper abdominal lymphadenopathy consistent with the patient's history of lymphoma.  PREVIOUS INTERVENTIONS:   B-cell lymphoma  Definitive Rituxan, 375 mg/m2, 02/05/2005 through 02/25/2005. Four weeks completed.  Definitive R-CHOP, from 01/04/2005 through 07/07/2005.  Five cycles completed.  Cycle #2 delayed by admission for management of deep perirectal abscess.  Maintenance Rituxan (every 3 months), 10/14/2005 through 11/11/2007.  Six cycles completed.  DIAGNOSTIC ABNORMALITIES:   Anemia, iron deficiency  Anemia substrates on 05/27/2008: Iron 43, %saturation 12, TIBC 366, UIBC 323, ferritin 27, vitamin B12 of 295, folate 7.2.  PREVIOUS INTERVENTIONS:   Anemia.  INFeD, 1000 mg IVPB, 06/04/2008.  INFeD, 1000 mg IVPB, 09/02/2010 and 09/09/2010.  INFeD 500 mg, 08/25/2016; 11/28/2016.        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)    4/28/2016 Initial Diagnosis     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)         PAST MEDICAL HISTORY:  ALLERGIES:  No Known Allergies  CURRENT MEDICATIONS:  Outpatient Encounter Medications as of 2/17/2020   Medication Sig Dispense Refill   • aspirin 81 MG EC tablet Take 81 mg by mouth daily.     • azelastine (ASTELIN) 0.1 % nasal spray 2 sprays into the nostril(s) as directed by provider 2 (Two) Times a Day. Use in each nostril as directed 30 mL 12   • cyclobenzaprine (FLEXERIL) 10 MG tablet Take 1 tablet by mouth 3 (Three) Times a Day As Needed for Muscle Spasms. 90 tablet 0   • digoxin (LANOXIN) 250 MCG tablet Take 1 tablet by mouth Daily. 90 tablet 3   • diltiaZEM CD (CARDIZEM CD) 240 MG 24 hr capsule TAKE ONE  CAPSULE BY MOUTH DAILY 90 capsule 3   • escitalopram (LEXAPRO) 20 MG tablet Take 1 tablet by mouth Daily. (Patient taking differently: Take 20 mg by mouth As Needed.) 90 tablet 3   • guaiFENesin-codeine (GUAIFENESIN AC) 100-10 MG/5ML liquid Take 10 mL by mouth 3 (Three) Times a Day As Needed for Cough. 118 mL 0   • oxyCODONE-acetaminophen (PERCOCET)  MG per tablet Take 1 tablet by mouth Every 6 (Six) Hours As Needed for Severe Pain . Must last 30 days 100 tablet 0   • pantoprazole (PROTONIX) 40 MG EC tablet Take 1 tablet by mouth Daily. 10 tablet 0   • polyethylene glycol (MIRALAX) packet Take 17 g by mouth Daily. (Patient taking differently: Take 17 g by mouth Daily As Needed.) 100 packet 3   • temazepam (RESTORIL) 15 MG capsule Take 1 capsule by mouth At Night As Needed for Sleep. 30 capsule 2   • warfarin (COUMADIN) 2 MG tablet Take 3.5 tablets by mouth Daily. 7MG, 7MG, 8MG, then repeat 360 tablet 5     No facility-administered encounter medications on file as of 2/17/2020.      ADULT ILLNESSES:   History of malignant lymphoma (situation) ( ICD-10:Z85.72 ;Personal history of non-Hodgkin lymphomas   Adenomatous colonic polyps ( ICD-10:D12.6 ;Benign neoplasm of colon, unspecified   Anemia ( ICD-10:D64.9 ;Anemia, unspecified   Anxiety ( chronic; ICD-10:F41.9 ;Anxiety disorder, unspecified )   Atrial fibrillation ( on chronic anticoagulation; ICD-10:I48.91 ;Unspecified atrial fibrillation )   Degenerative joint disease ( ICD-10:M16.10 ;Unilateral primary osteoarthritis, unspecified hip   Drug intolerance ( oral iron; ICD-10:T45.4x5D ;Adverse effect of iron and its compounds, subsequent encounter )   Hypertension ( ICD-10:I10 ;Essential (primary) hypertension   Iron deficiency anemia ( ICD-10:D50.9 ;Iron deficiency anemia, unspecified   Microscopic hematuria ( ICD-10:R31.29 ;Other microscopic hematuria   Nephrolithiasis ( ICD-10:N20.0 ;Calculus of kidney   Neuropathy ( ICD-10:G62.9 ;Polyneuropathy, unspecified    Orthostatic hypotension ( ICD-10:I95.1 ;Orthostatic hypotension   Perirectal abscess ( deep, severe; ICD-10:K61.1 ;Rectal abscess )   Polycystic kidney disease ( ICD-10:Q61.3 ;Polycystic kidney, unspecified   Vitamin B12 deficiency ( ICD-10:E53.8 ;Deficiency of other specified B group vitamins    SURGERIES:   Punch biopsy of skin lesion, right lower lip, 01/21/2015: 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 Dr. Hatch   Drainage of abscess, recurrent rectal abscess, 06/01/2006   Laparoscopic cholecystectomy and lymph node biopsy   Hip replacement, right, 06/15/2010      ADULT ILLNESSES:  Patient Active Problem List   Diagnosis Code   • Slow transit constipation K59.01   • Gastroesophageal reflux disease without esophagitis K21.9   • 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[facility]) C85.90   • Anxiety F41.9   • Chronic hip pain M25.559, G89.29   • Permanent atrial fibrillation I48.21   • Other insomnia G47.09   • Adenomatous polyp of colon D12.6   • Tobacco use Z72.0   • Chronic anticoagulation Z79.01   • PAD (peripheral artery disease) (CMS/[facility]) I73.9   • Congestive heart failure (CMS/[facility]) I50.9   • BMI 24.0-24.9, adult Z68.24   • Leukopenia D72.819   • Iron deficiency anemia D50.9     SURGERIES:  Past Surgical History:   Procedure Laterality Date   • CHOLECYSTECTOMY     • COLONOSCOPY  05/2012    3 yr recall   • COLONOSCOPY  09/18/2015    5 yr recall   • KIDNEY STONE SURGERY     • RECTAL SURGERY      X 2   • TOTAL HIP ARTHROPLASTY       HEALTH MAINTENANCE ITEMS:  Health Maintenance Due   Topic Date Due   • Pneumococcal Vaccine Once at 65 Years Old  05/11/2006   • ZOSTER VACCINE (2 of 2) 02/13/2017       <no information>  Last Completed Colonoscopy       Status Date      COLONOSCOPY Done 12/1/2015      Patient has more history with this topic...        Immunization History   Administered Date(s) Administered   • Fluad Quad  "11/05/2019   • Pneumococcal Conjugate 13-Valent (PCV13) 11/05/2019     Last Completed Mammogram     Patient has no health maintenance due at this time            FAMILY HISTORY:  Family History   Problem Relation Age of Onset   • Colon cancer Mother    • No Known Problems Father    • Prostate cancer Brother    • No Known Problems Daughter    • No Known Problems Son    • No Known Problems Maternal Grandmother    • No Known Problems Maternal Grandfather    • No Known Problems Paternal Grandmother    • No Known Problems Paternal Grandfather    • Prostate cancer Brother    • Colon polyps Neg Hx      SOCIAL HISTORY:  Social History     Socioeconomic History   • Marital status:      Spouse name: Not on file   • Number of children: Not on file   • Years of education: Not on file   • Highest education level: Not on file   Tobacco Use   • Smoking status: Never Smoker   • Smokeless tobacco: Never Used   Substance and Sexual Activity   • Alcohol use: No   • Drug use: No   • Sexual activity: Defer       REVIEW OF SYSTEMS:  PS:  ECOG 1-2.   Constitutional:  His appetite is fair. \"I eat\"  His energy remains low to fair.  \"I still get worn out easy.\"  He is as active and manages all his ADLs including chores, running errands, and driving.  Says his hip and lower back pains still inhibit his activities, this in spite of hip replacement.  Says he has not been walking his usual 1/2 mile to 1 mile due to 2 week viral URI.  \"Congestion and crud in my head.\" Says he has completed a course of antibiotics by Dr. Crawford.  He has no fevers, chills, or drenching night sweats.  He has lost 2 pounds (in addition to 5 pounds at his prior visit - had gained 3.9 pounds at his visit prior to that) over the last 3 months. Still says he wants to stay around 175-180 pounds.  He sleeps restlessly, waking frequently.  Ear/Nose/Throat/Mouth:   The patient reports no ear pains, but has lingering sinus symptoms, but no sore throat, nosebleeds.  No " "headaches. The patient denies any hoarseness, change in voice quality, or hemoptysis.   Ocular:   The patient reports no eye pain, significant change in visual acuity, double vision, or blurry vision.   Respiratory:  He reports no chronic cough, shortness of breathing, phlegm production, or chest wall pain.  Cardiovascular:  He reports no exertional chest pain, chest pressure, or chest heaviness.  He reports no claudication. He reports no palpitations or symptomatic orthostasis.  Gastrointestinal:  He has no pica, dysphagia, nausea, vomiting, postprandial abdominal pain, bloating, cramping, change in bowel habits, or discoloration of the stool.  He has had no rectal bleeding.  He has intermittent bouts of constipation modulated by daily stool softeners (Dulcolax).  Says he is still using Metamucil daily.  Says he moves his bowels every 3-4 days, \"been like that since chemo years ago.\" He has no diarrhea. Last colonoscopy, 09/2015.  Polyps. Repeat 5 years. Is oral iron intolerant (worsening constipation).  Genitourinary:    He reports no urinary burning, frequency, dribbling, or discoloration.  He reports no difficulty controlling his bladder. He reports no need to urinate frequently through the night.  Musculoskeletal:  He continues to have chronic trouble with right hip pain.  \"24/7.\"  He still uses maintenance Percocet, 2-3 doses daily.  He reports no unexplained arthralgias, myalgias, but has noted more frequent bouts of nighttime leg cramping.  Extremities:  He reports no trouble with fluid retention or significant leg swelling.  Endocrine:  He reports no problems with excess thirst, excessive urination, vasomotor instability, or unexplained fatigue.  Heme/Lymphatic:  He reports no bleeding, petechial rashes, or swollen glands.  Skin:  He reports no itching, rashes, or lesions which won't heal.  Neuro:  He reports night-time stocking-glove pain in his lower extremities.  \"Same-o.\" Says he has been seen by  " "Woeltz previously. \"He found no problems.\"  Says Dr. Rosario says he does not have significant vascular disease requiring surgery.  He reports no loss of consciousness, seizures, fainting spells, or dizziness. He reports no weakness of his face, arms, or legs.  He reports no difficulty with speech.  He reports no tremors.  Psych:  He seems generally satisfied with life.  He reports no depression.  He reports no mood swings.      VITAL SIGNS: /82   Pulse 88   Temp 97.6 °F (36.4 °C)   Resp 16   Ht 182.9 cm (72\")   Wt 78.8 kg (173 lb 11.2 oz)   SpO2 98%   BMI 23.56 kg/m²       PHYSICAL EXAMINATION:  General:  He is a well-developed, well-nourished, and well-kept elderly male in no distress.  He arrived in the exam room ambulatory. He appears to be his stated age.  His skin color is pale.  Head/Neck:  The patient is anicteric and atraumatic.  The neck is supple without evidence of jugular venous distention or cervical adenopathy.  Eyes:  The pupils are equal, round, and reactive to light.  The extraocular movements are full.  There is no scleral jaundice or erythema.  Chest:  The respiratory efforts are normal and unhindered.  The chest is clear to auscultation.  There are no wheezes, rales, or asymmetry of breath sounds.  The port in the right anterior chest wall has been removed and the site has healed. No tenderness or erythema.   Cardiac/Vascular:  The patient has an irregularly irregular cardiac rate and rhythm without murmurs.  The peripheral pulses are equal and full.  Abdomen:  The belly is soft and flat.  There is no rebound or guarding. There is no organomegaly, mass-effect, or tenderness.  Bowel sounds are normal.  Ortho:  There is no overt joint stiffness.  There is no overt foreshortening of height.  There are no overt joint changes which suggest degenerative arthritis.  Extremities:  There is no evidence of cyanosis, clubbing, or edema.  Rheumatologic:  There is no overt evidence of rheumatoid " deformities of the hands.  There is no sausaging of the fingers.  There is no sign of active synovitis.  Cutaneous:  Few areas of senile purpuras are present on his forearms.  There are no overt rashes, disseminated lesions, purpura, or petechiae.  Lymphatics:  There is no evidence of adenopathy in the cervical, supraclavicular, or axillary areas.  Neurologic:  The patient is alert, oriented, cooperative, and pleasant.  He is appropriately conversant.  He ambulated into the exam room and transferred from chair to exam table aided.  There is no overt dysfunction of the motor, sensory, or cerebellar systems.  Psych:  Impatient.  Mood and affect are appropriate for circumstance.  Eye contact is appropriate.        LABS    Lab Results - Last 18 Months   Lab Units 02/07/20  1402 02/03/20  1408 11/08/19  0854 08/08/19  1014 05/09/19  1055 03/11/19  1032 02/08/19  1029   HEMOGLOBIN g/dL 15.6 16.4 13.9 14.4 13.3* 13.7* 13.9*   HEMATOCRIT % 47.5 50.6 42.4 43.9 41.0 42.7 42.7   MCV fL 92.8 94 93.6 92.4 92.8 92.8 92.0   WBC 10*3/mm3 8.36 10.1 4.46 5.44 4.61* 3.96* 3.99*   RDW % 13.5 13.4 13.5 13.3 14.1 13.2 13.0   MPV fL 10.1  --  9.4 9.7 9.9  --  9.8   PLATELETS 10*3/mm3 244 293 205 224 209 220 202   IMM GRAN % % 0.6*  --  0.2 0.4 0.2  --  0.0   NEUTROS ABS 10*3/mm3 6.03 7.1* 2.83 3.55 2.84 2.40 2.56   LYMPHS ABS 10*3/mm3 0.88 1.5 0.80 0.98 1.02 0.86 0.82   MONOS ABS 10*3/mm3 1.24* 1.2* 0.60 0.65 0.55 0.59 0.44   EOS ABS 10*3/mm3 0.13 0.2 0.18 0.19 0.14 0.08 0.13   BASOS ABS 10*3/mm3 0.03 0.0 0.04 0.05 0.05 0.02 0.04   IMMATURE GRANS (ABS) 10*3/mm3 0.05  --  0.01 0.02 0.01  --  0.00   NRBC /100 WBC 0.0  --  0.0 0.0 0.0 0.0 0.0       Lab Results - Last 18 Months   Lab Units 02/10/20  0956 02/07/20  1402 02/03/20  1408 11/08/19  0854 08/08/19  1014 05/09/19  1055 03/11/19  1032 02/08/19  1029   GLUCOSE mg/dL  --  92  --  108* 100 96  --  110*   SODIUM mmol/L  --  137 140 138 141 140 140 140   POTASSIUM mmol/L  --  3.9 4.9 4.0  4.4 4.4 4.5 4.5   TOTAL CO2 mmol/L  --   --  24  --   --   --  30.0  --    CO2 mmol/L  --  28.0  --  31.0* 29.0 31.0  --  31.0   CHLORIDE mmol/L  --  98 97 99 101 100 98 99   ANION GAP mmol/L  --  11.0  --  8.0 11.0 9.0  --  10.0   CREATININE mg/dL 0.70 0.88 1.16 0.94 1.07 0.89 0.84 0.94   BUN mg/dL  --  17 23 14 20 16 15 17   BUN / CREAT RATIO   --  19.3 20 14.9 18.7 18.0 17.9 18.1   CALCIUM mg/dL  --  9.5 9.7 9.8 9.5 9.9 9.7 9.8   EGFR IF NONAFRICN AM mL/min/1.73  --  84 60 78 67 83 89 78   ALK PHOS U/L  --  78 79 71 70 71 79 71   TOTAL PROTEIN g/dL  --  8.5  --  8.2 9.0* 8.8*  --  8.8*   ALT (SGPT) U/L  --  15 24 11 20 16 28 17   AST (SGOT) U/L  --  17 23 17 26 26 24 25   BILIRUBIN mg/dL  --  1.1 0.5 0.4 0.6 0.6 0.8 0.7   ALBUMIN g/dL  --  4.10 4.5 4.30 4.80 4.70 4.10 4.60   GLOBULIN gm/dL  --  4.4  --  3.9 4.2 4.1  --  4.2       Lab Results - Last 18 Months   Lab Units 02/07/20  1402 11/08/19  0854 08/08/19  1014 05/09/19  1055 02/08/19  1029   LDH U/L 164 177 552 467 425       Lab Results - Last 18 Months   Lab Units 02/07/20  1402 02/03/20  1408 11/08/19  0854 08/08/19  1014 05/09/19  1055 02/08/19  1029   IRON mcg/dL 68  --  45* 87 84 100   TIBC mcg/dL 274*  --  374 355 348 345   IRON SATURATION % 25  --  12* 25 24 29   FERRITIN ng/mL 902.00*  --  199.20 121.00 143.00 146.00   TSH uIU/mL  --  1.110  --   --   --   --    FOLATE ng/mL 12.90  --  9.68 11.60 8.16 7.40       ASSESSMENT:  1.    Intermediate to high-grade lymphoma.  History of. No evidence of disease.  05/16/2005:          Stage IV-E.          Baseline Tumor [place]:   Mediastinum, left hilum, upper abdomen, and liver (clinically).          Complications of Tumor:   Hyperbilirubinemia. Improved.          Response to Therapy:  Clinical remission per CT scan 05/16/2005, 11/2011 and 02/10/2020.          -02/03/2020-CT chest without contrast.  Impression: Atheromatous disease of the thoracic aorta and coronary arteries.  Mild cardiomegaly.  Borderline  pulmonary artery dilatation.  No infiltrate or effusion.          -02/03/2020-CT abdomen and pelvis with contrast.  Impression: Prior cholecystectomy.  Mild prominence of biliary tree felt to be related.  Complex renal cyst on the left is stable in size with septation and calcification.  Dominant mid renal cyst on the right side is slightly larger.  Upper pole of the right renal collecting system is mildly dilated and contains 2 stones measuring 8 to 9 mm.  5 mm nonobstructive stone in the left mid kidney.  Ureter is nondilated.  Prostate mildly enlarged 5.1 cm.  Moderate stool in the colon.  No small bowel distention.          Prognosis:  Fair.          IPI at baseline:  3.  2.    Normocytic anemia from iron deficiency and chronic disease. Last Injectafer, 11/20/2019.  Hemoglobin, 15.6 on 02/07/2020 (prior range:  Hgb 12.9 - 14.7).  3.    Leukopenia, NOS. Has normalized, 02/07/2020   4.    Iron deficiency and oral iron intolerant (constipation).  Repleted since receipt of INFeD  5.    Variable B2M.  Stable, 2.5 on 02/07/2020 (prior range:  1.6 - 3.27).  6.    Adenomatous colon polyp, colonoscopy 09/18/2015.  7.    Atrial fibrillation on chronic anticoagulation.  8.    Microscopic hematuria.  Management per Dr. Mayes.  9.    Anxiety, chronic, stable on medications (Celexa).  10.  Lower extremity neuropathy.  Mild.  Not a problem of late.  11.  Hypertension.  Fair control on Cardizem.  12.  Low B12, 203 on 08/17/2017.  Repleted.  13.  Peripheral vascular disease.  Arterial studies and has been seen by Dr. Rosario of vascular surgery.              a.    Ultrasound ankle/brachial performed on 07/17/2018 at [facility]. Suspected atherosclerosis in the lower extremity arteries, supported by noncompressible posterior tibialis and dorsalis pedis arteries.              b.    Abdominal aortic ultrasound performed on 08/10/2018 at [facility].  No abdominal aortic aneurysm.    PLAN:  1.      Re:  Apprised of  labs from 02/07/2020 with normal WBC, normal diff, normal hemoglobin (resolution of anemia), normoglycemia with normal (previously slightly elevated) total protein, (stable) otherwise normal CMP, normal LDH, normal B2M, repleted serum iron, repleted (> 20%) Fe sat, repleted (> 100) ferritin, repleted folate, and repleted B12.   2.    Stay off B12, 1000 mcg IM monthly.   3.    Apprised of CT scans, 02/10/2020 - Large renal cyst (stable since 2011).  Stable. Otherwise GURPREET  4.    Continue other currently identified medications.  5.    Continue Coumadin.  PT/INR followed by Dr. Crawford.  6.    Continue ongoing management per primary care physician and other specialists.    7.    Advance Directive is discussed.  8.    Return to office in 3 months with pre-office CBC with differential, iron, TIBC, iron saturation, ferritin, B12, folate, CMP, B2M, and LDH.  9.    Please give patient copy of blood work.    MEDICAL DECISION MAKING: Moderate Complexity  AMOUNT OF DATA: Moderate    TIME SPENT:  Face-to-face time on this encounter, as defined by the American Medical Association in the 2020 Current Procedural Terminology codebook; assessment, record review, lab review, planning and education -31 minutes (greater than 50% face to face).      cc:   Azar Mayes MD          [facility]          Sunita Crawford MD - [place]          Jorge Alberto Rosario MD

## 2020-02-17 ENCOUNTER — OFFICE VISIT (OUTPATIENT)
Dept: ONCOLOGY | Facility: CLINIC | Age: 80
End: 2020-02-17

## 2020-02-17 VITALS
HEIGHT: 72 IN | SYSTOLIC BLOOD PRESSURE: 130 MMHG | RESPIRATION RATE: 16 BRPM | HEART RATE: 88 BPM | BODY MASS INDEX: 23.53 KG/M2 | OXYGEN SATURATION: 98 % | WEIGHT: 173.7 LBS | TEMPERATURE: 97.6 F | DIASTOLIC BLOOD PRESSURE: 82 MMHG

## 2020-02-17 DIAGNOSIS — C85.90 LYMPHOMA IN REMISSION (HCC): Primary | ICD-10-CM

## 2020-02-17 DIAGNOSIS — D72.819 LEUKOPENIA, UNSPECIFIED TYPE: ICD-10-CM

## 2020-02-17 PROCEDURE — 99214 OFFICE O/P EST MOD 30 MIN: CPT | Performed by: INTERNAL MEDICINE

## 2020-02-19 ENCOUNTER — HOSPITAL ENCOUNTER (OUTPATIENT)
Dept: ULTRASOUND IMAGING | Facility: HOSPITAL | Age: 80
Discharge: HOME | End: 2020-02-19
Admitting: FAMILY MEDICINE

## 2020-02-19 ENCOUNTER — CLINICAL SUPPORT (OUTPATIENT)
Dept: FAMILY MEDICINE CLINIC | Facility: CLINIC | Age: 80
End: 2020-02-19

## 2020-02-19 DIAGNOSIS — N28.1 RENAL CYST: ICD-10-CM

## 2020-02-19 DIAGNOSIS — Z79.01 CHRONIC ANTICOAGULATION: Primary | ICD-10-CM

## 2020-02-19 LAB — INR PPP: 2.5 (ref 0.9–1.1)

## 2020-02-19 PROCEDURE — 36416 COLLJ CAPILLARY BLOOD SPEC: CPT | Performed by: NURSE PRACTITIONER

## 2020-02-19 PROCEDURE — 85610 PROTHROMBIN TIME: CPT | Performed by: NURSE PRACTITIONER

## 2020-02-19 PROCEDURE — 76775 US EXAM ABDO BACK WALL LIM: CPT

## 2020-02-20 DIAGNOSIS — I48.20 CHRONIC ATRIAL FIBRILLATION (HCC): ICD-10-CM

## 2020-02-20 RX ORDER — WARFARIN SODIUM 2 MG/1
TABLET ORAL
Qty: 360 TABLET | Refills: 5 | Status: SHIPPED | OUTPATIENT
Start: 2020-02-20 | End: 2020-03-05 | Stop reason: SDUPTHER

## 2020-03-05 ENCOUNTER — OFFICE VISIT (OUTPATIENT)
Dept: FAMILY MEDICINE CLINIC | Facility: CLINIC | Age: 80
End: 2020-03-05

## 2020-03-05 VITALS
HEIGHT: 72 IN | WEIGHT: 171 LBS | TEMPERATURE: 98 F | OXYGEN SATURATION: 99 % | RESPIRATION RATE: 18 BRPM | DIASTOLIC BLOOD PRESSURE: 86 MMHG | BODY MASS INDEX: 23.16 KG/M2 | HEART RATE: 66 BPM | SYSTOLIC BLOOD PRESSURE: 142 MMHG

## 2020-03-05 DIAGNOSIS — G89.29 CHRONIC PAIN OF RIGHT HIP: Primary | ICD-10-CM

## 2020-03-05 DIAGNOSIS — J06.9 URI WITH COUGH AND CONGESTION: ICD-10-CM

## 2020-03-05 DIAGNOSIS — F41.9 ANXIETY: ICD-10-CM

## 2020-03-05 DIAGNOSIS — Z79.01 CHRONIC ANTICOAGULATION: ICD-10-CM

## 2020-03-05 DIAGNOSIS — I48.20 CHRONIC ATRIAL FIBRILLATION (HCC): ICD-10-CM

## 2020-03-05 DIAGNOSIS — K59.01 SLOW TRANSIT CONSTIPATION: ICD-10-CM

## 2020-03-05 DIAGNOSIS — G47.09 OTHER INSOMNIA: ICD-10-CM

## 2020-03-05 DIAGNOSIS — M25.551 CHRONIC PAIN OF RIGHT HIP: Primary | ICD-10-CM

## 2020-03-05 DIAGNOSIS — K21.9 GASTROESOPHAGEAL REFLUX DISEASE WITHOUT ESOPHAGITIS: ICD-10-CM

## 2020-03-05 LAB
INR PPP: 2.1 (ref 0.9–1.1)
PROTHROMBIN TIME: 24.7 SECONDS

## 2020-03-05 PROCEDURE — 99214 OFFICE O/P EST MOD 30 MIN: CPT | Performed by: FAMILY MEDICINE

## 2020-03-05 RX ORDER — WARFARIN SODIUM 2 MG/1
TABLET ORAL
Qty: 360 TABLET | Refills: 5 | Status: SHIPPED | OUTPATIENT
Start: 2020-03-05 | End: 2021-03-30

## 2020-03-05 RX ORDER — DIGOXIN 250 MCG
250 TABLET ORAL DAILY
Qty: 90 TABLET | Refills: 3 | Status: SHIPPED | OUTPATIENT
Start: 2020-03-05 | End: 2020-11-07 | Stop reason: SDUPTHER

## 2020-03-05 RX ORDER — CYCLOBENZAPRINE HCL 10 MG
10 TABLET ORAL 3 TIMES DAILY PRN
Qty: 90 TABLET | Refills: 0 | Status: SHIPPED | OUTPATIENT
Start: 2020-03-05 | End: 2020-11-02

## 2020-03-05 RX ORDER — AZELASTINE 1 MG/ML
2 SPRAY, METERED NASAL 2 TIMES DAILY
Qty: 30 ML | Refills: 12 | Status: SHIPPED | OUTPATIENT
Start: 2020-03-05 | End: 2020-07-07

## 2020-03-05 RX ORDER — ESCITALOPRAM OXALATE 20 MG/1
20 TABLET ORAL DAILY
Qty: 90 TABLET | Refills: 3 | Status: SHIPPED | OUTPATIENT
Start: 2020-03-05 | End: 2020-11-20 | Stop reason: SDUPTHER

## 2020-03-05 RX ORDER — TEMAZEPAM 15 MG/1
15 CAPSULE ORAL NIGHTLY PRN
Qty: 30 CAPSULE | Refills: 2 | Status: SHIPPED | OUTPATIENT
Start: 2020-03-05 | End: 2020-03-24

## 2020-03-05 RX ORDER — ASPIRIN 81 MG/1
81 TABLET ORAL DAILY
Qty: 90 TABLET | Refills: 3 | Status: SHIPPED | OUTPATIENT
Start: 2020-03-05

## 2020-03-05 RX ORDER — OXYCODONE AND ACETAMINOPHEN 10; 325 MG/1; MG/1
1 TABLET ORAL EVERY 6 HOURS PRN
Qty: 100 TABLET | Refills: 0 | Status: SHIPPED | OUTPATIENT
Start: 2020-03-05 | End: 2020-04-02 | Stop reason: SDUPTHER

## 2020-03-05 RX ORDER — DILTIAZEM HYDROCHLORIDE 240 MG/1
240 CAPSULE, COATED, EXTENDED RELEASE ORAL DAILY
Qty: 90 CAPSULE | Refills: 3 | Status: SHIPPED | OUTPATIENT
Start: 2020-03-05 | End: 2020-06-25

## 2020-03-05 NOTE — PROGRESS NOTES
Subjective cc: pain medication refill  Cabrera Avina is a 79 y.o. male who presents to get a refill on his pain medication.   He is having more trouble with anxiety - has not been taking SSRI on regular basis.   Pain in his right hip.  Pain is the same.  Taking the muscle relaxer PRN.  Pain improved with pain medication without side effects.  He reports significant improvement with muscle relaxer.       He continues to have constipation. He is using OTC constipation medication.    CBC, CMP, TSH, and PSA all returned normal.     Depression and anxiety stable - taking lexapro PRN - didn't understand it needed to be daily.   BP controlled.        Atrial fib is rate controlled.     He is currently taking restoril PRN for insomnia which controls his symptoms and allows him to get restful sleep.     Past information reviewed and updated as appropriate     Pain   This is a chronic problem. The current episode started more than 1 year ago. The problem occurs constantly. The problem has been unchanged. Associated symptoms include arthralgias, myalgias, numbness and weakness. Pertinent negatives include no abdominal pain, anorexia, chest pain, chills, coughing, diaphoresis, fatigue, fever, headaches, nausea or vomiting. The symptoms are aggravated by exertion, standing and walking. He has tried rest, walking, position changes and oral narcotics (Percocet, patient cannot tolerate NSAIDs secondary to Coumadin) for the symptoms. The treatment provided moderate relief.   Atrial Fibrillation   Presents for follow-up visit. Symptoms include hypertension and weakness. Symptoms are negative for bradycardia, chest pain, dizziness, hemodynamic instability, palpitations, shortness of breath, syncope and tachycardia. The symptoms have been stable. Past medical history includes atrial fibrillation. There are no medication compliance problems.   Depression   Visit Type: follow-up  Patient presents with the following symptoms:  "insomnia, muscle tension and nervousness/anxiety.  Patient is not experiencing: anhedonia, decreased concentration, depressed mood, excessive worry, feelings of hopelessness, palpitations, shortness of breath, suicidal ideas, suicidal planning and thoughts of death.  Frequency of symptoms: occasionally   Severity: mild   Sleep quality: fair  Nighttime awakenings: occasional         The following portions of the patient's history were reviewed and updated as appropriate: allergies, current medications, past family history, past medical history, past social history, past surgical history and problem list.        Review of Systems   Constitutional: Negative for activity change, appetite change, chills, diaphoresis, fatigue, fever and unexpected weight change.   Respiratory: Negative for cough, chest tightness and shortness of breath.    Cardiovascular: Negative for chest pain, palpitations, leg swelling and syncope.   Gastrointestinal: Positive for constipation. Negative for abdominal pain, anorexia, blood in stool, nausea and vomiting.   Musculoskeletal: Positive for arthralgias, back pain, gait problem and myalgias.   Neurological: Positive for weakness and numbness. Negative for dizziness, syncope, facial asymmetry, speech difficulty, light-headedness and headaches.   Hematological: Bruises/bleeds easily.   Psychiatric/Behavioral: Positive for dysphoric mood and sleep disturbance (wakes up in the middle of the night secondary to pain). Negative for decreased concentration, self-injury and suicidal ideas. The patient is nervous/anxious and has insomnia.    All other systems reviewed and are negative.      Objective   Blood pressure 142/86, pulse 66, temperature 98 °F (36.7 °C), temperature source Oral, resp. rate 18, height 182.9 cm (72.01\"), weight 77.6 kg (171 lb), SpO2 99 %.  Physical Exam   Constitutional: He is oriented to person, place, and time. Vital signs are normal. He appears well-developed and " well-nourished. He is active and cooperative.  Non-toxic appearance. He does not have a sickly appearance. He does not appear ill. No distress.   HENT:   Head: Normocephalic and atraumatic.   Right Ear: External ear normal.   Left Ear: External ear normal.   Nose: Nose normal.   Mouth/Throat: Oropharynx is clear and moist. Abnormal dentition.   Eyes: Conjunctivae and EOM are normal. Right eye exhibits no discharge. Left eye exhibits no discharge.   Neck: No tracheal deviation present.   Cardiovascular: Normal rate and intact distal pulses. An irregularly irregular rhythm present.   Pulmonary/Chest: Effort normal and breath sounds normal. No accessory muscle usage or stridor. No respiratory distress. He has no wheezes. He has no rales.   Abdominal: Soft. Normal appearance and bowel sounds are normal. He exhibits no distension, no fluid wave, no pulsatile midline mass and no mass. There is no tenderness.   Musculoskeletal: He exhibits no edema.        Lumbar back: He exhibits decreased range of motion, tenderness, pain and spasm. He exhibits no bony tenderness.   Significantly limited ROM in right hip, pain with movement, walks with limp favoring right hip, difficulty changing positions and getting onto the exam table.    Neurological: He is alert and oriented to person, place, and time.   Skin: Skin is warm and dry. He is not diaphoretic.   Psychiatric: His behavior is normal. Judgment and thought content normal. His mood appears not anxious. He does not exhibit a depressed mood.   Nursing note and vitals reviewed.    Assessment/Plan   Problems Addressed this Visit        Digestive    Slow transit constipation    Gastroesophageal reflux disease without esophagitis       Nervous and Auditory    Chronic hip pain - Primary    Relevant Medications    oxyCODONE-acetaminophen (PERCOCET)  MG per tablet    cyclobenzaprine (FLEXERIL) 10 MG tablet       Other    Anxiety    Relevant Medications    escitalopram (LEXAPRO)  20 MG tablet    Other insomnia    Relevant Medications    temazepam (RESTORIL) 15 MG capsule    Chronic anticoagulation      Other Visit Diagnoses     Chronic atrial fibrillation        Relevant Medications    warfarin (COUMADIN) 2 MG tablet    digoxin (LANOXIN) 250 MCG tablet    dilTIAZem CD (CARDIZEM CD) 240 MG 24 hr capsule    Other Relevant Orders    POC Protime / INR (Completed)    URI with cough and congestion        Relevant Medications    azelastine (ASTELIN) 0.1 % nasal spray        PLAN:     #1 insomnia: chronic, controlled, continue on current medication of restoril.    #2 depression/anxiety: chronic, controlled, continue on Lexapro but advised to take it daily     #3 chronic pain in right hip: Chronic, controlled with oral pain medication.  Patient is unable to take NSAIDs secondary to Coumadin.  Nii reviewed and appropriate.  Controlled contract in the chart.  Refill given on medication.  Sent to pharmacy.  Prescription for muscle relaxers to help with muscle spasms, advised on risk and benefits of this medication.  Return in 1 month     #4 chronic anticoagulation: normal INR today. continue coumadin at current dosing.      #5 atrial fibrillation: Patient is anticoagulated and rate controlled. Continue on cardizem.      #6 constipation: chronic, stable. continue on regular use of fiber and stool softeners, advised on diet changes, advised this is due to his medication    #7 GERD: chronic, stable     #8 URI: start nasal spray           This document has been electronically signed by Sunita Crawford MD on March 19, 2020 18:54

## 2020-03-19 ENCOUNTER — CLINICAL SUPPORT (OUTPATIENT)
Dept: FAMILY MEDICINE CLINIC | Facility: CLINIC | Age: 80
End: 2020-03-19

## 2020-03-19 DIAGNOSIS — I48.21 PERMANENT ATRIAL FIBRILLATION (HCC): Primary | ICD-10-CM

## 2020-03-19 LAB — INR PPP: 2.4 (ref 0.9–1.1)

## 2020-03-19 PROCEDURE — 85610 PROTHROMBIN TIME: CPT | Performed by: FAMILY MEDICINE

## 2020-03-24 DIAGNOSIS — G47.09 OTHER INSOMNIA: ICD-10-CM

## 2020-03-24 RX ORDER — TEMAZEPAM 15 MG/1
CAPSULE ORAL
Qty: 30 CAPSULE | Refills: 2 | Status: SHIPPED | OUTPATIENT
Start: 2020-03-24 | End: 2020-06-25

## 2020-04-02 ENCOUNTER — OFFICE VISIT (OUTPATIENT)
Dept: FAMILY MEDICINE CLINIC | Facility: CLINIC | Age: 80
End: 2020-04-02

## 2020-04-02 VITALS
BODY MASS INDEX: 23.03 KG/M2 | RESPIRATION RATE: 18 BRPM | HEIGHT: 72 IN | OXYGEN SATURATION: 98 % | DIASTOLIC BLOOD PRESSURE: 76 MMHG | HEART RATE: 59 BPM | SYSTOLIC BLOOD PRESSURE: 152 MMHG | WEIGHT: 170 LBS

## 2020-04-02 DIAGNOSIS — Z79.01 CHRONIC ANTICOAGULATION: ICD-10-CM

## 2020-04-02 DIAGNOSIS — G89.29 CHRONIC PAIN OF RIGHT HIP: ICD-10-CM

## 2020-04-02 DIAGNOSIS — I48.21 PERMANENT ATRIAL FIBRILLATION (HCC): Primary | ICD-10-CM

## 2020-04-02 DIAGNOSIS — G47.09 OTHER INSOMNIA: ICD-10-CM

## 2020-04-02 DIAGNOSIS — K59.01 SLOW TRANSIT CONSTIPATION: ICD-10-CM

## 2020-04-02 DIAGNOSIS — F41.9 ANXIETY: ICD-10-CM

## 2020-04-02 DIAGNOSIS — M25.551 CHRONIC PAIN OF RIGHT HIP: ICD-10-CM

## 2020-04-02 LAB
INR PPP: 1.9 (ref 0.9–1.1)
PROTHROMBIN TIME: 22.9 SECONDS

## 2020-04-02 PROCEDURE — 99214 OFFICE O/P EST MOD 30 MIN: CPT | Performed by: FAMILY MEDICINE

## 2020-04-02 PROCEDURE — 85610 PROTHROMBIN TIME: CPT | Performed by: FAMILY MEDICINE

## 2020-04-02 RX ORDER — OXYCODONE AND ACETAMINOPHEN 10; 325 MG/1; MG/1
1 TABLET ORAL EVERY 6 HOURS PRN
Qty: 100 TABLET | Refills: 0 | Status: SHIPPED | OUTPATIENT
Start: 2020-04-02 | End: 2020-05-01 | Stop reason: SDUPTHER

## 2020-04-02 NOTE — PROGRESS NOTES
Subjective cc: pain medication refill  Cabrera Avina is a 79 y.o. male who presents to get a refill on his pain medication.   He has no complaints today - has been staying in his home.   Only here today for refills and to get his INR checked.   He has not been eating as much lately because just not as hungry since less active.   Pain in his right hip.  Pain is the same.  Taking the muscle relaxer PRN.  Pain improved with pain medication without side effects.           Constipation stable   Depression and anxiety stable - taking lexapro PRN - didn't understand it needed to be daily.   BP uncontrolled - did not have medication this AM.      Atrial fib is rate controlled.     He is currently taking restoril PRN for insomnia which controls his symptoms and allows him to get restful sleep.     Past information reviewed and updated as appropriate     Atrial Fibrillation   Presents for follow-up visit. Symptoms include hypertension and weakness. Symptoms are negative for bradycardia, chest pain, dizziness, hemodynamic instability, palpitations, shortness of breath, syncope and tachycardia. The symptoms have been stable. Past medical history includes atrial fibrillation. There are no medication compliance problems.   Pain   This is a chronic problem. The current episode started more than 1 year ago. The problem occurs constantly. The problem has been unchanged. Associated symptoms include arthralgias, myalgias, numbness and weakness. Pertinent negatives include no abdominal pain, anorexia, chest pain, chills, coughing, diaphoresis, fatigue, fever, headaches, nausea or vomiting. The symptoms are aggravated by exertion, standing and walking. He has tried rest, walking, position changes and oral narcotics (Percocet, patient cannot tolerate NSAIDs secondary to Coumadin) for the symptoms. The treatment provided moderate relief.   Depression   Visit Type: follow-up  Patient presents with the following symptoms: insomnia,  "muscle tension and nervousness/anxiety.  Patient is not experiencing: anhedonia, decreased concentration, depressed mood, excessive worry, feelings of hopelessness, palpitations, shortness of breath, suicidal ideas, suicidal planning and thoughts of death.  Frequency of symptoms: occasionally   Severity: mild   Sleep quality: fair  Nighttime awakenings: occasional         The following portions of the patient's history were reviewed and updated as appropriate: allergies, current medications, past family history, past medical history, past social history, past surgical history and problem list.        Review of Systems   Constitutional: Negative for activity change, appetite change, chills, diaphoresis, fatigue, fever and unexpected weight change.   Respiratory: Negative for cough, chest tightness and shortness of breath.    Cardiovascular: Negative for chest pain, palpitations, leg swelling and syncope.   Gastrointestinal: Positive for constipation. Negative for abdominal pain, anorexia, blood in stool, nausea and vomiting.   Musculoskeletal: Positive for arthralgias, back pain, gait problem and myalgias.   Neurological: Positive for weakness and numbness. Negative for dizziness, syncope, facial asymmetry, speech difficulty, light-headedness and headaches.   Hematological: Bruises/bleeds easily.   Psychiatric/Behavioral: Positive for dysphoric mood and sleep disturbance (wakes up in the middle of the night secondary to pain). Negative for decreased concentration, self-injury and suicidal ideas. The patient is nervous/anxious and has insomnia.    All other systems reviewed and are negative.      Objective   Blood pressure 152/76, pulse 59, resp. rate 18, height 182.9 cm (72\"), weight 77.1 kg (170 lb), SpO2 98 %.  Physical Exam   Constitutional: He is oriented to person, place, and time. Vital signs are normal. He appears well-developed and well-nourished. He is active and cooperative.  Non-toxic appearance. He does " not have a sickly appearance. He does not appear ill. No distress.   HENT:   Head: Normocephalic and atraumatic.   Right Ear: External ear normal.   Left Ear: External ear normal.   Nose: Nose normal.   Mouth/Throat: Oropharynx is clear and moist. Abnormal dentition.   Eyes: Conjunctivae and EOM are normal. Right eye exhibits no discharge. Left eye exhibits no discharge.   Neck: No tracheal deviation present.   Cardiovascular: Normal rate and intact distal pulses. An irregularly irregular rhythm present.   Pulmonary/Chest: Effort normal and breath sounds normal. No accessory muscle usage or stridor. No respiratory distress. He has no wheezes. He has no rales.   Abdominal: Soft. Normal appearance and bowel sounds are normal. He exhibits no distension, no fluid wave, no pulsatile midline mass and no mass. There is no tenderness.   Musculoskeletal: He exhibits no edema.        Lumbar back: He exhibits decreased range of motion, tenderness, pain and spasm. He exhibits no bony tenderness.   Significantly limited ROM in right hip, pain with movement, walks with limp favoring right hip, difficulty changing positions and getting onto the exam table.    Neurological: He is alert and oriented to person, place, and time.   Skin: Skin is warm and dry. He is not diaphoretic.   Psychiatric: His behavior is normal. Judgment and thought content normal. His mood appears not anxious. He does not exhibit a depressed mood.   Nursing note and vitals reviewed.    Assessment/Plan   Problems Addressed this Visit        Cardiovascular and Mediastinum    Permanent atrial fibrillation - Primary    Relevant Orders    POC Protime / INR (Completed)       Digestive    Slow transit constipation       Nervous and Auditory    Chronic hip pain    Relevant Medications    oxyCODONE-acetaminophen (PERCOCET)  MG per tablet       Other    Anxiety    Other insomnia    Chronic anticoagulation        PLAN:     #1 insomnia: chronic, controlled, continue  on current medication of restoril.    #2 depression/anxiety: chronic, controlled, continue on Lexapro daily     #3 chronic pain in right hip: Chronic, controlled with oral pain medication.  Patient is unable to take NSAIDs secondary to Coumadin.  Nii reviewed and appropriate.  Controlled contract in the chart.  Refill given on medication.  Sent to pharmacy.  Prescription for muscle relaxers to help with muscle spasms, advised on risk and benefits of this medication.  Return in 1 month     #4 chronic anticoagulation: subtherapeutic INR today. continue coumadin at current dosing.      #5 atrial fibrillation: Patient is anticoagulated and rate controlled. Continue on cardizem.      #6 constipation: chronic, stable. continue on regular use of fiber and stool softeners, advised on diet changes, advised this is due to his medication             This document has been electronically signed by Sunita Crawford MD on April 2, 2020 14:35

## 2020-04-17 ENCOUNTER — CLINICAL SUPPORT (OUTPATIENT)
Dept: FAMILY MEDICINE CLINIC | Facility: CLINIC | Age: 80
End: 2020-04-17

## 2020-04-17 DIAGNOSIS — I48.21 PERMANENT ATRIAL FIBRILLATION (HCC): Primary | ICD-10-CM

## 2020-04-17 LAB — INR PPP: 1.9 (ref 0.9–1.1)

## 2020-04-17 PROCEDURE — 85610 PROTHROMBIN TIME: CPT | Performed by: FAMILY MEDICINE

## 2020-05-01 ENCOUNTER — OFFICE VISIT (OUTPATIENT)
Dept: FAMILY MEDICINE CLINIC | Facility: CLINIC | Age: 80
End: 2020-05-01

## 2020-05-01 VITALS
HEART RATE: 59 BPM | DIASTOLIC BLOOD PRESSURE: 68 MMHG | OXYGEN SATURATION: 98 % | RESPIRATION RATE: 18 BRPM | SYSTOLIC BLOOD PRESSURE: 128 MMHG | HEIGHT: 72 IN | TEMPERATURE: 98.4 F | WEIGHT: 174.6 LBS | BODY MASS INDEX: 23.65 KG/M2

## 2020-05-01 DIAGNOSIS — F41.9 ANXIETY: ICD-10-CM

## 2020-05-01 DIAGNOSIS — H61.21 IMPACTED CERUMEN OF RIGHT EAR: ICD-10-CM

## 2020-05-01 DIAGNOSIS — Z72.0 TOBACCO USE: ICD-10-CM

## 2020-05-01 DIAGNOSIS — K59.01 SLOW TRANSIT CONSTIPATION: ICD-10-CM

## 2020-05-01 DIAGNOSIS — G89.29 CHRONIC PAIN OF RIGHT HIP: Primary | ICD-10-CM

## 2020-05-01 DIAGNOSIS — Z79.01 CHRONIC ANTICOAGULATION: ICD-10-CM

## 2020-05-01 DIAGNOSIS — I48.21 PERMANENT ATRIAL FIBRILLATION (HCC): ICD-10-CM

## 2020-05-01 DIAGNOSIS — C85.90 LYMPHOMA IN REMISSION (HCC): ICD-10-CM

## 2020-05-01 DIAGNOSIS — G47.09 OTHER INSOMNIA: ICD-10-CM

## 2020-05-01 DIAGNOSIS — M25.551 CHRONIC PAIN OF RIGHT HIP: Primary | ICD-10-CM

## 2020-05-01 LAB — INR PPP: 2.8 (ref 0.9–1.1)

## 2020-05-01 PROCEDURE — 85610 PROTHROMBIN TIME: CPT | Performed by: FAMILY MEDICINE

## 2020-05-01 PROCEDURE — 99214 OFFICE O/P EST MOD 30 MIN: CPT | Performed by: FAMILY MEDICINE

## 2020-05-01 PROCEDURE — 69210 REMOVE IMPACTED EAR WAX UNI: CPT | Performed by: FAMILY MEDICINE

## 2020-05-01 RX ORDER — OXYCODONE AND ACETAMINOPHEN 10; 325 MG/1; MG/1
1 TABLET ORAL EVERY 6 HOURS PRN
Qty: 100 TABLET | Refills: 0 | Status: SHIPPED | OUTPATIENT
Start: 2020-05-01 | End: 2020-06-01 | Stop reason: SDUPTHER

## 2020-05-01 NOTE — PROGRESS NOTES
Subjective cc: pain medication refill  Cabrera Avina is a 79 y.o. male who presents to get a refill on his pain medication.   He complains of his ears feeling stopped up lately - wants them flushed if needed.   Here today for refills and to get his INR checked - has been taking 7MG daily.    He has been staying at home   Pain in his right hip.  Pain is the same.  Taking the muscle relaxer PRN.  Pain improved with pain medication without side effects.         Constipation stable with laxatives  Depression and anxiety stable - taking lexapro daily.   BP controlled.      Atrial fib is rate controlled.     He is currently taking restoril nighly for insomnia which controls his symptoms and allows him to get restful sleep.     Past information reviewed and updated as appropriate     Pain   This is a chronic problem. The current episode started more than 1 year ago. The problem occurs constantly. The problem has been unchanged. Associated symptoms include arthralgias, congestion, myalgias, numbness and weakness. Pertinent negatives include no abdominal pain, anorexia, chest pain, chills, coughing, diaphoresis, fatigue, fever, headaches, nausea or vomiting. The symptoms are aggravated by exertion, standing and walking. He has tried rest, walking, position changes and oral narcotics (Percocet, patient cannot tolerate NSAIDs secondary to Coumadin) for the symptoms. The treatment provided moderate relief.   Atrial Fibrillation   Presents for follow-up visit. Symptoms include hypertension and weakness. Symptoms are negative for bradycardia, chest pain, dizziness, hemodynamic instability, palpitations, shortness of breath, syncope and tachycardia. The symptoms have been stable. Past medical history includes atrial fibrillation. There are no medication compliance problems.   Depression   Visit Type: follow-up  Patient presents with the following symptoms: insomnia, muscle tension and nervousness/anxiety.  Patient is not  "experiencing: anhedonia, decreased concentration, depressed mood, excessive worry, feelings of hopelessness, palpitations, shortness of breath, suicidal ideas, suicidal planning and thoughts of death.  Frequency of symptoms: occasionally   Severity: mild   Sleep quality: fair  Nighttime awakenings: occasional         The following portions of the patient's history were reviewed and updated as appropriate: allergies, current medications, past family history, past medical history, past social history, past surgical history and problem list.        Review of Systems   Constitutional: Negative for activity change, appetite change, chills, diaphoresis, fatigue, fever and unexpected weight change.   HENT: Positive for congestion and hearing loss.    Respiratory: Negative for cough, chest tightness and shortness of breath.    Cardiovascular: Negative for chest pain, palpitations, leg swelling and syncope.   Gastrointestinal: Positive for constipation. Negative for abdominal pain, anorexia, blood in stool, nausea and vomiting.   Musculoskeletal: Positive for arthralgias, back pain, gait problem and myalgias.   Neurological: Positive for weakness and numbness. Negative for dizziness, syncope, facial asymmetry, speech difficulty, light-headedness and headaches.   Hematological: Bruises/bleeds easily.   Psychiatric/Behavioral: Positive for dysphoric mood and sleep disturbance (wakes up in the middle of the night secondary to pain). Negative for decreased concentration, self-injury and suicidal ideas. The patient is nervous/anxious and has insomnia.    All other systems reviewed and are negative.      Objective   Blood pressure 128/68, pulse 59, temperature 98.4 °F (36.9 °C), resp. rate 18, height 182.9 cm (72\"), weight 79.2 kg (174 lb 9.6 oz), SpO2 98 %.    Physical Exam   Constitutional: He is oriented to person, place, and time. Vital signs are normal. He appears well-developed and well-nourished. He is active and " cooperative.  Non-toxic appearance. He does not have a sickly appearance. He does not appear ill. No distress.   HENT:   Head: Normocephalic and atraumatic.   Right Ear: External ear normal.   Left Ear: External ear normal.   Ears:    Nose: Nose normal.   Mouth/Throat: Oropharynx is clear and moist. Abnormal dentition.   Eyes: Conjunctivae and EOM are normal. Right eye exhibits no discharge. Left eye exhibits no discharge.   Neck: No tracheal deviation present.   Cardiovascular: Normal rate and intact distal pulses. An irregularly irregular rhythm present.   Pulmonary/Chest: Effort normal and breath sounds normal. No accessory muscle usage or stridor. No respiratory distress. He has no wheezes. He has no rales.   Abdominal: Soft. Normal appearance and bowel sounds are normal. He exhibits no distension, no fluid wave, no pulsatile midline mass and no mass. There is no tenderness.   Musculoskeletal: He exhibits no edema.        Lumbar back: He exhibits decreased range of motion, tenderness, pain and spasm. He exhibits no bony tenderness.   Significantly limited ROM in right hip, pain with movement, walks with limp favoring right hip, difficulty changing positions and getting onto the exam table.    Neurological: He is alert and oriented to person, place, and time.   Skin: Skin is warm and dry. He is not diaphoretic.   Psychiatric: His behavior is normal. Judgment and thought content normal. His mood appears not anxious. He does not exhibit a depressed mood.   Nursing note and vitals reviewed.    Ear Cerumen Removal  Date/Time: 5/1/2020 1:30 PM  Performed by: Sunita Crawford MD  Authorized by: Sunita Crawford MD   Consent: Verbal consent obtained. Written consent not obtained.  Risks and benefits: risks, benefits and alternatives were discussed  Consent given by: patient  Patient understanding: patient states understanding of the procedure being performed  Patient consent: the patient's understanding of the  procedure matches consent given  Patient identity confirmed: verbally with patient    Anesthesia:  Local Anesthetic: none  Location details: right ear  Patient tolerance: Patient tolerated the procedure well with no immediate complications  Comments: Large clump of wax removed, pt reports improvement to hearing   Procedure type: instrumentation and irrigation   Sedation:  Patient sedated: no          Lab Results (last 24 hours)     Procedure Component Value Units Date/Time    POCT INR [[id number]]  (Abnormal) Collected:  05/01/20 1139    Specimen:  Blood Updated:  05/01/20 1139     INR 2.8     Comment: 34               Assessment/Plan   Problems Addressed this Visit        Cardiovascular and Mediastinum    Permanent atrial fibrillation    Relevant Orders    POCT INR (Completed)       Digestive    Slow transit constipation       Nervous and Auditory    Chronic hip pain - Primary    Relevant Medications    oxyCODONE-acetaminophen (PERCOCET)  MG per tablet       Hematopoietic and Hemostatic    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    #2 depression/anxiety: chronic, controlled, continue on Lexapro daily     #3 chronic pain in right hip: Chronic, controlled with oral pain medication.  Patient is unable to take NSAIDs secondary to Coumadin.  Nii reviewed and appropriate.  Controlled contract in the chart.  Refill given on medication.  Sent to pharmacy.  Prescription for muscle relaxers to help with muscle spasms, advised on risk and benefits of this medication.  Return in 1 month     #4 chronic anticoagulation: therapeutic INR today. continue coumadin rec 7MG x5 days, 6MG x 2 days.      #5 atrial fibrillation: Patient is anticoagulated and rate controlled. Continue on cardizem.          #6 constipation: chronic, stable. continue on regular use of fiber and stool softeners, advised on diet changes, advised this is due to his medication     #7 cerumen removal: new, pt reports improvement, tolerated procedure well         This document has been electronically signed by Sunita Crawford MD on May 1, 2020 13:32

## 2020-05-08 ENCOUNTER — LAB (OUTPATIENT)
Dept: LAB | Facility: HOSPITAL | Age: 80
End: 2020-05-08

## 2020-05-08 DIAGNOSIS — C85.90 LYMPHOMA IN REMISSION (HCC): ICD-10-CM

## 2020-05-08 DIAGNOSIS — D72.819 LEUKOPENIA, UNSPECIFIED TYPE: ICD-10-CM

## 2020-05-08 LAB
ALBUMIN SERPL-MCNC: 4.2 G/DL (ref 3.5–5.2)
ALBUMIN/GLOB SERPL: 1.3 G/DL
ALP SERPL-CCNC: 63 U/L (ref 39–117)
ALT SERPL W P-5'-P-CCNC: 9 U/L (ref 1–41)
ANION GAP SERPL CALCULATED.3IONS-SCNC: 10 MMOL/L (ref 5–15)
AST SERPL-CCNC: 14 U/L (ref 1–40)
B2 MICROGLOB SERPL-MCNC: 2.7 MG/L (ref 0.8–2.2)
BASOPHILS # BLD AUTO: 0.06 10*3/MM3 (ref 0–0.2)
BASOPHILS NFR BLD AUTO: 1 % (ref 0–1.5)
BILIRUB SERPL-MCNC: 0.7 MG/DL (ref 0.2–1.2)
BUN BLD-MCNC: 12 MG/DL (ref 8–23)
BUN/CREAT SERPL: 12.6 (ref 7–25)
CALCIUM SPEC-SCNC: 9.5 MG/DL (ref 8.6–10.5)
CHLORIDE SERPL-SCNC: 101 MMOL/L (ref 98–107)
CO2 SERPL-SCNC: 30 MMOL/L (ref 22–29)
CREAT BLD-MCNC: 0.95 MG/DL (ref 0.76–1.27)
DEPRECATED RDW RBC AUTO: 48.4 FL (ref 37–54)
EOSINOPHIL # BLD AUTO: 0.19 10*3/MM3 (ref 0–0.4)
EOSINOPHIL NFR BLD AUTO: 3.2 % (ref 0.3–6.2)
ERYTHROCYTE [DISTWIDTH] IN BLOOD BY AUTOMATED COUNT: 13.7 % (ref 12.3–15.4)
FERRITIN SERPL-MCNC: 315.4 NG/ML (ref 30–400)
FOLATE SERPL-MCNC: 10 NG/ML (ref 4.78–24.2)
GFR SERPL CREATININE-BSD FRML MDRD: 77 ML/MIN/1.73
GLOBULIN UR ELPH-MCNC: 3.3 GM/DL
GLUCOSE BLD-MCNC: 110 MG/DL (ref 65–99)
HCT VFR BLD AUTO: 43.3 % (ref 37.5–51)
HGB BLD-MCNC: 13.9 G/DL (ref 13–17.7)
IMM GRANULOCYTES # BLD AUTO: 0.01 10*3/MM3 (ref 0–0.05)
IMM GRANULOCYTES NFR BLD AUTO: 0.2 % (ref 0–0.5)
IRON 24H UR-MRATE: 76 MCG/DL (ref 59–158)
IRON SATN MFR SERPL: 21 % (ref 20–50)
LDH SERPL-CCNC: 186 U/L (ref 135–225)
LYMPHOCYTES # BLD AUTO: 1.02 10*3/MM3 (ref 0.7–3.1)
LYMPHOCYTES NFR BLD AUTO: 17.1 % (ref 19.6–45.3)
MCH RBC QN AUTO: 30.5 PG (ref 26.6–33)
MCHC RBC AUTO-ENTMCNC: 32.1 G/DL (ref 31.5–35.7)
MCV RBC AUTO: 95 FL (ref 79–97)
MONOCYTES # BLD AUTO: 0.67 10*3/MM3 (ref 0.1–0.9)
MONOCYTES NFR BLD AUTO: 11.2 % (ref 5–12)
NEUTROPHILS # BLD AUTO: 4.01 10*3/MM3 (ref 1.7–7)
NEUTROPHILS NFR BLD AUTO: 67.3 % (ref 42.7–76)
NRBC BLD AUTO-RTO: 0 /100 WBC (ref 0–0.2)
PLATELET # BLD AUTO: 196 10*3/MM3 (ref 140–450)
PMV BLD AUTO: 9.8 FL (ref 6–12)
POTASSIUM BLD-SCNC: 3.9 MMOL/L (ref 3.5–5.2)
PROT SERPL-MCNC: 7.5 G/DL (ref 6–8.5)
RBC # BLD AUTO: 4.56 10*6/MM3 (ref 4.14–5.8)
SODIUM BLD-SCNC: 141 MMOL/L (ref 136–145)
TIBC SERPL-MCNC: 358 MCG/DL (ref 298–536)
TRANSFERRIN SERPL-MCNC: 240 MG/DL (ref 200–360)
VIT B12 BLD-MCNC: 368 PG/ML (ref 211–946)
WBC NRBC COR # BLD: 5.96 10*3/MM3 (ref 3.4–10.8)

## 2020-05-08 PROCEDURE — 82607 VITAMIN B-12: CPT

## 2020-05-08 PROCEDURE — 83615 LACTATE (LD) (LDH) ENZYME: CPT

## 2020-05-08 PROCEDURE — 85025 COMPLETE CBC W/AUTO DIFF WBC: CPT

## 2020-05-08 PROCEDURE — 83540 ASSAY OF IRON: CPT

## 2020-05-08 PROCEDURE — 36415 COLL VENOUS BLD VENIPUNCTURE: CPT

## 2020-05-08 PROCEDURE — 84466 ASSAY OF TRANSFERRIN: CPT

## 2020-05-08 PROCEDURE — 82728 ASSAY OF FERRITIN: CPT

## 2020-05-08 PROCEDURE — 82746 ASSAY OF FOLIC ACID SERUM: CPT

## 2020-05-08 PROCEDURE — 82232 ASSAY OF BETA-2 PROTEIN: CPT

## 2020-05-08 PROCEDURE — 80053 COMPREHEN METABOLIC PANEL: CPT

## 2020-05-11 ENCOUNTER — TELEPHONE (OUTPATIENT)
Dept: ONCOLOGY | Facility: CLINIC | Age: 80
End: 2020-05-11

## 2020-05-19 ENCOUNTER — CLINICAL SUPPORT (OUTPATIENT)
Dept: FAMILY MEDICINE CLINIC | Facility: CLINIC | Age: 80
End: 2020-05-19

## 2020-05-19 DIAGNOSIS — Z79.01 CHRONIC ANTICOAGULATION: Primary | ICD-10-CM

## 2020-05-19 LAB — INR PPP: 2.3 (ref 0.9–1.1)

## 2020-05-19 PROCEDURE — 85610 PROTHROMBIN TIME: CPT | Performed by: FAMILY MEDICINE

## 2020-05-19 NOTE — PROGRESS NOTES
Pt presents for PT INR   Pt reports clipping his toes nails a few days ago and cut his skin, states took a little time to get it to stop bleeding but if finally did.   Allergies reviewed   PT 27.6 sec  INR 2.3

## 2020-05-26 ENCOUNTER — OFFICE VISIT (OUTPATIENT)
Dept: FAMILY MEDICINE CLINIC | Facility: CLINIC | Age: 80
End: 2020-05-26

## 2020-05-26 ENCOUNTER — HOSPITAL ENCOUNTER (OUTPATIENT)
Dept: ULTRASOUND IMAGING | Facility: HOSPITAL | Age: 80
Discharge: HOME | End: 2020-05-26
Admitting: FAMILY MEDICINE

## 2020-05-26 VITALS
HEART RATE: 43 BPM | RESPIRATION RATE: 18 BRPM | DIASTOLIC BLOOD PRESSURE: 68 MMHG | HEIGHT: 72 IN | OXYGEN SATURATION: 99 % | TEMPERATURE: 98.4 F | BODY MASS INDEX: 24.33 KG/M2 | SYSTOLIC BLOOD PRESSURE: 138 MMHG | WEIGHT: 179.6 LBS

## 2020-05-26 DIAGNOSIS — R22.42 LOCALIZED SWELLING OF LEFT LOWER EXTREMITY: Primary | ICD-10-CM

## 2020-05-26 DIAGNOSIS — R60.0 BILATERAL LOWER EXTREMITY EDEMA: ICD-10-CM

## 2020-05-26 PROCEDURE — 99213 OFFICE O/P EST LOW 20 MIN: CPT | Performed by: FAMILY MEDICINE

## 2020-05-26 PROCEDURE — 93971 EXTREMITY STUDY: CPT

## 2020-05-26 RX ORDER — FUROSEMIDE 20 MG/1
20 TABLET ORAL DAILY
Qty: 3 TABLET | Refills: 0 | Status: SHIPPED | OUTPATIENT
Start: 2020-05-26 | End: 2020-06-01

## 2020-05-26 NOTE — PROGRESS NOTES
"Subjective cc: leg swelling   Cabrera Avina is a 80 y.o. male who presents with complaint of new onset lower ext swelling x 4-5 days.  It is gradually improving, no known injury, L>R, L with more redness and tenderness to palpation.      Leg Swelling   This is a new problem. The current episode started in the past 7 days. The problem occurs constantly. The problem has been gradually improving. Associated symptoms include arthralgias. Pertinent negatives include no change in bowel habit, chest pain, chills, coughing, fever, nausea, numbness, rash, urinary symptoms, vomiting or weakness. Nothing aggravates the symptoms. He has tried rest and position changes for the symptoms. The treatment provided moderate relief.        The following portions of the patient's history were reviewed and updated as appropriate: allergies, current medications, past family history, past medical history, past social history, past surgical history and problem list.        Review of Systems   Constitutional: Negative for activity change, appetite change, chills, fever and unexpected weight change.   Respiratory: Negative for cough, shortness of breath and wheezing.    Cardiovascular: Positive for leg swelling. Negative for chest pain and palpitations.   Gastrointestinal: Negative for change in bowel habit, nausea and vomiting.   Musculoskeletal: Positive for arthralgias, back pain and gait problem.   Skin: Positive for color change. Negative for rash.   Neurological: Negative for weakness and numbness.   All other systems reviewed and are negative.      Objective   Blood pressure 138/68, pulse (!) 43, temperature 98.4 °F (36.9 °C), temperature source Temporal, resp. rate 18, height 182.9 cm (72\"), weight 81.5 kg (179 lb 9.6 oz), SpO2 99 %.  Physical Exam   Constitutional: He is oriented to person, place, and time. He appears well-developed and well-nourished. No distress.   HENT:   Head: Normocephalic and atraumatic.   Right Ear: " External ear normal.   Left Ear: External ear normal.   Nose: Nose normal.   Cardiovascular: Normal rate and intact distal pulses.   Murmur heard.  Pulmonary/Chest: Effort normal and breath sounds normal. No stridor. No respiratory distress. He has no wheezes. He has no rales.   Musculoskeletal: He exhibits edema.        Right lower leg: He exhibits no tenderness.        Left lower leg: He exhibits no tenderness.        Legs:  Neurological: He is alert and oriented to person, place, and time.   Skin: Skin is warm and dry. He is not diaphoretic. There is erythema.   Psychiatric: His behavior is normal. Judgment and thought content normal. His mood appears anxious.   Nursing note and vitals reviewed.      Imaging Results (Last 24 Hours)     Procedure Component Value Units Date/Time    US Venous Doppler Lower Extremity Left (duplex) [[id number]] Collected:  05/26/20 1606     Updated:  05/26/20 1609    Narrative:       US VENOUS DOPPLER LOWER EXTREMITY LEFT (DUPLEX)- 5/26/2020 3:32 PM CDT     HISTORY: leg swelling; R22.42-Localized swelling, mass and lump, left  lower limb     COMPARISON: NONE     FINDINGS:  Transverse and longitudinal grayscale and Doppler sonographic images of  the left lower extremity were obtained. Spectral analysis was also  obtained.     There is normal flow and compressibility of the left common femoral,  superficial femoral, popliteal, peroneal, anterior tibial, and posterior  tibial veins.       Impression:       1. Normal duplex ultrasound of the left lower extremity without evidence  of DVT.     This report was finalized on 05/26/2020 16:06 by Dr. Lopez Boyer MD.            Assessment/Plan   Problems Addressed this Visit     None      Visit Diagnoses     Localized swelling of left lower extremity    -  Primary    Relevant Medications    furosemide (Lasix) 20 MG tablet    Other Relevant Orders    US Venous Doppler Lower Extremity Left (duplex) (Completed)    Bilateral lower extremity edema             PLAN:     #1 bilateral lower ext edema: new, L worse than right with erythema and pain - US to r/o DVT - advised pt it would be unlikely since he is on coumadin but he also has dx of cancer which can make him at greater risk. Venous duplex is negative for DVT, advised pt to elevated legs, compression, lasix x 3 days, and decreased sodium intake call if not improving, if not improving will consider cardiac evaluation.           This document has been electronically signed by Sunita Crawford MD on May 26, 2020 17:28

## 2020-06-01 ENCOUNTER — OFFICE VISIT (OUTPATIENT)
Dept: FAMILY MEDICINE CLINIC | Facility: CLINIC | Age: 80
End: 2020-06-01

## 2020-06-01 VITALS
SYSTOLIC BLOOD PRESSURE: 142 MMHG | HEIGHT: 71 IN | HEART RATE: 76 BPM | RESPIRATION RATE: 20 BRPM | BODY MASS INDEX: 24.92 KG/M2 | DIASTOLIC BLOOD PRESSURE: 76 MMHG | TEMPERATURE: 98 F | WEIGHT: 178 LBS | OXYGEN SATURATION: 98 %

## 2020-06-01 DIAGNOSIS — F41.9 ANXIETY: ICD-10-CM

## 2020-06-01 DIAGNOSIS — G89.29 CHRONIC PAIN OF RIGHT HIP: Primary | ICD-10-CM

## 2020-06-01 DIAGNOSIS — I48.21 PERMANENT ATRIAL FIBRILLATION (HCC): ICD-10-CM

## 2020-06-01 DIAGNOSIS — Z79.899 MEDICATION MANAGEMENT: ICD-10-CM

## 2020-06-01 DIAGNOSIS — K59.01 SLOW TRANSIT CONSTIPATION: ICD-10-CM

## 2020-06-01 DIAGNOSIS — C85.90 LYMPHOMA IN REMISSION (HCC): ICD-10-CM

## 2020-06-01 DIAGNOSIS — Z72.0 TOBACCO USE: ICD-10-CM

## 2020-06-01 DIAGNOSIS — G47.09 OTHER INSOMNIA: ICD-10-CM

## 2020-06-01 DIAGNOSIS — M25.551 CHRONIC PAIN OF RIGHT HIP: Primary | ICD-10-CM

## 2020-06-01 DIAGNOSIS — Z79.01 CHRONIC ANTICOAGULATION: ICD-10-CM

## 2020-06-01 LAB — INR PPP: 2.4 (ref 0.9–1.1)

## 2020-06-01 PROCEDURE — 99213 OFFICE O/P EST LOW 20 MIN: CPT | Performed by: FAMILY MEDICINE

## 2020-06-01 PROCEDURE — 85610 PROTHROMBIN TIME: CPT | Performed by: FAMILY MEDICINE

## 2020-06-01 RX ORDER — BETAMETHASONE DIPROPIONATE 0.5 MG/G
CREAM TOPICAL 2 TIMES DAILY
Qty: 45 G | Refills: 0 | Status: SHIPPED | OUTPATIENT
Start: 2020-06-01 | End: 2020-11-02

## 2020-06-01 RX ORDER — OXYCODONE AND ACETAMINOPHEN 10; 325 MG/1; MG/1
1 TABLET ORAL EVERY 6 HOURS PRN
Qty: 100 TABLET | Refills: 0 | Status: SHIPPED | OUTPATIENT
Start: 2020-06-01 | End: 2020-06-30 | Stop reason: SDUPTHER

## 2020-06-01 NOTE — PROGRESS NOTES
Subjective cc: pain medication refill  Cabrera Avina is a 80 y.o. male who presents to get a refill on his pain medication.   His lower ext swelling is improving, he is still having some slight edema and itching over the left ankle. Reviewed US which shows no DVT.   Here today for refills and to get his INR checked - currently in normal range.   Pain in his right hip.  Pain is the same.  Taking the muscle relaxer PRN.  Pain improved with pain medication without side effects.         Constipation stable with laxatives  Depression and anxiety stable - taking lexapro daily.   BP controlled.      Atrial fib is rate controlled.   He is currently taking restoril nighly for insomnia which controls his symptoms and allows him to get restful sleep.     Past information reviewed and updated as appropriate     Pain   This is a chronic problem. The current episode started more than 1 year ago. The problem occurs constantly. The problem has been unchanged. Associated symptoms include arthralgias, myalgias, numbness and weakness. Pertinent negatives include no abdominal pain, anorexia, chest pain, chills, congestion, coughing, diaphoresis, fatigue, fever, headaches, nausea or vomiting. The symptoms are aggravated by exertion, standing and walking. He has tried rest, walking, position changes and oral narcotics (Percocet, patient cannot tolerate NSAIDs secondary to Coumadin) for the symptoms. The treatment provided moderate relief.   Atrial Fibrillation   Presents for follow-up visit. Symptoms include hypertension and weakness. Symptoms are negative for bradycardia, chest pain, dizziness, hemodynamic instability, palpitations, shortness of breath, syncope and tachycardia. The symptoms have been stable. Past medical history includes atrial fibrillation. There are no medication compliance problems.   Depression   Visit Type: follow-up  Patient presents with the following symptoms: insomnia, muscle tension and  "nervousness/anxiety.  Patient is not experiencing: anhedonia, decreased concentration, depressed mood, excessive worry, feelings of hopelessness, palpitations, shortness of breath, suicidal ideas, suicidal planning and thoughts of death.  Frequency of symptoms: occasionally   Severity: mild   Sleep quality: fair  Nighttime awakenings: occasional         The following portions of the patient's history were reviewed and updated as appropriate: allergies, current medications, past family history, past medical history, past social history, past surgical history and problem list.        Review of Systems   Constitutional: Negative for activity change, appetite change, chills, diaphoresis, fatigue, fever and unexpected weight change.   HENT: Positive for hearing loss. Negative for congestion.    Respiratory: Negative for cough, chest tightness and shortness of breath.    Cardiovascular: Positive for leg swelling. Negative for chest pain, palpitations and syncope.   Gastrointestinal: Positive for constipation. Negative for abdominal pain, anorexia, blood in stool, nausea and vomiting.   Musculoskeletal: Positive for arthralgias, back pain, gait problem and myalgias.   Skin: Positive for color change.        Itching left ankle    Neurological: Positive for weakness and numbness. Negative for dizziness, syncope, facial asymmetry, speech difficulty, light-headedness and headaches.   Hematological: Bruises/bleeds easily.   Psychiatric/Behavioral: Positive for sleep disturbance (wakes up in the middle of the night secondary to pain). Negative for decreased concentration, dysphoric mood, self-injury and suicidal ideas. The patient is nervous/anxious and has insomnia.    All other systems reviewed and are negative.      Objective   Blood pressure 142/76, pulse 76, temperature 98 °F (36.7 °C), temperature source Temporal, resp. rate 20, height 180.3 cm (71\"), weight 80.7 kg (178 lb), SpO2 98 %.    Physical Exam   Constitutional: " He is oriented to person, place, and time. Vital signs are normal. He appears well-developed and well-nourished. He is active and cooperative.  Non-toxic appearance. He does not have a sickly appearance. He does not appear ill. No distress.   HENT:   Head: Normocephalic and atraumatic.   Right Ear: External ear normal.   Left Ear: External ear normal.   Nose: Nose normal.   Mouth/Throat: Oropharynx is clear and moist. Abnormal dentition.   Eyes: Conjunctivae and EOM are normal. Right eye exhibits no discharge. Left eye exhibits no discharge.   Neck: No tracheal deviation present.   Cardiovascular: Normal rate and intact distal pulses. An irregularly irregular rhythm present.   Pulmonary/Chest: Effort normal and breath sounds normal. No accessory muscle usage or stridor. No respiratory distress. He has no wheezes. He has no rales.   Abdominal: Soft. Normal appearance and bowel sounds are normal. He exhibits no distension, no fluid wave, no pulsatile midline mass and no mass. There is no tenderness.   Musculoskeletal: He exhibits edema (L>R ).        Lumbar back: He exhibits decreased range of motion, tenderness, pain and spasm. He exhibits no bony tenderness.   Significantly limited ROM in right hip, pain with movement, walks with limp favoring right hip, difficulty changing positions and getting onto the exam table.    Neurological: He is alert and oriented to person, place, and time.   Skin: Skin is warm and dry. He is not diaphoretic.   Appears dry, pt scratching    Psychiatric: His behavior is normal. Judgment and thought content normal. His mood appears not anxious. He does not exhibit a depressed mood.   Nursing note and vitals reviewed.    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    #2 depression/anxiety: chronic, controlled, continue on Lexapro daily     #3 chronic pain in right hip: Chronic, controlled with oral pain medication.  Patient is unable to take NSAIDs secondary to Coumadin.  Nii reviewed and appropriate.  Controlled contract in the chart.  Refill given on medication.  Sent to pharmacy.  Prescription for muscle relaxers to help with muscle spasms, advised on risk and benefits of this medication.  Return in 1 month     #4 chronic anticoagulation: therapeutic INR today. continue coumadin      #5 atrial fibrillation: Patient is anticoagulated and rate controlled. Continue on cardizem.      #6 constipation: chronic, stable. continue on regular use of fiber and stool softeners, advised on diet changes, advised this is due to his medication     #7 swelling of lower ext: improving, discussed possible allergic reaction to something he came in contact with in yard since it is right above his shoe line         This document has been electronically signed by Sunita Crawford MD on June 1, 2020 13:56

## 2020-06-06 LAB — DRUGS UR: NORMAL

## 2020-06-16 ENCOUNTER — CLINICAL SUPPORT (OUTPATIENT)
Dept: FAMILY MEDICINE CLINIC | Facility: CLINIC | Age: 80
End: 2020-06-16

## 2020-06-16 DIAGNOSIS — I48.21 PERMANENT ATRIAL FIBRILLATION (HCC): Primary | ICD-10-CM

## 2020-06-16 LAB — INR PPP: 2.3 (ref 0.9–1.1)

## 2020-06-16 PROCEDURE — 85610 PROTHROMBIN TIME: CPT | Performed by: FAMILY MEDICINE

## 2020-06-18 ENCOUNTER — TELEPHONE (OUTPATIENT)
Dept: FAMILY MEDICINE CLINIC | Facility: CLINIC | Age: 80
End: 2020-06-18

## 2020-06-18 DIAGNOSIS — K04.7 ABSCESSED TOOTH: Primary | ICD-10-CM

## 2020-06-18 RX ORDER — AMOXICILLIN AND CLAVULANATE POTASSIUM 875; 125 MG/1; MG/1
1 TABLET, FILM COATED ORAL 2 TIMES DAILY
Qty: 14 TABLET | Refills: 0 | Status: SHIPPED | OUTPATIENT
Start: 2020-06-18 | End: 2020-07-07

## 2020-06-19 ENCOUNTER — TELEPHONE (OUTPATIENT)
Dept: ONCOLOGY | Facility: CLINIC | Age: 80
End: 2020-06-19

## 2020-06-19 NOTE — TELEPHONE ENCOUNTER
Patient would like to speak to Kriss.  He had labs 5/15 and then his appointment was canceled.  He said that Kriss said she would call him back in a few days to reschedule and he hasn't heard anything.    Please call him asap at  [phone]

## 2020-06-24 DIAGNOSIS — I48.20 CHRONIC ATRIAL FIBRILLATION (HCC): ICD-10-CM

## 2020-06-24 DIAGNOSIS — G47.09 OTHER INSOMNIA: ICD-10-CM

## 2020-06-25 RX ORDER — DILTIAZEM HYDROCHLORIDE 240 MG/1
CAPSULE, COATED, EXTENDED RELEASE ORAL
Qty: 90 CAPSULE | Refills: 3 | Status: SHIPPED | OUTPATIENT
Start: 2020-06-25 | End: 2021-03-30

## 2020-06-25 RX ORDER — TEMAZEPAM 15 MG/1
CAPSULE ORAL
Qty: 30 CAPSULE | Refills: 2 | Status: SHIPPED | OUTPATIENT
Start: 2020-06-25 | End: 2020-07-23 | Stop reason: SDUPTHER

## 2020-06-29 DIAGNOSIS — C85.90 LYMPHOMA IN REMISSION (HCC): Primary | ICD-10-CM

## 2020-06-29 DIAGNOSIS — D50.9 IRON DEFICIENCY ANEMIA, UNSPECIFIED IRON DEFICIENCY ANEMIA TYPE: ICD-10-CM

## 2020-06-30 ENCOUNTER — LAB (OUTPATIENT)
Dept: LAB | Facility: HOSPITAL | Age: 80
End: 2020-06-30

## 2020-06-30 ENCOUNTER — OFFICE VISIT (OUTPATIENT)
Dept: FAMILY MEDICINE CLINIC | Facility: CLINIC | Age: 80
End: 2020-06-30

## 2020-06-30 VITALS
TEMPERATURE: 98.6 F | WEIGHT: 177.2 LBS | DIASTOLIC BLOOD PRESSURE: 62 MMHG | RESPIRATION RATE: 16 BRPM | HEART RATE: 53 BPM | SYSTOLIC BLOOD PRESSURE: 134 MMHG | OXYGEN SATURATION: 98 % | BODY MASS INDEX: 24.81 KG/M2 | HEIGHT: 71 IN

## 2020-06-30 DIAGNOSIS — K59.01 SLOW TRANSIT CONSTIPATION: ICD-10-CM

## 2020-06-30 DIAGNOSIS — Z72.0 TOBACCO USE: ICD-10-CM

## 2020-06-30 DIAGNOSIS — D50.9 IRON DEFICIENCY ANEMIA, UNSPECIFIED IRON DEFICIENCY ANEMIA TYPE: ICD-10-CM

## 2020-06-30 DIAGNOSIS — I48.21 PERMANENT ATRIAL FIBRILLATION (HCC): ICD-10-CM

## 2020-06-30 DIAGNOSIS — G47.09 OTHER INSOMNIA: ICD-10-CM

## 2020-06-30 DIAGNOSIS — C85.90 LYMPHOMA IN REMISSION (HCC): ICD-10-CM

## 2020-06-30 DIAGNOSIS — R73.09 ELEVATED GLUCOSE: ICD-10-CM

## 2020-06-30 DIAGNOSIS — F41.9 ANXIETY: ICD-10-CM

## 2020-06-30 DIAGNOSIS — M25.551 CHRONIC PAIN OF RIGHT HIP: Primary | ICD-10-CM

## 2020-06-30 DIAGNOSIS — Z79.01 CHRONIC ANTICOAGULATION: ICD-10-CM

## 2020-06-30 DIAGNOSIS — G89.29 CHRONIC PAIN OF RIGHT HIP: Primary | ICD-10-CM

## 2020-06-30 LAB
ALBUMIN SERPL-MCNC: 3.8 G/DL (ref 3.5–5.2)
ALBUMIN/GLOB SERPL: 1.1 G/DL
ALP SERPL-CCNC: 61 U/L (ref 39–117)
ALT SERPL W P-5'-P-CCNC: 9 U/L (ref 1–41)
ANION GAP SERPL CALCULATED.3IONS-SCNC: 11 MMOL/L (ref 5–15)
AST SERPL-CCNC: 18 U/L (ref 1–40)
B2 MICROGLOB SERPL-MCNC: 2.1 MG/L (ref 0.8–2.2)
BASOPHILS # BLD AUTO: 0.04 10*3/MM3 (ref 0–0.2)
BASOPHILS NFR BLD AUTO: 0.7 % (ref 0–1.5)
BILIRUB SERPL-MCNC: 0.5 MG/DL (ref 0.2–1.2)
BUN SERPL-MCNC: 15 MG/DL (ref 8–23)
BUN/CREAT SERPL: 14.3 (ref 7–25)
CALCIUM SPEC-SCNC: 9.4 MG/DL (ref 8.6–10.5)
CHLORIDE SERPL-SCNC: 101 MMOL/L (ref 98–107)
CO2 SERPL-SCNC: 27 MMOL/L (ref 22–29)
CREAT SERPL-MCNC: 1.05 MG/DL (ref 0.76–1.27)
DEPRECATED RDW RBC AUTO: 44.6 FL (ref 37–54)
EOSINOPHIL # BLD AUTO: 0.13 10*3/MM3 (ref 0–0.4)
EOSINOPHIL NFR BLD AUTO: 2.3 % (ref 0.3–6.2)
ERYTHROCYTE [DISTWIDTH] IN BLOOD BY AUTOMATED COUNT: 13.1 % (ref 12.3–15.4)
FERRITIN SERPL-MCNC: 302.7 NG/ML (ref 30–400)
FOLATE SERPL-MCNC: 13.1 NG/ML (ref 4.78–24.2)
GFR SERPL CREATININE-BSD FRML MDRD: 68 ML/MIN/1.73
GLOBULIN UR ELPH-MCNC: 3.4 GM/DL
GLUCOSE SERPL-MCNC: 125 MG/DL (ref 65–99)
HCT VFR BLD AUTO: 42.6 % (ref 37.5–51)
HGB BLD-MCNC: 13.8 G/DL (ref 13–17.7)
IMM GRANULOCYTES # BLD AUTO: 0.01 10*3/MM3 (ref 0–0.05)
IMM GRANULOCYTES NFR BLD AUTO: 0.2 % (ref 0–0.5)
INR PPP: 2 (ref 0.9–1.1)
IRON 24H UR-MRATE: 83 MCG/DL (ref 59–158)
IRON SATN MFR SERPL: 25 % (ref 20–50)
LDH SERPL-CCNC: 184 U/L (ref 135–225)
LYMPHOCYTES # BLD AUTO: 1.11 10*3/MM3 (ref 0.7–3.1)
LYMPHOCYTES NFR BLD AUTO: 20 % (ref 19.6–45.3)
MCH RBC QN AUTO: 30.2 PG (ref 26.6–33)
MCHC RBC AUTO-ENTMCNC: 32.4 G/DL (ref 31.5–35.7)
MCV RBC AUTO: 93.2 FL (ref 79–97)
MONOCYTES # BLD AUTO: 0.49 10*3/MM3 (ref 0.1–0.9)
MONOCYTES NFR BLD AUTO: 8.8 % (ref 5–12)
NEUTROPHILS NFR BLD AUTO: 3.78 10*3/MM3 (ref 1.7–7)
NEUTROPHILS NFR BLD AUTO: 68 % (ref 42.7–76)
NRBC BLD AUTO-RTO: 0 /100 WBC (ref 0–0.2)
PLATELET # BLD AUTO: 216 10*3/MM3 (ref 140–450)
PMV BLD AUTO: 9.9 FL (ref 6–12)
POTASSIUM SERPL-SCNC: 4.2 MMOL/L (ref 3.5–5.2)
PROT SERPL-MCNC: 7.2 G/DL (ref 6–8.5)
RBC # BLD AUTO: 4.57 10*6/MM3 (ref 4.14–5.8)
SODIUM SERPL-SCNC: 139 MMOL/L (ref 136–145)
TIBC SERPL-MCNC: 331 MCG/DL (ref 298–536)
TRANSFERRIN SERPL-MCNC: 222 MG/DL (ref 200–360)
VIT B12 BLD-MCNC: 398 PG/ML (ref 211–946)
WBC # BLD AUTO: 5.56 10*3/MM3 (ref 3.4–10.8)

## 2020-06-30 PROCEDURE — 36415 COLL VENOUS BLD VENIPUNCTURE: CPT

## 2020-06-30 PROCEDURE — 82607 VITAMIN B-12: CPT

## 2020-06-30 PROCEDURE — 85610 PROTHROMBIN TIME: CPT | Performed by: FAMILY MEDICINE

## 2020-06-30 PROCEDURE — 82746 ASSAY OF FOLIC ACID SERUM: CPT

## 2020-06-30 PROCEDURE — 84466 ASSAY OF TRANSFERRIN: CPT

## 2020-06-30 PROCEDURE — 83615 LACTATE (LD) (LDH) ENZYME: CPT

## 2020-06-30 PROCEDURE — 99214 OFFICE O/P EST MOD 30 MIN: CPT | Performed by: FAMILY MEDICINE

## 2020-06-30 PROCEDURE — 82232 ASSAY OF BETA-2 PROTEIN: CPT

## 2020-06-30 PROCEDURE — 85025 COMPLETE CBC W/AUTO DIFF WBC: CPT

## 2020-06-30 PROCEDURE — 82728 ASSAY OF FERRITIN: CPT

## 2020-06-30 PROCEDURE — 83540 ASSAY OF IRON: CPT

## 2020-06-30 PROCEDURE — 80053 COMPREHEN METABOLIC PANEL: CPT

## 2020-06-30 RX ORDER — OXYCODONE AND ACETAMINOPHEN 10; 325 MG/1; MG/1
1 TABLET ORAL EVERY 6 HOURS PRN
Qty: 100 TABLET | Refills: 0 | Status: SHIPPED | OUTPATIENT
Start: 2020-06-30 | End: 2020-07-23 | Stop reason: SDUPTHER

## 2020-06-30 NOTE — PROGRESS NOTES
Subjective cc: pain medication refill  Cabrera Avina is a 80 y.o. male who presents to get a refill on his pain medication.   He complains of his ears feeling stopped up still - thinks it is allergies.   Here today for refills and to get his INR checked - WNL - has been taking 7MG daily.    He has been staying at home   Pain in his right hip.  Pain is the same.  Taking the muscle relaxer PRN.  Pain improved with pain medication without side effects.      Had labs today for oncology - reviewed with pt - he has upcoming appt with them      Constipation stable with laxatives  Depression and anxiety stable - taking lexapro daily.   BP controlled.      Atrial fib is rate controlled.     He is currently taking restoril nighly for insomnia which controls his symptoms and allows him to get restful sleep.     Past information reviewed and updated as appropriate     Pain   This is a chronic problem. The current episode started more than 1 year ago. The problem occurs constantly. The problem has been unchanged. Associated symptoms include arthralgias, congestion, myalgias, numbness and weakness. Pertinent negatives include no abdominal pain, anorexia, chest pain, chills, coughing, diaphoresis, fatigue, fever, headaches, nausea or vomiting. The symptoms are aggravated by exertion, standing and walking. He has tried rest, walking, position changes and oral narcotics (Percocet, patient cannot tolerate NSAIDs secondary to Coumadin) for the symptoms. The treatment provided moderate relief.   Atrial Fibrillation   Presents for follow-up visit. Symptoms include hypertension and weakness. Symptoms are negative for bradycardia, chest pain, dizziness, hemodynamic instability, palpitations, shortness of breath, syncope and tachycardia. The symptoms have been stable. Past medical history includes atrial fibrillation and CHF. There are no medication compliance problems.   Depression   Visit Type: follow-up  Patient presents with the  following symptoms: insomnia, muscle tension and nervousness/anxiety.  Patient is not experiencing: anhedonia, decreased concentration, depressed mood, excessive worry, feelings of hopelessness, palpitations, shortness of breath, suicidal ideas, suicidal planning and thoughts of death.  Frequency of symptoms: occasionally   Severity: mild   Sleep quality: fair  Nighttime awakenings: occasional  Patient has a history of: CHF    Congestive Heart Failure   Pertinent negatives include no abdominal pain, chest pain, fatigue, palpitations, shortness of breath or unexpected weight change.        The following portions of the patient's history were reviewed and updated as appropriate: allergies, current medications, past family history, past medical history, past social history, past surgical history and problem list.        Review of Systems   Constitutional: Negative for activity change, appetite change, chills, diaphoresis, fatigue, fever and unexpected weight change.   HENT: Positive for congestion and hearing loss.    Respiratory: Negative for cough, chest tightness and shortness of breath.    Cardiovascular: Negative for chest pain, palpitations, leg swelling and syncope.   Gastrointestinal: Positive for constipation. Negative for abdominal pain, anorexia, blood in stool, nausea and vomiting.   Musculoskeletal: Positive for arthralgias, back pain, gait problem and myalgias.   Neurological: Positive for weakness and numbness. Negative for dizziness, syncope, facial asymmetry, speech difficulty, light-headedness and headaches.   Hematological: Bruises/bleeds easily.   Psychiatric/Behavioral: Positive for dysphoric mood and sleep disturbance (wakes up in the middle of the night secondary to pain). Negative for decreased concentration, self-injury and suicidal ideas. The patient is nervous/anxious and has insomnia.    All other systems reviewed and are negative.      Objective   Blood pressure 134/62, pulse 53,  "temperature 98.6 °F (37 °C), temperature source Oral, resp. rate 16, height 180.3 cm (71\"), weight 80.4 kg (177 lb 3.2 oz), SpO2 98 %.    Physical Exam   Constitutional: He is oriented to person, place, and time. Vital signs are normal. He appears well-developed and well-nourished. He is active and cooperative.  Non-toxic appearance. He does not have a sickly appearance. He does not appear ill. No distress.   HENT:   Head: Normocephalic and atraumatic.   Right Ear: External ear normal.   Left Ear: External ear normal.   Nose: Nose normal.   Mouth/Throat: Oropharynx is clear and moist. Abnormal dentition.   Eyes: Conjunctivae and EOM are normal. Right eye exhibits no discharge. Left eye exhibits no discharge.   Neck: No tracheal deviation present.   Cardiovascular: Normal rate and intact distal pulses. An irregularly irregular rhythm present.   Pulmonary/Chest: Effort normal and breath sounds normal. No accessory muscle usage or stridor. No respiratory distress. He has no wheezes. He has no rales.   Abdominal: Soft. Normal appearance and bowel sounds are normal. He exhibits no distension, no fluid wave, no pulsatile midline mass and no mass. There is no tenderness.   Musculoskeletal: He exhibits no edema.        Lumbar back: He exhibits decreased range of motion, tenderness, pain and spasm. He exhibits no bony tenderness.   Significantly limited ROM in right hip, pain with movement, walks with limp favoring right hip, difficulty changing positions and getting onto the exam table.    Neurological: He is alert and oriented to person, place, and time.   Skin: Skin is warm and dry. He is not diaphoretic.   Psychiatric: His behavior is normal. Judgment and thought content normal. His mood appears not anxious. He does not exhibit a depressed mood.   Nursing note and vitals reviewed.    Procedures  Lab Results (last 24 hours)     Procedure Component Value Units Date/Time    POCT INR [[id number]]  (Abnormal) Collected:  " 06/30/20 1524    Specimen:  Blood Updated:  06/30/20 1524     INR 2.0     Comment: 23.6               Assessment/Plan   Problems Addressed this Visit        Cardiovascular and Mediastinum    Permanent atrial fibrillation (CMS/HCC)    Relevant Orders    POCT INR (Completed)       Digestive    Slow transit constipation       Nervous and Auditory    Chronic hip pain - Primary    Relevant Medications    oxyCODONE-acetaminophen (PERCOCET)  MG per tablet       Hematopoietic and Hemostatic    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    #2 depression/anxiety: chronic, controlled, continue on Lexapro daily     #3 chronic pain in right hip: Chronic, controlled with oral pain medication.  Patient is unable to take NSAIDs secondary to Coumadin.  Nii reviewed and appropriate.  Controlled contract in the chart.  Refill given on medication.  Sent to pharmacy.  Prescription for muscle relaxers to help with muscle spasms, advised on risk and benefits of this medication.  Return in 1 month     #4 chronic anticoagulation: therapeutic INR today. continue coumadin      #5 atrial fibrillation: Patient is anticoagulated and rate controlled. Continue on cardizem.      #6 constipation: chronic, stable. continue on regular use of fiber and stool softeners, advised on diet changes, advised this is due to his medication    #7 elevated glucose: new, will check ha1c with next POC orders           This document has been electronically signed by Sunita Crawford MD on June 30, 2020 15:46

## 2020-07-02 NOTE — PROGRESS NOTES
[facility] HEMATOLOGY AND ONCOLOGY  [address]  [facility] [address] [phone]    Patient Name: Cabrera Avina  Encounter Date: 07/07/2020  YOB: 1940  Patient Number: [id number]      REASON FOR FOLLOWUP:  Mr. Cabrera Avina is a 80-year-old male who returns in follow-up of intermediate grade B-cell lymphoma.  He is seen 180 months following the completion of R-CHOP chemotherapy and 128 months after the completion of Rituxan maintenance.  He is also followed for an iron deficiency anemia and for chronic anticoagulation. He is seen 8 months after the most recent dose of Injectafer.  The patient is here alone. History is obtained from the patient who is considered to be a reliable historian.     DIAGNOSTIC ABNORMALITIES: B-cell Lymphoma.   1. Periportal lymph node biopsy, 02/01/2005. Findings consistent with large B-cell lymphoma with an intermediate to high proliferative rate.  2. CT of the chest, 02/03/2005. Mediastinal, left hilar, and upper abdominal lymphadenopathy consistent with the patient's history of lymphoma.    PREVIOUS INTERVENTIONS: B-cell lymphoma   1. Definitive Rituxan, 375 mg/m2, 02/05/2005 through 02/25/2005. Four weeks completed.  2. Definitive R-CHOP, from 01/04/2005 through 07/07/2005. Five cycles completed. Cycle #2 delayed by admission for management of deep perirectal abscess.  3. Maintenance Rituxan (every 3 months), 10/14/2005 through 11/11/2007. Six cycles completed.    DIAGNOSTIC ABNORMALITIES: Anemia, iron deficiency   1. Anemia substrates on 05/27/2008: Iron 43, %saturation 12, TIBC 366, UIBC 323, ferritin 27, vitamin B12 of 295, folate 7.2.    PREVIOUS INTERVENTIONS: Anemia.   1. INFeD, 1000 mg IVPB, 06/04/2008.  2. INFeD, 1000 mg IVPB, 09/02/2010 and 09/09/2010.  3. INFeD 500 mg, 08/25/2016; 11/28/2016.  4. Injectafer 750 mg IV, 11/20/2019    Problem List Items Addressed This Visit         Hematopoietic and Hemostatic    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) - Primary        Oncology/Hematology History    DIAGNOSTIC ABNORMALITIES: B-cell Lymphoma.  Periportal lymph node biopsy, 02/01/2005.  Findings consistent with large B-cell lymphoma with an intermediate to high proliferative rate.  CT of the chest, 02/03/2005.   Mediastinal, left hilar, and upper abdominal lymphadenopathy consistent with the patient's history of lymphoma.  PREVIOUS INTERVENTIONS:   B-cell lymphoma  Definitive Rituxan, 375 mg/m2, 02/05/2005 through 02/25/2005. Four weeks completed.  Definitive R-CHOP, from 01/04/2005 through 07/07/2005.  Five cycles completed.  Cycle #2 delayed by admission for management of deep perirectal abscess.  Maintenance Rituxan (every 3 months), 10/14/2005 through 11/11/2007.  Six cycles completed.  DIAGNOSTIC ABNORMALITIES:   Anemia, iron deficiency  Anemia substrates on 05/27/2008: Iron 43, %saturation 12, TIBC 366, UIBC 323, ferritin 27, vitamin B12 of 295, folate 7.2.  PREVIOUS INTERVENTIONS:   Anemia.  INFeD, 1000 mg IVPB, 06/04/2008.  INFeD, 1000 mg IVPB, 09/02/2010 and 09/09/2010.  INFeD 500 mg, 08/25/2016; 11/28/2016.        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)    4/28/2016 Initial Diagnosis     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)         PAST MEDICAL HISTORY:  ALLERGIES:  No Known Allergies  CURRENT MEDICATIONS:  Outpatient Encounter Medications as of 7/7/2020   Medication Sig Dispense Refill   • aspirin 81 MG EC tablet Take 1 tablet by mouth Daily. 90 tablet 3   • betamethasone dipropionate (DIPROLENE) 0.05 % cream Apply  topically to the appropriate area as directed 2 (Two) Times a Day. 45 g 0   • cyclobenzaprine (FLEXERIL) 10 MG tablet Take 1 tablet by mouth 3 (Three) Times a Day As Needed for Muscle Spasms. 90 tablet 0   • digoxin (LANOXIN) 250 MCG tablet Take 1 tablet by mouth Daily. 90 tablet 3   • dilTIAZem CD (CARDIZEM CD) 240 MG 24 hr capsule TAKE ONE CAPSULE BY MOUTH DAILY 90 capsule 3   • escitalopram  (LEXAPRO) 20 MG tablet Take 1 tablet by mouth Daily. 90 tablet 3   • oxyCODONE-acetaminophen (PERCOCET)  MG per tablet Take 1 tablet by mouth Every 6 (Six) Hours As Needed for Severe Pain . Must last 30 days 100 tablet 0   • polyethylene glycol (MIRALAX) packet Take 17 g by mouth Daily. (Patient taking differently: Take 17 g by mouth Daily As Needed.) 100 packet 3   • temazepam (RESTORIL) 15 MG capsule TAKE ONE CAPSULE BY MOUTH EVERY NIGHT AT BEDTIME AS NEEDED FOR SLEEP 30 capsule 2   • warfarin (COUMADIN) 2 MG tablet Take 3.5 tablets PO daily - 7MG, 7MG, 8MG, then repeat 360 tablet 5   • [DISCONTINUED] amoxicillin-clavulanate (Augmentin) 875-125 MG per tablet Take 1 tablet by mouth 2 (Two) Times a Day. Take with food. May affect INR. 14 tablet 0   • [DISCONTINUED] azelastine (ASTELIN) 0.1 % nasal spray 2 sprays into the nostril(s) as directed by provider 2 (Two) Times a Day. Use in each nostril as directed 30 mL 12     No facility-administered encounter medications on file as of 7/7/2020.      ADULT ILLNESSES:   History of malignant lymphoma (situation) ( ICD-10:Z85.72 ;Personal history of non-Hodgkin lymphomas   Adenomatous colonic polyps ( ICD-10:D12.6 ;Benign neoplasm of colon, unspecified   Anemia ( ICD-10:D64.9 ;Anemia, unspecified   Anxiety ( chronic; ICD-10:F41.9 ;Anxiety disorder, unspecified )   Atrial fibrillation ( on chronic anticoagulation; ICD-10:I48.91 ;Unspecified atrial fibrillation )   Degenerative joint disease ( ICD-10:M16.10 ;Unilateral primary osteoarthritis, unspecified hip   Drug intolerance ( oral iron; ICD-10:T45.4x5D ;Adverse effect of iron and its compounds, subsequent encounter )   Hypertension ( ICD-10:I10 ;Essential (primary) hypertension   Iron deficiency anemia ( ICD-10:D50.9 ;Iron deficiency anemia, unspecified   Microscopic hematuria ( ICD-10:R31.29 ;Other microscopic hematuria   Nephrolithiasis ( ICD-10:N20.0 ;Calculus of kidney   Neuropathy ( ICD-10:G62.9 ;Polyneuropathy,  unspecified   Orthostatic hypotension ( ICD-10:I95.1 ;Orthostatic hypotension   Perirectal abscess ( deep, severe; ICD-10:K61.1 ;Rectal abscess )   Polycystic kidney disease ( ICD-10:Q61.3 ;Polycystic kidney, unspecified   Vitamin B12 deficiency ( ICD-10:E53.8 ;Deficiency of other specified B group vitamins    SURGERIES:   Punch biopsy of skin lesion, right lower lip, 01/21/2015: 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 Dr. Hatch   Drainage of abscess, recurrent rectal abscess, 06/01/2006   Laparoscopic cholecystectomy and lymph node biopsy   Hip replacement, right, 06/15/2010      ADULT ILLNESSES:  Patient Active Problem List   Diagnosis Code   • Slow transit constipation K59.01   • Gastroesophageal reflux disease without esophagitis K21.9   • 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[facility]) C85.90   • Anxiety F41.9   • Chronic hip pain M25.559, G89.29   • Permanent atrial fibrillation (CMS/[facility]) I48.21   • Other insomnia G47.09   • Adenomatous polyp of colon D12.6   • Tobacco use Z72.0   • Chronic anticoagulation Z79.01   • PAD (peripheral artery disease) (CMS/[facility]) I73.9   • Congestive heart failure (CMS/[facility]) I50.9   • BMI 24.0-24.9, adult Z68.24   • Leukopenia D72.819   • Iron deficiency anemia D50.9     SURGERIES:  Past Surgical History:   Procedure Laterality Date   • CHOLECYSTECTOMY     • COLONOSCOPY  05/2012    3 yr recall   • COLONOSCOPY  09/18/2015    5 yr recall   • KIDNEY STONE SURGERY     • RECTAL SURGERY      X 2   • TOTAL HIP ARTHROPLASTY       HEALTH MAINTENANCE ITEMS:  Health Maintenance Due   Topic Date Due   • Pneumococcal Vaccine Once at 65 Years Old  05/11/2006   • HEPATITIS C SCREENING  10/26/2016   • ZOSTER VACCINE (2 of 2) 02/13/2017       <no information>  Last Completed Colonoscopy       Status Date      COLONOSCOPY Done 12/1/2015      Patient has more history with this topic...        Immunization  "History   Administered Date(s) Administered   • Fluad Quad 11/05/2019   • Pneumococcal Conjugate 13-Valent (PCV13) 11/05/2019     Last Completed Mammogram     Patient has no health maintenance due at this time            FAMILY HISTORY:  Family History   Problem Relation Age of Onset   • Colon cancer Mother    • No Known Problems Father    • Prostate cancer Brother    • No Known Problems Daughter    • No Known Problems Son    • No Known Problems Maternal Grandmother    • No Known Problems Maternal Grandfather    • No Known Problems Paternal Grandmother    • No Known Problems Paternal Grandfather    • Prostate cancer Brother    • Colon polyps Neg Hx      SOCIAL HISTORY:  Social History     Socioeconomic History   • Marital status:      Spouse name: Not on file   • Number of children: Not on file   • Years of education: Not on file   • Highest education level: Not on file   Tobacco Use   • Smoking status: Never Smoker   • Smokeless tobacco: Never Used   Substance and Sexual Activity   • Alcohol use: No   • Drug use: No   • Sexual activity: Defer       REVIEW OF SYSTEMS:  PS:  ECOG 1-2.   Constitutional:  His appetite is fair. \"I eat enough for me\"  His energy remains low to fair.  \"Same, I still wear out.\"  He is as active and manages all his ADLs including chores, running errands, and driving.  \"I drove today.\" Says his hip and lower back pains still inhibit his activities, this in spite of hip replacement.  Says he has been walking his usual 1/2 mile to 1 mile.  He has no fevers, chills, or drenching night sweats.  He has regained 3 pounds (had lost 2 pounds at his prior visit) over the last 5 months. Still says he wants to stay around 175-180 pounds.  He sleeps restlessly, waking frequently.  Ear/Nose/Throat/Mouth:   The patient reports no ear pains, but has lingering sinus symptoms, but no sore throat, nosebleeds.  No headaches. The patient denies any hoarseness, change in voice quality, or hemoptysis. " "  Ocular:   The patient reports no eye pain, significant change in visual acuity, double vision, or blurry vision.   Respiratory:  He reports no chronic cough, shortness of breathing, phlegm production, or chest wall pain.  Cardiovascular:  He reports no exertional chest pain, chest pressure, or chest heaviness.  He reports no claudication. He reports no palpitations or symptomatic orthostasis.  Gastrointestinal:  He has no pica, dysphagia, nausea, vomiting, postprandial abdominal pain, bloating, cramping, change in bowel habits, or discoloration of the stool.  He has had no rectal bleeding.  He has intermittent bouts of constipation modulated by daily stool softeners (Dulcolax).  Says he is still using supplemental fiber daily.  Says he moves his bowels every 3-4 days, \"been like that since chemo years ago.\" He has no diarrhea. Last colonoscopy, 09/2015.  Polyps. Repeat 5 years. Is oral iron intolerant (worsening constipation).  Genitourinary:    He reports no urinary burning, frequency, dribbling, or discoloration.  He reports no difficulty controlling his bladder. He reports no need to urinate frequently through the night.  Musculoskeletal:  He continues to have chronic trouble with right hip pain.  \"24/7.\"  He still uses maintenance Percocet, up to 2 doses daily.  He reports no unexplained arthralgias, myalgias, but has noted more frequent bouts of nighttime leg cramping.  Extremities:  He reports no trouble with fluid retention or significant leg swelling.  Endocrine:  He reports no problems with excess thirst, excessive urination, vasomotor instability, or unexplained fatigue.  Heme/Lymphatic:  He reports no bleeding, petechial rashes, or swollen glands.  Skin:  He reports no itching, rashes, or lesions which won't heal.  Neuro:  He reports night-time stocking-glove pain in his lower extremities.  \"Same.\" Says he has been seen by Dr. Kinney previously. \"He found no problems.\"  Says Dr. Rosario says he does not " "have significant vascular disease requiring surgery.  He reports no loss of consciousness, seizures, fainting spells, or dizziness. He reports no weakness of his face, arms, or legs.  He reports no difficulty with speech.  He reports no tremors.  Psych:  He seems generally satisfied with life.  He reports no depression.  He reports no mood swings.      VITAL SIGNS: /68   Pulse 60   Temp 97.2 °F (36.2 °C) (Temporal)   Resp 16   Ht 180.3 cm (71\")   Wt 80.2 kg (176 lb 11.2 oz)   SpO2 96%   BMI 24.64 kg/m²       PHYSICAL EXAMINATION:  General:  He is a well-developed, well-nourished, and well-kept elderly male in no distress.  He arrived in the exam room ambulatory. He appears to be his stated age.  His skin color is pale.  Head/Neck:  The patient is anicteric and atraumatic.  He is wearing a surgical mask today. The neck is supple without evidence of jugular venous distention or cervical adenopathy.  Eyes:  The pupils are equal, round, and reactive to light.  The extraocular movements are full.  There is no scleral jaundice or erythema.  Chest:  The respiratory efforts are normal and unhindered.  The chest is clear to auscultation.  There are no wheezes, rales, or asymmetry of breath sounds.  The port in the right anterior chest wall has been removed and the site has healed. No tenderness or erythema.   Cardiac/Vascular:  The patient has an irregularly irregular cardiac rate and rhythm without murmurs.  The peripheral pulses are equal and full.  Abdomen:  The belly is soft and flat.  There is no rebound or guarding. There is no organomegaly, mass-effect, or tenderness.  Bowel sounds are normal.  Ortho:  There is no overt joint stiffness.  There is no overt foreshortening of height.  There are no overt joint changes which suggest degenerative arthritis.  Extremities:  There is no evidence of cyanosis, clubbing, or edema.  Rheumatologic:  There is no overt evidence of rheumatoid deformities of the hands.  " There is no sausaging of the fingers.  There is no sign of active synovitis.  Cutaneous:  Few areas of senile purpuras are present on his forearms.  There are no overt rashes, disseminated lesions, purpura, or petechiae.  Lymphatics:  There is no evidence of adenopathy in the cervical, supraclavicular, or axillary areas.  Neurologic:  The patient is alert, oriented, cooperative, and pleasant.  He is appropriately conversant.  He ambulated into the exam room and transferred from chair to exam table aided.  There is no overt dysfunction of the motor, sensory, or cerebellar systems.  Psych:  Impatient.  Mood and affect are appropriate for circumstance.  Eye contact is appropriate.        LABS    Lab Results - Last 18 Months   Lab Units 06/30/20  1241 05/08/20  1029 02/07/20  1402 02/03/20  1408 11/08/19  0854 08/08/19  1014 05/09/19  1055   HEMOGLOBIN g/dL 13.8 13.9 15.6 16.4 13.9 14.4 13.3*   HEMATOCRIT % 42.6 43.3 47.5 50.6 42.4 43.9 41.0   MCV fL 93.2 95.0 92.8 94 93.6 92.4 92.8   WBC 10*3/mm3 5.56 5.96 8.36 10.1 4.46 5.44 4.61*   RDW % 13.1 13.7 13.5 13.4 13.5 13.3 14.1   MPV fL 9.9 9.8 10.1  --  9.4 9.7 9.9   PLATELETS 10*3/mm3 216 196 244 293 205 224 209   IMM GRAN % % 0.2 0.2 0.6*  --  0.2 0.4 0.2   NEUTROS ABS 10*3/mm3 3.78 4.01 6.03 7.1* 2.83 3.55 2.84   LYMPHS ABS 10*3/mm3 1.11 1.02 0.88 1.5 0.80 0.98 1.02   MONOS ABS 10*3/mm3 0.49 0.67 1.24* 1.2* 0.60 0.65 0.55   EOS ABS 10*3/mm3 0.13 0.19 0.13 0.2 0.18 0.19 0.14   BASOS ABS 10*3/mm3 0.04 0.06 0.03 0.0 0.04 0.05 0.05   IMMATURE GRANS (ABS) 10*3/mm3 0.01 0.01 0.05  --  0.01 0.02 0.01   NRBC /100 WBC 0.0 0.0 0.0  --  0.0 0.0 0.0       Lab Results - Last 18 Months   Lab Units 06/30/20  1241 05/08/20  1029 02/10/20  0956 02/07/20  1402 02/03/20  1408 11/08/19  0854 08/08/19  1014 05/09/19  1055   GLUCOSE mg/dL 125* 110*  --  92  --  108* 100 96   SODIUM mmol/L 139 141  --  137 140 138 141 140   POTASSIUM mmol/L 4.2 3.9  --  3.9 4.9 4.0 4.4 4.4   TOTAL CO2 mmol/L   --   --   --   --  24  --   --   --    CO2 mmol/L 27.0 30.0*  --  28.0  --  31.0* 29.0 31.0   CHLORIDE mmol/L 101 101  --  98 97 99 101 100   ANION GAP mmol/L 11.0 10.0  --  11.0  --  8.0 11.0 9.0   CREATININE mg/dL 1.05 0.95 0.70 0.88 1.16 0.94 1.07 0.89   BUN mg/dL 15 12  --  17 23 14 20 16   BUN / CREAT RATIO  14.3 12.6  --  19.3 20 14.9 18.7 18.0   CALCIUM mg/dL 9.4 9.5  --  9.5 9.7 9.8 9.5 9.9   EGFR IF NONAFRICN AM mL/min/1.73 68 77  --  84 60 78 67 83   ALK PHOS U/L 61 63  --  78 79 71 70 71   TOTAL PROTEIN g/dL 7.2 7.5  --  8.5  --  8.2 9.0* 8.8*   ALT (SGPT) U/L 9 9  --  15 24 11 20 16   AST (SGOT) U/L 18 14  --  17 23 17 26 26   BILIRUBIN mg/dL 0.5 0.7  --  1.1 0.5 0.4 0.6 0.6   ALBUMIN g/dL 3.80 4.20  --  4.10 4.5 4.30 4.80 4.70   GLOBULIN gm/dL 3.4 3.3  --  4.4  --  3.9 4.2 4.1       Lab Results - Last 18 Months   Lab Units 06/30/20  1241 05/08/20  1029 02/07/20  1402 11/08/19  0854 08/08/19  1014 05/09/19  1055   LDH U/L 184 186 164 177 552 467       Lab Results - Last 18 Months   Lab Units 06/30/20  1241 05/08/20  1029 02/07/20  1402 02/03/20  1408 11/08/19  0854 08/08/19  1014 05/09/19  1055   IRON mcg/dL 83 76 68  --  45* 87 84   TIBC mcg/dL 331 358 274*  --  374 355 348   IRON SATURATION % 25 21 25  --  12* 25 24   FERRITIN ng/mL 302.70 315.40 902.00*  --  199.20 121.00 143.00   TSH uIU/mL  --   --   --  1.110  --   --   --    FOLATE ng/mL 13.10 10.00 12.90  --  9.68 11.60 8.16       ASSESSMENT:  1.    Intermediate to high-grade lymphoma.  History of. No evidence of disease.  05/16/2005:          Stage IV-E.          Baseline Tumor Nicholson:   Mediastinum, left hilum, upper abdomen, and liver (clinically).          Complications of Tumor:   Hyperbilirubinemia. Improved.          Response to Therapy:  Clinical remission per CT scan 05/16/2005, 11/2011 and 02/10/2020.          -02/03/2020-CT chest without contrast.  Impression: Atheromatous disease of the thoracic aorta and coronary arteries.  Mild  cardiomegaly.  Borderline pulmonary artery dilatation.  No infiltrate or effusion.          -02/03/2020-CT abdomen and pelvis with contrast.  Impression: Prior cholecystectomy.  Mild prominence of biliary tree felt to be related.  Complex renal cyst on the left is stable in size with septation and calcification.  Dominant mid renal cyst on the right side is slightly larger.  Upper pole of the right renal collecting system is mildly dilated and contains 2 stones measuring 8 to 9 mm.  5 mm nonobstructive stone in the left mid kidney.  Ureter is nondilated.  Prostate mildly enlarged 5.1 cm.  Moderate stool in the colon.  No small bowel distention.          Prognosis:  Fair.          IPI at baseline:  3.  2.    Normocytic anemia from iron deficiency and chronic disease. Last Injectafer, 11/20/2019.  Hemoglobin, 13.8 on 06/30/2020 (prior range:  Hgb 12.9 - 14.7).  3.    Leukopenia, NOS. normal since 02/07/2020   4.    Iron deficiency and oral iron intolerant (constipation).  Repleted since receipt of INFeD  5.    Variable B2M.  Stable, 2.1 on 06/30/2020 (prior range:  1.6 - 3.27).  6.    Adenomatous colon polyp, colonoscopy 09/18/2015.  7.    Atrial fibrillation on chronic anticoagulation.  8.    Microscopic hematuria.  Management per Dr. Mayes.  9.    Anxiety, chronic, stable on medications (Celexa).  10.  Lower extremity neuropathy.  Mild.  Not a problem of late.  11.  Hypertension.  Fair control on Cardizem.  12.  Low B12, 203 on 08/17/2017.  Repleted.  13.  Peripheral vascular disease.  Arterial studies and has been seen by Dr. Rosario of vascular surgery.              a.    Ultrasound ankle/brachial performed on 07/17/2018 at [facility]. Suspected atherosclerosis in the lower extremity arteries, supported by noncompressible posterior tibialis and dorsalis pedis arteries.              b.    Abdominal aortic ultrasound performed on 08/10/2018 at [facility].  No abdominal aortic aneurysm.    PLAN:  1.       Re:  Apprised of labs from 06/30/2020 with normal WBC, normal diff, normal hemoglobin (resolution of anemia), normoglycemia with normal (previously slightly elevated) total protein, (stable) otherwise normal CMP, normal LDH, normal B2M, repleted serum iron, repleted (> 20%) Fe sat, repleted (> 100) ferritin, repleted folate, and repleted B12.   2.    Stay off B12, 1000 mcg IM monthly.   3.    Previously apprised of CT scans, 02/10/2020 - Large renal cyst (stable since 2011).  Stable. Otherwise GURPREET  4.    Continue other currently identified medications.  5.    Continue Coumadin.  PT/INR followed by Dr. Crawford.  6.    Continue ongoing management per primary care physician and other specialists.    7.    Advance Directive is discussed.  8.    Return to office in 4 months with pre-office CBC with differential, iron, TIBC, iron saturation, ferritin, B12, folate, CMP, B2M, and LDH.  9.    Please give patient copy of blood work.    MEDICAL DECISION MAKING: Low Complexity  AMOUNT OF DATA: Moderate    I spent 21 total minutes, face-to-face, caring for Cabrera today.  Greater than 50% of this time involved counseling and/or coordination of care as documented within this note regarding the patient's illness(es), pros and cons of various treatment options, instructions and/or risk reduction.    cc:   Azar Mayes MD          [facility]          Sunita Crawford MD - [place]          Jorge Alberto Rosario MD

## 2020-07-07 ENCOUNTER — OFFICE VISIT (OUTPATIENT)
Dept: ONCOLOGY | Facility: CLINIC | Age: 80
End: 2020-07-07

## 2020-07-07 VITALS
TEMPERATURE: 97.2 F | BODY MASS INDEX: 24.74 KG/M2 | RESPIRATION RATE: 16 BRPM | HEIGHT: 71 IN | OXYGEN SATURATION: 96 % | HEART RATE: 60 BPM | SYSTOLIC BLOOD PRESSURE: 108 MMHG | WEIGHT: 176.7 LBS | DIASTOLIC BLOOD PRESSURE: 68 MMHG

## 2020-07-07 DIAGNOSIS — C85.90 LYMPHOMA IN REMISSION (HCC): Primary | ICD-10-CM

## 2020-07-07 PROCEDURE — 99213 OFFICE O/P EST LOW 20 MIN: CPT | Performed by: INTERNAL MEDICINE

## 2020-07-16 ENCOUNTER — OFFICE VISIT (OUTPATIENT)
Dept: GASTROENTEROLOGY | Facility: CLINIC | Age: 80
End: 2020-07-16

## 2020-07-16 VITALS
DIASTOLIC BLOOD PRESSURE: 62 MMHG | TEMPERATURE: 97.7 F | OXYGEN SATURATION: 97 % | HEART RATE: 50 BPM | BODY MASS INDEX: 25.2 KG/M2 | HEIGHT: 71 IN | WEIGHT: 180 LBS | SYSTOLIC BLOOD PRESSURE: 124 MMHG

## 2020-07-16 DIAGNOSIS — K21.9 GASTROESOPHAGEAL REFLUX DISEASE WITHOUT ESOPHAGITIS: ICD-10-CM

## 2020-07-16 DIAGNOSIS — K59.01 SLOW TRANSIT CONSTIPATION: ICD-10-CM

## 2020-07-16 DIAGNOSIS — D12.6 ADENOMATOUS POLYP OF COLON, UNSPECIFIED PART OF COLON: Primary | ICD-10-CM

## 2020-07-16 DIAGNOSIS — Z79.01 CHRONIC ANTICOAGULATION: ICD-10-CM

## 2020-07-16 PROCEDURE — 99213 OFFICE O/P EST LOW 20 MIN: CPT | Performed by: INTERNAL MEDICINE

## 2020-07-16 NOTE — PROGRESS NOTES
[facility]    Chief Complaint   Patient presents with   • Colonoscopy       Subjective     HPI    Cabrera Avina is a 80 y.o. male who presents with a chief complaint of constipation.    He tells me he is doing well is not really have any troubles with his constipation.  He is here for his annual checkup.  He states he is tired with fatigue but nothing else is new.  He is concerned about the coronavirus and him catching it.  Therefore he has been isolating a lot at home.  He does get out walk.  He states he does not go anywhere else.  This is really the first time he is been anywhere.  He does check in with his primary care once a month.  He also has had a telephone office visit with Dr. Navarro on for checkup.  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          ===================== May 23, 2019 HPI=======================  HPI     Cabrera Avina is a 79 y.o. male who presents with a chief complaint of constipation.     He comes in for an annual checkup.  He tells me he is doing okay.  He still suffers with constipation but he is doing okay with an over-the-counter supplement.  He is taking Perigose daily.  ( Mariela can)   he states with this he will move his bowels every 3 to 4 days.  Last year we gave him Linzess to try.  He states that did not help much.  MiraLAX was not very helpful either.  Denies any blood or mucus.     Regards to his reflux is under control.  He has been off omeprazole now for 2 years.  Denies heartburn or regurgitation.       Past Medical History:   Diagnosis Date   • Anemia    • Anxiety     chronic   • Atrial fibrillation (CMS/HCC)    • Cancer (CMS/HCC)     non-hodgkins lymphoma   • Cholecystitis    • Colon polyp    • Colon polyp    • Congestive heart failure (CMS/HCC) 8/13/2019   • Constipation    • GERD (gastroesophageal reflux disease)    • History of ERCP 2005   • Iron deficiency anemia 11/19/2019   • Nephrolithiasis     stones  removed   • Jaylyn-rectal abscess    • Rectal abscess        Past Surgical History:   Procedure Laterality Date   • CHOLECYSTECTOMY     • COLONOSCOPY  05/2012    3 yr recall   • COLONOSCOPY  09/18/2015    5 yr recall   • KIDNEY STONE SURGERY     • RECTAL SURGERY      X 2   • TOTAL HIP ARTHROPLASTY           Current Outpatient Medications:   •  aspirin 81 MG EC tablet, Take 1 tablet by mouth Daily., Disp: 90 tablet, Rfl: 3  •  digoxin (LANOXIN) 250 MCG tablet, Take 1 tablet by mouth Daily., Disp: 90 tablet, Rfl: 3  •  dilTIAZem CD (CARDIZEM CD) 240 MG 24 hr capsule, TAKE ONE CAPSULE BY MOUTH DAILY, Disp: 90 capsule, Rfl: 3  •  escitalopram (LEXAPRO) 20 MG tablet, Take 1 tablet by mouth Daily., Disp: 90 tablet, Rfl: 3  •  oxyCODONE-acetaminophen (PERCOCET)  MG per tablet, Take 1 tablet by mouth Every 6 (Six) Hours As Needed for Severe Pain . Must last 30 days, Disp: 100 tablet, Rfl: 0  •  polyethylene glycol (MIRALAX) packet, Take 17 g by mouth Daily. (Patient taking differently: Take 17 g by mouth Daily As Needed.), Disp: 100 packet, Rfl: 3  •  temazepam (RESTORIL) 15 MG capsule, TAKE ONE CAPSULE BY MOUTH EVERY NIGHT AT BEDTIME AS NEEDED FOR SLEEP, Disp: 30 capsule, Rfl: 2  •  warfarin (COUMADIN) 2 MG tablet, Take 3.5 tablets PO daily - 7MG, 7MG, 8MG, then repeat, Disp: 360 tablet, Rfl: 5  •  betamethasone dipropionate (DIPROLENE) 0.05 % cream, Apply  topically to the appropriate area as directed 2 (Two) Times a Day., Disp: 45 g, Rfl: 0  •  cyclobenzaprine (FLEXERIL) 10 MG tablet, Take 1 tablet by mouth 3 (Three) Times a Day As Needed for Muscle Spasms., Disp: 90 tablet, Rfl: 0    No Known Allergies    Social History     Socioeconomic History   • Marital status:      Spouse name: Not on file   • Number of children: Not on file   • Years of education: Not on file   • Highest education level: Not on file   Tobacco Use   • Smoking status: Never Smoker   • Smokeless tobacco: Never Used   Substance and Sexual  Activity   • Alcohol use: No   • Drug use: No   • Sexual activity: Defer       Family History   Problem Relation Age of Onset   • Colon cancer Mother    • No Known Problems Father    • Prostate cancer Brother    • No Known Problems Daughter    • No Known Problems Son    • No Known Problems Maternal Grandmother    • No Known Problems Maternal Grandfather    • No Known Problems Paternal Grandmother    • No Known Problems Paternal Grandfather    • Prostate cancer Brother    • Colon polyps Neg Hx        Review of Systems  General no fever chills or sweats weight stable  Gastrointestinal: Not present-abdominal pain, constipation, diarrhea, dysphagia, hematemesis, melena, odynophagia, nausea, vomiting, pyrosis, regurgitation, hematochezia,    Objective     Vitals:    07/16/20 1407   BP: 124/62   Pulse: 50   Temp: 97.7 °F (36.5 °C)   SpO2: 97%       Physical Exam   Constitutional: He appears well-developed and well-nourished.   Cardiovascular: Normal rate and regular rhythm.   Pulmonary/Chest: Effort normal and breath sounds normal.   Abdominal: Bowel sounds are normal. He exhibits no distension and no mass. There is no tenderness.   Psychiatric: He has a normal mood and affect. Thought content normal.   Vitals reviewed.            Assessment/Plan   Problem List Items Addressed This Visit        Digestive    Slow transit constipation    Gastroesophageal reflux disease without esophagitis    Adenomatous polyp of colon - Primary    Overview     Colonoscopy September 2015 small adenoma removed.  Surveillance colonoscopy recommended in September 2020 if clinically appropriate            Other    Chronic anticoagulation            He is asymptomatic from a GI standpoint.  We did talk about colon surveillance exams.  He last had a colonoscopy in 2015 with a small adenoma removed.  At his advanced age and health we talked about the risk of pursuing colonoscopy versus the benefits.  We talked about the risk of aspiration,  perforation, bleeding etc.  We talked about the risk of coronavirus.  At this time I believe the risk of coronavirus catch in the hospital is minimal.  After long conversation he does not wish to pursue colonoscopy at this time.  I think it is reasonable.  He wants to stay isolated as much possible to coronavirus crisis.  I suggest that we discussed that again next year.  I also told him that it is advanced age and health that it may be reasonable not to pursue any future colonoscopies.  He wishes to come back and see us in a year for reevaluation.  If he should develop any signs or symptoms then will contact us sooner and come see us then.  I think this is reasonable.    Continue ongoing management by primary care provider and other specialists.     Patient's Body mass index is 25.1 kg/m². BMI is within normal parameters. No follow-up required..        EMR Dragon/transcription disclaimer:  Much of this encounter note is electronic transcription/translation of spoken language to printed text.  The electronic translation of spoken language may be erroneous, or at times, nonsensical words or phrases may be inadvertently transcribed.  Although I have reviewed the note for such errors, some may still exist.    Ander Miguel MD  16:04  07/16/20

## 2020-07-17 ENCOUNTER — CLINICAL SUPPORT (OUTPATIENT)
Dept: FAMILY MEDICINE CLINIC | Facility: CLINIC | Age: 80
End: 2020-07-17

## 2020-07-17 DIAGNOSIS — Z79.01 CHRONIC ANTICOAGULATION: Primary | ICD-10-CM

## 2020-07-17 LAB — INR PPP: 2.1 (ref 0.9–1.1)

## 2020-07-17 PROCEDURE — 85610 PROTHROMBIN TIME: CPT | Performed by: FAMILY MEDICINE

## 2020-07-17 NOTE — PROGRESS NOTES
Pt presents for PT INR  Allergies reviewed  No occurrences of abnormal bleeding reported  PT 25.4 sec   INR 2.1

## 2020-07-23 ENCOUNTER — OFFICE VISIT (OUTPATIENT)
Dept: FAMILY MEDICINE CLINIC | Facility: CLINIC | Age: 80
End: 2020-07-23

## 2020-07-23 VITALS
SYSTOLIC BLOOD PRESSURE: 114 MMHG | RESPIRATION RATE: 18 BRPM | HEART RATE: 64 BPM | TEMPERATURE: 99.5 F | HEIGHT: 71 IN | WEIGHT: 177.8 LBS | DIASTOLIC BLOOD PRESSURE: 58 MMHG | OXYGEN SATURATION: 99 % | BODY MASS INDEX: 24.89 KG/M2

## 2020-07-23 DIAGNOSIS — C85.90 LYMPHOMA IN REMISSION (HCC): ICD-10-CM

## 2020-07-23 DIAGNOSIS — I48.20 ATRIAL FIBRILLATION, CHRONIC (HCC): ICD-10-CM

## 2020-07-23 DIAGNOSIS — M25.551 CHRONIC PAIN OF RIGHT HIP: Primary | ICD-10-CM

## 2020-07-23 DIAGNOSIS — K21.9 GASTROESOPHAGEAL REFLUX DISEASE WITHOUT ESOPHAGITIS: ICD-10-CM

## 2020-07-23 DIAGNOSIS — F41.9 ANXIETY: ICD-10-CM

## 2020-07-23 DIAGNOSIS — I48.21 PERMANENT ATRIAL FIBRILLATION (HCC): ICD-10-CM

## 2020-07-23 DIAGNOSIS — G89.29 CHRONIC PAIN OF RIGHT HIP: Primary | ICD-10-CM

## 2020-07-23 DIAGNOSIS — G47.09 OTHER INSOMNIA: ICD-10-CM

## 2020-07-23 DIAGNOSIS — K59.01 SLOW TRANSIT CONSTIPATION: ICD-10-CM

## 2020-07-23 LAB — INR PPP: 2 (ref 0.9–1.1)

## 2020-07-23 PROCEDURE — 99214 OFFICE O/P EST MOD 30 MIN: CPT | Performed by: FAMILY MEDICINE

## 2020-07-23 PROCEDURE — 85610 PROTHROMBIN TIME: CPT | Performed by: FAMILY MEDICINE

## 2020-07-23 RX ORDER — TEMAZEPAM 15 MG/1
15 CAPSULE ORAL NIGHTLY PRN
Qty: 30 CAPSULE | Refills: 2 | Status: SHIPPED | OUTPATIENT
Start: 2020-07-23 | End: 2020-09-23

## 2020-07-23 RX ORDER — OXYCODONE AND ACETAMINOPHEN 10; 325 MG/1; MG/1
1 TABLET ORAL EVERY 6 HOURS PRN
Qty: 100 TABLET | Refills: 0 | Status: SHIPPED | OUTPATIENT
Start: 2020-07-23 | End: 2020-08-25 | Stop reason: SDUPTHER

## 2020-07-23 NOTE — PROGRESS NOTES
Subjective cc: pain medication refill  Cabrera Avina is a 80 y.o. male who presents to get a refill on his pain medication.   He has no complaints today.   Here today for refills and to get his INR checked - WNL - has been taking 7MG daily except for 6MG on sundays.    Pain in his right hip.  Pain is the same.  Taking the muscle relaxer PRN.  Pain improved with pain medication without side effects.      Still following with oncology - reviewed most recent note.      Constipation stable with laxatives  Depression and anxiety stable - taking lexapro daily. No refill needed.   BP controlled.      Atrial fib is rate controlled.     He is currently taking restoril nighly for insomnia which controls his symptoms and allows him to get restful sleep. He needs a refill     Past information reviewed and updated as appropriate     Congestive Heart Failure   Pertinent negatives include no abdominal pain, chest pain, fatigue, palpitations, shortness of breath or unexpected weight change.   Pain   This is a chronic problem. The current episode started more than 1 year ago. The problem occurs constantly. The problem has been unchanged. Associated symptoms include arthralgias, myalgias, numbness and weakness. Pertinent negatives include no abdominal pain, anorexia, chest pain, chills, coughing, diaphoresis, fatigue, fever, headaches, nausea or vomiting. The symptoms are aggravated by exertion, standing and walking. He has tried rest, walking, position changes and oral narcotics (Percocet, patient cannot tolerate NSAIDs secondary to Coumadin) for the symptoms. The treatment provided moderate relief.   Atrial Fibrillation   Presents for follow-up visit. Symptoms include hypertension and weakness. Symptoms are negative for bradycardia, chest pain, dizziness, hemodynamic instability, palpitations, shortness of breath, syncope and tachycardia. The symptoms have been stable. Past medical history includes atrial fibrillation and  "CHF. There are no medication compliance problems.   Depression   Visit Type: follow-up  Patient presents with the following symptoms: insomnia, muscle tension and nervousness/anxiety.  Patient is not experiencing: anhedonia, decreased concentration, depressed mood, excessive worry, feelings of hopelessness, palpitations, shortness of breath, suicidal ideas, suicidal planning and thoughts of death.  Frequency of symptoms: occasionally   Severity: mild   Sleep quality: fair  Nighttime awakenings: occasional  Patient has a history of: CHF         The following portions of the patient's history were reviewed and updated as appropriate: allergies, current medications, past family history, past medical history, past social history, past surgical history and problem list.        Review of Systems   Constitutional: Negative for activity change, appetite change, chills, diaphoresis, fatigue, fever and unexpected weight change.   Respiratory: Negative for cough, chest tightness and shortness of breath.    Cardiovascular: Negative for chest pain, palpitations, leg swelling and syncope.   Gastrointestinal: Positive for constipation. Negative for abdominal pain, anorexia, blood in stool, nausea and vomiting.   Musculoskeletal: Positive for arthralgias, back pain, gait problem and myalgias.   Neurological: Positive for weakness and numbness. Negative for dizziness, syncope, facial asymmetry, speech difficulty, light-headedness and headaches.   Hematological: Bruises/bleeds easily.   Psychiatric/Behavioral: Positive for dysphoric mood and sleep disturbance (wakes up in the middle of the night secondary to pain). Negative for decreased concentration, self-injury and suicidal ideas. The patient is nervous/anxious and has insomnia.    All other systems reviewed and are negative.      Objective   Blood pressure 114/58, pulse 64, temperature 99.5 °F (37.5 °C), temperature source Infrared, resp. rate 18, height 180.3 cm (71\"), weight " 80.6 kg (177 lb 12.8 oz), SpO2 99 %.    Physical Exam   Constitutional: He is oriented to person, place, and time. Vital signs are normal. He appears well-developed and well-nourished. He is active and cooperative.  Non-toxic appearance. He does not have a sickly appearance. He does not appear ill. No distress.   HENT:   Head: Normocephalic and atraumatic.   Right Ear: External ear normal.   Left Ear: External ear normal.   Mouth/Throat: Abnormal dentition.   Eyes: Conjunctivae and EOM are normal. Right eye exhibits no discharge. Left eye exhibits no discharge.   Neck: No tracheal deviation present.   Cardiovascular: Normal rate and intact distal pulses. An irregularly irregular rhythm present.   Pulmonary/Chest: Effort normal and breath sounds normal. No accessory muscle usage or stridor. No respiratory distress. He has no wheezes. He has no rales.   Abdominal: Soft. Normal appearance and bowel sounds are normal. He exhibits no distension, no fluid wave, no pulsatile midline mass and no mass. There is no tenderness.   Musculoskeletal: He exhibits no edema.        Lumbar back: He exhibits decreased range of motion, tenderness, pain and spasm. He exhibits no bony tenderness.   Significantly limited ROM in right hip, pain with movement, walks with limp favoring right hip, difficulty changing positions and getting onto the exam table.    Neurological: He is alert and oriented to person, place, and time.   Skin: Skin is warm and dry. He is not diaphoretic.   Psychiatric: His behavior is normal. Judgment and thought content normal. His mood appears not anxious. He does not exhibit a depressed mood.   Nursing note and vitals reviewed.    Procedures  Lab Results (last 24 hours)     Procedure Component Value Units Date/Time    POCT INR [[id number]]  (Abnormal) Collected:  07/23/20 1213    Specimen:  Blood Updated:  07/23/20 1213     INR 2.0            Assessment/Plan   Problems Addressed this Visit        Cardiovascular and  Mediastinum    Permanent atrial fibrillation (CMS/HCC)       Digestive    Slow transit constipation    Gastroesophageal reflux disease without esophagitis       Nervous and Auditory    Chronic hip pain - Primary    Relevant Medications    oxyCODONE-acetaminophen (PERCOCET)  MG per tablet       Hematopoietic and Hemostatic    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)       Other    Anxiety    Other insomnia    Relevant Medications    temazepam (RESTORIL) 15 MG capsule      Other Visit Diagnoses     Atrial fibrillation, chronic (CMS/HCC)        Relevant Orders    POCT INR (Completed)        PLAN:     #1 insomnia: chronic, controlled, continue on current medication of restoril - refill given.    #2 depression/anxiety: chronic, controlled, continue on Lexapro daily     #3 chronic pain in right hip: Chronic, controlled with oral pain medication.  Patient is unable to take NSAIDs secondary to Coumadin.  Nii reviewed and appropriate.  Controlled contract in the chart.  Refill given on medication.  Sent to pharmacy.  Prescription for muscle relaxers to help with muscle spasms, advised on risk and benefits of this medication.  Return in 1 month     #4 chronic anticoagulation: therapeutic INR today. continue coumadin      #5 atrial fibrillation: Patient is anticoagulated and rate controlled. Continue on cardizem.      #6 constipation: chronic, stable. continue on regular use of fiber and stool softeners, advised on diet changes, advised this is due to his medication            This document has been electronically signed by Sunita Crawford MD on July 23, 2020 15:36

## 2020-08-07 ENCOUNTER — CLINICAL SUPPORT (OUTPATIENT)
Dept: FAMILY MEDICINE CLINIC | Facility: CLINIC | Age: 80
End: 2020-08-07

## 2020-08-07 DIAGNOSIS — I48.21 PERMANENT ATRIAL FIBRILLATION (HCC): Primary | ICD-10-CM

## 2020-08-07 LAB
INR PPP: 2.4 (ref 0.9–1.1)
PROTHROMBIN TIME: 28.4 SECONDS

## 2020-08-07 PROCEDURE — 85610 PROTHROMBIN TIME: CPT | Performed by: NURSE PRACTITIONER

## 2020-08-25 ENCOUNTER — OFFICE VISIT (OUTPATIENT)
Dept: FAMILY MEDICINE CLINIC | Facility: CLINIC | Age: 80
End: 2020-08-25

## 2020-08-25 VITALS
DIASTOLIC BLOOD PRESSURE: 78 MMHG | HEART RATE: 58 BPM | SYSTOLIC BLOOD PRESSURE: 140 MMHG | TEMPERATURE: 98.7 F | HEIGHT: 71 IN | BODY MASS INDEX: 25.51 KG/M2 | OXYGEN SATURATION: 98 % | WEIGHT: 182.2 LBS | RESPIRATION RATE: 18 BRPM

## 2020-08-25 DIAGNOSIS — F41.9 ANXIETY: ICD-10-CM

## 2020-08-25 DIAGNOSIS — K59.01 SLOW TRANSIT CONSTIPATION: ICD-10-CM

## 2020-08-25 DIAGNOSIS — M25.551 CHRONIC PAIN OF RIGHT HIP: ICD-10-CM

## 2020-08-25 DIAGNOSIS — Z79.01 CHRONIC ANTICOAGULATION: ICD-10-CM

## 2020-08-25 DIAGNOSIS — I48.21 PERMANENT ATRIAL FIBRILLATION (HCC): Primary | ICD-10-CM

## 2020-08-25 DIAGNOSIS — G89.29 CHRONIC PAIN OF RIGHT HIP: ICD-10-CM

## 2020-08-25 DIAGNOSIS — C85.90 LYMPHOMA IN REMISSION (HCC): ICD-10-CM

## 2020-08-25 DIAGNOSIS — G47.09 OTHER INSOMNIA: ICD-10-CM

## 2020-08-25 LAB — INR PPP: 2.3 (ref 0.9–1.1)

## 2020-08-25 PROCEDURE — 99214 OFFICE O/P EST MOD 30 MIN: CPT | Performed by: FAMILY MEDICINE

## 2020-08-25 PROCEDURE — 85610 PROTHROMBIN TIME: CPT | Performed by: FAMILY MEDICINE

## 2020-08-25 RX ORDER — OXYCODONE AND ACETAMINOPHEN 10; 325 MG/1; MG/1
1 TABLET ORAL EVERY 6 HOURS PRN
Qty: 100 TABLET | Refills: 0 | Status: CANCELLED | OUTPATIENT
Start: 2020-08-25

## 2020-08-25 RX ORDER — OXYCODONE AND ACETAMINOPHEN 10; 325 MG/1; MG/1
1 TABLET ORAL EVERY 6 HOURS PRN
Qty: 100 TABLET | Refills: 0 | Status: SHIPPED | OUTPATIENT
Start: 2020-08-25 | End: 2020-09-25 | Stop reason: SDUPTHER

## 2020-08-25 NOTE — PROGRESS NOTES
Subjective cc: pain medication refill  Cabrera Avina is a 80 y.o. male who presents to get a refill on his pain medication.   He has no complaints today.   Here today for refills and to get his INR checked - WNL - has been taking 7MG daily    Pain in his right hip.  Pain is the same.  Taking the muscle relaxer PRN.  Pain improved with pain medication without side effects.      Still following with oncology - reviewed most recent note.      Constipation stable with laxatives  Depression and anxiety stable - taking lexapro daily. No refill needed.   BP controlled.      Atrial fib is rate controlled.     He is currently taking restoril nighly for insomnia which controls his symptoms and allows him to get restful sleep.     Past information reviewed and updated as appropriate     Pain   This is a chronic problem. The current episode started more than 1 year ago. The problem occurs constantly. The problem has been unchanged. Associated symptoms include arthralgias, myalgias, numbness and weakness. Pertinent negatives include no abdominal pain, anorexia, chest pain, chills, coughing, diaphoresis, fatigue, fever, headaches, nausea or vomiting. The symptoms are aggravated by exertion, standing and walking. He has tried rest, walking, position changes and oral narcotics (Percocet, patient cannot tolerate NSAIDs secondary to Coumadin) for the symptoms. The treatment provided moderate relief.   Congestive Heart Failure   Presents for follow-up visit. Pertinent negatives include no abdominal pain, chest pain, fatigue, palpitations, shortness of breath or unexpected weight change. The symptoms have been stable. Compliance with total regimen is %. Compliance with diet is 51-75%. Compliance with exercise is 51-75%. Compliance with medications is %.   Atrial Fibrillation   Presents for follow-up visit. Symptoms include hypertension and weakness. Symptoms are negative for bradycardia, chest pain, dizziness,  hemodynamic instability, palpitations, shortness of breath, syncope and tachycardia. The symptoms have been stable. Past medical history includes atrial fibrillation and CHF. There are no medication compliance problems.   Depression   Visit Type: follow-up  Patient presents with the following symptoms: insomnia, muscle tension and nervousness/anxiety.  Patient is not experiencing: anhedonia, decreased concentration, depressed mood, excessive worry, feelings of hopelessness, palpitations, shortness of breath, suicidal ideas, suicidal planning and thoughts of death.  Frequency of symptoms: occasionally   Severity: mild   Sleep quality: fair  Nighttime awakenings: occasional  Patient has a history of: CHF         The following portions of the patient's history were reviewed and updated as appropriate: allergies, current medications, past family history, past medical history, past social history, past surgical history and problem list.        Review of Systems   Constitutional: Negative for activity change, appetite change, chills, diaphoresis, fatigue, fever and unexpected weight change.   Respiratory: Negative for cough, chest tightness and shortness of breath.    Cardiovascular: Negative for chest pain, palpitations, leg swelling and syncope.   Gastrointestinal: Positive for constipation (controlled). Negative for abdominal pain, anorexia, blood in stool, nausea and vomiting.   Musculoskeletal: Positive for arthralgias, back pain, gait problem and myalgias.   Neurological: Positive for weakness and numbness. Negative for dizziness, syncope, facial asymmetry, speech difficulty, light-headedness and headaches.   Hematological: Bruises/bleeds easily.   Psychiatric/Behavioral: Positive for dysphoric mood and sleep disturbance (wakes up in the middle of the night secondary to pain). Negative for decreased concentration, self-injury and suicidal ideas. The patient is nervous/anxious and has insomnia.    All other systems  "reviewed and are negative.      Objective   Blood pressure 140/78, pulse 58, temperature 98.7 °F (37.1 °C), temperature source Infrared, resp. rate 18, height 180.3 cm (71\"), weight 82.6 kg (182 lb 3.2 oz), SpO2 98 %.    Physical Exam   Constitutional: He is oriented to person, place, and time. Vital signs are normal. He appears well-developed and well-nourished. He is active and cooperative.  Non-toxic appearance. He does not have a sickly appearance. He does not appear ill. No distress.   HENT:   Head: Normocephalic and atraumatic.   Right Ear: External ear normal.   Left Ear: External ear normal.   Mouth/Throat: Abnormal dentition.   Eyes: Conjunctivae and EOM are normal. Right eye exhibits no discharge. Left eye exhibits no discharge.   Neck: No tracheal deviation present.   Cardiovascular: Normal rate and intact distal pulses. An irregularly irregular rhythm present.   Pulmonary/Chest: Effort normal and breath sounds normal. No accessory muscle usage or stridor. No respiratory distress. He has no wheezes. He has no rales.   Abdominal: Soft. Normal appearance and bowel sounds are normal. He exhibits no distension, no fluid wave, no pulsatile midline mass and no mass. There is no tenderness.   Musculoskeletal: He exhibits no edema.        Lumbar back: He exhibits decreased range of motion, tenderness, pain and spasm. He exhibits no bony tenderness.   Significantly limited ROM in right hip, pain with movement, walks with limp favoring right hip, difficulty changing positions and getting onto the exam table.    Neurological: He is alert and oriented to person, place, and time.   Skin: Skin is warm and dry. He is not diaphoretic.   Psychiatric: His behavior is normal. Judgment and thought content normal. His mood appears not anxious. He does not exhibit a depressed mood.   Nursing note and vitals reviewed.    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    #2 depression/anxiety: chronic, controlled, continue on Lexapro daily     #3 chronic pain in right hip: Chronic, controlled with oral pain medication.  Patient is unable to take NSAIDs secondary to Coumadin.  Nii reviewed and appropriate.  Controlled contract in the chart.  Refill given on medication.  Sent to pharmacy.  Prescription for muscle relaxers to help with muscle spasms, advised on risk and benefits of this medication.  Return in 1 month     #4 chronic anticoagulation: therapeutic INR today. continue coumadin      #5 atrial fibrillation: Patient is anticoagulated and rate controlled. Continue on cardizem.      #6 constipation: chronic, stable. continue on regular use of fiber and stool softeners, advised on diet changes, advised this is due to his medication            This document has been electronically signed by Sunita Crawford MD on August 25, 2020 16:32

## 2020-09-11 ENCOUNTER — CLINICAL SUPPORT (OUTPATIENT)
Dept: FAMILY MEDICINE CLINIC | Facility: CLINIC | Age: 80
End: 2020-09-11

## 2020-09-11 VITALS — TEMPERATURE: 97.5 F

## 2020-09-11 DIAGNOSIS — Z79.01 CHRONIC ANTICOAGULATION: Primary | ICD-10-CM

## 2020-09-11 LAB — INR PPP: 2.5 (ref 0.9–1.1)

## 2020-09-11 PROCEDURE — 85610 PROTHROMBIN TIME: CPT | Performed by: FAMILY MEDICINE

## 2020-09-11 NOTE — PROGRESS NOTES
Pt presents for PT INR   Allergies reviewed  No occurrences of abnormal bleeding reported  PT: 2.5  INR: 30.3sec  Pt reports that he takes one tablet 7mg daily.

## 2020-09-23 DIAGNOSIS — G47.09 OTHER INSOMNIA: ICD-10-CM

## 2020-09-23 RX ORDER — TEMAZEPAM 15 MG/1
CAPSULE ORAL
Qty: 30 CAPSULE | Refills: 2 | Status: SHIPPED | OUTPATIENT
Start: 2020-09-23 | End: 2020-11-20 | Stop reason: SDUPTHER

## 2020-09-25 ENCOUNTER — OFFICE VISIT (OUTPATIENT)
Dept: FAMILY MEDICINE CLINIC | Facility: CLINIC | Age: 80
End: 2020-09-25

## 2020-09-25 VITALS
HEIGHT: 71 IN | TEMPERATURE: 97.8 F | RESPIRATION RATE: 18 BRPM | SYSTOLIC BLOOD PRESSURE: 148 MMHG | BODY MASS INDEX: 25.9 KG/M2 | HEART RATE: 64 BPM | OXYGEN SATURATION: 98 % | DIASTOLIC BLOOD PRESSURE: 74 MMHG | WEIGHT: 185 LBS

## 2020-09-25 DIAGNOSIS — K21.9 GASTROESOPHAGEAL REFLUX DISEASE WITHOUT ESOPHAGITIS: ICD-10-CM

## 2020-09-25 DIAGNOSIS — I48.21 PERMANENT ATRIAL FIBRILLATION (HCC): ICD-10-CM

## 2020-09-25 DIAGNOSIS — G89.29 CHRONIC PAIN OF RIGHT HIP: Primary | ICD-10-CM

## 2020-09-25 DIAGNOSIS — Z79.01 CHRONIC ANTICOAGULATION: ICD-10-CM

## 2020-09-25 DIAGNOSIS — F41.9 ANXIETY: ICD-10-CM

## 2020-09-25 DIAGNOSIS — G47.09 OTHER INSOMNIA: ICD-10-CM

## 2020-09-25 DIAGNOSIS — M25.551 CHRONIC PAIN OF RIGHT HIP: Primary | ICD-10-CM

## 2020-09-25 DIAGNOSIS — K59.01 SLOW TRANSIT CONSTIPATION: ICD-10-CM

## 2020-09-25 LAB
INR PPP: 2.8 (ref 0.9–1.1)
PROTHROMBIN TIME: 33.2 SECONDS

## 2020-09-25 PROCEDURE — 99214 OFFICE O/P EST MOD 30 MIN: CPT | Performed by: FAMILY MEDICINE

## 2020-09-25 PROCEDURE — 85610 PROTHROMBIN TIME: CPT | Performed by: FAMILY MEDICINE

## 2020-09-25 RX ORDER — OXYCODONE AND ACETAMINOPHEN 10; 325 MG/1; MG/1
1 TABLET ORAL EVERY 6 HOURS PRN
Qty: 100 TABLET | Refills: 0 | Status: CANCELLED | OUTPATIENT
Start: 2020-09-25

## 2020-09-25 RX ORDER — OXYCODONE AND ACETAMINOPHEN 10; 325 MG/1; MG/1
1 TABLET ORAL EVERY 6 HOURS PRN
Qty: 100 TABLET | Refills: 0 | Status: SHIPPED | OUTPATIENT
Start: 2020-09-25 | End: 2020-10-22 | Stop reason: SDUPTHER

## 2020-09-25 NOTE — PROGRESS NOTES
Subjective cc: pain medication refill  Cabrera Avina is a 80 y.o. male who presents to get a refill on his pain medication.   He has no complaints today.   Here today for refills and to get his INR checked - WNL (2.8) - has been taking 7MG daily   Pain in his right hip.  Pain is the same.  Taking the muscle relaxer PRN.  Pain improved with pain medication without side effects.      Still following with oncology.  Wants his flu shot but not today - will come back and get it next week.       Constipation stable with laxatives  Depression and anxiety stable - taking lexapro daily. No refill needed.  BP controlled.      Atrial fib is rate controlled.     He is currently taking restoril nighly for insomnia which controls his symptoms and allows him to get restful sleep. No refill needed today.     Past information reviewed and updated as appropriate     Pain  This is a chronic problem. The current episode started more than 1 year ago. The problem occurs constantly. The problem has been unchanged. Associated symptoms include arthralgias, myalgias, numbness and weakness. Pertinent negatives include no abdominal pain, anorexia, chest pain, chills, coughing, diaphoresis, fatigue, fever, headaches, nausea or vomiting. The symptoms are aggravated by exertion, standing and walking. He has tried rest, walking, position changes and oral narcotics (Percocet, patient cannot tolerate NSAIDs secondary to Coumadin) for the symptoms. The treatment provided moderate relief.   Congestive Heart Failure  Presents for follow-up visit. Pertinent negatives include no abdominal pain, chest pain, fatigue, palpitations, shortness of breath or unexpected weight change. The symptoms have been stable. Compliance with total regimen is %. Compliance with diet is 51-75%. Compliance with exercise is 51-75%. Compliance with medications is %.   Atrial Fibrillation  Presents for follow-up visit. Symptoms include hypertension and  weakness. Symptoms are negative for bradycardia, chest pain, dizziness, hemodynamic instability, palpitations, shortness of breath, syncope and tachycardia. The symptoms have been stable. Past medical history includes atrial fibrillation and CHF. There are no medication compliance problems.   Depression  Visit Type: follow-up  Patient presents with the following symptoms: insomnia, muscle tension and nervousness/anxiety.  Patient is not experiencing: anhedonia, decreased concentration, depressed mood, excessive worry, feelings of hopelessness, palpitations, shortness of breath, suicidal ideas, suicidal planning and thoughts of death.  Frequency of symptoms: occasionally   Severity: mild   Sleep quality: fair  Nighttime awakenings: occasional  Patient has a history of: CHF         The following portions of the patient's history were reviewed and updated as appropriate: allergies, current medications, past family history, past medical history, past social history, past surgical history and problem list.        Review of Systems   Constitutional: Negative for activity change, appetite change, chills, diaphoresis, fatigue, fever and unexpected weight change.   Respiratory: Negative for cough, chest tightness and shortness of breath.    Cardiovascular: Negative for chest pain, palpitations, leg swelling and syncope.   Gastrointestinal: Positive for constipation (controlled). Negative for abdominal pain, anorexia, blood in stool, nausea and vomiting.   Musculoskeletal: Positive for arthralgias, back pain, gait problem and myalgias.   Neurological: Positive for weakness and numbness. Negative for dizziness, syncope, facial asymmetry, speech difficulty, light-headedness and headaches.   Hematological: Bruises/bleeds easily.   Psychiatric/Behavioral: Positive for dysphoric mood and sleep disturbance (wakes up in the middle of the night secondary to pain). Negative for decreased concentration, self-injury and suicidal ideas.  "The patient is nervous/anxious and has insomnia.    All other systems reviewed and are negative.      Objective   Blood pressure 148/74, pulse 64, temperature 97.8 °F (36.6 °C), temperature source Infrared, resp. rate 18, height 180.3 cm (71\"), weight 83.9 kg (185 lb), SpO2 98 %.    Physical Exam   Constitutional: He is oriented to person, place, and time. He appears well-developed. He is cooperative.  Non-toxic appearance. He does not appear ill. No distress.   HENT:   Head: Normocephalic and atraumatic.   Right Ear: External ear normal.   Left Ear: External ear normal.   Mouth/Throat: Abnormal dentition.   Eyes: Conjunctivae are normal. Right eye exhibits no discharge. Left eye exhibits no discharge.   Neck: No tracheal deviation present.   Cardiovascular: Normal rate. An irregularly irregular rhythm present.   Pulmonary/Chest: Effort normal and breath sounds normal. No accessory muscle usage or stridor. No respiratory distress. He has no wheezes. He has no rales.   Abdominal: Soft. Normal appearance and bowel sounds are normal. He exhibits no distension, no fluid wave, no pulsatile midline mass and no mass. There is no abdominal tenderness.   Musculoskeletal:      Lumbar back: He exhibits decreased range of motion, tenderness, pain and spasm. He exhibits no bony tenderness.      Comments: Significantly limited ROM in right hip, pain with movement, walks with limp favoring right hip, difficulty changing positions and getting onto the exam table.    Neurological: He is alert and oriented to person, place, and time.   Skin: Skin is warm and dry. He is not diaphoretic.   Psychiatric: His behavior is normal. Judgment and thought content normal. His mood appears not anxious. He does not exhibit a depressed mood.   Nursing note and vitals reviewed.    Procedures  Lab Results (last 24 hours)     Procedure Component Value Units Date/Time    POC Protime / INR [[id number]]  (Abnormal) Collected: 09/25/20 1137    Specimen: " Blood Updated: 09/25/20 1138     Protime 33.2 seconds      INR 2.8            Assessment/Plan   Problems Addressed this Visit        Cardiovascular and Mediastinum    Permanent atrial fibrillation (CMS/HCC)       Digestive    Slow transit constipation    Gastroesophageal reflux disease without esophagitis       Nervous and Auditory    Chronic hip pain - Primary    Relevant Medications    oxyCODONE-acetaminophen (PERCOCET)  MG per tablet       Other    Anxiety    Other insomnia    Chronic anticoagulation    Relevant Orders    POC Protime / INR (Completed)      Diagnoses       Codes Comments    Chronic pain of right hip    -  Primary ICD-10-CM: M25.551, G89.29  ICD-9-CM: 719.45, 338.29     Chronic anticoagulation     ICD-10-CM: Z79.01  ICD-9-CM: V58.61     Slow transit constipation     ICD-10-CM: K59.01  ICD-9-CM: 564.01     Permanent atrial fibrillation (CMS/HCC)     ICD-10-CM: I48.21  ICD-9-CM: 427.31     Gastroesophageal reflux disease without esophagitis     ICD-10-CM: K21.9  ICD-9-CM: 530.81     Anxiety     ICD-10-CM: F41.9  ICD-9-CM: 300.00     Other insomnia     ICD-10-CM: G47.09  ICD-9-CM: 780.52         PLAN:     #1 insomnia: chronic, controlled, continue on current medication of restoril.    #2 depression/anxiety: chronic, controlled, continue on Lexapro daily     #3 chronic pain in right hip: Chronic, controlled with oral pain medication.  Patient is unable to take NSAIDs secondary to Coumadin.  Nii reviewed and appropriate.  Controlled contract in the chart.  Refill given on medication.  Sent to pharmacy.  Prescription for muscle relaxers to help with muscle spasms, advised on risk and benefits of this medication.  Return in 1 month     #4 chronic anticoagulation: therapeutic INR today. continue coumadin      #5 atrial fibrillation: Patient is anticoagulated and rate controlled. Continue on cardizem.      #6 constipation: chronic, stable. continue on regular use of fiber and stool softeners,  advised on diet changes, advised this is due to his medication            This document has been electronically signed by Sunita Crawford MD on September 25, 2020 11:51 CDT

## 2020-10-09 ENCOUNTER — CLINICAL SUPPORT (OUTPATIENT)
Dept: FAMILY MEDICINE CLINIC | Facility: CLINIC | Age: 80
End: 2020-10-09

## 2020-10-09 DIAGNOSIS — Z23 NEED FOR INFLUENZA VACCINATION: ICD-10-CM

## 2020-10-09 DIAGNOSIS — Z79.01 CHRONIC ANTICOAGULATION: Primary | ICD-10-CM

## 2020-10-09 LAB — INR PPP: 2.2 (ref 0.9–1.1)

## 2020-10-09 PROCEDURE — 90694 VACC AIIV4 NO PRSRV 0.5ML IM: CPT | Performed by: FAMILY MEDICINE

## 2020-10-09 PROCEDURE — 36416 COLLJ CAPILLARY BLOOD SPEC: CPT | Performed by: FAMILY MEDICINE

## 2020-10-09 PROCEDURE — 85610 PROTHROMBIN TIME: CPT | Performed by: FAMILY MEDICINE

## 2020-10-09 PROCEDURE — G0008 ADMIN INFLUENZA VIRUS VAC: HCPCS | Performed by: FAMILY MEDICINE

## 2020-10-09 NOTE — PROGRESS NOTES
Pt presents for flu immunization and PT INR  Allergies reviewed   No occurrences of abnormal bleeding reported   Consent obtained   LD- pt asked to wait 15 min after admin   VIS given   PT 26.1 sec INR 2.2

## 2020-10-22 ENCOUNTER — OFFICE VISIT (OUTPATIENT)
Dept: FAMILY MEDICINE CLINIC | Facility: CLINIC | Age: 80
End: 2020-10-22

## 2020-10-22 VITALS
TEMPERATURE: 97.8 F | OXYGEN SATURATION: 97 % | HEIGHT: 71 IN | DIASTOLIC BLOOD PRESSURE: 64 MMHG | HEART RATE: 67 BPM | SYSTOLIC BLOOD PRESSURE: 138 MMHG | RESPIRATION RATE: 18 BRPM | WEIGHT: 183 LBS | BODY MASS INDEX: 25.62 KG/M2

## 2020-10-22 VITALS
HEART RATE: 67 BPM | DIASTOLIC BLOOD PRESSURE: 64 MMHG | HEIGHT: 71 IN | OXYGEN SATURATION: 97 % | WEIGHT: 183 LBS | TEMPERATURE: 97.8 F | RESPIRATION RATE: 18 BRPM | SYSTOLIC BLOOD PRESSURE: 138 MMHG | BODY MASS INDEX: 25.62 KG/M2

## 2020-10-22 DIAGNOSIS — M25.551 CHRONIC PAIN OF RIGHT HIP: Primary | ICD-10-CM

## 2020-10-22 DIAGNOSIS — G89.29 CHRONIC PAIN OF RIGHT HIP: Primary | ICD-10-CM

## 2020-10-22 DIAGNOSIS — G47.09 OTHER INSOMNIA: ICD-10-CM

## 2020-10-22 DIAGNOSIS — Z11.59 NEED FOR HEPATITIS C SCREENING TEST: ICD-10-CM

## 2020-10-22 DIAGNOSIS — K21.9 GASTROESOPHAGEAL REFLUX DISEASE WITHOUT ESOPHAGITIS: ICD-10-CM

## 2020-10-22 DIAGNOSIS — Z79.01 CHRONIC ANTICOAGULATION: ICD-10-CM

## 2020-10-22 DIAGNOSIS — Z72.0 TOBACCO USE: ICD-10-CM

## 2020-10-22 DIAGNOSIS — I48.21 PERMANENT ATRIAL FIBRILLATION (HCC): ICD-10-CM

## 2020-10-22 DIAGNOSIS — F41.9 ANXIETY: ICD-10-CM

## 2020-10-22 DIAGNOSIS — C85.90 LYMPHOMA IN REMISSION (HCC): ICD-10-CM

## 2020-10-22 DIAGNOSIS — Z00.00 MEDICARE ANNUAL WELLNESS VISIT, SUBSEQUENT: Primary | ICD-10-CM

## 2020-10-22 DIAGNOSIS — K59.01 SLOW TRANSIT CONSTIPATION: ICD-10-CM

## 2020-10-22 DIAGNOSIS — H61.23 EXCESSIVE CERUMEN IN BOTH EAR CANALS: ICD-10-CM

## 2020-10-22 LAB — INR PPP: 2.2 (ref 0.9–1.1)

## 2020-10-22 PROCEDURE — 96160 PT-FOCUSED HLTH RISK ASSMT: CPT | Performed by: FAMILY MEDICINE

## 2020-10-22 PROCEDURE — 69209 REMOVE IMPACTED EAR WAX UNI: CPT | Performed by: FAMILY MEDICINE

## 2020-10-22 PROCEDURE — 99214 OFFICE O/P EST MOD 30 MIN: CPT | Performed by: FAMILY MEDICINE

## 2020-10-22 PROCEDURE — 85610 PROTHROMBIN TIME: CPT | Performed by: FAMILY MEDICINE

## 2020-10-22 PROCEDURE — 36416 COLLJ CAPILLARY BLOOD SPEC: CPT | Performed by: FAMILY MEDICINE

## 2020-10-22 PROCEDURE — G0439 PPPS, SUBSEQ VISIT: HCPCS | Performed by: FAMILY MEDICINE

## 2020-10-22 RX ORDER — OXYCODONE AND ACETAMINOPHEN 10; 325 MG/1; MG/1
1 TABLET ORAL EVERY 6 HOURS PRN
Qty: 100 TABLET | Refills: 0 | Status: SHIPPED | OUTPATIENT
Start: 2020-10-22 | End: 2020-11-20 | Stop reason: SDUPTHER

## 2020-10-22 NOTE — PROGRESS NOTES
Subjective cc: pain medication refill  Cabrera Avina is a 80 y.o. male who presents to get a refill on his pain medication.   He has no complaints today. He would like his ears cleaned out today.   Here today for refills and to get his INR checked - WNL (2.2) - has been taking 7MG daily   Pain in his right hip.  Pain is the same.  Taking the muscle relaxer PRN.  Pain improved with pain medication without side effects.      Still following with oncology.  Flu shot UTD.       Constipation stable with laxatives  Depression and anxiety stable - taking lexapro daily. No refill needed.  BP controlled.      Atrial fib is rate controlled.     He is currently taking restoril nighly for insomnia which controls his symptoms and allows him to get restful sleep. No refill needed today.     Past information reviewed and updated as appropriate     Atrial Fibrillation  Presents for follow-up visit. Symptoms include hypertension and weakness. Symptoms are negative for bradycardia, chest pain, dizziness, hemodynamic instability, palpitations, shortness of breath, syncope and tachycardia. The symptoms have been stable. Past medical history includes atrial fibrillation and CHF. There are no medication compliance problems.   Pain  This is a chronic problem. The current episode started more than 1 year ago. The problem occurs constantly. The problem has been unchanged. Associated symptoms include arthralgias, myalgias, numbness and weakness. Pertinent negatives include no abdominal pain, anorexia, chest pain, chills, coughing, diaphoresis, fatigue, fever, headaches, nausea or vomiting. The symptoms are aggravated by exertion, standing and walking. He has tried rest, walking, position changes and oral narcotics (Percocet, patient cannot tolerate NSAIDs secondary to Coumadin) for the symptoms. The treatment provided moderate relief.   Congestive Heart Failure  Presents for follow-up visit. Pertinent negatives include no abdominal  pain, chest pain, fatigue, palpitations, shortness of breath or unexpected weight change. The symptoms have been stable. Compliance with total regimen is %. Compliance with diet is 51-75%. Compliance with exercise is 51-75%. Compliance with medications is %.   Depression  Visit Type: follow-up  Patient presents with the following symptoms: insomnia, muscle tension and nervousness/anxiety.  Patient is not experiencing: anhedonia, decreased concentration, depressed mood, excessive worry, feelings of hopelessness, palpitations, shortness of breath, suicidal ideas, suicidal planning and thoughts of death.  Frequency of symptoms: occasionally   Severity: mild   Sleep quality: fair  Nighttime awakenings: occasional  Patient has a history of: CHF         The following portions of the patient's history were reviewed and updated as appropriate: allergies, current medications, past family history, past medical history, past social history, past surgical history and problem list.        Review of Systems   Constitutional: Negative for activity change, appetite change, chills, diaphoresis, fatigue, fever and unexpected weight change.   Respiratory: Negative for cough, chest tightness and shortness of breath.    Cardiovascular: Negative for chest pain, palpitations, leg swelling and syncope.   Gastrointestinal: Positive for constipation (controlled). Negative for abdominal pain, anorexia, blood in stool, nausea and vomiting.   Musculoskeletal: Positive for arthralgias, back pain, gait problem and myalgias.   Neurological: Positive for weakness and numbness. Negative for dizziness, syncope, facial asymmetry, speech difficulty, light-headedness and headaches.   Hematological: Bruises/bleeds easily.   Psychiatric/Behavioral: Positive for dysphoric mood and sleep disturbance (wakes up in the middle of the night secondary to pain). Negative for decreased concentration, self-injury and suicidal ideas. The patient is  "nervous/anxious and has insomnia.    All other systems reviewed and are negative.      Objective   Blood pressure 138/64, pulse 67, temperature 97.8 °F (36.6 °C), temperature source Infrared, resp. rate 18, height 180.3 cm (71\"), weight 83 kg (183 lb), SpO2 97 %.    Physical Exam   Constitutional: He is oriented to person, place, and time. He appears well-developed. He is cooperative.  Non-toxic appearance. He does not appear ill. No distress.   HENT:   Head: Normocephalic and atraumatic.   Right Ear: External ear normal.   Left Ear: External ear normal.   Eyes: Conjunctivae are normal. Right eye exhibits no discharge. Left eye exhibits no discharge.   Neck: No tracheal deviation present.   Cardiovascular: Normal rate. An irregularly irregular rhythm present.   Pulmonary/Chest: Effort normal and breath sounds normal. No accessory muscle usage or stridor. No respiratory distress. He has no wheezes. He has no rales.   Abdominal: Soft. Normal appearance and bowel sounds are normal. He exhibits no distension, no fluid wave, no pulsatile midline mass and no mass. There is no abdominal tenderness.   Musculoskeletal:      Lumbar back: He exhibits decreased range of motion, tenderness, pain and spasm. He exhibits no bony tenderness.      Comments: Significantly limited ROM in right hip, pain with movement, walks with limp favoring right hip, difficulty changing positions and getting onto the exam table.    Neurological: He is alert and oriented to person, place, and time.   Skin: Skin is warm and dry. He is not diaphoretic.   Psychiatric: His behavior is normal. Judgment and thought content normal. His mood appears not anxious. He does not exhibit a depressed mood.   Nursing note and vitals reviewed.    Ear Cerumen Removal    Date/Time: 10/22/2020 11:46 AM  Performed by: Sunita Crawford MD  Authorized by: Sunita Crawford MD   Consent: Verbal consent obtained. Written consent not obtained.  Risks and benefits: " risks, benefits and alternatives were discussed  Consent given by: patient  Patient understanding: patient states understanding of the procedure being performed    Anesthesia:  Local Anesthetic: none  Location details: left ear and right ear  Patient tolerance: patient tolerated the procedure well with no immediate complications  Procedure type: irrigation   Sedation:  Patient sedated: no          Lab Results (last 24 hours)     Procedure Component Value Units Date/Time    POCT INR [[id number]]  (Abnormal) Collected: 10/22/20 1041    Specimen: Blood Updated: 10/22/20 1041     INR 2.2     Comment: 26.6 sec               Assessment/Plan   Problems Addressed this Visit        Cardiovascular and Mediastinum    Permanent atrial fibrillation (CMS/HCC)       Digestive    Slow transit constipation    Gastroesophageal reflux disease without esophagitis       Nervous and Auditory    Chronic hip pain - Primary    Relevant Medications    oxyCODONE-acetaminophen (PERCOCET)  MG per tablet       Hematopoietic and Hemostatic    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)       Other    Anxiety    Other insomnia    Tobacco use    Chronic anticoagulation    Relevant Orders    POCT INR (Completed)      Other Visit Diagnoses     Excessive cerumen in both ear canals          Diagnoses       Codes Comments    Chronic pain of right hip    -  Primary ICD-10-CM: M25.551, G89.29  ICD-9-CM: 719.45, 338.29     Chronic anticoagulation     ICD-10-CM: Z79.01  ICD-9-CM: V58.61     Permanent atrial fibrillation (CMS/HCC)     ICD-10-CM: I48.21  ICD-9-CM: 427.31     Slow transit constipation     ICD-10-CM: K59.01  ICD-9-CM: 564.01     Gastroesophageal reflux disease without esophagitis     ICD-10-CM: K21.9  ICD-9-CM: 530.81     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    #2 depression/anxiety: chronic, controlled, continue on Lexapro daily     #3 chronic pain in right hip: Chronic, controlled with oral pain medication.  Patient is unable to take NSAIDs secondary to Coumadin.  Nii reviewed and appropriate.  Controlled contract in the chart.  Refill given on medication.  Sent to pharmacy.  Prescription for muscle relaxers to help with muscle spasms, advised on risk and benefits of this medication.  Return in 1 month     #4 chronic anticoagulation: therapeutic INR today. continue coumadin      #5 atrial fibrillation: Patient is anticoagulated and rate controlled. Continue on cardizem.      #6 constipation: chronic, stable. continue on regular use of fiber and stool softeners, advised on diet changes, advised this is due to his medication            This document has been electronically signed by Sunita Crawford MD on October 22, 2020 11:47 CDT

## 2020-10-22 NOTE — PROGRESS NOTES
The ABCs of the Annual Wellness Visit  Subsequent Medicare Wellness Visit    Chief Complaint   Patient presents with   • Medicare Wellness-subsequent     Pt here for medicare wellness exam       Subjective   History of Present Illness:  Cabrera Avina is a 80 y.o. male who presents for a Subsequent Medicare Wellness Visit.    HEALTH RISK ASSESSMENT    Recent Hospitalizations:  No hospitalization(s) within the last year.    Current Medical Providers:  Patient Care Team:  Sunita Crawford MD as PCP - General (Family Medicine)  Arnaldo Coiln MD as Cardiologist (Cardiology)  West Jeff MD as Consulting Physician (Urology)  Paul Rachel MD as Consulting Physician (Hematology and Oncology)  Ander Miguel MD as Consulting Physician (Gastroenterology)    Smoking Status:  Social History     Tobacco Use   Smoking Status Never Smoker   Smokeless Tobacco Never Used       Alcohol Consumption:  Social History     Substance and Sexual Activity   Alcohol Use No       Depression Screen:   PHQ-2/PHQ-9 Depression Screening 10/22/2020   Little interest or pleasure in doing things 0   Feeling down, depressed, or hopeless 1   Trouble falling or staying asleep, or sleeping too much -   Feeling tired or having little energy -   Poor appetite or overeating -   Feeling bad about yourself - or that you are a failure or have let yourself or your family down -   Trouble concentrating on things, such as reading the newspaper or watching television -   Moving or speaking so slowly that other people could have noticed. Or the opposite - being so fidgety or restless that you have been moving around a lot more than usual -   Thoughts that you would be better off dead, or of hurting yourself in some way -   Total Score 1   If you checked off any problems, how difficult have these problems made it for you to do your work, take care of things at home, or get along with other people? -       Fall Risk  Screen:  [facility] Fall Risk Assessment was completed, and patient is at LOW risk for falls.Assessment completed on:10/22/2020    Health Habits and Functional and Cognitive Screening:  Functional & Cognitive Status 10/22/2020   Do you have difficulty preparing food and eating? No   Do you have difficulty bathing yourself, getting dressed or grooming yourself? No   Do you have difficulty using the toilet? No   Do you have difficulty moving around from place to place? No   Do you have trouble with steps or getting out of a bed or a chair? No   Current Diet Well Balanced Diet   Dental Exam Not up to date   Eye Exam Not up to date   Exercise (times per week) 5 times per week   Current Exercise Activities Include Walking   Do you need help using the phone?  No   Are you deaf or do you have serious difficulty hearing?  No   Do you need help with transportation? No   Do you need help shopping? No   Do you need help preparing meals?  No   Do you need help with housework?  No   Do you need help with laundry? No   Do you need help taking your medications? No   Do you need help managing money? No   Do you ever drive or ride in a car without wearing a seat belt? No   Have you felt unusual stress, anger or loneliness in the last month? Yes   Who do you live with? Spouse   If you need help, do you have trouble finding someone available to you? No   Have you been bothered in the last four weeks by sexual problems? No   Do you have difficulty concentrating, remembering or making decisions? No         Does the patient have evidence of cognitive impairment? No    Asprin use counseling:Taking ASA appropriately as indicated    Age-appropriate Screening Schedule:  Refer to the list below for future screening recommendations based on patient's age, sex and/or medical conditions. Orders for these recommended tests are listed in the plan section. The patient has been provided with a written plan.    Health Maintenance   Topic Date Due   •  ZOSTER VACCINE (2 of 2) 02/13/2017   • COLONOSCOPY  12/01/2025   • TDAP/TD VACCINES (2 - Td) 10/26/2026   • INFLUENZA VACCINE  Completed          The following portions of the patient's history were reviewed and updated as appropriate: allergies, current medications, past family history, past medical history, past social history, past surgical history and problem list.    Outpatient Medications Prior to Visit   Medication Sig Dispense Refill   • aspirin 81 MG EC tablet Take 1 tablet by mouth Daily. 90 tablet 3   • betamethasone dipropionate (DIPROLENE) 0.05 % cream Apply  topically to the appropriate area as directed 2 (Two) Times a Day. 45 g 0   • cyclobenzaprine (FLEXERIL) 10 MG tablet Take 1 tablet by mouth 3 (Three) Times a Day As Needed for Muscle Spasms. 90 tablet 0   • digoxin (LANOXIN) 250 MCG tablet Take 1 tablet by mouth Daily. 90 tablet 3   • dilTIAZem CD (CARDIZEM CD) 240 MG 24 hr capsule TAKE ONE CAPSULE BY MOUTH DAILY 90 capsule 3   • escitalopram (LEXAPRO) 20 MG tablet Take 1 tablet by mouth Daily. 90 tablet 3   • oxyCODONE-acetaminophen (PERCOCET)  MG per tablet Take 1 tablet by mouth Every 6 (Six) Hours As Needed for Severe Pain . Must last 30 days 100 tablet 0   • polyethylene glycol (MIRALAX) packet Take 17 g by mouth Daily. (Patient taking differently: Take 17 g by mouth Daily As Needed.) 100 packet 3   • temazepam (RESTORIL) 15 MG capsule TAKE ONE CAPSULE BY MOUTH NIGHTLY AS NEEDED FOR SLEEP **MAY MAKE DROWSY** 30 capsule 2   • warfarin (COUMADIN) 2 MG tablet Take 3.5 tablets PO daily - 7MG, 7MG, 8MG, then repeat 360 tablet 5     No facility-administered medications prior to visit.        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 Patient has an advance directive (not in EMR), copy requested.    Review of Systems    Compared to one year ago, the patient feels his physical health is worse.  Compared to one year ago, the patient feels his mental health is the same.    Reviewed chart for potential of high risk medication in the elderly: yes  Reviewed chart for potential of harmful drug interactions in the elderly:yes    Objective         Vitals:    10/22/20 1054   BP: 138/64   BP Location: Left arm   Patient Position: Sitting   Cuff Size: Adult   Pulse: 67   Resp: 18   Temp: 97.8 °F (36.6 °C)   TempSrc: Infrared   SpO2: 97%   Weight: 83 kg (183 lb)   Height: 180.3 cm (71\")  Comment: Per patient   PainSc:   9   PainLoc: Generalized       Body mass index is 25.52 kg/m².  Discussed the patient's BMI with him. The BMI is in the acceptable range.    Physical Exam          Assessment/Plan   Medicare Risks and Personalized Health Plan  CMS Preventative Services Quick Reference  Abdominal Aortic Aneurysm Screening  Advance Directive Discussion  Cardiovascular risk  Chronic Pain   Colon Cancer Screening  Dementia/Memory   Depression/Dysphoria  Diabetic Lab Screening   Fall Risk  Immunizations Discussed/Encouraged (specific immunizations; adacel Tdap, Influenza, Pneumococcal 23, Prevnar and Shingrix )  Lung Cancer Risk  Obesity/Overweight   Polypharmacy  Prostate Cancer Screening     The above risks/problems have been discussed with the patient.  Pertinent information has been shared with the patient in the After Visit Summary.  Follow up plans and orders are seen below in the Assessment/Plan Section.    Diagnoses and all orders for this visit:    1. Medicare annual wellness visit, " subsequent (Primary)    2. Need for hepatitis C screening test  -     Hepatitis C Antibody      Follow Up:  Return in about 1 year (around 10/22/2021), or if symptoms worsen or fail to improve, for Medicare Wellness.     An After Visit Summary and PPPS were given to the patient.

## 2020-10-22 NOTE — PATIENT INSTRUCTIONS
Medicare Wellness  Personal Prevention Plan of Service     Date of Office Visit:  10/22/2020  Encounter Provider:  Sunita Crawford MD  Place of Service:  [facility]  Patient Name: Cabrera Avina  :  1940    As part of the Medicare Wellness portion of your visit today, we are providing you with this personalized preventive plan of services (PPPS). This plan is based upon recommendations of the [place] Preventive Services Task Force (USPSTF) and the Advisory Committee on Immunization Practices (ACIP).    This lists the preventive care services that should be considered, and provides dates of when you are due. Items listed as completed are up-to-date and do not require any further intervention.    Health Maintenance   Topic Date Due   • HEPATITIS C SCREENING  10/26/2016   • ZOSTER VACCINE (2 of 2) 2017   • Pneumococcal Vaccine 65+ (2 of 2 - PPSV23) 2020   • ANNUAL WELLNESS VISIT  10/22/2021   • COLONOSCOPY  2025   • TDAP/TD VACCINES (2 - Td) 10/26/2026   • INFLUENZA VACCINE  Completed       Orders Placed This Encounter   Procedures   • Hepatitis C Antibody       Return in about 1 year (around 10/22/2021), or if symptoms worsen or fail to improve, for Medicare Wellness.

## 2020-11-02 ENCOUNTER — OFFICE VISIT (OUTPATIENT)
Dept: CARDIOLOGY | Facility: CLINIC | Age: 80
End: 2020-11-02

## 2020-11-02 ENCOUNTER — LAB (OUTPATIENT)
Dept: LAB | Facility: HOSPITAL | Age: 80
End: 2020-11-02

## 2020-11-02 VITALS
HEART RATE: 65 BPM | OXYGEN SATURATION: 94 % | SYSTOLIC BLOOD PRESSURE: 133 MMHG | DIASTOLIC BLOOD PRESSURE: 74 MMHG | BODY MASS INDEX: 25.76 KG/M2 | HEIGHT: 71 IN | WEIGHT: 184 LBS

## 2020-11-02 DIAGNOSIS — I48.21 PERMANENT ATRIAL FIBRILLATION (HCC): Primary | ICD-10-CM

## 2020-11-02 DIAGNOSIS — Z79.01 CHRONIC ANTICOAGULATION: ICD-10-CM

## 2020-11-02 DIAGNOSIS — C85.90 LYMPHOMA IN REMISSION (HCC): ICD-10-CM

## 2020-11-02 LAB
ALBUMIN SERPL-MCNC: 3.9 G/DL (ref 3.5–5.2)
ALBUMIN/GLOB SERPL: 1.3 G/DL
ALP SERPL-CCNC: 65 U/L (ref 39–117)
ALT SERPL W P-5'-P-CCNC: 12 U/L (ref 1–41)
ANION GAP SERPL CALCULATED.3IONS-SCNC: 8 MMOL/L (ref 5–15)
AST SERPL-CCNC: 18 U/L (ref 1–40)
BASOPHILS # BLD AUTO: 0.07 10*3/MM3 (ref 0–0.2)
BASOPHILS NFR BLD AUTO: 1.4 % (ref 0–1.5)
BILIRUB SERPL-MCNC: 0.6 MG/DL (ref 0–1.2)
BUN SERPL-MCNC: 19 MG/DL (ref 8–23)
BUN/CREAT SERPL: 19.4 (ref 7–25)
CALCIUM SPEC-SCNC: 9.1 MG/DL (ref 8.6–10.5)
CHLORIDE SERPL-SCNC: 102 MMOL/L (ref 98–107)
CO2 SERPL-SCNC: 28 MMOL/L (ref 22–29)
CREAT SERPL-MCNC: 0.98 MG/DL (ref 0.76–1.27)
DEPRECATED RDW RBC AUTO: 45.8 FL (ref 37–54)
EOSINOPHIL # BLD AUTO: 0.16 10*3/MM3 (ref 0–0.4)
EOSINOPHIL NFR BLD AUTO: 3.2 % (ref 0.3–6.2)
ERYTHROCYTE [DISTWIDTH] IN BLOOD BY AUTOMATED COUNT: 13.4 % (ref 12.3–15.4)
FERRITIN SERPL-MCNC: 268.3 NG/ML (ref 30–400)
GFR SERPL CREATININE-BSD FRML MDRD: 74 ML/MIN/1.73
GLOBULIN UR ELPH-MCNC: 3 GM/DL
GLUCOSE SERPL-MCNC: 113 MG/DL (ref 65–99)
HCT VFR BLD AUTO: 42.1 % (ref 37.5–51)
HGB BLD-MCNC: 14 G/DL (ref 13–17.7)
IMM GRANULOCYTES # BLD AUTO: 0.01 10*3/MM3 (ref 0–0.05)
IMM GRANULOCYTES NFR BLD AUTO: 0.2 % (ref 0–0.5)
IRON 24H UR-MRATE: 97 MCG/DL (ref 59–158)
IRON SATN MFR SERPL: 29 % (ref 20–50)
LDH SERPL-CCNC: 223 U/L (ref 135–225)
LYMPHOCYTES # BLD AUTO: 0.91 10*3/MM3 (ref 0.7–3.1)
LYMPHOCYTES NFR BLD AUTO: 18.3 % (ref 19.6–45.3)
MCH RBC QN AUTO: 30.8 PG (ref 26.6–33)
MCHC RBC AUTO-ENTMCNC: 33.3 G/DL (ref 31.5–35.7)
MCV RBC AUTO: 92.7 FL (ref 79–97)
MONOCYTES # BLD AUTO: 0.55 10*3/MM3 (ref 0.1–0.9)
MONOCYTES NFR BLD AUTO: 11 % (ref 5–12)
NEUTROPHILS NFR BLD AUTO: 3.28 10*3/MM3 (ref 1.7–7)
NEUTROPHILS NFR BLD AUTO: 65.9 % (ref 42.7–76)
NRBC BLD AUTO-RTO: 0 /100 WBC (ref 0–0.2)
PLATELET # BLD AUTO: 192 10*3/MM3 (ref 140–450)
PMV BLD AUTO: 9.6 FL (ref 6–12)
POTASSIUM SERPL-SCNC: 4.1 MMOL/L (ref 3.5–5.2)
PROT SERPL-MCNC: 6.9 G/DL (ref 6–8.5)
RBC # BLD AUTO: 4.54 10*6/MM3 (ref 4.14–5.8)
SODIUM SERPL-SCNC: 138 MMOL/L (ref 136–145)
TIBC SERPL-MCNC: 338 MCG/DL (ref 298–536)
TRANSFERRIN SERPL-MCNC: 227 MG/DL (ref 200–360)
WBC # BLD AUTO: 4.98 10*3/MM3 (ref 3.4–10.8)

## 2020-11-02 PROCEDURE — 82728 ASSAY OF FERRITIN: CPT

## 2020-11-02 PROCEDURE — 82607 VITAMIN B-12: CPT

## 2020-11-02 PROCEDURE — 82232 ASSAY OF BETA-2 PROTEIN: CPT

## 2020-11-02 PROCEDURE — 82746 ASSAY OF FOLIC ACID SERUM: CPT

## 2020-11-02 PROCEDURE — 83540 ASSAY OF IRON: CPT

## 2020-11-02 PROCEDURE — 83615 LACTATE (LD) (LDH) ENZYME: CPT

## 2020-11-02 PROCEDURE — 84466 ASSAY OF TRANSFERRIN: CPT

## 2020-11-02 PROCEDURE — 99213 OFFICE O/P EST LOW 20 MIN: CPT | Performed by: INTERNAL MEDICINE

## 2020-11-02 PROCEDURE — 85025 COMPLETE CBC W/AUTO DIFF WBC: CPT

## 2020-11-02 PROCEDURE — 86803 HEPATITIS C AB TEST: CPT | Performed by: FAMILY MEDICINE

## 2020-11-02 PROCEDURE — 80053 COMPREHEN METABOLIC PANEL: CPT

## 2020-11-02 PROCEDURE — 36415 COLL VENOUS BLD VENIPUNCTURE: CPT | Performed by: FAMILY MEDICINE

## 2020-11-02 NOTE — PROGRESS NOTES
Subjective:     Encounter Date:11/02/2020      Patient ID: Cabrera Avina is a 80 y.o. male with history of atrial fibrillation, chronically anticoagulated, here for follow-up.    Chief Complaint: Routine follow-up    This patient presents today for follow-up of atrial fibrillation.  He says that he has been doing very well.  He denies having any palpitations at this time.  No significant shortness of breath or dyspnea on exertion.  No lightheadedness, dizziness or syncope.  No problems with his warfarin therapy.  This is followed closely and he has not had any significant bleeding problems.  He reports good control of his blood pressure in general.  No side effects from any of his medications.  Overall, he says that he is doing well and feeling well at this time.    Atrial Fibrillation  Presents for follow-up visit. Symptoms are negative for chest pain, dizziness, hemodynamic instability, palpitations, shortness of breath, syncope and weakness. The symptoms have been resolved. Past medical history includes atrial fibrillation. There are no medication compliance problems.       Current Outpatient Medications:   •  aspirin 81 MG EC tablet, Take 1 tablet by mouth Daily., Disp: 90 tablet, Rfl: 3  •  digoxin (LANOXIN) 250 MCG tablet, Take 1 tablet by mouth Daily., Disp: 90 tablet, Rfl: 3  •  dilTIAZem CD (CARDIZEM CD) 240 MG 24 hr capsule, TAKE ONE CAPSULE BY MOUTH DAILY, Disp: 90 capsule, Rfl: 3  •  escitalopram (LEXAPRO) 20 MG tablet, Take 1 tablet by mouth Daily., Disp: 90 tablet, Rfl: 3  •  Iron-Vitamins (GERITOL COMPLETE PO), Take 2 tablets by mouth Every Other Day., Disp: , Rfl:   •  oxyCODONE-acetaminophen (PERCOCET)  MG per tablet, Take 1 tablet by mouth Every 6 (Six) Hours As Needed for Severe Pain . Must last 30 days, Disp: 100 tablet, Rfl: 0  •  temazepam (RESTORIL) 15 MG capsule, TAKE ONE CAPSULE BY MOUTH NIGHTLY AS NEEDED FOR SLEEP **MAY MAKE DROWSY**, Disp: 30 capsule, Rfl: 2  •  warfarin  (COUMADIN) 2 MG tablet, Take 3.5 tablets PO daily - 7MG, 7MG, 8MG, then repeat, Disp: 360 tablet, Rfl: 5    No Known Allergies    Social History     Tobacco Use   • Smoking status: Never Smoker   • Smokeless tobacco: Never Used   Substance Use Topics   • Alcohol use: No     Review of Systems   Constitution: Negative for fever and weight loss.   Cardiovascular: Negative for chest pain, dyspnea on exertion, leg swelling, orthopnea, palpitations, paroxysmal nocturnal dyspnea and syncope.   Respiratory: Negative for cough, shortness of breath and wheezing.    Hematologic/Lymphatic: Negative for bleeding problem. Does not bruise/bleed easily.   Musculoskeletal: Positive for arthritis and joint pain.   Gastrointestinal: Negative for abdominal pain, hematemesis, hematochezia, melena, nausea and vomiting.   Neurological: Negative for dizziness, headaches, loss of balance and weakness.       ECG 12 Lead    Date/Time: 11/3/2020 9:57 AM  Performed by: Arnaldo Colin MD  Authorized by: Arnaldo Colin MD   Comparison: compared with previous ECG from 10/30/2019  Similar to previous ECG  Rhythm: atrial fibrillation  Ectopy: unifocal PVCs  Rate: normal  BPM: 65  Conduction: non-specific intraventricular conduction delay  QRS axis: normal  Other findings: poor R wave progression    Clinical impression: abnormal EKG             Objective:     Vitals signs reviewed.   Constitutional:       General: Not in acute distress.     Appearance: Healthy appearance. Well-developed.   Eyes:      Extraocular Movements: Extraocular movements intact.      Pupils: Pupils are equal, round, and reactive to light.   HENT:      Head: Normocephalic and atraumatic.    Mouth/Throat:      Pharynx: Oropharynx is clear.   Neck:      Musculoskeletal: Normal range of motion and neck supple.      Thyroid: No thyroid mass.      Vascular: No JVD.   Pulmonary:      Effort: Pulmonary effort is normal.      Breath sounds: Normal breath sounds. No  "wheezing. No rhonchi. No rales.   Cardiovascular:      Normal rate. Irregularly irregular rhythm.      Murmurs: There is no murmur.      No gallop.   Pulses:     Intact distal pulses.   Edema:     Peripheral edema absent.   Abdominal:      General: Bowel sounds are normal. There is no distension.      Palpations: Abdomen is soft.      Tenderness: There is no abdominal tenderness.   Musculoskeletal: Normal range of motion.      Extremities: No clubbing present.  Skin:     General: Skin is warm and dry. There is no cyanosis.      Findings: No erythema or rash.   Neurological:      Mental Status: Alert and oriented to person, place, and time.      Cranial Nerves: No cranial nerve deficit.       /74   Pulse 65   Ht 180.3 cm (71\")   Wt 83.5 kg (184 lb)   SpO2 94%   BMI 25.66 kg/m²     Data/Lab Review:     Lab Results   Component Value Date    INR 2.2 (A) 10/22/2020    INR 2.2 (A) 10/09/2020    INR 2.8 (A) 09/25/2020    PROTIME 33.2 09/25/2020    PROTIME 28.4 08/07/2020    PROTIME 22.9 04/02/2020     Lab Results   Component Value Date    WBC 4.98 11/02/2020    HGB 14.0 11/02/2020    HCT 42.1 11/02/2020    MCV 92.7 11/02/2020     11/02/2020     Lab Results   Component Value Date    GLUCOSE 113 (H) 11/02/2020    CALCIUM 9.1 11/02/2020     11/02/2020    K 4.1 11/02/2020    CO2 28.0 11/02/2020     11/02/2020    BUN 19 11/02/2020    CREATININE 0.98 11/02/2020    EGFRIFAFRI 69 02/03/2020    EGFRIFNONA 74 11/02/2020    BCR 19.4 11/02/2020    ANIONGAP 8.0 11/02/2020         Assessment:          Diagnosis Plan   1. Permanent atrial fibrillation (CMS/HCC)  ECG 12 Lead   2. Chronic anticoagulation          Plan:       1.  Permanent atrial fibrillation: The patient remains in atrial fibrillation with heart rate well controlled and the patient remains asymptomatic and otherwise hemodynamically stable.  He is tolerating Coumadin therapy well.  No changes at this time    2.  Chronic anticoagulation: The " patient's INR levels are referenced above.  He is not having any problems with his Coumadin.    Patient's Body mass index is 25.66 kg/m². BMI is above normal parameters. Recommendations include: exercise counseling and nutrition counseling.    We will plan to see the patient back in 12 months unless otherwise needed sooner.

## 2020-11-03 LAB
B2 MICROGLOB SERPL-MCNC: 2.6 MG/L (ref 0.8–2.2)
FOLATE SERPL-MCNC: 14 NG/ML (ref 4.78–24.2)
HCV AB S/CO SERPL IA: <0.1 S/CO RATIO (ref 0–0.9)
VIT B12 BLD-MCNC: 367 PG/ML (ref 211–946)

## 2020-11-03 PROCEDURE — 93000 ELECTROCARDIOGRAM COMPLETE: CPT | Performed by: INTERNAL MEDICINE

## 2020-11-06 DIAGNOSIS — I48.20 CHRONIC ATRIAL FIBRILLATION (HCC): ICD-10-CM

## 2020-11-07 RX ORDER — DIGOXIN 250 MCG
TABLET ORAL
Qty: 90 TABLET | Refills: 3 | Status: SHIPPED | OUTPATIENT
Start: 2020-11-07 | End: 2021-10-14 | Stop reason: SDUPTHER

## 2020-11-10 ENCOUNTER — CLINICAL SUPPORT (OUTPATIENT)
Dept: FAMILY MEDICINE CLINIC | Facility: CLINIC | Age: 80
End: 2020-11-10

## 2020-11-10 DIAGNOSIS — Z79.01 CHRONIC ANTICOAGULATION: Primary | ICD-10-CM

## 2020-11-10 PROCEDURE — 36416 COLLJ CAPILLARY BLOOD SPEC: CPT | Performed by: NURSE PRACTITIONER

## 2020-11-10 PROCEDURE — 85610 PROTHROMBIN TIME: CPT | Performed by: NURSE PRACTITIONER

## 2020-11-12 LAB — INR PPP: 2.1 (ref 0.9–1.1)

## 2020-11-18 NOTE — PROGRESS NOTES
[facility] HEMATOLOGY AND ONCOLOGY  [address]  [facility] [address] [phone]    Patient Name: Cabrera Avina  Encounter Date: 11/19/2020  YOB: 1940  Patient Number: [id number]      REASON FOR FOLLOWUP:  Mr. Cabrera Avina is a 80-year-old male who returns in follow-up of intermediate grade B-cell lymphoma.  He is seen 184.5 months following the completion of R-CHOP chemotherapy and 132.5 months after the completion of Rituxan maintenance.  He is also followed for an iron deficiency anemia and for chronic anticoagulation. He is seen 12 months after the most recent dose of Injectafer.  The patient is here alone. History is obtained from the patient who is considered to be a reliable historian.     DIAGNOSTIC ABNORMALITIES: B-cell Lymphoma.   1. Periportal lymph node biopsy, 02/01/2005. Findings consistent with large B-cell lymphoma with an intermediate to high proliferative rate.  2. CT of the chest, 02/03/2005. Mediastinal, left hilar, and upper abdominal lymphadenopathy consistent with the patient's history of lymphoma.    PREVIOUS INTERVENTIONS: B-cell lymphoma   1. Definitive Rituxan, 375 mg/m2, 02/05/2005 through 02/25/2005. Four weeks completed.  2. Definitive R-CHOP, from 01/04/2005 through 07/07/2005. Five cycles completed. Cycle #2 delayed by admission for management of deep perirectal abscess.  3. Maintenance Rituxan (every 3 months), 10/14/2005 through 11/11/2007. Six cycles completed.    DIAGNOSTIC ABNORMALITIES: Anemia, iron deficiency   1. Anemia substrates on 05/27/2008: Iron 43, %saturation 12, TIBC 366, UIBC 323, ferritin 27, vitamin B12 of 295, folate 7.2.    PREVIOUS INTERVENTIONS: Anemia.   1. INFeD, 1000 mg IVPB, 06/04/2008.  2. INFeD, 1000 mg IVPB, 09/02/2010 and 09/09/2010.  3. INFeD 500 mg, 08/25/2016; 11/28/2016.  4. Injectafer 750 mg IV, 11/20/2019    Problem List Items Addressed This Visit         Hematopoietic and Hemostatic    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) - Primary        Oncology/Hematology History Overview Note   DIAGNOSTIC ABNORMALITIES: B-cell Lymphoma.  Periportal lymph node biopsy, 02/01/2005.  Findings consistent with large B-cell lymphoma with an intermediate to high proliferative rate.  CT of the chest, 02/03/2005.   Mediastinal, left hilar, and upper abdominal lymphadenopathy consistent with the patient's history of lymphoma.  PREVIOUS INTERVENTIONS:   B-cell lymphoma  Definitive Rituxan, 375 mg/m2, 02/05/2005 through 02/25/2005. Four weeks completed.  Definitive R-CHOP, from 01/04/2005 through 07/07/2005.  Five cycles completed.  Cycle #2 delayed by admission for management of deep perirectal abscess.  Maintenance Rituxan (every 3 months), 10/14/2005 through 11/11/2007.  Six cycles completed.  DIAGNOSTIC ABNORMALITIES:   Anemia, iron deficiency  Anemia substrates on 05/27/2008: Iron 43, %saturation 12, TIBC 366, UIBC 323, ferritin 27, vitamin B12 of 295, folate 7.2.  PREVIOUS INTERVENTIONS:   Anemia.  INFeD, 1000 mg IVPB, 06/04/2008.  INFeD, 1000 mg IVPB, 09/02/2010 and 09/09/2010.  INFeD 500 mg, 08/25/2016; 11/28/2016.     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)   4/28/2016 Initial Diagnosis    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)         PAST MEDICAL HISTORY:  ALLERGIES:  No Known Allergies  CURRENT MEDICATIONS:  Outpatient Encounter Medications as of 11/19/2020   Medication Sig Dispense Refill   • aspirin 81 MG EC tablet Take 1 tablet by mouth Daily. 90 tablet 3   • digoxin (LANOXIN) 250 MCG tablet TAKE ONE TABLET BY MOUTH DAILY 90 tablet 3   • dilTIAZem CD (CARDIZEM CD) 240 MG 24 hr capsule TAKE ONE CAPSULE BY MOUTH DAILY 90 capsule 3   • escitalopram (LEXAPRO) 20 MG tablet Take 1 tablet by mouth Daily. 90 tablet 3   • Iron-Vitamins (GERITOL COMPLETE PO) Take 2 tablets by mouth Every Other Day.     • oxyCODONE-acetaminophen (PERCOCET)  MG per tablet Take 1 tablet by mouth Every 6 (Six) Hours  As Needed for Severe Pain . Must last 30 days 100 tablet 0   • temazepam (RESTORIL) 15 MG capsule TAKE ONE CAPSULE BY MOUTH NIGHTLY AS NEEDED FOR SLEEP **MAY MAKE DROWSY** 30 capsule 2   • warfarin (COUMADIN) 2 MG tablet Take 3.5 tablets PO daily - 7MG, 7MG, 8MG, then repeat 360 tablet 5   • [DISCONTINUED] digoxin (LANOXIN) 250 MCG tablet Take 1 tablet by mouth Daily. 90 tablet 3     No facility-administered encounter medications on file as of 11/19/2020.      ADULT ILLNESSES:   History of malignant lymphoma (situation) ( ICD-10:Z85.72 ;Personal history of non-Hodgkin lymphomas   Adenomatous colonic polyps ( ICD-10:D12.6 ;Benign neoplasm of colon, unspecified   Anemia ( ICD-10:D64.9 ;Anemia, unspecified   Anxiety ( chronic; ICD-10:F41.9 ;Anxiety disorder, unspecified )   Atrial fibrillation ( on chronic anticoagulation; ICD-10:I48.91 ;Unspecified atrial fibrillation )   Degenerative joint disease ( ICD-10:M16.10 ;Unilateral primary osteoarthritis, unspecified hip   Drug intolerance ( oral iron; ICD-10:T45.4x5D ;Adverse effect of iron and its compounds, subsequent encounter )   Hypertension ( ICD-10:I10 ;Essential (primary) hypertension   Iron deficiency anemia ( ICD-10:D50.9 ;Iron deficiency anemia, unspecified   Microscopic hematuria ( ICD-10:R31.29 ;Other microscopic hematuria   Nephrolithiasis ( ICD-10:N20.0 ;Calculus of kidney   Neuropathy ( ICD-10:G62.9 ;Polyneuropathy, unspecified   Orthostatic hypotension ( ICD-10:I95.1 ;Orthostatic hypotension   Perirectal abscess ( deep, severe; ICD-10:K61.1 ;Rectal abscess )   Polycystic kidney disease ( ICD-10:Q61.3 ;Polycystic kidney, unspecified   Vitamin B12 deficiency ( ICD-10:E53.8 ;Deficiency of other specified B group vitamins    SURGERIES:   Punch biopsy of skin lesion, right lower lip, 01/21/2015: 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 Dr. Hatch    Drainage of abscess, recurrent rectal abscess, 06/01/2006   Laparoscopic cholecystectomy and lymph node biopsy   Hip replacement, right, 06/15/2010      ADULT ILLNESSES:  Patient Active Problem List   Diagnosis Code   • Slow transit constipation K59.01   • Gastroesophageal reflux disease without esophagitis K21.9   • 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[facility]) C85.90   • Anxiety F41.9   • Chronic hip pain M25.559, G89.29   • Permanent atrial fibrillation (CMS/[facility]) I48.21   • Other insomnia G47.09   • Adenomatous polyp of colon D12.6   • Tobacco use Z72.0   • Chronic anticoagulation Z79.01   • PAD (peripheral artery disease) (CMS/[facility]) I73.9   • Congestive heart failure (CMS/[facility]) I50.9   • BMI 24.0-24.9, adult Z68.24   • Leukopenia D72.819   • Iron deficiency anemia D50.9     SURGERIES:  Past Surgical History:   Procedure Laterality Date   • CHOLECYSTECTOMY     • COLONOSCOPY  05/2012    3 yr recall   • COLONOSCOPY  09/18/2015    5 yr recall   • KIDNEY STONE SURGERY     • RECTAL SURGERY      X 2   • TOTAL HIP ARTHROPLASTY       HEALTH MAINTENANCE ITEMS:  Health Maintenance Due   Topic Date Due   • ZOSTER VACCINE (2 of 2) 02/13/2017   • Pneumococcal Vaccine 65+ (2 of 2 - PPSV23) 11/05/2020       <no information>  Last Completed Colonoscopy       Status Date      COLONOSCOPY Done 12/1/2015      Patient has more history with this topic...        Immunization History   Administered Date(s) Administered   • Fluad Quad 65+ 11/05/2019, 10/09/2020   • Pneumococcal Conjugate 13-Valent (PCV13) 11/05/2019     Last Completed Mammogram     Patient has no health maintenance due at this time            FAMILY HISTORY:  Family History   Problem Relation Age of Onset   • Colon cancer Mother    • No Known Problems Father    • Prostate cancer Brother    • No Known Problems Daughter    • No Known Problems Son    • No Known Problems Maternal Grandmother    • No Known Problems Maternal Grandfather    • No Known Problems Paternal Grandmother    • No  "Known Problems Paternal Grandfather    • Prostate cancer Brother    • Colon polyps Neg Hx      SOCIAL HISTORY:  Social History     Socioeconomic History   • Marital status:      Spouse name: Not on file   • Number of children: Not on file   • Years of education: Not on file   • Highest education level: Not on file   Tobacco Use   • Smoking status: Never Smoker   • Smokeless tobacco: Never Used   Substance and Sexual Activity   • Alcohol use: No   • Drug use: No   • Sexual activity: Defer       REVIEW OF SYSTEMS:  PS:  ECOG 1-2.   Constitutional:  His appetite is fair. \"I eat.\"  His energy remains low to fair.  \"About the same.\"  He is as active and manages all his ADLs including chores, running errands, and driving.  \"I drove myself in today.\" Says his hip and lower back pains still inhibit his activities inspite of hip replacement.  Says he is still walking his usual 1/2 mile to 1 mile.  He has no fevers, chills, or drenching night sweats.  He has gained 9 pounds (in addition to 3 pounds at his prior visit) over the last 4.5 months. Still says he wants to stay around 175-180 pounds.  He sleeps more restfully on temazepam.  Ear/Nose/Throat/Mouth:   The patient reports no ear pains, but has lingering sinus symptoms, but no sore throat, nosebleeds.  No headaches. The patient denies any hoarseness, nor change in voice quality.   Ocular:   The patient reports no eye pain, significant change in visual acuity, double vision, or blurry vision.   Respiratory:  He reports no chronic cough, shortness of breathing, phlegm production, or chest wall pain.  Cardiovascular:  He reports no exertional chest pain, chest pressure, or chest heaviness.  He reports no claudication. He reports no palpitations or symptomatic orthostasis.  Gastrointestinal:  He has no pica, dysphagia, nausea, vomiting, postprandial abdominal pain, bloating, cramping, change in bowel habits, or discoloration of the stool.  He has had no rectal " "bleeding.  He has intermittent bouts of constipation modulated by daily stool softeners (Dulcolax).  Says he is still using supplemental fiber daily.  Says he moves his bowels every 3-4 days, \"been like that since chemo years ago.\" He has no diarrhea. Last colonoscopy, 09/2015.  Polyps. Repeat 5 years. Is oral iron intolerant (worsening constipation).  Genitourinary:    He reports no urinary burning, frequency, dribbling, or discoloration.  He reports no difficulty controlling his bladder. \"I gotta go when I gotta go.\" He reports no need to urinate frequently through the night.  Musculoskeletal:  He continues to have chronic trouble with right hip pain.  \"same, 24/7.\"  He still uses maintenance Percocet, up to 2 doses daily.  He reports no unexplained arthralgias, myalgias, but has noted more frequent bouts of nighttime leg cramping.  Extremities:  He reports no trouble with fluid retention or significant leg swelling.  Endocrine:  He reports no problems with excess thirst, excessive urination, vasomotor instability, or unexplained fatigue.  Heme/Lymphatic:  He reports no bleeding, petechial rashes, or swollen glands.  Skin:  He reports no itching, rashes, or lesions which won't heal.  Neuro:  He reports night-time stocking-glove pain in his lower extremities.  \"No change.\" Says he has been seen by Dr. Kinney previously. \"He found no problems.\"  Says Dr. Rosario says he does not have significant vascular disease requiring surgery.  He reports no loss of consciousness, seizures, fainting spells, or dizziness. He reports no weakness of his face, arms, or legs.  He reports no difficulty with speech.  He reports no tremors.  Psych:  He seems generally satisfied with life.  He reports no depression.  He reports no mood swings.      VITAL SIGNS: /68   Pulse 60   Temp 97.8 °F (36.6 °C)   Resp 16   Ht 180.3 cm (71\")   Wt 84 kg (185 lb 3.2 oz)   SpO2 97%   BMI 25.83 kg/m²       PHYSICAL " EXAMINATION:  General:  He is a pleasant, slender, and well-kept elderly male in no distress.  He arrived in the exam room ambulatory. He appears to be his stated age.  His skin color is pale. ECOG 1  Head/Neck:  The patient is anicteric and atraumatic.  He is wearing a surgical mask today. The neck is supple without evidence of jugular venous distention or cervical adenopathy.  Eyes:  The pupils are equal, round, and reactive to light.  The extraocular movements are full.  There is no scleral jaundice or erythema.  Chest:  The respiratory efforts are normal and unhindered.  The chest is clear to auscultation.  There are no wheezes, rales, or asymmetry of breath sounds.  The port in the right anterior chest wall has been removed and the site has healed. No tenderness or erythema.   Cardiac/Vascular:  The patient has an irregularly irregular cardiac rate and rhythm without murmurs.  The peripheral pulses are equal and full.  Abdomen:  The belly is soft and flat.  There is no rebound or guarding. There is no organomegaly, mass-effect, or tenderness.  Bowel sounds are normal.  Ortho:  There is no overt joint stiffness.  There is no overt foreshortening of height.  There are no overt joint changes which suggest degenerative arthritis.  Extremities:  There is no evidence of cyanosis, clubbing, with trace bipedal edema.  Rheumatologic:  There is no overt evidence of rheumatoid deformities of the hands.  There is no sausaging of the fingers.  There is no sign of active synovitis.  Cutaneous:  Few areas of senile purpuras are present on his forearms.  There are no overt rashes, disseminated lesions, purpura, or petechiae.  Lymphatics:  There is no evidence of adenopathy in the cervical, supraclavicular, or axillary areas.  Neurologic:  The patient is alert, oriented, cooperative, and pleasant.  He is appropriately conversant.  He ambulated into the exam room and transferred from chair to exam table aided.  There is no overt  dysfunction of the motor, sensory, or cerebellar systems.  Psych:  Impatient.  Mood and affect are appropriate for circumstance.  Eye contact is appropriate.        LABS    Lab Results - Last 18 Months   Lab Units 11/02/20 1330 06/30/20  1241 05/08/20  1029 02/07/20  1402 02/03/20  1408 11/08/19  0854 08/08/19  1014   HEMOGLOBIN g/dL 14.0 13.8 13.9 15.6 16.4 13.9 14.4   HEMATOCRIT % 42.1 42.6 43.3 47.5 50.6 42.4 43.9   MCV fL 92.7 93.2 95.0 92.8 94 93.6 92.4   WBC 10*3/mm3 4.98 5.56 5.96 8.36 10.1 4.46 5.44   RDW % 13.4 13.1 13.7 13.5 13.4 13.5 13.3   MPV fL 9.6 9.9 9.8 10.1  --  9.4 9.7   PLATELETS 10*3/mm3 192 216 196 244 293 205 224   IMM GRAN % % 0.2 0.2 0.2 0.6*  --  0.2 0.4   NEUTROS ABS 10*3/mm3 3.28 3.78 4.01 6.03 7.1* 2.83 3.55   LYMPHS ABS 10*3/mm3 0.91 1.11 1.02 0.88 1.5 0.80 0.98   MONOS ABS 10*3/mm3 0.55 0.49 0.67 1.24* 1.2* 0.60 0.65   EOS ABS 10*3/mm3 0.16 0.13 0.19 0.13 0.2 0.18 0.19   BASOS ABS 10*3/mm3 0.07 0.04 0.06 0.03 0.0 0.04 0.05   IMMATURE GRANS (ABS) 10*3/mm3 0.01 0.01 0.01 0.05  --  0.01 0.02   NRBC /100 WBC 0.0 0.0 0.0 0.0  --  0.0 0.0       Lab Results - Last 18 Months   Lab Units 11/02/20  1330 06/30/20  1241 05/08/20  1029 02/10/20  0956 02/07/20  1402 02/03/20  1408 11/08/19  0854 08/08/19  1014   GLUCOSE mg/dL 113* 125* 110*  --  92  --  108* 100   SODIUM mmol/L 138 139 141  --  137 140 138 141   POTASSIUM mmol/L 4.1 4.2 3.9  --  3.9 4.9 4.0 4.4   TOTAL CO2 mmol/L  --   --   --   --   --  24  --   --    CO2 mmol/L 28.0 27.0 30.0*  --  28.0  --  31.0* 29.0   CHLORIDE mmol/L 102 101 101  --  98 97 99 101   ANION GAP mmol/L 8.0 11.0 10.0  --  11.0  --  8.0 11.0   CREATININE mg/dL 0.98 1.05 0.95 0.70 0.88 1.16 0.94 1.07   BUN mg/dL 19 15 12  --  17 23 14 20   BUN / CREAT RATIO  19.4 14.3 12.6  --  19.3 20 14.9 18.7   CALCIUM mg/dL 9.1 9.4 9.5  --  9.5 9.7 9.8 9.5   EGFR IF NONAFRICN AM mL/min/1.73 74 68 77  --  84 60 78 67   ALK PHOS U/L 65 61 63  --  78 79 71 70   TOTAL PROTEIN g/dL  6.9 7.2 7.5  --  8.5  --  8.2 9.0*   ALT (SGPT) U/L 12 9 9  --  15 24 11 20   AST (SGOT) U/L 18 18 14  --  17 23 17 26   BILIRUBIN mg/dL 0.6 0.5 0.7  --  1.1 0.5 0.4 0.6   ALBUMIN g/dL 3.90 3.80 4.20  --  4.10 4.5 4.30 4.80   GLOBULIN gm/dL 3.0 3.4 3.3  --  4.4  --  3.9 4.2       Lab Results - Last 18 Months   Lab Units 11/02/20  1330 06/30/20  1241 05/08/20  1029 02/07/20  1402 11/08/19  0854 08/08/19  1014   LDH U/L 223 184 186 164 177 552       Lab Results - Last 18 Months   Lab Units 11/02/20  1330 06/30/20  1241 05/08/20  1029 02/07/20  1402 02/03/20  1408 11/08/19  0854 08/08/19  1014   IRON mcg/dL 97 83 76 68  --  45* 87   TIBC mcg/dL 338 331 358 274*  --  374 355   IRON SATURATION % 29 25 21 25  --  12* 25   FERRITIN ng/mL 268.30 302.70 315.40 902.00*  --  199.20 121.00   TSH uIU/mL  --   --   --   --  1.110  --   --    FOLATE ng/mL 14.00 13.10 10.00 12.90  --  9.68 11.60       ASSESSMENT:  1.    Intermediate to high-grade lymphoma.  History of. No evidence of disease.  05/16/2005:          Stage IV-E.          Baseline Tumor [place]:   Mediastinum, left hilum, upper abdomen, and liver (clinically).          Complications of Tumor:   Hyperbilirubinemia. Improved.          Response to Therapy:  Clinical remission per CT scan 05/16/2005, 11/2011 and 02/10/2020.          -02/03/2020-CT chest without contrast.  Impression: Atheromatous disease of the thoracic aorta and coronary arteries.  Mild cardiomegaly.  Borderline pulmonary artery dilatation.  No infiltrate or effusion.          -02/03/2020-CT abdomen and pelvis with contrast.  Impression: Prior cholecystectomy.  Mild prominence of biliary tree felt to be related.  Complex renal cyst on the left is stable in size with septation and calcification.  Dominant mid renal cyst on the right side is slightly larger.  Upper pole of the right renal collecting system is mildly dilated and contains 2 stones measuring 8 to 9 mm.  5 mm nonobstructive stone in the left mid  kidney.  Ureter is nondilated.  Prostate mildly enlarged 5.1 cm.  Moderate stool in the colon.  No small bowel distention.          Prognosis:  Fair.          IPI at baseline:  3.  2.    Normocytic anemia from iron deficiency and chronic disease. Last Injectafer, 11/20/2019.  Hemoglobin, 14 on 11/02/2020 (prior range:  Hgb 12.9 - 14.7).  3.    Leukopenia, NOS. normal since 02/07/2020   4.    Iron deficiency and oral iron intolerant (constipation).  Repleted since receipt of INFeD  5.    Variable B2M.  Stable, 2.6 on 11/02/2020 (prior range:  1.6 - 3.27).  6.    Adenomatous colon polyp, colonoscopy 09/18/2015.  7.    Atrial fibrillation on chronic anticoagulation.  8.    Microscopic hematuria.  Management per Dr. Mayes.  9.    Anxiety, chronic, stable on medications (Celexa).  10.  Lower extremity neuropathy.  Mild.  Not a problem of late.  11.  Hypertension.  Fair control on Cardizem.  12.  Low B12, 203 on 08/17/2017.  Repleted.  13.  Peripheral vascular disease.  Arterial studies and has been seen by Dr. Rosario of vascular surgery.              a.    Ultrasound ankle/brachial performed on 07/17/2018 at [facility]. Suspected atherosclerosis in the lower extremity arteries, supported by noncompressible posterior tibialis and dorsalis pedis arteries.              b.    Abdominal aortic ultrasound performed on 08/10/2018 at [facility].  No abdominal aortic aneurysm.    PLAN:  1.      Re:  Apprised of labs from 11/02/2020 with normal WBC, normal diff, normal hemoglobin (resolution of anemia), normoglycemia with normal CMP, normal LDH, normal B2M, repleted serum iron, repleted (> 20%) Fe sat, repleted (> 100) ferritin, repleted folate, and repleted B12.   2.    Stay off B12, 1000 mcg IM monthly.   3.    Previously apprised of CT scans, 02/10/2020 - Large renal cyst (stable since 2011).  Stable. Otherwise GURPREET  4.    Continue other currently identified medications.  5.    Continue Coumadin.  PT/INR  followed by Dr. Crawford.  6.    Continue ongoing management per primary care physician and other specialists.    7.    Return to office in 4 months with pre-office CBC with differential, iron, TIBC, iron saturation, ferritin, B12, folate, CMP, B2M, and LDH.      MEDICAL DECISION MAKING: Low Complexity  AMOUNT OF DATA: Moderate    I spent 18 total minutes, face-to-face, caring for Cabrera today.  Greater than 50% of this time involved counseling and/or coordination of care as documented within this note regarding the patient's illness(es), pros and cons of various treatment options, instructions and/or risk reduction.    cc:   Azar Mayes MD          [facility]          Sunita Crawford MD - [place]          Jorge Alberto Rosario MD

## 2020-11-19 ENCOUNTER — OFFICE VISIT (OUTPATIENT)
Dept: ONCOLOGY | Facility: CLINIC | Age: 80
End: 2020-11-19

## 2020-11-19 VITALS
WEIGHT: 185.2 LBS | DIASTOLIC BLOOD PRESSURE: 68 MMHG | HEIGHT: 71 IN | TEMPERATURE: 97.8 F | SYSTOLIC BLOOD PRESSURE: 120 MMHG | OXYGEN SATURATION: 97 % | HEART RATE: 60 BPM | BODY MASS INDEX: 25.93 KG/M2 | RESPIRATION RATE: 16 BRPM

## 2020-11-19 DIAGNOSIS — C85.90 LYMPHOMA IN REMISSION (HCC): Primary | ICD-10-CM

## 2020-11-19 PROCEDURE — 99213 OFFICE O/P EST LOW 20 MIN: CPT | Performed by: INTERNAL MEDICINE

## 2020-11-20 ENCOUNTER — OFFICE VISIT (OUTPATIENT)
Dept: FAMILY MEDICINE CLINIC | Facility: CLINIC | Age: 80
End: 2020-11-20

## 2020-11-20 VITALS
HEIGHT: 71 IN | OXYGEN SATURATION: 98 % | WEIGHT: 185.4 LBS | HEART RATE: 68 BPM | RESPIRATION RATE: 18 BRPM | DIASTOLIC BLOOD PRESSURE: 58 MMHG | BODY MASS INDEX: 25.96 KG/M2 | TEMPERATURE: 97.3 F | SYSTOLIC BLOOD PRESSURE: 136 MMHG

## 2020-11-20 DIAGNOSIS — Z72.0 TOBACCO USE: ICD-10-CM

## 2020-11-20 DIAGNOSIS — G47.09 OTHER INSOMNIA: ICD-10-CM

## 2020-11-20 DIAGNOSIS — C85.90 LYMPHOMA IN REMISSION (HCC): ICD-10-CM

## 2020-11-20 DIAGNOSIS — F41.9 ANXIETY: ICD-10-CM

## 2020-11-20 DIAGNOSIS — M25.551 CHRONIC PAIN OF RIGHT HIP: Primary | ICD-10-CM

## 2020-11-20 DIAGNOSIS — G89.29 CHRONIC PAIN OF RIGHT HIP: Primary | ICD-10-CM

## 2020-11-20 DIAGNOSIS — K59.01 SLOW TRANSIT CONSTIPATION: ICD-10-CM

## 2020-11-20 DIAGNOSIS — I48.21 PERMANENT ATRIAL FIBRILLATION (HCC): ICD-10-CM

## 2020-11-20 DIAGNOSIS — Z79.01 CHRONIC ANTICOAGULATION: ICD-10-CM

## 2020-11-20 LAB
INR PPP: 2.2 (ref 0.9–1.1)
PROTHROMBIN TIME: 26.5 SECONDS

## 2020-11-20 PROCEDURE — 85610 PROTHROMBIN TIME: CPT | Performed by: FAMILY MEDICINE

## 2020-11-20 PROCEDURE — 99214 OFFICE O/P EST MOD 30 MIN: CPT | Performed by: FAMILY MEDICINE

## 2020-11-20 PROCEDURE — 36416 COLLJ CAPILLARY BLOOD SPEC: CPT | Performed by: FAMILY MEDICINE

## 2020-11-20 RX ORDER — TEMAZEPAM 15 MG/1
15 CAPSULE ORAL NIGHTLY PRN
Qty: 30 CAPSULE | Refills: 2 | Status: SHIPPED | OUTPATIENT
Start: 2020-11-20 | End: 2021-02-18 | Stop reason: SDUPTHER

## 2020-11-20 RX ORDER — OXYCODONE AND ACETAMINOPHEN 10; 325 MG/1; MG/1
1 TABLET ORAL EVERY 6 HOURS PRN
Qty: 100 TABLET | Refills: 0 | Status: SHIPPED | OUTPATIENT
Start: 2020-11-20 | End: 2020-12-18 | Stop reason: SDUPTHER

## 2020-11-20 RX ORDER — ESCITALOPRAM OXALATE 20 MG/1
20 TABLET ORAL DAILY
Qty: 90 TABLET | Refills: 3 | Status: SHIPPED | OUTPATIENT
Start: 2020-11-20 | End: 2021-10-14 | Stop reason: SDUPTHER

## 2020-11-20 NOTE — PROGRESS NOTES
Subjective cc: pain medication refill  Cabrera Avina is a 80 y.o. male who presents to get a refill on his pain medication.   He has no complaints today.    Here today for refills and to get his INR checked - WNL (2.2) - has been taking 7MG daily   He needs forms sent so he can stop his coumadin and have 7 teeth pulled - pt reports he has stopped it in the past for procedures without issue.   Pain in his right hip.  Pain is the same.  Taking the muscle relaxer PRN.  Pain improved with pain medication without side effects.      Still following with oncology - saw them yesterday - good report   Flu shot UTD.       Constipation stable with laxatives  Depression and anxiety stable - taking lexapro daily. refill needed.  BP controlled.      Atrial fib is rate controlled.     He is currently taking restoril nighly for insomnia which controls his symptoms and allows him to get restful sleep. refill needed today.     Past information reviewed and updated as appropriate     Pain  This is a chronic problem. The current episode started more than 1 year ago. The problem occurs constantly. The problem has been unchanged. Associated symptoms include arthralgias, myalgias, numbness and weakness. Pertinent negatives include no abdominal pain, anorexia, chest pain, chills, coughing, diaphoresis, fatigue, fever, headaches, nausea or vomiting. The symptoms are aggravated by exertion, standing and walking. He has tried rest, walking, position changes and oral narcotics (Percocet, patient cannot tolerate NSAIDs secondary to Coumadin) for the symptoms. The treatment provided moderate relief.   Atrial Fibrillation  Presents for follow-up visit. Symptoms include hypertension and weakness. Symptoms are negative for bradycardia, chest pain, dizziness, hemodynamic instability, palpitations, shortness of breath, syncope and tachycardia. The symptoms have been stable. Past medical history includes atrial fibrillation and CHF. There are  no medication compliance problems.   Congestive Heart Failure  Presents for follow-up visit. Pertinent negatives include no abdominal pain, chest pain, fatigue, palpitations, shortness of breath or unexpected weight change. The symptoms have been stable. Compliance with total regimen is %. Compliance with diet is 51-75%. Compliance with exercise is 51-75%. Compliance with medications is %.   Depression  Visit Type: follow-up  Patient presents with the following symptoms: insomnia, muscle tension and nervousness/anxiety.  Patient is not experiencing: anhedonia, decreased concentration, depressed mood, excessive worry, feelings of hopelessness, palpitations, shortness of breath, suicidal ideas, suicidal planning and thoughts of death.  Frequency of symptoms: occasionally   Severity: mild   Sleep quality: fair  Nighttime awakenings: occasional  Patient has a history of: CHF         The following portions of the patient's history were reviewed and updated as appropriate: allergies, current medications, past family history, past medical history, past social history, past surgical history and problem list.        Review of Systems   Constitutional: Negative for activity change, appetite change, chills, diaphoresis, fatigue, fever and unexpected weight change.   Respiratory: Negative for cough, chest tightness and shortness of breath.    Cardiovascular: Negative for chest pain, palpitations, leg swelling and syncope.   Gastrointestinal: Positive for constipation (controlled). Negative for abdominal pain, anorexia, blood in stool, nausea and vomiting.   Musculoskeletal: Positive for arthralgias, back pain, gait problem and myalgias.   Neurological: Positive for weakness and numbness. Negative for dizziness, syncope, facial asymmetry, speech difficulty, light-headedness and headaches.   Hematological: Bruises/bleeds easily.   Psychiatric/Behavioral: Positive for dysphoric mood and sleep disturbance (wakes up in  "the middle of the night secondary to pain). Negative for decreased concentration, self-injury and suicidal ideas. The patient is nervous/anxious and has insomnia.    All other systems reviewed and are negative.      Objective   Blood pressure 136/58, pulse 68, temperature 97.3 °F (36.3 °C), temperature source Infrared, resp. rate 18, height 180.3 cm (71\"), weight 84.1 kg (185 lb 6.4 oz), SpO2 98 %.    Physical Exam   Constitutional: He is oriented to person, place, and time. He appears well-developed. He is cooperative.  Non-toxic appearance. He does not appear ill. No distress.   HENT:   Head: Normocephalic and atraumatic.   Right Ear: External ear normal.   Left Ear: External ear normal.   Eyes: Conjunctivae are normal. Right eye exhibits no discharge. Left eye exhibits no discharge.   Neck: No tracheal deviation present.   Cardiovascular: Normal rate. An irregularly irregular rhythm present.   Pulmonary/Chest: Effort normal and breath sounds normal. No accessory muscle usage or stridor. No respiratory distress. He has no wheezes. He has no rales.   Abdominal: Soft. Normal appearance and bowel sounds are normal. He exhibits no distension, no fluid wave, no pulsatile midline mass and no mass. There is no abdominal tenderness.   Musculoskeletal:      Lumbar back: He exhibits decreased range of motion, tenderness, pain and spasm. He exhibits no bony tenderness.      Comments: Significantly limited ROM in right hip, pain with movement, walks with limp favoring right hip, difficulty changing positions and getting onto the exam table.    Neurological: He is alert and oriented to person, place, and time.   Skin: Skin is warm and dry. He is not diaphoretic.   Psychiatric: His behavior is normal. Judgment and thought content normal. His mood appears not anxious. He does not exhibit a depressed mood.   Nursing note and vitals reviewed.    Procedures  Lab Results (last 24 hours)     Procedure Component Value Units Date/Time  "    POC Protime / INR [[id number]]  (Abnormal) Collected: 11/20/20 1143    Specimen: Blood Updated: 11/20/20 1143     Protime 26.5 seconds      INR 2.2            Assessment/Plan   Problems Addressed this Visit        Cardiovascular and Mediastinum    Permanent atrial fibrillation (CMS/HCC)    Relevant Orders    POC Protime / INR (Completed)       Digestive    Slow transit constipation       Nervous and Auditory    Chronic hip pain - Primary    Relevant Medications    oxyCODONE-acetaminophen (PERCOCET)  MG per tablet       Hematopoietic and Hemostatic    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)       Other    Anxiety    Relevant Medications    escitalopram (Lexapro) 20 MG tablet    Other insomnia    Relevant Medications    temazepam (RESTORIL) 15 MG capsule    Tobacco use    Chronic anticoagulation      Diagnoses       Codes Comments    Chronic pain of right hip    -  Primary ICD-10-CM: M25.551, G89.29  ICD-9-CM: 719.45, 338.29     Permanent atrial fibrillation (CMS/HCC)     ICD-10-CM: I48.21  ICD-9-CM: 427.31     Anxiety     ICD-10-CM: F41.9  ICD-9-CM: 300.00     Other insomnia     ICD-10-CM: G47.09  ICD-9-CM: 780.52     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    #2 depression/anxiety: chronic, controlled, continue on Lexapro daily     #3 chronic pain in right hip: Chronic, controlled with oral pain medication.  Patient is unable to take NSAIDs secondary to Coumadin.  Nii reviewed and appropriate.  Controlled contract in the chart.  Refill given on medication.  Sent to pharmacy.  Prescription for muscle relaxers to help with muscle spasms, advised on risk and benefits of this medication.  Return in 1  month     #4 chronic anticoagulation: therapeutic INR today. continue coumadin. Permission given to stop coumadin for procedure, reviewed current guidelines CHADSVAS score: 4 - no bridging recommended    #5 atrial fibrillation: Patient is anticoagulated and rate controlled. Continue on cardizem.      #6 constipation: chronic, stable. continue on regular use of fiber and stool softeners, advised on diet changes, advised this is due to his medication            This document has been electronically signed by Sunita Crawford MD on November 20, 2020 17:20 CST

## 2020-12-18 ENCOUNTER — OFFICE VISIT (OUTPATIENT)
Dept: FAMILY MEDICINE CLINIC | Facility: CLINIC | Age: 80
End: 2020-12-18

## 2020-12-18 VITALS
OXYGEN SATURATION: 99 % | HEART RATE: 62 BPM | WEIGHT: 185.8 LBS | SYSTOLIC BLOOD PRESSURE: 126 MMHG | DIASTOLIC BLOOD PRESSURE: 71 MMHG | HEIGHT: 71 IN | TEMPERATURE: 97.3 F | BODY MASS INDEX: 26.01 KG/M2 | RESPIRATION RATE: 18 BRPM

## 2020-12-18 DIAGNOSIS — F41.9 ANXIETY: ICD-10-CM

## 2020-12-18 DIAGNOSIS — G47.09 OTHER INSOMNIA: ICD-10-CM

## 2020-12-18 DIAGNOSIS — M25.551 CHRONIC PAIN OF RIGHT HIP: ICD-10-CM

## 2020-12-18 DIAGNOSIS — I48.21 PERMANENT ATRIAL FIBRILLATION (HCC): Primary | ICD-10-CM

## 2020-12-18 DIAGNOSIS — C85.90 LYMPHOMA IN REMISSION (HCC): ICD-10-CM

## 2020-12-18 DIAGNOSIS — Z79.01 CHRONIC ANTICOAGULATION: ICD-10-CM

## 2020-12-18 DIAGNOSIS — G89.29 CHRONIC PAIN OF RIGHT HIP: ICD-10-CM

## 2020-12-18 DIAGNOSIS — K59.01 SLOW TRANSIT CONSTIPATION: ICD-10-CM

## 2020-12-18 LAB — INR PPP: 1.7 (ref 0.9–1.1)

## 2020-12-18 PROCEDURE — 85610 PROTHROMBIN TIME: CPT | Performed by: FAMILY MEDICINE

## 2020-12-18 PROCEDURE — 99214 OFFICE O/P EST MOD 30 MIN: CPT | Performed by: FAMILY MEDICINE

## 2020-12-18 PROCEDURE — 36416 COLLJ CAPILLARY BLOOD SPEC: CPT | Performed by: FAMILY MEDICINE

## 2020-12-18 RX ORDER — OXYCODONE AND ACETAMINOPHEN 10; 325 MG/1; MG/1
1 TABLET ORAL EVERY 6 HOURS PRN
Qty: 100 TABLET | Refills: 0 | Status: SHIPPED | OUTPATIENT
Start: 2020-12-18 | End: 2021-01-18 | Stop reason: SDUPTHER

## 2020-12-18 NOTE — PROGRESS NOTES
Subjective cc: pain medication refill  Cabrera Avina is a 80 y.o. male who presents to get a refill on his pain medication.       Here today for refills and to get his INR checked - (low 1.7) - has been taking 7MG daily - he had to be off of it for 5 days because of having his teeth pulled. He has restarted it and it is trending up.   He is going to see dental for a plate on his top teeth. He is eating okay. Weight has remained stable.     Pain in his right hip.  Pain is the same.  Taking the muscle relaxer PRN.  Pain improved with pain medication without side effects.      Still following with oncology - good report   Flu shot UTD.       Constipation stable with laxatives  Depression and anxiety stable - taking lexapro daily. refill needed.  BP controlled.      Atrial fib is rate controlled.     He is currently taking restoril nighly for insomnia which controls his symptoms and allows him to get restful sleep.     Past information reviewed and updated as appropriate     Pain  This is a chronic problem. The current episode started more than 1 year ago. The problem occurs constantly. The problem has been unchanged. Associated symptoms include arthralgias, myalgias, numbness and weakness. Pertinent negatives include no abdominal pain, anorexia, chest pain, chills, coughing, diaphoresis, fatigue, fever, headaches, nausea or vomiting. The symptoms are aggravated by exertion, standing and walking. He has tried rest, walking, position changes and oral narcotics (Percocet, patient cannot tolerate NSAIDs secondary to Coumadin) for the symptoms. The treatment provided moderate relief.   Atrial Fibrillation  Presents for follow-up visit. Symptoms include hypertension and weakness. Symptoms are negative for bradycardia, chest pain, dizziness, hemodynamic instability, palpitations, shortness of breath, syncope and tachycardia. The symptoms have been stable. Past medical history includes atrial fibrillation and CHF. There  are no medication compliance problems.   Congestive Heart Failure  Presents for follow-up visit. Pertinent negatives include no abdominal pain, chest pain, fatigue, palpitations, shortness of breath or unexpected weight change. The symptoms have been stable. Compliance with total regimen is %. Compliance with diet is 51-75%. Compliance with exercise is 51-75%. Compliance with medications is %.   Depression  Visit Type: follow-up  Patient presents with the following symptoms: insomnia, muscle tension and nervousness/anxiety.  Patient is not experiencing: anhedonia, decreased concentration, depressed mood, excessive worry, feelings of hopelessness, palpitations, shortness of breath, suicidal ideas, suicidal planning and thoughts of death.  Frequency of symptoms: occasionally   Severity: mild   Sleep quality: fair  Nighttime awakenings: occasional  Patient has a history of: CHF         The following portions of the patient's history were reviewed and updated as appropriate: allergies, current medications, past family history, past medical history, past social history, past surgical history and problem list.        Review of Systems   Constitutional: Negative for activity change, appetite change, chills, diaphoresis, fatigue, fever and unexpected weight change.   HENT: Positive for dental problem.    Respiratory: Negative for cough, chest tightness and shortness of breath.    Cardiovascular: Negative for chest pain, palpitations, leg swelling and syncope.   Gastrointestinal: Positive for constipation (controlled). Negative for abdominal pain, anorexia, blood in stool, nausea and vomiting.   Musculoskeletal: Positive for arthralgias, back pain, gait problem and myalgias.   Neurological: Positive for weakness and numbness. Negative for dizziness, syncope, facial asymmetry, speech difficulty, light-headedness and headaches.   Hematological: Bruises/bleeds easily.   Psychiatric/Behavioral: Positive for dysphoric  "mood and sleep disturbance (wakes up in the middle of the night secondary to pain). Negative for decreased concentration, self-injury and suicidal ideas. The patient is nervous/anxious and has insomnia.    All other systems reviewed and are negative.      Objective   Blood pressure 126/71, pulse 62, temperature 97.3 °F (36.3 °C), temperature source Temporal, resp. rate 18, height 180.3 cm (71\"), weight 84.3 kg (185 lb 12.8 oz), SpO2 99 %.    Physical Exam   Constitutional: He is oriented to person, place, and time. He appears well-developed. He is cooperative.  Non-toxic appearance. He does not appear ill. No distress.   HENT:   Head: Normocephalic and atraumatic.   Right Ear: External ear normal.   Left Ear: External ear normal.   Eyes: Conjunctivae are normal. Right eye exhibits no discharge. Left eye exhibits no discharge.   Neck: No tracheal deviation present.   Cardiovascular: Normal rate and normal pulses. An irregularly irregular rhythm present.   Pulmonary/Chest: Effort normal and breath sounds normal. No accessory muscle usage or stridor. No respiratory distress. He has no wheezes. He has no rales.   Abdominal: Soft. Normal appearance and bowel sounds are normal. He exhibits no distension, no fluid wave, no pulsatile midline mass and no mass. There is no abdominal tenderness.   Musculoskeletal:      Lumbar back: He exhibits decreased range of motion, tenderness, pain and spasm. He exhibits no bony tenderness.      Comments: Significantly limited ROM in right hip, pain with movement, walks with limp favoring right hip, difficulty changing positions and getting onto the exam table.    Neurological: He is alert and oriented to person, place, and time.   Skin: Skin is warm and dry. He is not diaphoretic.   Psychiatric: His behavior is normal. Judgment and thought content normal. His mood appears not anxious. He does not exhibit a depressed mood.   Nursing note and vitals reviewed.    Procedures  Lab Results " (last 24 hours)     Procedure Component Value Units Date/Time    POCT INR [[id number]]  (Abnormal) Collected: 12/18/20 1309    Specimen: Blood Updated: 12/18/20 1309     INR 1.7     Comment: 20.8               Assessment/Plan   Problems Addressed this Visit        Cardiovascular and Mediastinum    Permanent atrial fibrillation (CMS/HCC) - Primary    Relevant Orders    POCT INR (Completed)       Digestive    Slow transit constipation       Nervous and Auditory    Chronic hip pain    Relevant Medications    oxyCODONE-acetaminophen (PERCOCET)  MG per tablet       Hematopoietic and Hemostatic    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)       Other    Anxiety    Other insomnia    Chronic anticoagulation      Diagnoses       Codes Comments    Permanent atrial fibrillation (CMS/HCC)    -  Primary ICD-10-CM: I48.21  ICD-9-CM: 427.31     Chronic pain of right hip     ICD-10-CM: M25.551, G89.29  ICD-9-CM: 719.45, 338.29     Slow transit constipation     ICD-10-CM: K59.01  ICD-9-CM: 564.01     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    #2 depression/anxiety: chronic, controlled, continue on Lexapro daily     #3 chronic pain in right hip: Chronic, controlled with oral pain medication.  Patient is unable to take NSAIDs secondary to Coumadin.  Nii reviewed and appropriate.  Controlled contract in the chart.  Refill given on medication.  Sent to pharmacy.  Prescription for muscle relaxers to help with muscle spasms, advised on risk and benefits of this medication.  Return in 1 month     #4 chronic anticoagulation: reviewed INR - subtherapeutic. continue coumadin.     #5 atrial fibrillation: Patient is anticoagulated and rate  controlled. Continue on cardizem.      #6 constipation: chronic, stable. continue on regular use of fiber and stool softeners, advised on diet changes, advised this is due to his medication            This document has been electronically signed by Sunita Crawford MD on December 18, 2020 17:44 CST

## 2021-01-18 ENCOUNTER — OFFICE VISIT (OUTPATIENT)
Dept: FAMILY MEDICINE CLINIC | Facility: CLINIC | Age: 81
End: 2021-01-18

## 2021-01-18 VITALS
HEIGHT: 71 IN | RESPIRATION RATE: 16 BRPM | HEART RATE: 54 BPM | SYSTOLIC BLOOD PRESSURE: 129 MMHG | WEIGHT: 186.6 LBS | OXYGEN SATURATION: 94 % | DIASTOLIC BLOOD PRESSURE: 74 MMHG | TEMPERATURE: 98.1 F | BODY MASS INDEX: 26.12 KG/M2

## 2021-01-18 DIAGNOSIS — I48.21 PERMANENT ATRIAL FIBRILLATION (HCC): Primary | ICD-10-CM

## 2021-01-18 DIAGNOSIS — Z79.01 CHRONIC ANTICOAGULATION: ICD-10-CM

## 2021-01-18 DIAGNOSIS — C85.90 LYMPHOMA IN REMISSION (HCC): ICD-10-CM

## 2021-01-18 DIAGNOSIS — M25.551 CHRONIC PAIN OF RIGHT HIP: ICD-10-CM

## 2021-01-18 DIAGNOSIS — F41.9 ANXIETY: ICD-10-CM

## 2021-01-18 DIAGNOSIS — G47.09 OTHER INSOMNIA: ICD-10-CM

## 2021-01-18 DIAGNOSIS — Z72.0 TOBACCO USE: ICD-10-CM

## 2021-01-18 DIAGNOSIS — K21.9 GASTROESOPHAGEAL REFLUX DISEASE WITHOUT ESOPHAGITIS: ICD-10-CM

## 2021-01-18 DIAGNOSIS — K59.01 SLOW TRANSIT CONSTIPATION: ICD-10-CM

## 2021-01-18 DIAGNOSIS — G89.29 CHRONIC PAIN OF RIGHT HIP: ICD-10-CM

## 2021-01-18 LAB — INR PPP: 3.1 (ref 0.9–1.1)

## 2021-01-18 PROCEDURE — 99214 OFFICE O/P EST MOD 30 MIN: CPT | Performed by: FAMILY MEDICINE

## 2021-01-18 PROCEDURE — 36416 COLLJ CAPILLARY BLOOD SPEC: CPT | Performed by: FAMILY MEDICINE

## 2021-01-18 PROCEDURE — 85610 PROTHROMBIN TIME: CPT | Performed by: FAMILY MEDICINE

## 2021-01-18 RX ORDER — OXYCODONE AND ACETAMINOPHEN 10; 325 MG/1; MG/1
1 TABLET ORAL EVERY 6 HOURS PRN
Qty: 100 TABLET | Refills: 0 | Status: SHIPPED | OUTPATIENT
Start: 2021-01-18 | End: 2021-02-18 | Stop reason: SDUPTHER

## 2021-01-18 NOTE — PROGRESS NOTES
Subjective cc: pain medication refill/hip pain   Cabrera Avina is a 80 y.o. male who presents to get a refill on his pain medication. He feels like we will have to increase his medication at some point - only taking it BID right now.       He is taking 7mg daily of coumadin - supra therapeutic today - 3.1.  He has been off his coumadin for 5 days because of his dental procedure.    He is going to see dental for his dentures. Weight has remained stable.     Pain in his right hip.  Pain is the same.  Taking the muscle relaxer PRN.  Pain improved with pain medication without side effects.      Still following with oncology - good report   Flu shot UTD.       Constipation stable with laxatives  Depression and anxiety stable - taking lexapro daily. refill needed.  BP controlled.      Atrial fib is rate controlled.     He is currently taking restoril nighly for insomnia which controls his symptoms and allows him to get restful sleep.     Past information reviewed and updated as appropriate     Pain  This is a chronic problem. The current episode started more than 1 year ago. The problem occurs constantly. The problem has been unchanged. Associated symptoms include arthralgias, myalgias, numbness and weakness. Pertinent negatives include no abdominal pain, anorexia, chest pain, chills, coughing, diaphoresis, fatigue, fever, headaches, nausea or vomiting. The symptoms are aggravated by exertion, standing and walking. He has tried rest, walking, position changes and oral narcotics (Percocet, patient cannot tolerate NSAIDs secondary to Coumadin) for the symptoms. The treatment provided moderate relief.   Atrial Fibrillation  Presents for follow-up visit. Symptoms include hypertension and weakness. Symptoms are negative for bradycardia, chest pain, dizziness, hemodynamic instability, palpitations, shortness of breath, syncope and tachycardia. The symptoms have been stable. Past medical history includes atrial  fibrillation and CHF. There are no medication compliance problems.   Congestive Heart Failure  Presents for follow-up visit. Pertinent negatives include no abdominal pain, chest pain, fatigue, palpitations, shortness of breath or unexpected weight change. The symptoms have been stable. Compliance with total regimen is %. Compliance with diet is 51-75%. Compliance with exercise is 51-75%. Compliance with medications is %.   Depression  Visit Type: follow-up  Patient presents with the following symptoms: insomnia, muscle tension and nervousness/anxiety.  Patient is not experiencing: anhedonia, decreased concentration, depressed mood, excessive worry, feelings of hopelessness, palpitations, shortness of breath, suicidal ideas, suicidal planning and thoughts of death.  Frequency of symptoms: occasionally   Severity: mild   Sleep quality: fair  Nighttime awakenings: occasional  Patient has a history of: CHF         The following portions of the patient's history were reviewed and updated as appropriate: allergies, current medications, past family history, past medical history, past social history, past surgical history and problem list.        Review of Systems   Constitutional: Negative for activity change, appetite change, chills, diaphoresis, fatigue, fever and unexpected weight change.   HENT: Positive for dental problem.    Respiratory: Negative for cough, chest tightness and shortness of breath.    Cardiovascular: Negative for chest pain, palpitations, leg swelling and syncope.   Gastrointestinal: Positive for constipation (controlled). Negative for abdominal pain, anorexia, blood in stool, nausea and vomiting.   Musculoskeletal: Positive for arthralgias, back pain, gait problem and myalgias.   Neurological: Positive for weakness and numbness. Negative for dizziness, syncope, facial asymmetry, speech difficulty, light-headedness and headaches.   Hematological: Bruises/bleeds easily.  "  Psychiatric/Behavioral: Positive for dysphoric mood and sleep disturbance (wakes up in the middle of the night secondary to pain). Negative for decreased concentration, self-injury and suicidal ideas. The patient is nervous/anxious and has insomnia.    All other systems reviewed and are negative.      Objective   Blood pressure 129/74, pulse 54, temperature 98.1 °F (36.7 °C), temperature source Infrared, resp. rate 16, height 180.3 cm (71\"), weight 84.6 kg (186 lb 9.6 oz), SpO2 94 %.    Physical Exam   Constitutional: He is oriented to person, place, and time. He appears well-developed. He is cooperative.  Non-toxic appearance. He does not appear ill. No distress.   HENT:   Head: Normocephalic and atraumatic.   Right Ear: External ear normal.   Left Ear: External ear normal.   Eyes: Conjunctivae are normal. Right eye exhibits no discharge. Left eye exhibits no discharge.   Neck: No tracheal deviation present.   Cardiovascular: Normal rate and normal pulses. An irregularly irregular rhythm present.   Pulmonary/Chest: Effort normal and breath sounds normal. No accessory muscle usage or stridor. No respiratory distress. He has no wheezes. He has no rales.   Abdominal: Soft. Normal appearance and bowel sounds are normal. He exhibits no distension, no fluid wave, no pulsatile midline mass and no mass. There is no abdominal tenderness.   Musculoskeletal:      Lumbar back: He exhibits decreased range of motion, tenderness, pain and spasm. He exhibits no bony tenderness.      Comments: Significantly limited ROM in right hip, pain with movement, walks with limp favoring right hip, difficulty changing positions and getting onto the exam table.    Neurological: He is alert and oriented to person, place, and time.   Skin: Skin is warm and dry. He is not diaphoretic.   Psychiatric: His behavior is normal. Judgment and thought content normal. His mood appears not anxious. He does not exhibit a depressed mood.   Nursing note and " vitals reviewed.    Procedures  Lab Results (last 24 hours)     Procedure Component Value Units Date/Time    POCT INR [[id number]]  (Abnormal) Collected: 01/18/21 1413    Specimen: Blood Updated: 01/18/21 1413     INR 3.1     Comment: 37.4               Assessment/Plan   Problems Addressed this Visit        Cardiac and Vasculature    Permanent atrial fibrillation (CMS/HCC) - Primary    Relevant Orders    POCT INR (Completed)       Coag and Thromboembolic    Chronic anticoagulation       Gastrointestinal Abdominal     Slow transit constipation    Gastroesophageal reflux disease without esophagitis       Hematology and Neoplasia    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)       Mental Health    Anxiety       Musculoskeletal and Injuries    Chronic hip pain    Relevant Medications    oxyCODONE-acetaminophen (PERCOCET)  MG per tablet       Sleep    Other insomnia       Tobacco    Tobacco use      Diagnoses       Codes Comments    Permanent atrial fibrillation (CMS/HCC)    -  Primary ICD-10-CM: I48.21  ICD-9-CM: 427.31     Chronic pain of right hip     ICD-10-CM: M25.551, G89.29  ICD-9-CM: 719.45, 338.29     Chronic anticoagulation     ICD-10-CM: Z79.01  ICD-9-CM: V58.61     Slow transit constipation     ICD-10-CM: K59.01  ICD-9-CM: 564.01     Gastroesophageal reflux disease without esophagitis     ICD-10-CM: K21.9  ICD-9-CM: 530.81     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    #2 depression/anxiety: chronic, controlled, continue on Lexapro daily     #3 chronic pain in right hip: Chronic, controlled with oral pain medication.  Advised can increase to taking it TID from BID.  Patient is unable to take NSAIDs secondary to Coumadin.  Nii  reviewed and appropriate.  Controlled contract in the chart.  Refill given on medication.  Sent to pharmacy.  Prescription for muscle relaxers to help with muscle spasms, advised on risk and benefits of this medication.  Return in 1 month     #4 chronic anticoagulation: reviewed INR - supratherapeutic. Hold dose tonight and then resume 6mg-7mg every other day.     #5 atrial fibrillation: Patient is anticoagulated and rate controlled. Continue on cardizem.      #6 constipation: chronic, stable. continue on regular use of fiber and stool softeners, advised on diet changes, advised this is due to his medication            This document has been electronically signed by Sunita Crawford MD on January 18, 2021 14:29 CST

## 2021-02-18 ENCOUNTER — OFFICE VISIT (OUTPATIENT)
Dept: FAMILY MEDICINE CLINIC | Facility: CLINIC | Age: 81
End: 2021-02-18

## 2021-02-18 VITALS
HEART RATE: 61 BPM | SYSTOLIC BLOOD PRESSURE: 134 MMHG | DIASTOLIC BLOOD PRESSURE: 73 MMHG | BODY MASS INDEX: 26.43 KG/M2 | OXYGEN SATURATION: 98 % | WEIGHT: 188.8 LBS | TEMPERATURE: 97.3 F | RESPIRATION RATE: 16 BRPM | HEIGHT: 71 IN

## 2021-02-18 DIAGNOSIS — G89.29 CHRONIC PAIN OF RIGHT HIP: Primary | ICD-10-CM

## 2021-02-18 DIAGNOSIS — K21.9 GASTROESOPHAGEAL REFLUX DISEASE WITHOUT ESOPHAGITIS: ICD-10-CM

## 2021-02-18 DIAGNOSIS — I48.21 PERMANENT ATRIAL FIBRILLATION (HCC): ICD-10-CM

## 2021-02-18 DIAGNOSIS — M25.551 CHRONIC PAIN OF RIGHT HIP: Primary | ICD-10-CM

## 2021-02-18 DIAGNOSIS — C85.90 LYMPHOMA IN REMISSION (HCC): ICD-10-CM

## 2021-02-18 DIAGNOSIS — G47.09 OTHER INSOMNIA: ICD-10-CM

## 2021-02-18 DIAGNOSIS — Z72.0 TOBACCO USE: ICD-10-CM

## 2021-02-18 DIAGNOSIS — Z79.01 CHRONIC ANTICOAGULATION: ICD-10-CM

## 2021-02-18 DIAGNOSIS — F41.9 ANXIETY: ICD-10-CM

## 2021-02-18 DIAGNOSIS — K59.01 SLOW TRANSIT CONSTIPATION: ICD-10-CM

## 2021-02-18 LAB
INR PPP: 2.2 (ref 0.9–1.1)
PROTHROMBIN TIME: 26.2 SECONDS

## 2021-02-18 PROCEDURE — 99214 OFFICE O/P EST MOD 30 MIN: CPT | Performed by: FAMILY MEDICINE

## 2021-02-18 PROCEDURE — 36416 COLLJ CAPILLARY BLOOD SPEC: CPT | Performed by: FAMILY MEDICINE

## 2021-02-18 PROCEDURE — 85610 PROTHROMBIN TIME: CPT | Performed by: FAMILY MEDICINE

## 2021-02-18 RX ORDER — TEMAZEPAM 15 MG/1
15 CAPSULE ORAL NIGHTLY PRN
Qty: 30 CAPSULE | Refills: 2 | Status: SHIPPED | OUTPATIENT
Start: 2021-02-18 | End: 2021-05-17 | Stop reason: SDUPTHER

## 2021-02-18 RX ORDER — OXYCODONE AND ACETAMINOPHEN 10; 325 MG/1; MG/1
1 TABLET ORAL EVERY 6 HOURS PRN
Qty: 100 TABLET | Refills: 0 | Status: SHIPPED | OUTPATIENT
Start: 2021-02-18 | End: 2021-03-18 | Stop reason: SDUPTHER

## 2021-02-18 NOTE — PROGRESS NOTES
Subjective cc: pain medication refill/hip pain   Cabrera Avina is a 80 y.o. male who presents to get a refill on his pain medication.      He is alternating 6mg and 7mg daily of coumadin - INR WNL today.      Weight has remained stable - wearing thick boots today because of weather.  No new concerns.      Pain in his right hip.  Pain is the same.  Taking the muscle relaxer PRN.  Pain improved with pain medication without side effects.      Still following with oncology - good report   Flu shot UTD.       Constipation stable with laxatives  Depression and anxiety stable - taking lexapro daily.   BP controlled.      Atrial fib is rate controlled.     He is currently taking restoril nighly for insomnia which controls his symptoms and allows him to get restful sleep.     Past information reviewed and updated as appropriate     Pain  This is a chronic problem. The current episode started more than 1 year ago. The problem occurs constantly. The problem has been unchanged. Associated symptoms include arthralgias, myalgias, numbness and weakness. Pertinent negatives include no abdominal pain, anorexia, chest pain, chills, coughing, diaphoresis, fatigue, fever, headaches, nausea or vomiting. The symptoms are aggravated by exertion, standing and walking. He has tried rest, walking, position changes and oral narcotics (Percocet, patient cannot tolerate NSAIDs secondary to Coumadin) for the symptoms. The treatment provided moderate relief.   Atrial Fibrillation  Presents for follow-up visit. Symptoms include hypertension and weakness. Symptoms are negative for bradycardia, chest pain, dizziness, hemodynamic instability, palpitations, shortness of breath, syncope and tachycardia. The symptoms have been stable. Past medical history includes atrial fibrillation and CHF. There are no medication compliance problems.   Congestive Heart Failure  Presents for follow-up visit. Pertinent negatives include no abdominal pain, chest  pain, fatigue, palpitations, shortness of breath or unexpected weight change. The symptoms have been stable. Compliance with total regimen is %. Compliance with diet is 51-75%. Compliance with exercise is 51-75%. Compliance with medications is %.   Depression  Visit Type: follow-up  Patient presents with the following symptoms: insomnia, muscle tension and nervousness/anxiety.  Patient is not experiencing: anhedonia, decreased concentration, depressed mood, excessive worry, feelings of hopelessness, palpitations, shortness of breath, suicidal ideas, suicidal planning and thoughts of death.  Frequency of symptoms: occasionally   Severity: mild   Sleep quality: fair  Nighttime awakenings: occasional  Patient has a history of: CHF         The following portions of the patient's history were reviewed and updated as appropriate: allergies, current medications, past family history, past medical history, past social history, past surgical history and problem list.        Review of Systems   Constitutional: Negative for activity change, appetite change, chills, diaphoresis, fatigue, fever and unexpected weight change.   Respiratory: Negative for cough, chest tightness and shortness of breath.    Cardiovascular: Negative for chest pain, palpitations, leg swelling and syncope.   Gastrointestinal: Positive for constipation (controlled). Negative for abdominal pain, anorexia, blood in stool, nausea and vomiting.   Musculoskeletal: Positive for arthralgias, back pain, gait problem and myalgias.   Neurological: Positive for weakness and numbness. Negative for dizziness, syncope, facial asymmetry, speech difficulty, light-headedness and headaches.   Hematological: Bruises/bleeds easily.   Psychiatric/Behavioral: Positive for dysphoric mood and sleep disturbance (wakes up in the middle of the night secondary to pain). Negative for decreased concentration, self-injury and suicidal ideas. The patient is nervous/anxious  "and has insomnia.    All other systems reviewed and are negative.      Objective   Blood pressure 134/73, pulse 61, temperature 97.3 °F (36.3 °C), temperature source Infrared, resp. rate 16, height 180.3 cm (71\"), weight 85.6 kg (188 lb 12.8 oz), SpO2 98 %.    Physical Exam   Constitutional: He is oriented to person, place, and time. He appears well-developed. He is cooperative.  Non-toxic appearance. He does not appear ill. No distress.   HENT:   Head: Normocephalic and atraumatic.   Right Ear: External ear normal.   Left Ear: External ear normal.   Eyes: Conjunctivae are normal. Right eye exhibits no discharge. Left eye exhibits no discharge.   Neck: No tracheal deviation present.   Cardiovascular: Normal rate and normal pulses. An irregular rhythm present.   Pulmonary/Chest: Effort normal and breath sounds normal. No accessory muscle usage or stridor. No respiratory distress. He has no wheezes. He has no rales.   Abdominal: Soft. Normal appearance and bowel sounds are normal. He exhibits no distension, no fluid wave, no pulsatile midline mass and no mass. There is no abdominal tenderness.   Musculoskeletal:      Lumbar back: He exhibits decreased range of motion, tenderness, pain and spasm. He exhibits no bony tenderness.      Comments: Significantly limited ROM in right hip, pain with movement, walks with limp favoring right hip, difficulty changing positions and getting onto the exam table.    Neurological: He is alert and oriented to person, place, and time.   Skin: Skin is warm and dry. He is not diaphoretic.   Psychiatric: His behavior is normal. Judgment and thought content normal. His mood appears not anxious. He does not exhibit a depressed mood.   Nursing note and vitals reviewed.    Procedures  Lab Results (last 24 hours)     Procedure Component Value Units Date/Time    POC Protime / INR [[id number]]  (Abnormal) Collected: 02/18/21 1136    Specimen: Blood Updated: 02/18/21 1136     Protime 26.2 seconds   "     INR 2.2            Assessment/Plan   Problems Addressed this Visit        Cardiac and Vasculature    Permanent atrial fibrillation (CMS/HCC)    Relevant Orders    POC Protime / INR (Completed)       Coag and Thromboembolic    Chronic anticoagulation       Gastrointestinal Abdominal     Slow transit constipation    Gastroesophageal reflux disease without esophagitis       Hematology and Neoplasia    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[facility])       Mental Health    Anxiety       Musculoskeletal and Injuries    Chronic hip pain - Primary    Relevant Medications    oxyCODONE-acetaminophen (PERCOCET)  MG per tablet       Sleep    Other insomnia    Relevant Medications    temazepam (RESTORIL) 15 MG capsule       Tobacco    Tobacco use      Diagnoses       Codes Comments    Chronic pain of right hip    -  Primary ICD-10-CM: M25.551, G89.29  ICD-9-CM: 719.45, 338.29     Permanent atrial fibrillation (CMS/[facility])     ICD-10-CM: I48.21  ICD-9-CM: 427.31     Other insomnia     ICD-10-CM: G47.09  ICD-9-CM: 780.52     Chronic anticoagulation     ICD-10-CM: Z79.01  ICD-9-CM: V58.61     Slow transit constipation     ICD-10-CM: K59.01  ICD-9-CM: 564.01     Gastroesophageal reflux disease without esophagitis     ICD-10-CM: K21.9  ICD-9-CM: 530.81     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[facility])     ICD-10-CM: C85.90  ICD-9-CM: 202.80     Tobacco use     ICD-10-CM: Z72.0  ICD-9-CM: 305.1     Anxiety     ICD-10-CM: F41.9  ICD-9-CM: 300.00         PLAN:     #1 insomnia: chronic, controlled, continue on current medication of restoril.    #2 depression/anxiety: chronic, controlled, continue on Lexapro daily     #3 chronic pain in right hip: Chronic, controlled with oral pain medication.  Advised can increase to taking it TID from BID.  Patient is unable to take NSAIDs secondary to Coumadin.  Nii reviewed and appropriate.  Controlled contract in the chart.  Refill given on medication.  Sent to pharmacy.  Prescription for muscle relaxers to help with  muscle spasms, advised on risk and benefits of this medication.  Return in 1 month     #4 chronic anticoagulation: reviewed INR - therapeutic.     #5 atrial fibrillation: Patient is anticoagulated and rate controlled. Continue on cardizem.      #6 constipation: chronic, stable. continue on regular use of fiber and stool softeners, advised on diet changes, advised this is due to his medication            This document has been electronically signed by Sunita Crawford MD on February 18, 2021 14:49 CST

## 2021-02-22 DIAGNOSIS — G89.29 CHRONIC PAIN OF RIGHT HIP: ICD-10-CM

## 2021-02-22 DIAGNOSIS — M25.551 CHRONIC PAIN OF RIGHT HIP: ICD-10-CM

## 2021-02-22 RX ORDER — CYCLOBENZAPRINE HCL 10 MG
10 TABLET ORAL 3 TIMES DAILY PRN
Qty: 90 TABLET | Refills: 0 | Status: SHIPPED | OUTPATIENT
Start: 2021-02-22 | End: 2022-05-26 | Stop reason: SDUPTHER

## 2021-02-22 NOTE — TELEPHONE ENCOUNTER
Pt called stating that he got Rx for his back spams. Pt states that he is having these again and he would like a refill on this med. Please review.

## 2021-03-09 ENCOUNTER — CLINICAL SUPPORT (OUTPATIENT)
Dept: FAMILY MEDICINE CLINIC | Facility: CLINIC | Age: 81
End: 2021-03-09

## 2021-03-09 DIAGNOSIS — I48.21 PERMANENT ATRIAL FIBRILLATION (HCC): Primary | ICD-10-CM

## 2021-03-09 LAB
INR PPP: 2.1 (ref 0.9–1.1)
PROTHROMBIN TIME: 25.2 SECONDS

## 2021-03-09 PROCEDURE — 85610 PROTHROMBIN TIME: CPT | Performed by: FAMILY MEDICINE

## 2021-03-09 PROCEDURE — 36416 COLLJ CAPILLARY BLOOD SPEC: CPT | Performed by: FAMILY MEDICINE

## 2021-03-15 ENCOUNTER — LAB (OUTPATIENT)
Dept: LAB | Facility: HOSPITAL | Age: 81
End: 2021-03-15

## 2021-03-15 DIAGNOSIS — C85.90 LYMPHOMA IN REMISSION (HCC): ICD-10-CM

## 2021-03-15 LAB
ALBUMIN SERPL-MCNC: 3.7 G/DL (ref 3.5–5.2)
ALBUMIN/GLOB SERPL: 1 G/DL
ALP SERPL-CCNC: 83 U/L (ref 39–117)
ALT SERPL W P-5'-P-CCNC: 13 U/L (ref 1–41)
ANION GAP SERPL CALCULATED.3IONS-SCNC: 10 MMOL/L (ref 5–15)
AST SERPL-CCNC: 20 U/L (ref 1–40)
B2 MICROGLOB SERPL-MCNC: 3.3 MG/L (ref 0.8–2.2)
BASOPHILS # BLD AUTO: 0.03 10*3/MM3 (ref 0–0.2)
BASOPHILS NFR BLD AUTO: 0.8 % (ref 0–1.5)
BILIRUB SERPL-MCNC: 0.5 MG/DL (ref 0–1.2)
BUN SERPL-MCNC: 13 MG/DL (ref 8–23)
BUN/CREAT SERPL: 13.1 (ref 7–25)
CALCIUM SPEC-SCNC: 9.4 MG/DL (ref 8.6–10.5)
CHLORIDE SERPL-SCNC: 98 MMOL/L (ref 98–107)
CO2 SERPL-SCNC: 30 MMOL/L (ref 22–29)
CREAT SERPL-MCNC: 0.99 MG/DL (ref 0.76–1.27)
DEPRECATED RDW RBC AUTO: 46 FL (ref 37–54)
EOSINOPHIL # BLD AUTO: 0.17 10*3/MM3 (ref 0–0.4)
EOSINOPHIL NFR BLD AUTO: 4.4 % (ref 0.3–6.2)
ERYTHROCYTE [DISTWIDTH] IN BLOOD BY AUTOMATED COUNT: 13.5 % (ref 12.3–15.4)
FERRITIN SERPL-MCNC: 277.6 NG/ML (ref 30–400)
FOLATE SERPL-MCNC: 9.74 NG/ML (ref 4.78–24.2)
GFR SERPL CREATININE-BSD FRML MDRD: 73 ML/MIN/1.73
GLOBULIN UR ELPH-MCNC: 3.8 GM/DL
GLUCOSE SERPL-MCNC: 103 MG/DL (ref 65–99)
HCT VFR BLD AUTO: 44 % (ref 37.5–51)
HGB BLD-MCNC: 14.2 G/DL (ref 13–17.7)
IMM GRANULOCYTES # BLD AUTO: 0.01 10*3/MM3 (ref 0–0.05)
IMM GRANULOCYTES NFR BLD AUTO: 0.3 % (ref 0–0.5)
IRON 24H UR-MRATE: 84 MCG/DL (ref 59–158)
IRON SATN MFR SERPL: 23 % (ref 20–50)
LDH SERPL-CCNC: 184 U/L (ref 135–225)
LYMPHOCYTES # BLD AUTO: 0.64 10*3/MM3 (ref 0.7–3.1)
LYMPHOCYTES NFR BLD AUTO: 16.5 % (ref 19.6–45.3)
MCH RBC QN AUTO: 30 PG (ref 26.6–33)
MCHC RBC AUTO-ENTMCNC: 32.3 G/DL (ref 31.5–35.7)
MCV RBC AUTO: 93 FL (ref 79–97)
MONOCYTES # BLD AUTO: 0.5 10*3/MM3 (ref 0.1–0.9)
MONOCYTES NFR BLD AUTO: 12.9 % (ref 5–12)
NEUTROPHILS NFR BLD AUTO: 2.52 10*3/MM3 (ref 1.7–7)
NEUTROPHILS NFR BLD AUTO: 65.1 % (ref 42.7–76)
NRBC BLD AUTO-RTO: 0 /100 WBC (ref 0–0.2)
PLATELET # BLD AUTO: 202 10*3/MM3 (ref 140–450)
PMV BLD AUTO: 9.7 FL (ref 6–12)
POTASSIUM SERPL-SCNC: 4 MMOL/L (ref 3.5–5.2)
PROT SERPL-MCNC: 7.5 G/DL (ref 6–8.5)
RBC # BLD AUTO: 4.73 10*6/MM3 (ref 4.14–5.8)
SODIUM SERPL-SCNC: 138 MMOL/L (ref 136–145)
TIBC SERPL-MCNC: 359 MCG/DL (ref 298–536)
TRANSFERRIN SERPL-MCNC: 241 MG/DL (ref 200–360)
VIT B12 BLD-MCNC: 393 PG/ML (ref 211–946)
WBC # BLD AUTO: 3.87 10*3/MM3 (ref 3.4–10.8)

## 2021-03-15 PROCEDURE — 82232 ASSAY OF BETA-2 PROTEIN: CPT

## 2021-03-15 PROCEDURE — 83615 LACTATE (LD) (LDH) ENZYME: CPT

## 2021-03-15 PROCEDURE — 82746 ASSAY OF FOLIC ACID SERUM: CPT

## 2021-03-15 PROCEDURE — 80053 COMPREHEN METABOLIC PANEL: CPT

## 2021-03-15 PROCEDURE — 83540 ASSAY OF IRON: CPT

## 2021-03-15 PROCEDURE — 82728 ASSAY OF FERRITIN: CPT

## 2021-03-15 PROCEDURE — 82607 VITAMIN B-12: CPT

## 2021-03-15 PROCEDURE — 85025 COMPLETE CBC W/AUTO DIFF WBC: CPT

## 2021-03-15 PROCEDURE — 84466 ASSAY OF TRANSFERRIN: CPT

## 2021-03-15 PROCEDURE — 36415 COLL VENOUS BLD VENIPUNCTURE: CPT

## 2021-03-18 ENCOUNTER — OFFICE VISIT (OUTPATIENT)
Dept: FAMILY MEDICINE CLINIC | Facility: CLINIC | Age: 81
End: 2021-03-18

## 2021-03-18 VITALS
DIASTOLIC BLOOD PRESSURE: 66 MMHG | OXYGEN SATURATION: 97 % | WEIGHT: 184.6 LBS | BODY MASS INDEX: 25.84 KG/M2 | TEMPERATURE: 97.1 F | SYSTOLIC BLOOD PRESSURE: 132 MMHG | HEART RATE: 71 BPM | HEIGHT: 71 IN | RESPIRATION RATE: 16 BRPM

## 2021-03-18 DIAGNOSIS — G47.09 OTHER INSOMNIA: ICD-10-CM

## 2021-03-18 DIAGNOSIS — K21.9 GASTROESOPHAGEAL REFLUX DISEASE WITHOUT ESOPHAGITIS: ICD-10-CM

## 2021-03-18 DIAGNOSIS — G89.29 CHRONIC PAIN OF RIGHT HIP: Primary | ICD-10-CM

## 2021-03-18 DIAGNOSIS — M25.551 CHRONIC PAIN OF RIGHT HIP: Primary | ICD-10-CM

## 2021-03-18 DIAGNOSIS — Z79.01 CHRONIC ANTICOAGULATION: ICD-10-CM

## 2021-03-18 DIAGNOSIS — I48.21 PERMANENT ATRIAL FIBRILLATION (HCC): ICD-10-CM

## 2021-03-18 DIAGNOSIS — K59.01 SLOW TRANSIT CONSTIPATION: ICD-10-CM

## 2021-03-18 DIAGNOSIS — C85.90 LYMPHOMA IN REMISSION (HCC): ICD-10-CM

## 2021-03-18 LAB
INR PPP: 2.1 (ref 0.9–1.1)
PROTHROMBIN TIME: 25 SECONDS

## 2021-03-18 PROCEDURE — 85610 PROTHROMBIN TIME: CPT | Performed by: FAMILY MEDICINE

## 2021-03-18 PROCEDURE — 36416 COLLJ CAPILLARY BLOOD SPEC: CPT | Performed by: FAMILY MEDICINE

## 2021-03-18 PROCEDURE — 99214 OFFICE O/P EST MOD 30 MIN: CPT | Performed by: FAMILY MEDICINE

## 2021-03-18 RX ORDER — OXYCODONE AND ACETAMINOPHEN 10; 325 MG/1; MG/1
1 TABLET ORAL EVERY 6 HOURS PRN
Qty: 100 TABLET | Refills: 0 | Status: SHIPPED | OUTPATIENT
Start: 2021-03-18 | End: 2021-04-19 | Stop reason: SDUPTHER

## 2021-03-18 NOTE — PROGRESS NOTES
Subjective cc: pain medication refill/hip pain   Cabrera Avina is a 80 y.o. male who presents to get a refill on his pain medication.      No new problems.   INR WNL today.      Weight has decreased 4 lbs - he has been watching what he has been eating since he had been gaining weight.     Pain in his right hip.  Pain is the same.  Taking the muscle relaxer PRN.  Pain improved with pain medication without side effects.      Still following with oncology - reviewed labs    Had first and second moderna COVID vaccines - got 2nd dose 1 week ago      Constipation stable with laxatives  Depression and anxiety stable - taking lexapro daily.   BP controlled.      Atrial fib is rate controlled.   restoril cher for insomnia - well controlled      Atrial Fibrillation  Presents for follow-up visit. Symptoms include hypertension and weakness. Symptoms are negative for bradycardia, chest pain, dizziness, hemodynamic instability, palpitations, shortness of breath, syncope and tachycardia. The symptoms have been stable. Past medical history includes atrial fibrillation and CHF. There are no medication compliance problems.   Pain  This is a chronic problem. The current episode started more than 1 year ago. The problem occurs constantly. The problem has been unchanged. Associated symptoms include arthralgias, myalgias, numbness and weakness. Pertinent negatives include no abdominal pain, anorexia, chest pain, chills, coughing, diaphoresis, fatigue, fever, headaches, nausea or vomiting. The symptoms are aggravated by exertion, standing and walking. He has tried rest, walking, position changes and oral narcotics (Percocet, patient cannot tolerate NSAIDs secondary to Coumadin) for the symptoms. The treatment provided moderate relief.   Congestive Heart Failure  Presents for follow-up visit. Pertinent negatives include no abdominal pain, chest pain, fatigue, palpitations, shortness of breath or unexpected weight change. The  symptoms have been stable. Compliance with total regimen is %. Compliance with diet is 51-75%. Compliance with exercise is 51-75%. Compliance with medications is %.   Depression  Visit Type: follow-up  Patient presents with the following symptoms: insomnia, muscle tension and nervousness/anxiety.  Patient is not experiencing: anhedonia, decreased concentration, depressed mood, excessive worry, feelings of hopelessness, palpitations, shortness of breath, suicidal ideas, suicidal planning and thoughts of death.  Frequency of symptoms: occasionally   Severity: mild   Sleep quality: fair  Nighttime awakenings: occasional  Patient has a history of: CHF         The following portions of the patient's history were reviewed and updated as appropriate: allergies, current medications, past family history, past medical history, past social history, past surgical history and problem list.        Review of Systems   Constitutional: Negative for activity change, appetite change, chills, diaphoresis, fatigue, fever and unexpected weight change.   Respiratory: Negative for cough, chest tightness and shortness of breath.    Cardiovascular: Negative for chest pain, palpitations, leg swelling and syncope.   Gastrointestinal: Positive for constipation (controlled). Negative for abdominal pain, anorexia, blood in stool, nausea and vomiting.   Musculoskeletal: Positive for arthralgias, back pain, gait problem and myalgias.   Neurological: Positive for weakness and numbness. Negative for dizziness, syncope, facial asymmetry, speech difficulty, light-headedness and headaches.   Hematological: Bruises/bleeds easily.   Psychiatric/Behavioral: Positive for dysphoric mood and sleep disturbance (wakes up in the middle of the night secondary to pain). Negative for decreased concentration, self-injury and suicidal ideas. The patient is nervous/anxious and has insomnia.    All other systems reviewed and are negative.      Objective    "  Blood pressure 132/66, pulse 71, temperature 97.1 °F (36.2 °C), temperature source Infrared, resp. rate 16, height 180.3 cm (71\"), weight 83.7 kg (184 lb 9.6 oz), SpO2 97 %.    Physical Exam   Constitutional: He is oriented to person, place, and time. He appears well-developed. He is cooperative.  Non-toxic appearance. He does not appear ill. No distress.   HENT:   Head: Normocephalic and atraumatic.   Right Ear: External ear normal.   Left Ear: External ear normal.   Eyes: Conjunctivae are normal. Right eye exhibits no discharge. Left eye exhibits no discharge.   Neck: No tracheal deviation present.   Cardiovascular: Normal rate and normal pulses. An irregular rhythm present.   Pulmonary/Chest: Effort normal and breath sounds normal. No accessory muscle usage or stridor. No respiratory distress. He has no wheezes. He has no rales.   Abdominal: Soft. Normal appearance and bowel sounds are normal. He exhibits no distension, no fluid wave, no pulsatile midline mass and no mass. There is no abdominal tenderness.   Musculoskeletal:      Lumbar back: He exhibits decreased range of motion, tenderness, pain and spasm. He exhibits no bony tenderness.      Comments: Limited ROM in right hip, pain with movement, walks with limp favoring right hip, difficulty changing positions.    Neurological: He is alert and oriented to person, place, and time.   Skin: Skin is warm and dry. He is not diaphoretic.   Psychiatric: His behavior is normal. Judgment and thought content normal. His mood appears not anxious. He does not exhibit a depressed mood.   Nursing note and vitals reviewed.    Procedures  Lab Results (last 24 hours)     Procedure Component Value Units Date/Time    POC Protime / INR [[id number]]  (Abnormal) Collected: 03/18/21 1120    Specimen: Blood Updated: 03/18/21 1120     Protime 25.0 seconds      INR 2.1            Assessment/Plan   Problems Addressed this Visit        Cardiac and Vasculature    Permanent atrial " fibrillation (CMS/[facility])    Relevant Orders    POC Protime / INR (Completed)       Coag and Thromboembolic    Chronic anticoagulation       Gastrointestinal Abdominal     Slow transit constipation    Gastroesophageal reflux disease without esophagitis       Hematology and Neoplasia    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[facility])       Musculoskeletal and Injuries    Chronic hip pain - Primary    Relevant Medications    oxyCODONE-acetaminophen (PERCOCET)  MG per tablet       Sleep    Other insomnia      Diagnoses       Codes Comments    Chronic pain of right hip    -  Primary ICD-10-CM: M25.551, G89.29  ICD-9-CM: 719.45, 338.29     Permanent atrial fibrillation (CMS/[facility])     ICD-10-CM: I48.21  ICD-9-CM: 427.31     Chronic anticoagulation     ICD-10-CM: Z79.01  ICD-9-CM: V58.61     Slow transit constipation     ICD-10-CM: K59.01  ICD-9-CM: 564.01     Gastroesophageal reflux disease without esophagitis     ICD-10-CM: K21.9  ICD-9-CM: 530.81     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    #2 depression/anxiety: chronic, controlled, continue on Lexapro daily     #3 chronic pain in right hip: Chronic, controlled with oral pain medication.  Patient is unable to take NSAIDs secondary to Coumadin.  Nii reviewed and appropriate.  Controlled contract in the chart.  Refill given on medication.  Sent to pharmacy.  Prescription for muscle relaxers to help with muscle spasms, advised on risk and benefits of this medication.  Return in 1 month     #4 chronic anticoagulation: reviewed INR - therapeutic.     #5 atrial fibrillation: Patient is anticoagulated and rate controlled. Continue on cardizem.      #6 constipation: chronic, stable. continue on regular use of fiber and stool softeners, advised on diet changes, advised this is due to his  medication    Past information, including assessment and plan, reviewed and updated as appropriate         This document has been electronically signed by Sunita Crawford MD on March 18, 2021 12:52 CDT

## 2021-03-26 NOTE — PROGRESS NOTES
MGW ONC [facility] HEMATOLOGY AND ONCOLOGY  [address]  [facility] [id number] [phone]    Patient Name: Cabrera Avina  Encounter Date: 03/29/2021  YOB: 1940  Patient Number: [id number]    REASON FOR FOLLOWUP:  Mr. Cabrera Avina is a 80-year-old male who returns in follow-up of intermediate grade B-cell lymphoma.  He is seen 189 months following the completion of R-CHOP chemotherapy and 137 months after the completion of Rituxan maintenance.  He is also followed for an iron deficiency anemia and for chronic anticoagulation. He is seen 16 months after the most recent dose of Injectafer.  The patient is here alone.     DIAGNOSTIC ABNORMALITIES: B-cell Lymphoma.   1. Periportal lymph node biopsy, 02/01/2005. Findings consistent with large B-cell lymphoma with an intermediate to high proliferative rate.  2. CT of the chest, 02/03/2005. Mediastinal, left hilar, and upper abdominal lymphadenopathy consistent with the patient's history of lymphoma.    PREVIOUS INTERVENTIONS: B-cell lymphoma   1. Definitive Rituxan, 375 mg/m2, 02/05/2005 through 02/25/2005. Four weeks completed.  2. Definitive R-CHOP, from 01/04/2005 through 07/07/2005. Five cycles completed. Cycle #2 delayed by admission for management of deep perirectal abscess.  3. Maintenance Rituxan (every 3 months), 10/14/2005 through 11/11/2007. Six cycles completed.    DIAGNOSTIC ABNORMALITIES: Anemia, iron deficiency   1. Anemia substrates on 05/27/2008: Iron 43, %saturation 12, TIBC 366, UIBC 323, ferritin 27, vitamin B12 of 295, folate 7.2.    PREVIOUS INTERVENTIONS: Anemia.   1. INFeD, 1000 mg IVPB, 06/04/2008.  2. INFeD, 1000 mg IVPB, 09/02/2010 and 09/09/2010.  3. INFeD 500 mg, 08/25/2016; 11/28/2016.  4. Injectafer 750 mg IV, 11/20/2019    Problem List Items Addressed This Visit        Other    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) - Primary        Oncology/Hematology History  Overview Note   DIAGNOSTIC ABNORMALITIES: B-cell Lymphoma.  Periportal lymph node biopsy, 02/01/2005.  Findings consistent with large B-cell lymphoma with an intermediate to high proliferative rate.  CT of the chest, 02/03/2005.   Mediastinal, left hilar, and upper abdominal lymphadenopathy consistent with the patient's history of lymphoma.  PREVIOUS INTERVENTIONS:   B-cell lymphoma  Definitive Rituxan, 375 mg/m2, 02/05/2005 through 02/25/2005. Four weeks completed.  Definitive R-CHOP, from 01/04/2005 through 07/07/2005.  Five cycles completed.  Cycle #2 delayed by admission for management of deep perirectal abscess.  Maintenance Rituxan (every 3 months), 10/14/2005 through 11/11/2007.  Six cycles completed.  DIAGNOSTIC ABNORMALITIES:   Anemia, iron deficiency  Anemia substrates on 05/27/2008: Iron 43, %saturation 12, TIBC 366, UIBC 323, ferritin 27, vitamin B12 of 295, folate 7.2.  PREVIOUS INTERVENTIONS:   Anemia.  INFeD, 1000 mg IVPB, 06/04/2008.  INFeD, 1000 mg IVPB, 09/02/2010 and 09/09/2010.  INFeD 500 mg, 08/25/2016; 11/28/2016.     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)   4/28/2016 Initial Diagnosis    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)         PAST MEDICAL HISTORY:  ALLERGIES:  No Known Allergies  CURRENT MEDICATIONS:  Outpatient Encounter Medications as of 3/29/2021   Medication Sig Dispense Refill   • aspirin 81 MG EC tablet Take 1 tablet by mouth Daily. 90 tablet 3   • cyclobenzaprine (FLEXERIL) 10 MG tablet Take 1 tablet by mouth 3 (Three) Times a Day As Needed for Muscle Spasms. 90 tablet 0   • digoxin (LANOXIN) 250 MCG tablet TAKE ONE TABLET BY MOUTH DAILY 90 tablet 3   • dilTIAZem CD (CARDIZEM CD) 240 MG 24 hr capsule TAKE ONE CAPSULE BY MOUTH DAILY 90 capsule 3   • escitalopram (Lexapro) 20 MG tablet Take 1 tablet by mouth Daily. 90 tablet 3   • Iron-Vitamins (GERITOL COMPLETE PO) Take 2 tablets by mouth Every Other Day.     • oxyCODONE-acetaminophen (PERCOCET)  MG per tablet Take 1 tablet by mouth  Every 6 (Six) Hours As Needed for Severe Pain . Must last 30 days 100 tablet 0   • temazepam (RESTORIL) 15 MG capsule Take 1 capsule by mouth At Night As Needed for Sleep. 30 capsule 2   • warfarin (COUMADIN) 2 MG tablet Take 3.5 tablets PO daily - 7MG, 7MG, 8MG, then repeat 360 tablet 5   • [DISCONTINUED] digoxin (LANOXIN) 250 MCG tablet Take 1 tablet by mouth Daily. 90 tablet 3     No facility-administered encounter medications on file as of 3/29/2021.     ADULT ILLNESSES:   History of malignant lymphoma (situation) ( ICD-10:Z85.72 ;Personal history of non-Hodgkin lymphomas   Adenomatous colonic polyps ( ICD-10:D12.6 ;Benign neoplasm of colon, unspecified   Anemia ( ICD-10:D64.9 ;Anemia, unspecified   Anxiety ( chronic; ICD-10:F41.9 ;Anxiety disorder, unspecified )   Atrial fibrillation ( on chronic anticoagulation; ICD-10:I48.91 ;Unspecified atrial fibrillation )   Degenerative joint disease ( ICD-10:M16.10 ;Unilateral primary osteoarthritis, unspecified hip   Drug intolerance ( oral iron; ICD-10:T45.4x5D ;Adverse effect of iron and its compounds, subsequent encounter )   Hypertension ( ICD-10:I10 ;Essential (primary) hypertension   Iron deficiency anemia ( ICD-10:D50.9 ;Iron deficiency anemia, unspecified   Microscopic hematuria ( ICD-10:R31.29 ;Other microscopic hematuria   Nephrolithiasis ( ICD-10:N20.0 ;Calculus of kidney   Neuropathy ( ICD-10:G62.9 ;Polyneuropathy, unspecified   Orthostatic hypotension ( ICD-10:I95.1 ;Orthostatic hypotension   Perirectal abscess ( deep, severe; ICD-10:K61.1 ;Rectal abscess )   Polycystic kidney disease ( ICD-10:Q61.3 ;Polycystic kidney, unspecified   Vitamin B12 deficiency ( ICD-10:E53.8 ;Deficiency of other specified B group vitamins    SURGERIES:   Punch biopsy of skin lesion, right lower lip, 01/21/2015: 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 Dr. Hatch    Drainage of abscess, recurrent rectal abscess, 06/01/2006   Laparoscopic cholecystectomy and lymph node biopsy   Hip replacement, right, 06/15/2010      ADULT ILLNESSES:  Patient Active Problem List   Diagnosis Code   • Slow transit constipation K59.01   • Gastroesophageal reflux disease without esophagitis K21.9   • 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[facility]) C85.90   • Anxiety F41.9   • Chronic hip pain M25.559, G89.29   • Permanent atrial fibrillation (CMS/[facility]) I48.21   • Other insomnia G47.09   • Adenomatous polyp of colon D12.6   • Tobacco use Z72.0   • Chronic anticoagulation Z79.01   • PAD (peripheral artery disease) (CMS/[facility]) I73.9   • Congestive heart failure (CMS/[facility]) I50.9   • BMI 24.0-24.9, adult Z68.24   • Leukopenia D72.819   • Iron deficiency anemia D50.9     SURGERIES:  Past Surgical History:   Procedure Laterality Date   • CHOLECYSTECTOMY     • COLONOSCOPY  05/2012    3 yr recall   • COLONOSCOPY  09/18/2015    5 yr recall   • KIDNEY STONE SURGERY     • RECTAL SURGERY      X 2   • TOTAL HIP ARTHROPLASTY       HEALTH MAINTENANCE ITEMS:  Health Maintenance Due   Topic Date Due   • ZOSTER VACCINE (2 of 2) 02/13/2017   • Pneumococcal Vaccine 65+ (2 of 2 - PPSV23) 11/05/2020   • COVID-19 Vaccine (2 - Moderna 2-dose series) 03/14/2021       <no information>  Last Completed Colonoscopy       Status Date      COLONOSCOPY Done 12/1/2015      Patient has more history with this topic...        Immunization History   Administered Date(s) Administered   • COVID-19 (MODERNA) 02/14/2021   • FLUAD TRI 65YR+ 11/05/2019   • Fluad Quad 65+ 11/05/2019, 10/09/2020   • Pneumococcal Conjugate 13-Valent (PCV13) 11/05/2019     Last Completed Mammogram    Patient has no health maintenance due at this time           FAMILY HISTORY:  Family History   Problem Relation Age of Onset   • Colon cancer Mother    • No Known Problems Father    • Prostate cancer Brother    • No Known Problems Daughter    • No Known Problems Son    • No Known  "Problems Maternal Grandmother    • No Known Problems Maternal Grandfather    • No Known Problems Paternal Grandmother    • No Known Problems Paternal Grandfather    • Prostate cancer Brother    • Colon polyps Neg Hx      SOCIAL HISTORY:  Social History     Socioeconomic History   • Marital status:      Spouse name: Not on file   • Number of children: Not on file   • Years of education: Not on file   • Highest education level: Not on file   Tobacco Use   • Smoking status: Never Smoker   • Smokeless tobacco: Never Used   Substance and Sexual Activity   • Alcohol use: No   • Drug use: No   • Sexual activity: Defer       REVIEW OF SYSTEMS:  PS:  ECOG 1-2.   Constitutional:  His appetite is fair. \"I eat normal for me.\"  His energy remains low to fair.  \"About the same as always.\"  He is as active as his norm and manages all his ADLs including chores, running errands, and driving.  Says his hip and lower back pains still inhibit his activities inspite of hip replacement.  Says he is still walking his usual 1/2 mile to 1 mile weather permitting.  He has no fevers, chills, or drenching night sweats.  He has lost 5 pounds (had gained 9 pounds at his prior visit) over the last 4 months. Still says he wants to stay around 175-180 pounds.  He sleeps more restfully on temazepam.  Ear/Nose/Throat/Mouth:   The patient reports no ear pains, but has lingering sinus symptoms, but no sore throat, nosebleeds.  No headaches. The patient denies any hoarseness.   Ocular:   The patient reports no eye pain, significant change in visual acuity, double vision, or blurry vision.   Respiratory:  He reports no chronic cough, shortness of breathing, phlegm production, or chest wall pain.  Cardiovascular:  He reports no exertional chest pain, chest pressure, or chest heaviness.  He reports no claudication. He reports no palpitations or symptomatic orthostasis.  Gastrointestinal:  He has no pica, dysphagia, nausea, vomiting, postprandial " "abdominal pain, bloating, cramping, change in bowel habits, or discoloration of the stool.  He has had no rectal bleeding.  He has intermittent bouts of constipation modulated by daily stool softeners (Dulcolax).  Says he is still using supplemental fiber daily.  Says he moves his bowels every 3-4 days, \"ever since chemo years ago.\" He has no diarrhea. Last colonoscopy, 09/2015.  Polyps. Repeat 5 years. Is oral iron intolerant (worsening constipation).  Genitourinary:    He reports no urinary burning, frequency, dribbling, or discoloration.  He reports no difficulty controlling his bladder. \"I gotta go when I gotta go.\" He reports no need to urinate frequently through the night.  Musculoskeletal:  He continues to have chronic trouble with right hip pain.  \"same, 24/7.\"  He still uses maintenance Percocet, up to 2 doses daily.  He reports no unexplained arthralgias, myalgias, but has noted more frequent bouts of nighttime leg cramping.  Extremities:  He reports no trouble with fluid retention or significant leg swelling.  Endocrine:  He reports no problems with excess thirst, excessive urination, vasomotor instability, or unexplained fatigue.  Heme/Lymphatic:  He reports no bleeding, petechial rashes, or swollen glands.  Skin:  He reports no itching, rashes, or lesions which won't heal.  Neuro:  He reports night-time stocking-glove pain in his lower extremities.  \"Same. No change.\" Says he had previously been seen by Dr. Kinney previously. \"He didn't find any problems.\"  Says Dr. Rosario says he does not have significant vascular disease requiring surgery.  He reports no loss of consciousness, seizures, fainting spells, or dizziness. He reports no weakness of his face, arms, or legs.  He reports no difficulty with speech.  He reports no tremors.  Psych:  He seems generally satisfied with life.  He reports no depression.  He reports no mood swings.    VITAL SIGNS: /70   Pulse 51   Temp 97.6 °F (36.4 °C)   " "Resp 16   Ht 180.3 cm (71\")   Wt 81.9 kg (180 lb 9.6 oz)   SpO2 96%   BMI 25.19 kg/m²       PHYSICAL EXAMINATION:  General:  He is a pleasant, cooperative, slender, and well-kept elderly male in no distress.  He arrived in the exam room ambulatory. He appears to be his stated age.  His skin color is pale. ECOG 1  Head/Neck:  The patient is anicteric and atraumatic.  He is wearing a surgical mask today. The neck is supple without evidence of jugular venous distention or cervical adenopathy.  Eyes:  The pupils are equal, round, and reactive to light.  The extraocular movements are full.  There is no scleral jaundice or erythema.  Chest:  The respiratory efforts are normal and unhindered.  The chest is clear to auscultation.  There are no wheezes, rales, or asymmetry of breath sounds.  The port in the right anterior chest wall has been removed and the site has healed. No tenderness or erythema.   Cardiac/Vascular:  The patient has an irregularly irregular cardiac rate and rhythm without murmurs.  The peripheral pulses are equal and full.  Abdomen:  The belly is soft and flat.  There is no rebound or guarding. There is no organomegaly, mass-effect, or tenderness.  Bowel sounds are normal.  Ortho:  There is no overt joint stiffness.  There is no overt foreshortening of height.  There are no overt joint changes which suggest degenerative arthritis.  Extremities:  There is no evidence of cyanosis, clubbing, with trace bipedal edema.  Rheumatologic:  There is no overt evidence of rheumatoid deformities of the hands.  There is no sausaging of the fingers.  There is no sign of active synovitis.  Cutaneous:  Few areas of senile purpuras are present on his forearms.  There are no overt rashes, disseminated lesions, purpura, or petechiae.  Lymphatics:  There is no evidence of adenopathy in the cervical, supraclavicular, or axillary areas.  Neurologic:  The patient is alert, oriented, cooperative, and pleasant.  He is " appropriately conversant.  He ambulated into the exam room and transferred from chair to exam table aided.  There is no overt dysfunction of the motor, sensory, or cerebellar systems.  Psych:  Impatient.  Mood and affect are appropriate for circumstance.  Eye contact is appropriate.        LABS    Lab Results - Last 18 Months   Lab Units 03/15/21  1238 11/02/20  1330 06/30/20  1241 05/08/20  1029 02/07/20  1402 02/03/20  1408 11/08/19  0854   HEMOGLOBIN g/dL 14.2 14.0 13.8 13.9 15.6 16.4 13.9   HEMATOCRIT % 44.0 42.1 42.6 43.3 47.5 50.6 42.4   MCV fL 93.0 92.7 93.2 95.0 92.8 94 93.6   WBC 10*3/mm3 3.87 4.98 5.56 5.96 8.36 10.1 4.46   RDW % 13.5 13.4 13.1 13.7 13.5 13.4 13.5   MPV fL 9.7 9.6 9.9 9.8 10.1  --  9.4   PLATELETS 10*3/mm3 202 192 216 196 244 293 205   IMM GRAN % % 0.3 0.2 0.2 0.2 0.6*  --  0.2   NEUTROS ABS 10*3/mm3 2.52 3.28 3.78 4.01 6.03 7.1* 2.83   LYMPHS ABS 10*3/mm3 0.64* 0.91 1.11 1.02 0.88 1.5 0.80   MONOS ABS 10*3/mm3 0.50 0.55 0.49 0.67 1.24* 1.2* 0.60   EOS ABS 10*3/mm3 0.17 0.16 0.13 0.19 0.13 0.2 0.18   BASOS ABS 10*3/mm3 0.03 0.07 0.04 0.06 0.03 0.0 0.04   IMMATURE GRANS (ABS) 10*3/mm3 0.01 0.01 0.01 0.01 0.05  --  0.01   NRBC /100 WBC 0.0 0.0 0.0 0.0 0.0  --  0.0       Lab Results - Last 18 Months   Lab Units 03/15/21  1238 11/02/20  1330 06/30/20  1241 05/08/20  1029 02/10/20  0956 02/07/20  1402 02/03/20  1408 11/08/19  0854   GLUCOSE mg/dL 103* 113* 125* 110*  --  92  --  108*   SODIUM mmol/L 138 138 139 141  --  137 140 138   POTASSIUM mmol/L 4.0 4.1 4.2 3.9  --  3.9 4.9 4.0   TOTAL CO2 mmol/L  --   --   --   --   --   --  24  --    CO2 mmol/L 30.0* 28.0 27.0 30.0*  --  28.0  --  31.0*   CHLORIDE mmol/L 98 102 101 101  --  98 97 99   ANION GAP mmol/L 10.0 8.0 11.0 10.0  --  11.0  --  8.0   CREATININE mg/dL 0.99 0.98 1.05 0.95 0.70 0.88 1.16 0.94   BUN mg/dL 13 19 15 12  --  17 23 14   BUN / CREAT RATIO  13.1 19.4 14.3 12.6  --  19.3 20 14.9   CALCIUM mg/dL 9.4 9.1 9.4 9.5  --  9.5 9.7  9.8   EGFR IF NONAFRICN AM mL/min/1.73 73 74 68 77  --  84 60 78   ALK PHOS U/L 83 65 61 63  --  78 79 71   TOTAL PROTEIN g/dL 7.5 6.9 7.2 7.5  --  8.5  --  8.2   ALT (SGPT) U/L 13 12 9 9  --  15 24 11   AST (SGOT) U/L 20 18 18 14  --  17 23 17   BILIRUBIN mg/dL 0.5 0.6 0.5 0.7  --  1.1 0.5 0.4   ALBUMIN g/dL 3.70 3.90 3.80 4.20  --  4.10 4.5 4.30   GLOBULIN gm/dL 3.8 3.0 3.4 3.3  --  4.4  --  3.9       Lab Results - Last 18 Months   Lab Units 03/15/21  1238 11/02/20  1330 06/30/20  1241 05/08/20  1029 02/07/20  1402 11/08/19  0854   LDH U/L 184 223 184 186 164 177       Lab Results - Last 18 Months   Lab Units 03/15/21  1238 11/02/20  1330 06/30/20  1241 05/08/20  1029 02/07/20  1402 02/03/20  1408 11/08/19  0854   IRON mcg/dL 84 97 83 76 68  --  45*   TIBC mcg/dL 359 338 331 358 274*  --  374   IRON SATURATION % 23 29 25 21 25  --  12*   FERRITIN ng/mL 277.60 268.30 302.70 315.40 902.00*  --  199.20   TSH uIU/mL  --   --   --   --   --  1.110  --    FOLATE ng/mL 9.74 14.00 13.10 10.00 12.90  --  9.68       ASSESSMENT:  1.    Intermediate to high-grade lymphoma.  History of. No evidence of disease.  05/16/2005:          Stage IV-E.          Baseline Tumor Uledi:   Mediastinum, left hilum, upper abdomen, and liver (clinically).          Complications of Tumor:   Hyperbilirubinemia. Improved.          Response to Therapy:  Clinical remission per CT scan 05/16/2005, 11/2011 and 02/10/2020.          -02/03/2020-CT chest without contrast.  Impression: Atheromatous disease of the thoracic aorta and coronary arteries.  Mild cardiomegaly.  Borderline pulmonary artery dilatation.  No infiltrate or effusion.          -02/03/2020-CT abdomen and pelvis with contrast.  Impression: Prior cholecystectomy.  Mild prominence of biliary tree felt to be related.  Complex renal cyst on the left is stable in size with septation and calcification.  Dominant mid renal cyst on the right side is slightly larger.  Upper pole of the right  renal collecting system is mildly dilated and contains 2 stones measuring 8 to 9 mm.  5 mm nonobstructive stone in the left mid kidney.  Ureter is nondilated.  Prostate mildly enlarged 5.1 cm.  Moderate stool in the colon.  No small bowel distention.          Prognosis:  Fair.          IPI at baseline:  3.  2.    Normocytic anemia from iron deficiency and chronic disease. Last Injectafer, 11/20/2019.  Hemoglobin, 14.2 on 03/15/2021 (prior range:  Hgb 12.9 - 14.7).  3.    Leukopenia, NOS. normal since 02/07/2020   4.    Iron deficiency and oral iron intolerant (constipation).  Repleted since receipt of INFeD  5.    Variable B2M.  Stable, 3.3 on 03/15/2021 (prior range:  1.6 - 3.27).  6.    Adenomatous colon polyp, colonoscopy 09/18/2015.  7.    Atrial fibrillation on chronic anticoagulation.  8.    Microscopic hematuria.  Management per Dr. Mayes.  9.    Anxiety, chronic, stable on medications (Celexa).  10.  Lower extremity neuropathy.  Mild.  Not a problem of late.  11.  Hypertension.  Fair control on Cardizem.  12.  Low B12, 203 on 08/17/2017.  Repleted.  13.  Peripheral vascular disease.  Arterial studies and has been seen by Dr. Rosario of vascular surgery.              a.    Ultrasound ankle/brachial performed on 07/17/2018 at [facility]. Suspected atherosclerosis in the lower extremity arteries, supported by noncompressible posterior tibialis and dorsalis pedis arteries.              b.    Abdominal aortic ultrasound performed on 08/10/2018 at [facility].  No abdominal aortic aneurysm.  14.  COVID vaccination # 2, 03/10/2021    PLAN:  1.      Re:  Apprised of labs from 03/15/2021 with low normal WBC, normal diff, normal hemoglobin (resolution of anemia), normoglycemia with normal CMP, normal LDH, trivially elevated B2M, repleted serum iron, repleted (> 20%) Fe sat, repleted (> 100) ferritin, repleted folate, and repleted B12.   2.    Stay off B12, 1000 mcg IM monthly.   3.    Previously  apprised of CT scans, 02/10/2020 - Large renal cyst (stable since 2011).  Stable. Otherwise GURPREET  4.    Continue other currently identified medications.  5.    Continue Coumadin.  PT/INR followed by Dr. Crawford.  6.    Continue ongoing management per primary care physician and other specialists.    7.    Return to office in 4 months with pre-office CBC with differential, iron, TIBC, iron saturation, ferritin, B12, folate, CMP, B2M, and LDH.      MEDICAL DECISION MAKING: Low Complexity  AMOUNT OF DATA: Moderate    I spent     total minutes, face-to-face, caring for Cabrera today.  Greater than 50% of this time involved counseling and/or coordination of care as documented within this note regarding the patient's illness(es), pros and cons of various treatment options, instructions and/or risk reduction.    cc:   Azar Mayes MD          [facility]          Sunita Crawford MD - [place]          Jorge Alberto Rosario MD

## 2021-03-29 ENCOUNTER — OFFICE VISIT (OUTPATIENT)
Dept: ONCOLOGY | Facility: CLINIC | Age: 81
End: 2021-03-29

## 2021-03-29 VITALS
DIASTOLIC BLOOD PRESSURE: 70 MMHG | BODY MASS INDEX: 25.28 KG/M2 | RESPIRATION RATE: 16 BRPM | TEMPERATURE: 97.6 F | HEIGHT: 71 IN | OXYGEN SATURATION: 96 % | SYSTOLIC BLOOD PRESSURE: 128 MMHG | WEIGHT: 180.6 LBS | HEART RATE: 51 BPM

## 2021-03-29 DIAGNOSIS — I48.20 CHRONIC ATRIAL FIBRILLATION (HCC): ICD-10-CM

## 2021-03-29 DIAGNOSIS — C85.90 LYMPHOMA IN REMISSION (HCC): Primary | ICD-10-CM

## 2021-03-29 PROCEDURE — 99213 OFFICE O/P EST LOW 20 MIN: CPT | Performed by: INTERNAL MEDICINE

## 2021-03-30 RX ORDER — WARFARIN SODIUM 2 MG/1
TABLET ORAL
Qty: 360 TABLET | Refills: 5 | Status: SHIPPED | OUTPATIENT
Start: 2021-03-30 | End: 2022-05-31

## 2021-03-30 RX ORDER — DILTIAZEM HYDROCHLORIDE 240 MG/1
CAPSULE, COATED, EXTENDED RELEASE ORAL
Qty: 90 CAPSULE | Refills: 3 | Status: SHIPPED | OUTPATIENT
Start: 2021-03-30 | End: 2022-02-11 | Stop reason: SDUPTHER

## 2021-04-06 ENCOUNTER — CLINICAL SUPPORT (OUTPATIENT)
Dept: FAMILY MEDICINE CLINIC | Facility: CLINIC | Age: 81
End: 2021-04-06

## 2021-04-06 DIAGNOSIS — I48.21 PERMANENT ATRIAL FIBRILLATION (HCC): Primary | ICD-10-CM

## 2021-04-06 LAB
INR PPP: 2.4 (ref 0.9–1.1)
PROTHROMBIN TIME: 35.3 SECONDS

## 2021-04-06 PROCEDURE — 36416 COLLJ CAPILLARY BLOOD SPEC: CPT | Performed by: FAMILY MEDICINE

## 2021-04-06 PROCEDURE — 85610 PROTHROMBIN TIME: CPT | Performed by: FAMILY MEDICINE

## 2021-04-19 ENCOUNTER — OFFICE VISIT (OUTPATIENT)
Dept: FAMILY MEDICINE CLINIC | Facility: CLINIC | Age: 81
End: 2021-04-19

## 2021-04-19 VITALS
HEIGHT: 71 IN | OXYGEN SATURATION: 98 % | RESPIRATION RATE: 16 BRPM | SYSTOLIC BLOOD PRESSURE: 118 MMHG | DIASTOLIC BLOOD PRESSURE: 66 MMHG | TEMPERATURE: 97.8 F | WEIGHT: 184 LBS | BODY MASS INDEX: 25.76 KG/M2 | HEART RATE: 66 BPM

## 2021-04-19 DIAGNOSIS — F41.9 ANXIETY: ICD-10-CM

## 2021-04-19 DIAGNOSIS — R73.09 ELEVATED GLUCOSE: ICD-10-CM

## 2021-04-19 DIAGNOSIS — K21.9 GASTROESOPHAGEAL REFLUX DISEASE WITHOUT ESOPHAGITIS: ICD-10-CM

## 2021-04-19 DIAGNOSIS — M25.551 CHRONIC PAIN OF RIGHT HIP: Primary | ICD-10-CM

## 2021-04-19 DIAGNOSIS — Z79.01 CHRONIC ANTICOAGULATION: ICD-10-CM

## 2021-04-19 DIAGNOSIS — G89.29 CHRONIC PAIN OF RIGHT HIP: Primary | ICD-10-CM

## 2021-04-19 DIAGNOSIS — I73.9 PAD (PERIPHERAL ARTERY DISEASE) (HCC): ICD-10-CM

## 2021-04-19 DIAGNOSIS — G47.09 OTHER INSOMNIA: ICD-10-CM

## 2021-04-19 DIAGNOSIS — I48.21 PERMANENT ATRIAL FIBRILLATION (HCC): ICD-10-CM

## 2021-04-19 DIAGNOSIS — C85.90 LYMPHOMA IN REMISSION (HCC): ICD-10-CM

## 2021-04-19 LAB
INR PPP: 2.7 (ref 0.9–1.1)
PROTHROMBIN TIME: 32.4 SECONDS

## 2021-04-19 PROCEDURE — 36416 COLLJ CAPILLARY BLOOD SPEC: CPT | Performed by: FAMILY MEDICINE

## 2021-04-19 PROCEDURE — 99214 OFFICE O/P EST MOD 30 MIN: CPT | Performed by: FAMILY MEDICINE

## 2021-04-19 PROCEDURE — 85610 PROTHROMBIN TIME: CPT | Performed by: FAMILY MEDICINE

## 2021-04-19 RX ORDER — OXYCODONE AND ACETAMINOPHEN 10; 325 MG/1; MG/1
1 TABLET ORAL EVERY 6 HOURS PRN
Qty: 100 TABLET | Refills: 0 | Status: SHIPPED | OUTPATIENT
Start: 2021-04-19 | End: 2021-05-17 | Stop reason: SDUPTHER

## 2021-04-19 NOTE — PROGRESS NOTES
Subjective cc: pain medication refill/hip pain   Cabrera Avina is a 80 y.o. male who presents to get a refill on his pain medication.      No new concerns   INR WNL  Weight reviewed - increasing slgihtly but overall stable   Still taking pain medication - it does help but he still has a lot of pain - sometimes feels like he needs additional medication  He is still following with oncology   Anxiety/depression controlled on current medication   HTN: controlled  Constipation controlled on medication   A fib: rate controlled and on anticog  Insomnia: controlled on restoril      COVID vaccines completed       Pain  This is a chronic problem. The current episode started more than 1 year ago. The problem occurs constantly. The problem has been unchanged. Associated symptoms include arthralgias, myalgias, numbness and weakness. Pertinent negatives include no abdominal pain, anorexia, chest pain, chills, coughing, diaphoresis, fatigue, fever, headaches, nausea or vomiting. The symptoms are aggravated by exertion, standing and walking. He has tried rest, walking, position changes and oral narcotics (Percocet, patient cannot tolerate NSAIDs secondary to Coumadin) for the symptoms. The treatment provided moderate relief.   Atrial Fibrillation  Presents for follow-up visit. Symptoms include hypertension and weakness. Symptoms are negative for bradycardia, chest pain, dizziness, hemodynamic instability, palpitations, shortness of breath, syncope and tachycardia. The symptoms have been stable. Past medical history includes atrial fibrillation and CHF. There are no medication compliance problems.   Congestive Heart Failure  Presents for follow-up visit. Pertinent negatives include no abdominal pain, chest pain, fatigue, palpitations, shortness of breath or unexpected weight change. The symptoms have been stable. Compliance with total regimen is %. Compliance with diet is 51-75%. Compliance with exercise is 51-75%.  "Compliance with medications is %.   Depression  Visit Type: follow-up  Patient presents with the following symptoms: insomnia, muscle tension and nervousness/anxiety.  Patient is not experiencing: anhedonia, decreased concentration, depressed mood, excessive worry, feelings of hopelessness, palpitations, shortness of breath, suicidal ideas, suicidal planning and thoughts of death.  Frequency of symptoms: occasionally   Severity: mild   Sleep quality: fair  Nighttime awakenings: occasional  Patient has a history of: CHF         The following portions of the patient's history were reviewed and updated as appropriate: allergies, current medications, past family history, past medical history, past social history, past surgical history and problem list.        Review of Systems   Constitutional: Negative for activity change, appetite change, chills, diaphoresis, fatigue, fever and unexpected weight change.   Respiratory: Negative for cough, chest tightness and shortness of breath.    Cardiovascular: Negative for chest pain, palpitations, leg swelling and syncope.   Gastrointestinal: Positive for constipation (controlled). Negative for abdominal pain, anorexia, blood in stool, nausea and vomiting.   Musculoskeletal: Positive for arthralgias, back pain, gait problem and myalgias.   Neurological: Positive for weakness and numbness. Negative for dizziness, syncope, facial asymmetry, speech difficulty, light-headedness and headaches.   Hematological: Bruises/bleeds easily.   Psychiatric/Behavioral: Positive for dysphoric mood and sleep disturbance (wakes up in the middle of the night secondary to pain). Negative for decreased concentration, self-injury and suicidal ideas. The patient is nervous/anxious and has insomnia.    All other systems reviewed and are negative.      Objective   Blood pressure 118/66, pulse 66, temperature 97.8 °F (36.6 °C), temperature source Infrared, resp. rate 16, height 180.3 cm (71\"), weight " 83.5 kg (184 lb), SpO2 98 %.    Physical Exam   Constitutional: He is oriented to person, place, and time. He appears well-developed. He is cooperative.  Non-toxic appearance. He does not appear ill. No distress.   HENT:   Head: Normocephalic and atraumatic.   Right Ear: External ear normal.   Left Ear: External ear normal.   Eyes: Conjunctivae are normal. Right eye exhibits no discharge. Left eye exhibits no discharge.   Neck: No tracheal deviation present.   Cardiovascular: Normal rate and normal pulses. An irregular rhythm present.   Pulmonary/Chest: Effort normal and breath sounds normal. No accessory muscle usage or stridor. No respiratory distress. He has no wheezes. He has no rales.   Abdominal: Soft. Normal appearance and bowel sounds are normal. He exhibits no distension, no fluid wave, no pulsatile midline mass and no mass. There is no abdominal tenderness.   Musculoskeletal:      Lumbar back: He exhibits decreased range of motion, tenderness, pain and spasm. He exhibits no bony tenderness.      Comments: Limited ROM in right hip, pain with movement, walks with limp favoring right hip, difficulty changing positions.    Neurological: He is alert and oriented to person, place, and time.   Skin: Skin is warm and dry. He is not diaphoretic.   Psychiatric: His behavior is normal. Judgment and thought content normal. His mood appears not anxious. He does not exhibit a depressed mood.   Nursing note and vitals reviewed.    Procedures  Lab Results (last 24 hours)     ** No results found for the last 24 hours. **            Assessment/Plan   Problems Addressed this Visit        Cardiac and Vasculature    Permanent atrial fibrillation (CMS/[facility])    Relevant Orders    POC Protime / INR (Completed)    PAD (peripheral artery disease) (CMS/[facility])       Coag and Thromboembolic    Chronic anticoagulation       Gastrointestinal Abdominal     Gastroesophageal reflux disease without esophagitis       Hematology and Neoplasia     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[facility])       Mental Health    Anxiety       Musculoskeletal and Injuries    Chronic hip pain - Primary    Relevant Medications    oxyCODONE-acetaminophen (PERCOCET)  MG per tablet       Sleep    Other insomnia      Other Visit Diagnoses     Elevated glucose          Diagnoses       Codes Comments    Chronic pain of right hip    -  Primary ICD-10-CM: M25.551, G89.29  ICD-9-CM: 719.45, 338.29     Elevated glucose     ICD-10-CM: R73.09  ICD-9-CM: 790.29     Permanent atrial fibrillation (CMS/[facility])     ICD-10-CM: I48.21  ICD-9-CM: 427.31     Chronic anticoagulation     ICD-10-CM: Z79.01  ICD-9-CM: V58.61     PAD (peripheral artery disease) (CMS/[facility])     ICD-10-CM: I73.9  ICD-9-CM: 443.9     Gastroesophageal reflux disease without esophagitis     ICD-10-CM: K21.9  ICD-9-CM: 530.81     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[facility])     ICD-10-CM: C85.90  ICD-9-CM: 202.80     Anxiety     ICD-10-CM: F41.9  ICD-9-CM: 300.00     Other insomnia     ICD-10-CM: G47.09  ICD-9-CM: 780.52         PLAN:     #1 insomnia: chronic, controlled, continue on current medication of restoril.    #2 depression/anxiety: chronic, controlled, continue on Lexapro daily     #3 chronic pain in right hip: Chronic, controlled with oral pain medication.  Patient is unable to take NSAIDs secondary to Coumadin.  Nii reviewed and appropriate.  Controlled contract in the chart.  Refill given on medication.  Sent to pharmacy.  Prescription for muscle relaxers to help with muscle spasms, advised on risk and benefits of this medication.  Return in 1 month     #4 chronic anticoagulation: reviewed INR - therapeutic.     #5 atrial fibrillation: Patient is anticoagulated and rate controlled. Continue on cardizem.      #6 constipation: chronic, stable. continue on regular use of fiber and stool softeners, advised on diet changes, advised this is due to his medication    Past information, including assessment and plan, reviewed and updated as  appropriate         This document has been electronically signed by Sunita Crawford MD on April 29, 2021 23:04 CDT

## 2021-05-07 ENCOUNTER — CLINICAL SUPPORT (OUTPATIENT)
Dept: FAMILY MEDICINE CLINIC | Facility: CLINIC | Age: 81
End: 2021-05-07

## 2021-05-07 DIAGNOSIS — I48.91 ATRIAL FIBRILLATION, UNSPECIFIED TYPE (HCC): Primary | ICD-10-CM

## 2021-05-07 LAB
INR PPP: 2.2 (ref 0.9–1.1)
PROTHROMBIN TIME: 27 SECONDS

## 2021-05-07 PROCEDURE — 36416 COLLJ CAPILLARY BLOOD SPEC: CPT | Performed by: FAMILY MEDICINE

## 2021-05-07 PROCEDURE — 85610 PROTHROMBIN TIME: CPT | Performed by: FAMILY MEDICINE

## 2021-05-17 ENCOUNTER — OFFICE VISIT (OUTPATIENT)
Dept: FAMILY MEDICINE CLINIC | Facility: CLINIC | Age: 81
End: 2021-05-17

## 2021-05-17 VITALS
WEIGHT: 180.2 LBS | SYSTOLIC BLOOD PRESSURE: 140 MMHG | TEMPERATURE: 97.8 F | HEIGHT: 71 IN | RESPIRATION RATE: 18 BRPM | DIASTOLIC BLOOD PRESSURE: 60 MMHG | HEART RATE: 56 BPM | BODY MASS INDEX: 25.23 KG/M2 | OXYGEN SATURATION: 97 %

## 2021-05-17 DIAGNOSIS — M25.551 CHRONIC PAIN OF RIGHT HIP: ICD-10-CM

## 2021-05-17 DIAGNOSIS — I48.21 PERMANENT ATRIAL FIBRILLATION (HCC): Primary | ICD-10-CM

## 2021-05-17 DIAGNOSIS — G47.09 OTHER INSOMNIA: ICD-10-CM

## 2021-05-17 DIAGNOSIS — F41.9 ANXIETY AND DEPRESSION: ICD-10-CM

## 2021-05-17 DIAGNOSIS — F32.A ANXIETY AND DEPRESSION: ICD-10-CM

## 2021-05-17 DIAGNOSIS — G89.29 CHRONIC PAIN OF RIGHT HIP: ICD-10-CM

## 2021-05-17 LAB
INR PPP: 2 (ref 0.9–1.1)
PROTHROMBIN TIME: 24 SECONDS

## 2021-05-17 PROCEDURE — 99214 OFFICE O/P EST MOD 30 MIN: CPT | Performed by: FAMILY MEDICINE

## 2021-05-17 PROCEDURE — 85610 PROTHROMBIN TIME: CPT | Performed by: FAMILY MEDICINE

## 2021-05-17 PROCEDURE — 36416 COLLJ CAPILLARY BLOOD SPEC: CPT | Performed by: FAMILY MEDICINE

## 2021-05-17 RX ORDER — OXYCODONE AND ACETAMINOPHEN 10; 325 MG/1; MG/1
1 TABLET ORAL EVERY 6 HOURS PRN
Qty: 100 TABLET | Refills: 0 | Status: SHIPPED | OUTPATIENT
Start: 2021-05-17 | End: 2021-06-16 | Stop reason: SDUPTHER

## 2021-05-17 RX ORDER — TEMAZEPAM 15 MG/1
15 CAPSULE ORAL NIGHTLY PRN
Qty: 30 CAPSULE | Refills: 2 | Status: SHIPPED | OUTPATIENT
Start: 2021-05-17 | End: 2021-08-24

## 2021-05-18 DIAGNOSIS — G47.09 OTHER INSOMNIA: ICD-10-CM

## 2021-05-18 RX ORDER — TEMAZEPAM 15 MG/1
CAPSULE ORAL
OUTPATIENT
Start: 2021-05-18

## 2021-06-16 ENCOUNTER — OFFICE VISIT (OUTPATIENT)
Dept: FAMILY MEDICINE CLINIC | Facility: CLINIC | Age: 81
End: 2021-06-16

## 2021-06-16 VITALS
TEMPERATURE: 97.8 F | RESPIRATION RATE: 18 BRPM | WEIGHT: 180 LBS | BODY MASS INDEX: 25.2 KG/M2 | HEART RATE: 76 BPM | HEIGHT: 71 IN | DIASTOLIC BLOOD PRESSURE: 76 MMHG | SYSTOLIC BLOOD PRESSURE: 142 MMHG

## 2021-06-16 DIAGNOSIS — M25.551 CHRONIC PAIN OF RIGHT HIP: Primary | ICD-10-CM

## 2021-06-16 DIAGNOSIS — Z79.01 CHRONIC ANTICOAGULATION: ICD-10-CM

## 2021-06-16 DIAGNOSIS — I48.21 PERMANENT ATRIAL FIBRILLATION (HCC): ICD-10-CM

## 2021-06-16 DIAGNOSIS — C85.90 LYMPHOMA IN REMISSION (HCC): ICD-10-CM

## 2021-06-16 DIAGNOSIS — F41.9 ANXIETY: ICD-10-CM

## 2021-06-16 DIAGNOSIS — G89.29 CHRONIC PAIN OF RIGHT HIP: Primary | ICD-10-CM

## 2021-06-16 DIAGNOSIS — K21.9 GASTROESOPHAGEAL REFLUX DISEASE WITHOUT ESOPHAGITIS: ICD-10-CM

## 2021-06-16 DIAGNOSIS — G47.09 OTHER INSOMNIA: ICD-10-CM

## 2021-06-16 DIAGNOSIS — K59.01 SLOW TRANSIT CONSTIPATION: ICD-10-CM

## 2021-06-16 LAB — INR PPP: 2.4 (ref 0.9–1.1)

## 2021-06-16 PROCEDURE — 36416 COLLJ CAPILLARY BLOOD SPEC: CPT | Performed by: FAMILY MEDICINE

## 2021-06-16 PROCEDURE — 85610 PROTHROMBIN TIME: CPT | Performed by: FAMILY MEDICINE

## 2021-06-16 PROCEDURE — 99214 OFFICE O/P EST MOD 30 MIN: CPT | Performed by: FAMILY MEDICINE

## 2021-06-16 RX ORDER — OXYCODONE AND ACETAMINOPHEN 10; 325 MG/1; MG/1
1 TABLET ORAL EVERY 6 HOURS PRN
Qty: 100 TABLET | Refills: 0 | Status: SHIPPED | OUTPATIENT
Start: 2021-06-16 | End: 2021-07-15 | Stop reason: SDUPTHER

## 2021-06-16 NOTE — PROGRESS NOTES
Subjective cc: pain medication refill/hip pain   Cabrera Avina is a 81 y.o. male who presents to get a refill on his pain medication.  No new concerns  Pain is the same - would like refill on pain medication - helping him to function   BP controlled   INR today - on chronic anticoag -still in a fib -no symptoms - compliant with medication   Anxiety and depression controlled   Insomnia controlled on medication   Follows with oncology - no changes - has appt next month.     COVID vaccines completed       Pain  This is a chronic problem. The current episode started more than 1 year ago. The problem occurs constantly. The problem has been unchanged. Associated symptoms include arthralgias and myalgias. Pertinent negatives include no chest pain. The symptoms are aggravated by exertion, standing and walking. He has tried rest, walking, position changes and oral narcotics (Percocet, patient cannot tolerate NSAIDs secondary to Coumadin) for the symptoms. The treatment provided moderate relief.   Atrial Fibrillation  Presents for follow-up visit. Symptoms include hypertension. Symptoms are negative for bradycardia, chest pain, hemodynamic instability, palpitations, shortness of breath and tachycardia. The symptoms have been stable. Past medical history includes atrial fibrillation and CHF. There are no medication compliance problems.   Congestive Heart Failure  Presents for follow-up visit. Pertinent negatives include no chest pain, palpitations or shortness of breath. The symptoms have been stable. Compliance with total regimen is %. Compliance with diet is 51-75%. Compliance with exercise is 51-75%. Compliance with medications is %.   Depression  Visit Type: follow-up  Patient presents with the following symptoms: muscle tension and nervousness/anxiety.  Patient is not experiencing: anhedonia, depressed mood, excessive worry, feelings of hopelessness, palpitations, shortness of breath, suicidal planning  "and thoughts of death.  Frequency of symptoms: occasionally   Severity: mild   Sleep quality: fair  Nighttime awakenings: occasional  Patient has a history of: CHF         The following portions of the patient's history were reviewed and updated as appropriate: allergies, current medications, past family history, past medical history, past social history, past surgical history and problem list.        Review of Systems   Constitutional: Negative for activity change and appetite change.   Respiratory: Negative for chest tightness and shortness of breath.    Cardiovascular: Negative for chest pain and palpitations.   Gastrointestinal: Positive for constipation.   Musculoskeletal: Positive for arthralgias, back pain and myalgias.   Hematological: Bruises/bleeds easily.   Psychiatric/Behavioral: Positive for dysphoric mood and sleep disturbance. The patient is nervous/anxious.    All other systems reviewed and are negative.      Objective   Blood pressure 142/76, pulse 76, temperature 97.8 °F (36.6 °C), temperature source Infrared, resp. rate 18, height 180.3 cm (71\"), weight 81.6 kg (180 lb).    Physical Exam   Constitutional: He is oriented to person, place, and time. He appears well-developed. No distress.   HENT:   Head: Normocephalic and atraumatic.   Right Ear: External ear normal.   Left Ear: External ear normal.   Nose: Nose normal.   Eyes: Conjunctivae are normal. Right eye exhibits no discharge. Left eye exhibits no discharge.   Neck: No tracheal deviation present. No thyromegaly present.   Cardiovascular: Normal rate and normal pulses. An irregular rhythm present.   Pulmonary/Chest: Effort normal and breath sounds normal. No stridor. No respiratory distress. He has no wheezes. He exhibits no tenderness.   Abdominal: Soft. Normal appearance and bowel sounds are normal. He exhibits no distension. There is no abdominal tenderness.   Musculoskeletal: Tenderness present.      Lumbar back: He exhibits decreased " range of motion, tenderness and pain.      Right lower leg: No edema.      Left lower leg: No edema.   Lymphadenopathy:     He has no cervical adenopathy.   Neurological: He is alert and oriented to person, place, and time. He displays weakness. He exhibits normal muscle tone. Gait abnormal. Coordination normal.   Skin: Skin is warm and dry. Bruising (on thighs - dog jumped on him ) noted. He is not diaphoretic.   Psychiatric: His behavior is normal. Judgment and thought content normal.   Nursing note and vitals reviewed.    Procedures  Lab Results (last 24 hours)     Procedure Component Value Units Date/Time    POCT INR [[id number]]  (Abnormal) Collected: 06/16/21 1207    Specimen: Blood Updated: 06/16/21 1207     INR 2.4     Comment: 29.2               Assessment/Plan   Problems Addressed this Visit        Cardiac and Vasculature    Permanent atrial fibrillation (CMS/HCC)    Relevant Orders    POCT INR (Completed)       Coag and Thromboembolic    Chronic anticoagulation       Gastrointestinal Abdominal     Slow transit constipation    Gastroesophageal reflux disease without esophagitis       Hematology and Neoplasia    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[facility])       Mental Health    Anxiety       Musculoskeletal and Injuries    Chronic hip pain - Primary    Relevant Medications    oxyCODONE-acetaminophen (PERCOCET)  MG per tablet       Sleep    Other insomnia      Diagnoses       Codes Comments    Chronic pain of right hip    -  Primary ICD-10-CM: M25.551, G89.29  ICD-9-CM: 719.45, 338.29     Permanent atrial fibrillation (CMS/HCC)     ICD-10-CM: I48.21  ICD-9-CM: 427.31     Chronic anticoagulation     ICD-10-CM: Z79.01  ICD-9-CM: V58.61     Slow transit constipation     ICD-10-CM: K59.01  ICD-9-CM: 564.01     Gastroesophageal reflux disease without esophagitis     ICD-10-CM: K21.9  ICD-9-CM: 530.81     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)     ICD-10-CM: C85.90  ICD-9-CM: 202.80     Anxiety     ICD-10-CM: F41.9  ICD-9-CM:  300.00     Other insomnia     ICD-10-CM: G47.09  ICD-9-CM: 780.52         PLAN:     #1 insomnia: chronic, controlled, continue on current medication of restoril.    #2 depression/anxiety: chronic, controlled, continue on Lexapro daily     #3 chronic pain in right hip: Chronic, controlled with oral pain medication.  Patient is unable to take NSAIDs secondary to Coumadin.  Nii reviewed and appropriate.  Controlled contract in the chart.  Refill given on medication.  Advised on risks and benfits of medication including sedation and death     #4 chronic anticoagulation: reviewed INR - therapeutic.     #5 atrial fibrillation: Patient is anticoagulated and rate controlled. Continue on cardizem.      Past information, including assessment and plan, reviewed and updated as appropriate           This document has been electronically signed by Sunita Crawford MD on June 16, 2021 12:22 CDT

## 2021-07-12 ENCOUNTER — OFFICE VISIT (OUTPATIENT)
Dept: GASTROENTEROLOGY | Facility: CLINIC | Age: 81
End: 2021-07-12

## 2021-07-12 ENCOUNTER — TELEPHONE (OUTPATIENT)
Dept: UROLOGY | Facility: CLINIC | Age: 81
End: 2021-07-12

## 2021-07-12 VITALS
SYSTOLIC BLOOD PRESSURE: 128 MMHG | HEIGHT: 71 IN | BODY MASS INDEX: 25.2 KG/M2 | WEIGHT: 180 LBS | TEMPERATURE: 96.4 F | DIASTOLIC BLOOD PRESSURE: 64 MMHG | HEART RATE: 45 BPM | OXYGEN SATURATION: 98 %

## 2021-07-12 DIAGNOSIS — D12.6 ADENOMATOUS POLYP OF COLON, UNSPECIFIED PART OF COLON: ICD-10-CM

## 2021-07-12 DIAGNOSIS — K21.9 GASTROESOPHAGEAL REFLUX DISEASE WITHOUT ESOPHAGITIS: ICD-10-CM

## 2021-07-12 DIAGNOSIS — Z79.01 CHRONIC ANTICOAGULATION: ICD-10-CM

## 2021-07-12 DIAGNOSIS — K59.01 SLOW TRANSIT CONSTIPATION: Primary | ICD-10-CM

## 2021-07-12 PROCEDURE — 99213 OFFICE O/P EST LOW 20 MIN: CPT | Performed by: INTERNAL MEDICINE

## 2021-07-12 NOTE — PROGRESS NOTES
[facility]    Chief Complaint   Patient presents with   • Colonoscopy       Subjective     HPI    Cabrera Avina is a 81 y.o. male who presents with a chief complaint of constipation.    He tells me he is doing well.  Still suffers with constipation.  He states he controls this with taken 2 bisacodyl tablets every 3 days.  He states his biggest complaint is having burning and tingling in both his lower extremities.  He has been to neurosurgery and sounds like he has had a neurological work-up.  He states he controls the discomfort with 1 Percocet in the morning sometimes he will take 2 of the 4 prescribed daily.  Regarding his bowel habits though they are relatively unchanged.  He is not passing bloody mucus.  No abdominal cramping.  Regarding reflux is not had any heartburn for several years.  He thinks he had heartburn 1 time the last couple years.      He was due for surveillance colonoscopy last year but he chose not to pursue secondary to the coronavirus pandemic and his age.  ============== July 2020 HPI===================================  HPI     Cabrera Avina is a 80 y.o. male who presents with a chief complaint of constipation.     He tells me he is doing well is not really have any troubles with his constipation.  He is here for his annual checkup.  He states he is tired with fatigue but nothing else is new.  He is concerned about the coronavirus and him catching it.  Therefore he has been isolating a lot at home.  He does get out walk.  He states he does not go anywhere else.  This is really the first time he is been anywhere.  He does check in with his primary care once a month.  He also has had a telephone office visit with Dr. Navarro on for checkup.  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              ===================== May 23, 2019 HPI=======================  HPI     Cabrera Avina is a 79 y.o. male who presents with a chief  complaint of constipation.     He comes in for an annual checkup.  He tells me he is doing okay.  He still suffers with constipation but he is doing okay with an over-the-counter supplement.  He is taking Perigose daily.  ( Mariela can)   he states with this he will move his bowels every 3 to 4 days.  Last year we gave him Linzess to try.  He states that did not help much.  MiraLAX was not very helpful either.  Denies any blood or mucus.     Regards to his reflux is under control.  He has been off omeprazole now for 2 years.  Denies heartburn or regurgitation.     Past Medical History:   Diagnosis Date   • Anemia    • Anxiety     chronic   • Atrial fibrillation (CMS/HCC)    • Cancer (CMS/HCC)     non-hodgkins lymphoma   • Cholecystitis    • Colon polyp    • Colon polyp    • Congestive heart failure (CMS/HCC) 8/13/2019   • Constipation    • GERD (gastroesophageal reflux disease)    • History of ERCP 2005   • Iron deficiency anemia 11/19/2019   • Nephrolithiasis     stones removed   • Jaylyn-rectal abscess    • Rectal abscess        Past Surgical History:   Procedure Laterality Date   • CHOLECYSTECTOMY     • COLONOSCOPY  05/2012    3 yr recall   • COLONOSCOPY  09/18/2015    5 yr recall   • KIDNEY STONE SURGERY     • RECTAL SURGERY      X 2   • TOTAL HIP ARTHROPLASTY           Current Outpatient Medications:   •  aspirin 81 MG EC tablet, Take 1 tablet by mouth Daily., Disp: 90 tablet, Rfl: 3  •  cyclobenzaprine (FLEXERIL) 10 MG tablet, Take 1 tablet by mouth 3 (Three) Times a Day As Needed for Muscle Spasms. (Patient taking differently: Take 10 mg by mouth 3 (Three) Times a Day As Needed for Muscle Spasms (rare).), Disp: 90 tablet, Rfl: 0  •  digoxin (LANOXIN) 250 MCG tablet, TAKE ONE TABLET BY MOUTH DAILY, Disp: 90 tablet, Rfl: 3  •  dilTIAZem CD (CARDIZEM CD) 240 MG 24 hr capsule, TAKE ONE CAPSULE BY MOUTH DAILY, Disp: 90 capsule, Rfl: 3  •  escitalopram (Lexapro) 20 MG tablet, Take 1 tablet by mouth Daily., Disp: 90  tablet, Rfl: 3  •  oxyCODONE-acetaminophen (PERCOCET)  MG per tablet, Take 1 tablet by mouth Every 6 (Six) Hours As Needed for Severe Pain . Must last 30 days, Disp: 100 tablet, Rfl: 0  •  temazepam (RESTORIL) 15 MG capsule, Take 1 capsule by mouth At Night As Needed for Sleep., Disp: 30 capsule, Rfl: 2  •  warfarin (COUMADIN) 2 MG tablet, TAKE 3 AND 0NE-HALF TABLETS BY MOUTH DAILY -- 7 MG, 7 MG, 8 MG, THEN REPEAT, Disp: 360 tablet, Rfl: 5  •  Iron-Vitamins (GERITOL COMPLETE PO), Take 2 tablets by mouth Every Other Day., Disp: , Rfl:     No Known Allergies    Social History     Socioeconomic History   • Marital status:      Spouse name: Not on file   • Number of children: Not on file   • Years of education: Not on file   • Highest education level: Not on file   Tobacco Use   • Smoking status: Never Smoker   • Smokeless tobacco: Never Used   Substance and Sexual Activity   • Alcohol use: No   • Drug use: No   • Sexual activity: Defer       Family History   Problem Relation Age of Onset   • Colon cancer Mother    • No Known Problems Father    • Prostate cancer Brother    • No Known Problems Daughter    • No Known Problems Son    • No Known Problems Maternal Grandmother    • No Known Problems Maternal Grandfather    • No Known Problems Paternal Grandmother    • No Known Problems Paternal Grandfather    • Prostate cancer Brother    • Colon polyps Neg Hx        Review of Systems   Gastrointestinal: Positive for constipation. Negative for abdominal pain, blood in stool and diarrhea.         Objective     Vitals:    07/12/21 1409   BP: 128/64   Pulse: (!) 45   Temp: 96.4 °F (35.8 °C)   SpO2: 98%       Physical Exam  No acute distress. Vital signs as documented.  Sclera anicteric.  Neck without noticeable JVD. Lungs clear to auscultation. Heart exam notable for regular rhythm, normal sounds. Abdomen is soft, nontender, non distended, normal bowel sounds and without evidence of organomegaly, masses.  Neuro  alert, moves extremities.        Assessment/Plan   Problem List Items Addressed This Visit        Coag and Thromboembolic    Chronic anticoagulation       Gastrointestinal Abdominal     Slow transit constipation - Primary    Gastroesophageal reflux disease without esophagitis    Adenomatous polyp of colon    Overview     Colonoscopy September 2015 small adenoma removed.  Surveillance colonoscopy recommended in September 2020 if clinically appropriate                 Regarding his constipation he is doing okay with taking 2 bisacodyl tablets every third day.  We talked about the pros and cons of laxative use.  It is what makes him comfortable.  It is working for him.  Okay to continue from my standpoint.  Continue with a high-fiber healthy diet as well and activity.    We discussed his history of having colon polyps and pursuing colonoscopy exam.  Considering his advanced age and overall general health we talked about the increased risk associated with colonoscopy as well as the benefits.  At this time the risk may be equal to the benefit.  I stated I had be happy to pursue surveillance colonoscopy but after our conversation he does not wish to do so unless he would start to have troubles.  I think that is reasonable considering the pros and cons.  If he has any concerns or new problems arise he will come see us.  We will see him back in the office in a year for checkup    Continue ongoing management by primary care provider and other specialists.     Body mass index is 25.1 kg/m².  Stable no GI intervention recommended      EMR Dragon/transcription disclaimer:  Much of this encounter note is electronic transcription/translation of spoken language to printed text.  The electronic translation of spoken language may be erroneous, or at times, nonsensical words or phrases may be inadvertently transcribed.  Although I have reviewed the note for such errors, some may still exist.    Ander Miguel MD  14:48  CDT  07/12/21

## 2021-07-12 NOTE — TELEPHONE ENCOUNTER
Dr Jeff patient came into office to say that he has not been seen since 03/18/2019 when he was seen for renal calculus. He said he was told not to come back for 2 years and he thinks that is too long.  I could not find a recall or a note for the need for a recall in his chart.    He is concerned because his brother and his father had prostate cancer and he thinks he needs to be seen.  He wants to be changed to Dr Herrera.  Please speak with the Dr's and let the patient know if he can change drs.

## 2021-07-15 ENCOUNTER — OFFICE VISIT (OUTPATIENT)
Dept: FAMILY MEDICINE CLINIC | Facility: CLINIC | Age: 81
End: 2021-07-15

## 2021-07-15 VITALS
RESPIRATION RATE: 16 BRPM | BODY MASS INDEX: 24.3 KG/M2 | WEIGHT: 173.6 LBS | OXYGEN SATURATION: 97 % | HEART RATE: 67 BPM | DIASTOLIC BLOOD PRESSURE: 70 MMHG | TEMPERATURE: 97.9 F | HEIGHT: 71 IN | SYSTOLIC BLOOD PRESSURE: 120 MMHG

## 2021-07-15 DIAGNOSIS — M25.551 CHRONIC PAIN OF RIGHT HIP: Primary | ICD-10-CM

## 2021-07-15 DIAGNOSIS — F41.9 ANXIETY: ICD-10-CM

## 2021-07-15 DIAGNOSIS — Z79.01 CHRONIC ANTICOAGULATION: ICD-10-CM

## 2021-07-15 DIAGNOSIS — G89.29 CHRONIC PAIN OF RIGHT HIP: Primary | ICD-10-CM

## 2021-07-15 DIAGNOSIS — G62.9 NEUROPATHY: ICD-10-CM

## 2021-07-15 DIAGNOSIS — K59.01 SLOW TRANSIT CONSTIPATION: ICD-10-CM

## 2021-07-15 DIAGNOSIS — I48.21 PERMANENT ATRIAL FIBRILLATION (HCC): ICD-10-CM

## 2021-07-15 LAB
INR PPP: 1.8 (ref 0.9–1.1)
PROTHROMBIN TIME: 21.8 SECONDS

## 2021-07-15 PROCEDURE — 36416 COLLJ CAPILLARY BLOOD SPEC: CPT | Performed by: FAMILY MEDICINE

## 2021-07-15 PROCEDURE — 99214 OFFICE O/P EST MOD 30 MIN: CPT | Performed by: FAMILY MEDICINE

## 2021-07-15 PROCEDURE — 85610 PROTHROMBIN TIME: CPT | Performed by: FAMILY MEDICINE

## 2021-07-15 RX ORDER — OXYCODONE AND ACETAMINOPHEN 10; 325 MG/1; MG/1
1 TABLET ORAL EVERY 6 HOURS PRN
Qty: 100 TABLET | Refills: 0 | Status: CANCELLED | OUTPATIENT
Start: 2021-07-15

## 2021-07-15 RX ORDER — GABAPENTIN 100 MG/1
100 CAPSULE ORAL 3 TIMES DAILY
Qty: 90 CAPSULE | Refills: 0 | Status: SHIPPED | OUTPATIENT
Start: 2021-07-15 | End: 2021-08-16 | Stop reason: SDUPTHER

## 2021-07-15 RX ORDER — OXYCODONE AND ACETAMINOPHEN 10; 325 MG/1; MG/1
1 TABLET ORAL EVERY 6 HOURS PRN
Qty: 100 TABLET | Refills: 0 | Status: SHIPPED | OUTPATIENT
Start: 2021-07-15 | End: 2021-08-16 | Stop reason: SDUPTHER

## 2021-07-15 NOTE — PROGRESS NOTES
Subjective Chief complaint: Chronic hip pain  Cabrera Avina is a 81 y.o. male with chronic pain, atrial fibrillation, GERD, constipation, history of lymphoma who presents for follow-up on his chronic hip pain.  Patient is currently taking Percocet as needed to help with his pain.  His pain has remained stable over the past few years.  He is not having any new symptoms.  His pain medication does help improve the pain so that he is able to stay active.  No side effects from medication other than chronic constipation which is controlled.  He is complaining of his legs having a burning and tingling sensation.  He is not currently on any medication for neuropathy.  We have discussed this in the past.  Patient is agreeable to try it at this time.  Constipation controlled with medication as needed.  Atrial fibrillation: Chronic, controlled on current medication with rhythm control and anticoagulation.  Patient to have INR checked today.  Patient does have a history of lymphoma which is currently in remission.  He follows with oncology for regular follow-up.  Anxiety and depression currently treated with Lexapro and temazepam.  Patient taking medication appropriately.  He understands the risk of taking a narcotic pain medication in combination with the benzo anxiety medication.  We have discussed this and ways to avoid any potential complications.    History of Present Illness     The following portions of the patient's history were reviewed and updated as appropriate: allergies, current medications, past family history, past medical history, past social history, past surgical history and problem list.        Review of Systems   Constitutional: Negative for activity change, appetite change and unexpected weight change.   Respiratory: Negative for cough and shortness of breath.    Cardiovascular: Negative for chest pain, palpitations and leg swelling.   Gastrointestinal: Positive for constipation.   Musculoskeletal:  "Positive for arthralgias, back pain, gait problem and myalgias.   Hematological: Bruises/bleeds easily.   Psychiatric/Behavioral: Positive for dysphoric mood and sleep disturbance. The patient is nervous/anxious.    All other systems reviewed and are negative.      Objective   Blood pressure 120/70, pulse 67, temperature 97.9 °F (36.6 °C), temperature source Infrared, resp. rate 16, height 180.3 cm (71\"), weight 78.7 kg (173 lb 9.6 oz), SpO2 97 %.  Physical Exam  Vitals and nursing note reviewed.   Constitutional:       General: He is not in acute distress.     Appearance: He is well-developed. He is not diaphoretic.   HENT:      Head: Normocephalic and atraumatic.      Right Ear: External ear normal.      Left Ear: External ear normal.      Nose: Nose normal.   Eyes:      General:         Right eye: No discharge.         Left eye: No discharge.      Conjunctiva/sclera: Conjunctivae normal.   Neck:      Thyroid: No thyromegaly.      Trachea: No tracheal deviation.   Cardiovascular:      Rate and Rhythm: Normal rate. Rhythm irregular.      Pulses: Normal pulses.      Heart sounds: Normal heart sounds.   Pulmonary:      Effort: Pulmonary effort is normal. No respiratory distress.      Breath sounds: Normal breath sounds. No stridor. No wheezing.   Chest:      Chest wall: No tenderness.   Abdominal:      General: There is no distension.      Palpations: Abdomen is soft.      Tenderness: There is no abdominal tenderness.   Musculoskeletal:      Cervical back: Normal range of motion.      Lumbar back: Tenderness present. Decreased range of motion.      Right hip: Tenderness present. Decreased range of motion. Decreased strength.      Right lower leg: No edema.      Left lower leg: No edema.   Lymphadenopathy:      Cervical: No cervical adenopathy.   Skin:     General: Skin is warm and dry.      Findings: Bruising present.   Neurological:      Mental Status: He is alert and oriented to person, place, and time.      Motor: " No abnormal muscle tone.      Coordination: Coordination normal.      Gait: Gait abnormal.   Psychiatric:         Behavior: Behavior normal.         Thought Content: Thought content normal.         Judgment: Judgment normal.         Lab Results (last 24 hours)     Procedure Component Value Units Date/Time    POC Protime / INR [[id number]]  (Abnormal) Collected: 07/15/21 1137    Specimen: Blood Updated: 07/15/21 1137     Protime 21.8 seconds      INR 1.8          Assessment/Plan   Problems Addressed this Visit        Cardiac and Vasculature    Permanent atrial fibrillation (CMS/HCC)       Coag and Thromboembolic    Chronic anticoagulation    Relevant Orders    POC Protime / INR (Completed)       Gastrointestinal Abdominal     Slow transit constipation       Mental Health    Anxiety       Musculoskeletal and Injuries    Chronic hip pain - Primary    Relevant Medications    oxyCODONE-acetaminophen (PERCOCET)  MG per tablet       Neuro    Neuropathy    Relevant Medications    gabapentin (NEURONTIN) 100 MG capsule      Diagnoses       Codes Comments    Chronic pain of right hip    -  Primary ICD-10-CM: M25.551, G89.29  ICD-9-CM: 719.45, 338.29     Permanent atrial fibrillation (CMS/[facility])     ICD-10-CM: I48.21  ICD-9-CM: 427.31     Chronic anticoagulation     ICD-10-CM: Z79.01  ICD-9-CM: V58.61     Slow transit constipation     ICD-10-CM: K59.01  ICD-9-CM: 564.01     Anxiety     ICD-10-CM: F41.9  ICD-9-CM: 300.00     Neuropathy     ICD-10-CM: G62.9  ICD-9-CM: 355.9         Plan:    1. Atrial fibrillation: Chronic, stable.  Continue on current medication.  INR is currently    2.  Chronic hip pain: Chronic, stable.  Patient on pain medication as needed.  Cannot take NSAIDs.  Refill given on pain medication.  Patient is taking appropriately.  He understands the risk and benefits of medication.  Nii reviewed and up-to-date.  Controlled contract in the chart.  UDS in the chart.  Patient strongly advised to take  medication as needed and do not taking combination with his Restoril.    3.  Anxiety and depression: Chronic, stable.  Patient to continue on Lexapro.  Using temazepam as needed.  Advised on risk and benefits of the medication especially in combination with opiates.  Patient to take medication responsibly.  Refill not needed at this time.  Nii reviewed.  UDS up-to-date.  Controlled contract in the chart.    #4 constipation: Chronic, stable.  Continue on current medication.    #5 neuropathy: Chronic, worsening.  Discussed medication options.  Advised patient we do have a medication called gabapentin that we could try for his tingling sensation.  Patient is agreeable to try.  Patient advised on use 3 times daily as needed for pain.  Advised on risk and benefits of medication including sedation especially in combination with his pain medication and benzodiazepine patient advised not to take in combination.        This document has been electronically signed by Sunita Crawford MD on July 15, 2021 11:42 CDT

## 2021-07-19 ENCOUNTER — LAB (OUTPATIENT)
Dept: LAB | Facility: HOSPITAL | Age: 81
End: 2021-07-19

## 2021-07-19 DIAGNOSIS — C85.90 LYMPHOMA IN REMISSION (HCC): ICD-10-CM

## 2021-07-19 LAB
ALBUMIN SERPL-MCNC: 3.9 G/DL (ref 3.5–5.2)
ALBUMIN/GLOB SERPL: 1.3 G/DL
ALP SERPL-CCNC: 67 U/L (ref 39–117)
ALT SERPL W P-5'-P-CCNC: 10 U/L (ref 1–41)
ANION GAP SERPL CALCULATED.3IONS-SCNC: 5 MMOL/L (ref 5–15)
AST SERPL-CCNC: 19 U/L (ref 1–40)
B2 MICROGLOB SERPL-MCNC: 2.4 MG/L (ref 0.8–2.2)
BASOPHILS # BLD AUTO: 0.06 10*3/MM3 (ref 0–0.2)
BASOPHILS NFR BLD AUTO: 1.5 % (ref 0–1.5)
BILIRUB SERPL-MCNC: 0.6 MG/DL (ref 0–1.2)
BUN SERPL-MCNC: 10 MG/DL (ref 8–23)
BUN/CREAT SERPL: 10.4 (ref 7–25)
CALCIUM SPEC-SCNC: 9.2 MG/DL (ref 8.6–10.5)
CHLORIDE SERPL-SCNC: 104 MMOL/L (ref 98–107)
CO2 SERPL-SCNC: 28 MMOL/L (ref 22–29)
CREAT SERPL-MCNC: 0.96 MG/DL (ref 0.76–1.27)
DEPRECATED RDW RBC AUTO: 48.1 FL (ref 37–54)
EOSINOPHIL # BLD AUTO: 0.17 10*3/MM3 (ref 0–0.4)
EOSINOPHIL NFR BLD AUTO: 4.1 % (ref 0.3–6.2)
ERYTHROCYTE [DISTWIDTH] IN BLOOD BY AUTOMATED COUNT: 13.8 % (ref 12.3–15.4)
FERRITIN SERPL-MCNC: 289.5 NG/ML (ref 30–400)
FOLATE SERPL-MCNC: 11.2 NG/ML (ref 4.78–24.2)
GFR SERPL CREATININE-BSD FRML MDRD: 75 ML/MIN/1.73
GLOBULIN UR ELPH-MCNC: 3 GM/DL
GLUCOSE SERPL-MCNC: 103 MG/DL (ref 65–99)
HCT VFR BLD AUTO: 42.7 % (ref 37.5–51)
HGB BLD-MCNC: 13.9 G/DL (ref 13–17.7)
IMM GRANULOCYTES # BLD AUTO: 0.01 10*3/MM3 (ref 0–0.05)
IMM GRANULOCYTES NFR BLD AUTO: 0.2 % (ref 0–0.5)
IRON 24H UR-MRATE: 86 MCG/DL (ref 59–158)
IRON SATN MFR SERPL: 27 % (ref 20–50)
LDH SERPL-CCNC: 179 U/L (ref 135–225)
LYMPHOCYTES # BLD AUTO: 0.78 10*3/MM3 (ref 0.7–3.1)
LYMPHOCYTES NFR BLD AUTO: 19 % (ref 19.6–45.3)
MCH RBC QN AUTO: 30.8 PG (ref 26.6–33)
MCHC RBC AUTO-ENTMCNC: 32.6 G/DL (ref 31.5–35.7)
MCV RBC AUTO: 94.5 FL (ref 79–97)
MONOCYTES # BLD AUTO: 0.53 10*3/MM3 (ref 0.1–0.9)
MONOCYTES NFR BLD AUTO: 12.9 % (ref 5–12)
NEUTROPHILS NFR BLD AUTO: 2.56 10*3/MM3 (ref 1.7–7)
NEUTROPHILS NFR BLD AUTO: 62.3 % (ref 42.7–76)
NRBC BLD AUTO-RTO: 0 /100 WBC (ref 0–0.2)
PLATELET # BLD AUTO: 184 10*3/MM3 (ref 140–450)
PMV BLD AUTO: 10 FL (ref 6–12)
POTASSIUM SERPL-SCNC: 4.4 MMOL/L (ref 3.5–5.2)
PROT SERPL-MCNC: 6.9 G/DL (ref 6–8.5)
RBC # BLD AUTO: 4.52 10*6/MM3 (ref 4.14–5.8)
SODIUM SERPL-SCNC: 137 MMOL/L (ref 136–145)
TIBC SERPL-MCNC: 322 MCG/DL (ref 298–536)
TRANSFERRIN SERPL-MCNC: 216 MG/DL (ref 200–360)
VIT B12 BLD-MCNC: 351 PG/ML (ref 211–946)
WBC # BLD AUTO: 4.11 10*3/MM3 (ref 3.4–10.8)

## 2021-07-19 PROCEDURE — 82728 ASSAY OF FERRITIN: CPT

## 2021-07-19 PROCEDURE — 80053 COMPREHEN METABOLIC PANEL: CPT

## 2021-07-19 PROCEDURE — 82607 VITAMIN B-12: CPT

## 2021-07-19 PROCEDURE — 83615 LACTATE (LD) (LDH) ENZYME: CPT

## 2021-07-19 PROCEDURE — 36415 COLL VENOUS BLD VENIPUNCTURE: CPT

## 2021-07-19 PROCEDURE — 82746 ASSAY OF FOLIC ACID SERUM: CPT

## 2021-07-19 PROCEDURE — 83540 ASSAY OF IRON: CPT

## 2021-07-19 PROCEDURE — 82232 ASSAY OF BETA-2 PROTEIN: CPT

## 2021-07-19 PROCEDURE — 85025 COMPLETE CBC W/AUTO DIFF WBC: CPT

## 2021-07-19 PROCEDURE — 84466 ASSAY OF TRANSFERRIN: CPT

## 2021-07-20 ENCOUNTER — APPOINTMENT (OUTPATIENT)
Dept: LAB | Facility: HOSPITAL | Age: 81
End: 2021-07-20

## 2021-07-30 ENCOUNTER — CLINICAL SUPPORT (OUTPATIENT)
Dept: FAMILY MEDICINE CLINIC | Facility: CLINIC | Age: 81
End: 2021-07-30

## 2021-07-30 DIAGNOSIS — I50.9 CONGESTIVE HEART FAILURE, UNSPECIFIED HF CHRONICITY, UNSPECIFIED HEART FAILURE TYPE (HCC): Primary | ICD-10-CM

## 2021-07-30 LAB
INR PPP: 3.1 (ref 0.9–1.1)
PROTHROMBIN TIME: 37.7 SECONDS

## 2021-07-30 PROCEDURE — 85610 PROTHROMBIN TIME: CPT | Performed by: NURSE PRACTITIONER

## 2021-07-30 PROCEDURE — 36416 COLLJ CAPILLARY BLOOD SPEC: CPT | Performed by: NURSE PRACTITIONER

## 2021-08-13 ENCOUNTER — CLINICAL SUPPORT (OUTPATIENT)
Dept: FAMILY MEDICINE CLINIC | Facility: CLINIC | Age: 81
End: 2021-08-13

## 2021-08-13 DIAGNOSIS — Z79.01 CHRONIC ANTICOAGULATION: Primary | ICD-10-CM

## 2021-08-13 LAB — INR PPP: 1.9 (ref 0.9–1.1)

## 2021-08-13 PROCEDURE — 36416 COLLJ CAPILLARY BLOOD SPEC: CPT | Performed by: FAMILY MEDICINE

## 2021-08-13 PROCEDURE — 85610 PROTHROMBIN TIME: CPT | Performed by: FAMILY MEDICINE

## 2021-08-15 NOTE — PROGRESS NOTES
[facility] HEMATOLOGY AND ONCOLOGY  [address]  Formerly [facility] [address] [phone]    Patient Name: Cabrera Avina  Encounter Date: 08/19/2021  YOB: 1940  Patient Number: [id number]       REASON FOR FOLLOWUP:  Mr. Cabrera Avina is a 81-year-old male who returns in follow-up of intermediate grade B-cell lymphoma.  He is seen 194 months following the completion of R-CHOP chemotherapy and 142 months after the completion of Rituxan maintenance.  He is also followed for an iron deficiency anemia and for chronic anticoagulation. He is seen 21 months after the most recent dose of Injectafer.  The patient is here alone.     I have reviewed the HPI and verified with the patient the accuracy of it. No changes to interval history since the information was documented. Paul Rachel MD 08/19/21     DIAGNOSTIC ABNORMALITIES: B-cell Lymphoma.   1. Periportal lymph node biopsy, 02/01/2005. Findings consistent with large B-cell lymphoma with an intermediate to high proliferative rate.  2. CT of the chest, 02/03/2005. Mediastinal, left hilar, and upper abdominal lymphadenopathy consistent with the patient's history of lymphoma.    PREVIOUS INTERVENTIONS: B-cell lymphoma   1. Definitive Rituxan, 375 mg/m2, 02/05/2005 through 02/25/2005. Four weeks completed.  2. Definitive R-CHOP, from 01/04/2005 through 07/07/2005. Five cycles completed. Cycle #2 delayed by admission for management of deep perirectal abscess.  3. Maintenance Rituxan (every 3 months), 10/14/2005 through 11/11/2007. Six cycles completed.    DIAGNOSTIC ABNORMALITIES: Anemia, iron deficiency   1. Anemia substrates on 05/27/2008: Iron 43, %saturation 12, TIBC 366, UIBC 323, ferritin 27, vitamin B12 of 295, folate 7.2.    PREVIOUS INTERVENTIONS: Anemia.   1. INFeD, 1000 mg IVPB, 06/04/2008.  2. INFeD, 1000 mg IVPB, 09/02/2010 and 09/09/2010.  3. INFeD 500 mg, 08/25/2016;  11/28/2016.  4. Injectafer 750 mg IV, 11/20/2019    Problem List Items Addressed This Visit        Other    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[facility]) - Primary        Oncology/Hematology History Overview Note   DIAGNOSTIC ABNORMALITIES: B-cell Lymphoma.  Periportal lymph node biopsy, 02/01/2005.  Findings consistent with large B-cell lymphoma with an intermediate to high proliferative rate.  CT of the chest, 02/03/2005.   Mediastinal, left hilar, and upper abdominal lymphadenopathy consistent with the patient's history of lymphoma.  PREVIOUS INTERVENTIONS:   B-cell lymphoma  Definitive Rituxan, 375 mg/m2, 02/05/2005 through 02/25/2005. Four weeks completed.  Definitive R-CHOP, from 01/04/2005 through 07/07/2005.  Five cycles completed.  Cycle #2 delayed by admission for management of deep perirectal abscess.  Maintenance Rituxan (every 3 months), 10/14/2005 through 11/11/2007.  Six cycles completed.  DIAGNOSTIC ABNORMALITIES:   Anemia, iron deficiency  Anemia substrates on 05/27/2008: Iron 43, %saturation 12, TIBC 366, UIBC 323, ferritin 27, vitamin B12 of 295, folate 7.2.  PREVIOUS INTERVENTIONS:   Anemia.  INFeD, 1000 mg IVPB, 06/04/2008.  INFeD, 1000 mg IVPB, 09/02/2010 and 09/09/2010.  INFeD 500 mg, 08/25/2016; 11/28/2016.     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[facility])   4/28/2016 Initial Diagnosis    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[facility])         PAST MEDICAL HISTORY:  ALLERGIES:  No Known Allergies  CURRENT MEDICATIONS:  Outpatient Encounter Medications as of 8/19/2021   Medication Sig Dispense Refill   • aspirin 81 MG EC tablet Take 1 tablet by mouth Daily. 90 tablet 3   • cyclobenzaprine (FLEXERIL) 10 MG tablet Take 1 tablet by mouth 3 (Three) Times a Day As Needed for Muscle Spasms. (Patient taking differently: Take 10 mg by mouth 3 (Three) Times a Day As Needed for Muscle Spasms (rare).) 90 tablet 0   • digoxin (LANOXIN) 250 MCG tablet TAKE ONE TABLET BY MOUTH DAILY 90 tablet 3   • dilTIAZem CD (CARDIZEM CD) 240 MG 24 hr capsule TAKE  ONE CAPSULE BY MOUTH DAILY 90 capsule 3   • escitalopram (Lexapro) 20 MG tablet Take 1 tablet by mouth Daily. 90 tablet 3   • gabapentin (NEURONTIN) 100 MG capsule Take 1 capsule by mouth 3 (Three) Times a Day. 90 capsule 0   • Iron-Vitamins (GERITOL COMPLETE PO) Take 2 tablets by mouth Every Other Day.     • oxyCODONE-acetaminophen (PERCOCET)  MG per tablet Take 1 tablet by mouth Every 6 (Six) Hours As Needed for Severe Pain . Must last 30 days 100 tablet 0   • temazepam (RESTORIL) 15 MG capsule Take 1 capsule by mouth At Night As Needed for Sleep. 30 capsule 2   • warfarin (COUMADIN) 2 MG tablet TAKE 3 AND 0NE-HALF TABLETS BY MOUTH DAILY -- 7 MG, 7 MG, 8 MG, THEN REPEAT 360 tablet 5   • [DISCONTINUED] digoxin (LANOXIN) 250 MCG tablet Take 1 tablet by mouth Daily. 90 tablet 3   • [DISCONTINUED] gabapentin (NEURONTIN) 100 MG capsule Take 1 capsule by mouth 3 (Three) Times a Day. 90 capsule 0   • [DISCONTINUED] oxyCODONE-acetaminophen (PERCOCET)  MG per tablet Take 1 tablet by mouth Every 6 (Six) Hours As Needed for Severe Pain . Must last 30 days 100 tablet 0     No facility-administered encounter medications on file as of 8/19/2021.     ADULT ILLNESSES:   History of malignant lymphoma (situation) ( ICD-10:Z85.72 ;Personal history of non-Hodgkin lymphomas   Adenomatous colonic polyps ( ICD-10:D12.6 ;Benign neoplasm of colon, unspecified   Anemia ( ICD-10:D64.9 ;Anemia, unspecified   Anxiety ( chronic; ICD-10:F41.9 ;Anxiety disorder, unspecified )   Atrial fibrillation ( on chronic anticoagulation; ICD-10:I48.91 ;Unspecified atrial fibrillation )   Degenerative joint disease ( ICD-10:M16.10 ;Unilateral primary osteoarthritis, unspecified hip   Drug intolerance ( oral iron; ICD-10:T45.4x5D ;Adverse effect of iron and its compounds, subsequent encounter )   Hypertension ( ICD-10:I10 ;Essential (primary) hypertension   Iron deficiency anemia ( ICD-10:D50.9 ;Iron deficiency anemia, unspecified   Microscopic  hematuria ( ICD-10:R31.29 ;Other microscopic hematuria   Nephrolithiasis ( ICD-10:N20.0 ;Calculus of kidney   Neuropathy ( ICD-10:G62.9 ;Polyneuropathy, unspecified   Orthostatic hypotension ( ICD-10:I95.1 ;Orthostatic hypotension   Perirectal abscess ( deep, severe; ICD-10:K61.1 ;Rectal abscess )   Polycystic kidney disease ( ICD-10:Q61.3 ;Polycystic kidney, unspecified   Vitamin B12 deficiency ( ICD-10:E53.8 ;Deficiency of other specified B group vitamins    SURGERIES:   Punch biopsy of skin lesion, right lower lip, 01/21/2015: 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 Dr. Hatch   Drainage of abscess, recurrent rectal abscess, 06/01/2006   Laparoscopic cholecystectomy and lymph node biopsy   Hip replacement, right, 06/15/2010      ADULT ILLNESSES:  Patient Active Problem List   Diagnosis Code   • Slow transit constipation K59.01   • Gastroesophageal reflux disease without esophagitis K21.9   • 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[facility]) C85.90   • Anxiety F41.9   • Chronic hip pain M25.559, G89.29   • Permanent atrial fibrillation (CMS/[facility]) I48.21   • Other insomnia G47.09   • Adenomatous polyp of colon D12.6   • Tobacco use Z72.0   • Chronic anticoagulation Z79.01   • PAD (peripheral artery disease) (CMS/[facility]) I73.9   • Congestive heart failure (CMS/[facility]) I50.9   • BMI 24.0-24.9, adult Z68.24   • Leukopenia D72.819   • Iron deficiency anemia D50.9   • Neuropathy G62.9     SURGERIES:  Past Surgical History:   Procedure Laterality Date   • CHOLECYSTECTOMY     • COLONOSCOPY  05/2012    3 yr recall   • COLONOSCOPY  09/18/2015    5 yr recall   • KIDNEY STONE SURGERY     • RECTAL SURGERY      X 2   • TOTAL HIP ARTHROPLASTY       HEALTH MAINTENANCE ITEMS:  Health Maintenance Due   Topic Date Due   • ZOSTER VACCINE (2 of 2) 02/13/2017   • Pneumococcal Vaccine 65+ (1 of 2 - PPSV23) 12/31/2019       <no information>  Last  "Completed Colonoscopy     This patient has no relevant Health Maintenance data.        Immunization History   Administered Date(s) Administered   • COVID-19 (MODERNA) 02/14/2021   • FLUAD TRI 65YR+ 11/05/2019   • Fluad Quad 65+ 11/05/2019, 10/09/2020   • Pneumococcal Conjugate 13-Valent (PCV13) 11/05/2019     Last Completed Mammogram     This patient has no relevant Health Maintenance data.            FAMILY HISTORY:  Family History   Problem Relation Age of Onset   • Colon cancer Mother    • No Known Problems Father    • Prostate cancer Brother    • No Known Problems Daughter    • No Known Problems Son    • No Known Problems Maternal Grandmother    • No Known Problems Maternal Grandfather    • No Known Problems Paternal Grandmother    • No Known Problems Paternal Grandfather    • Prostate cancer Brother    • Colon polyps Neg Hx      SOCIAL HISTORY:  Social History     Socioeconomic History   • Marital status:      Spouse name: Not on file   • Number of children: Not on file   • Years of education: Not on file   • Highest education level: Not on file   Tobacco Use   • Smoking status: Never Smoker   • Smokeless tobacco: Never Used   Substance and Sexual Activity   • Alcohol use: No   • Drug use: No   • Sexual activity: Defer       REVIEW OF SYSTEMS:  PS:  ECOG 1-2.   Constitutional:  His appetite is fair, \"normal for me.\"  His energy remains low to fair.  \"About the same as usual.\"  He is as active as his norm and manages all his ADLs including chores, running errands, and driving.  Says his hip and lower back pains still inhibit his activities inspite of hip replacement.  Says he is still walking his usual 1/2 mile weather permitting.  He has no fevers, chills, or drenching night sweats.  He has lost 5 pounds (in addition to 5 pounds at his prior visit-had gained 9 pounds at his visit prior to that) since his last visit. Still says he wants to stay around 175-180 pounds.  He sleeps more restfully on " "temazepam.  Ear/Nose/Throat/Mouth:   The patient reports no ear pains, but has lingering sinus symptoms, but no sore throat, nosebleeds.  No headaches. The patient denies any hoarseness.   Ocular:   The patient reports no eye pain, significant change in visual acuity, double vision, or blurry vision.   Respiratory:  He reports no chronic cough, shortness of breathing, phlegm production, or chest wall pain.  Cardiovascular:  He reports no exertional chest pain, chest pressure, or chest heaviness.  He reports no claudication. He reports no palpitations or symptomatic orthostasis.  Gastrointestinal:  He has no pica, dysphagia, nausea, vomiting, postprandial abdominal pain, bloating, cramping, change in bowel habits, or discoloration of the stool.  He has had no rectal bleeding.  He has intermittent bouts of constipation modulated by daily stool softeners (Dulcolax).  Says he is still using supplemental fiber daily.  Says he moves his bowels every 3 days, \"ever since chemo years ago.\" He has no diarrhea. Last colonoscopy, 09/2015.  Polyps. Repeat 5 years. Is oral iron intolerant (worsening constipation).  Genitourinary:    He reports no urinary burning, frequency, dribbling, or discoloration.  He reports no difficulty controlling his bladder. \"I still gotta go when I gotta go.\" He reports no need to urinate frequently through the night.  Musculoskeletal:  He continues to have chronic trouble with right hip pain.  \"same, 24/7.\"  He still uses maintenance Percocet, up to 2 doses daily.  He reports no unexplained arthralgias, myalgias, but has noted more frequent bouts of nighttime leg cramping.  Extremities:  He reports no trouble with fluid retention or significant leg swelling.  Endocrine:  He reports no problems with excess thirst, excessive urination, vasomotor instability, or unexplained fatigue.  Heme/Lymphatic:  He reports no bleeding, petechial rashes, or swollen glands.  Skin:  He reports no itching, rashes, or " "lesions which won't heal.  Neuro:  He reports night-time stocking-glove pain in his lower extremities.  \"Same.\" Says he had previously been seen by Dr. Kinney previously. \"He didn't find any problems.\"  Says Dr. Rosario says he does not have significant vascular disease requiring surgery.  He reports no loss of consciousness, seizures, fainting spells, or dizziness. He reports no weakness of his face, arms, or legs.  He reports no difficulty with speech.  He reports no tremors.  Psych:  He seems generally satisfied with life.  He reports no depression.  He reports no mood swings.        VITAL SIGNS: /66   Pulse 67   Temp 96.7 °F (35.9 °C)   Resp 16   Ht 180.3 cm (71\")   Wt 79.4 kg (175 lb 1.6 oz)   SpO2 97%   BMI 24.42 kg/m²       PHYSICAL EXAMINATION:  General:  He is a pleasant, cooperative, slender, and fairly well-kept elderly male in no distress.  He arrived in the exam room ambulatory. He appears to be his stated age.  His skin color is pale. ECOG 1  Head/Neck:  The patient is anicteric and atraumatic.  He is wearing a surgical mask today. The neck is supple without evidence of jugular venous distention or cervical adenopathy.  Eyes:  The pupils are equal, round, and reactive to light.  The extraocular movements are full.  There is no scleral jaundice or erythema.  Chest:  The respiratory efforts are normal and unhindered.  The chest is clear to auscultation.  There are no wheezes, rales, or asymmetry of breath sounds.  The port in the right anterior chest wall has been removed and the site has healed. No tenderness or erythema.   Cardiac/Vascular:  The patient has an irregularly irregular cardiac rate and rhythm without murmurs.  The peripheral pulses are equal and full.  Abdomen:  The belly is soft and flat.  There is no rebound or guarding. There is no organomegaly, mass-effect, or tenderness.  Bowel sounds are normal.  Ortho:  There is no overt joint stiffness.  There is no overt " foreshortening of height.  There are no overt joint changes which suggest degenerative arthritis.  Extremities:  There is no evidence of cyanosis, clubbing, with trace bipedal edema.  Rheumatologic:  There is no overt evidence of rheumatoid deformities of the hands.  There is no sausaging of the fingers.  There is no sign of active synovitis.  Cutaneous:  Few areas of senile purpura are present on his forearms.  There are several areas of dog associated escoriations on his legs.  There are no overt rashes, disseminated lesions, purpura, or petechiae.  Lymphatics:  There is no evidence of adenopathy in the cervical, supraclavicular, or axillary areas.  Neurologic:  The patient is alert, oriented, cooperative, and pleasant.  He is appropriately conversant.  He ambulated into the exam room and transferred from chair to exam table aided.  There is no overt dysfunction of the motor, sensory, or cerebellar systems.  Psych:  Impatient.  Mood and affect are appropriate for circumstance.  Eye contact is appropriate.        LABS    Lab Results - Last 18 Months   Lab Units 07/19/21  1325 03/15/21  1238 11/02/20  1330 06/30/20  1241 05/08/20  1029   HEMOGLOBIN g/dL 13.9 14.2 14.0 13.8 13.9   HEMATOCRIT % 42.7 44.0 42.1 42.6 43.3   MCV fL 94.5 93.0 92.7 93.2 95.0   WBC 10*3/mm3 4.11 3.87 4.98 5.56 5.96   RDW % 13.8 13.5 13.4 13.1 13.7   MPV fL 10.0 9.7 9.6 9.9 9.8   PLATELETS 10*3/mm3 184 202 192 216 196   IMM GRAN % % 0.2 0.3 0.2 0.2 0.2   NEUTROS ABS 10*3/mm3 2.56 2.52 3.28 3.78 4.01   LYMPHS ABS 10*3/mm3 0.78 0.64* 0.91 1.11 1.02   MONOS ABS 10*3/mm3 0.53 0.50 0.55 0.49 0.67   EOS ABS 10*3/mm3 0.17 0.17 0.16 0.13 0.19   BASOS ABS 10*3/mm3 0.06 0.03 0.07 0.04 0.06   IMMATURE GRANS (ABS) 10*3/mm3 0.01 0.01 0.01 0.01 0.01   NRBC /100 WBC 0.0 0.0 0.0 0.0 0.0       Lab Results - Last 18 Months   Lab Units 07/19/21  1325 03/15/21  1238 11/02/20  1330 06/30/20  1241 05/08/20  1029   GLUCOSE mg/dL 103* 103* 113* 125* 110*   SODIUM  mmol/L 137 138 138 139 141   POTASSIUM mmol/L 4.4 4.0 4.1 4.2 3.9   CO2 mmol/L 28.0 30.0* 28.0 27.0 30.0*   CHLORIDE mmol/L 104 98 102 101 101   ANION GAP mmol/L 5.0 10.0 8.0 11.0 10.0   CREATININE mg/dL 0.96 0.99 0.98 1.05 0.95   BUN mg/dL 10 13 19 15 12   BUN / CREAT RATIO  10.4 13.1 19.4 14.3 12.6   CALCIUM mg/dL 9.2 9.4 9.1 9.4 9.5   EGFR IF NONAFRICN AM mL/min/1.73 75 73 74 68 77   ALK PHOS U/L 67 83 65 61 63   TOTAL PROTEIN g/dL 6.9 7.5 6.9 7.2 7.5   ALT (SGPT) U/L 10 13 12 9 9   AST (SGOT) U/L 19 20 18 18 14   BILIRUBIN mg/dL 0.6 0.5 0.6 0.5 0.7   ALBUMIN g/dL 3.90 3.70 3.90 3.80 4.20   GLOBULIN gm/dL 3.0 3.8 3.0 3.4 3.3       Lab Results - Last 18 Months   Lab Units 07/19/21  1325 03/15/21  1238 11/02/20  1330 06/30/20  1241 05/08/20  1029   LDH U/L 179 184 223 184 186       Lab Results - Last 18 Months   Lab Units 07/19/21  1325 03/15/21  1238 11/02/20  1330 06/30/20  1241 05/08/20  1029   IRON mcg/dL 86 84 97 83 76   TIBC mcg/dL 322 359 338 331 358   IRON SATURATION % 27 23 29 25 21   FERRITIN ng/mL 289.50 277.60 268.30 302.70 315.40   FOLATE ng/mL 11.20 9.74 14.00 13.10 10.00     I have reviewed the assessment and plan and verified the accuracy of it. No changes to assessment and plan since the information was documented. Paul Rachel MD 08/19/21    ASSESSMENT:  1.    Intermediate to high-grade lymphoma.  History of. No evidence of disease.  05/16/2005:          Stage IV-E.          Baseline Tumor [place]:   Mediastinum, left hilum, upper abdomen, and liver (clinically).          Complications of Tumor:   Hyperbilirubinemia. Improved.          Response to Therapy:  Clinical remission per CT scan 05/16/2005, 11/2011 and 02/10/2020.          -02/03/2020-CT chest without contrast.  Impression: Atheromatous disease of the thoracic aorta and coronary arteries.  Mild cardiomegaly.  Borderline pulmonary artery dilatation.  No infiltrate or effusion.          -02/03/2020-CT abdomen and pelvis with contrast.   Impression: Prior cholecystectomy.  Mild prominence of biliary tree felt to be related.  Complex renal cyst on the left is stable in size with septation and calcification.  Dominant mid renal cyst on the right side is slightly larger.  Upper pole of the right renal collecting system is mildly dilated and contains 2 stones measuring 8 to 9 mm.  5 mm nonobstructive stone in the left mid kidney.  Ureter is nondilated.  Prostate mildly enlarged 5.1 cm.  Moderate stool in the colon.  No small bowel distention.          Prognosis:  Fair.          IPI at baseline:  3.  2.    Normocytic anemia from iron deficiency and chronic disease. Last Injectafer, 11/20/2019.    --Hemoglobin, 13.9 on 07/19/2021 (prior range:  Hgb 12.9 - 14.7).  3.    Leukopenia, NOS. normal since 02/07/2020   4.    Iron deficiency and oral iron intolerant (constipation).  Repleted since receipt of INFeD  5.    Variable B2M.    --Stable, 2.4 on 07/19/2021 (prior range:  1.6 - 3.3).  6.    Adenomatous colon polyp, colonoscopy 09/18/2015.  7.    Atrial fibrillation on chronic anticoagulation.  8.    Microscopic hematuria.  Management per Dr. Mayes.  9.    Anxiety, chronic, stable on medications (Celexa).  10.  Lower extremity neuropathy.  Mild.  Not a problem of late.  11.  Hypertension.  Fair control on Cardizem.  12.  Low B12, 203 on 08/17/2017.  Repleted.  13.  Peripheral vascular disease.  Arterial studies and has been seen by Dr. Rosario of vascular surgery.              a.    Ultrasound ankle/brachial performed on 07/17/2018 at [facility]. Suspected atherosclerosis in the lower extremity arteries, supported by noncompressible posterior tibialis and dorsalis pedis arteries.              b.    Abdominal aortic ultrasound performed on 08/10/2018 at [facility].  No abdominal aortic aneurysm.  14.  COVID vaccination # 2, 03/10/2021    PLAN:  1.      Re:  Apprised of labs from 07/19/2021 with low normal WBC, normal diff, normal hemoglobin  (resolution of anemia), normoglycemia with normal CMP, normal LDH, normal B2M, repleted serum iron, repleted (> 20%) Fe sat, repleted (> 100) ferritin, repleted folate, and repleted B12.   2.    Stay off B12, 1000 mcg IM monthly.   3.    Previously apprised of CT scans, 02/10/2020 - Large renal cyst (stable since 2011).  Stable. Otherwise GURPREET  4.    Continue other currently identified medications.  5.    Continue Coumadin.  PT/INR followed by Dr. Crawford.  6.    Continue ongoing management per primary care physician and other specialists.    7.    Return to office in 4 months with pre-office CBC with differential, iron, TIBC, iron saturation, ferritin, B12, folate, CMP, B2M, and LDH.      MEDICAL DECISION MAKING: Low Complexity  AMOUNT OF DATA: Moderate    I spent 20 total minutes, face-to-face, caring for Cabrera today.  Greater than 50% of this time involved counseling and/or coordination of care as documented within this note regarding the patient's illness(es), pros and cons of various treatment options, instructions and/or risk reduction.    cc:   Azar Mayes MD          [facility]          Sunita Crawford MD - [place]          Jorge Alberto Rosario MD

## 2021-08-16 ENCOUNTER — OFFICE VISIT (OUTPATIENT)
Dept: FAMILY MEDICINE CLINIC | Facility: CLINIC | Age: 81
End: 2021-08-16

## 2021-08-16 VITALS
BODY MASS INDEX: 24.98 KG/M2 | HEIGHT: 71 IN | WEIGHT: 178.4 LBS | RESPIRATION RATE: 16 BRPM | TEMPERATURE: 98 F | DIASTOLIC BLOOD PRESSURE: 68 MMHG | SYSTOLIC BLOOD PRESSURE: 128 MMHG | HEART RATE: 57 BPM | OXYGEN SATURATION: 97 %

## 2021-08-16 DIAGNOSIS — Z79.899 MEDICATION MANAGEMENT: ICD-10-CM

## 2021-08-16 DIAGNOSIS — G89.29 CHRONIC PAIN OF RIGHT HIP: Primary | ICD-10-CM

## 2021-08-16 DIAGNOSIS — F32.A ANXIETY AND DEPRESSION: ICD-10-CM

## 2021-08-16 DIAGNOSIS — M25.551 CHRONIC PAIN OF RIGHT HIP: Primary | ICD-10-CM

## 2021-08-16 DIAGNOSIS — I48.21 PERMANENT ATRIAL FIBRILLATION (HCC): ICD-10-CM

## 2021-08-16 DIAGNOSIS — F41.9 ANXIETY AND DEPRESSION: ICD-10-CM

## 2021-08-16 DIAGNOSIS — G62.9 NEUROPATHY: ICD-10-CM

## 2021-08-16 LAB
INR PPP: 2.1 (ref 0.9–1.1)
PROTHROMBIN TIME: 25.6 SECONDS

## 2021-08-16 PROCEDURE — 85610 PROTHROMBIN TIME: CPT | Performed by: FAMILY MEDICINE

## 2021-08-16 PROCEDURE — 99214 OFFICE O/P EST MOD 30 MIN: CPT | Performed by: FAMILY MEDICINE

## 2021-08-16 PROCEDURE — 36416 COLLJ CAPILLARY BLOOD SPEC: CPT | Performed by: FAMILY MEDICINE

## 2021-08-16 RX ORDER — OXYCODONE AND ACETAMINOPHEN 10; 325 MG/1; MG/1
1 TABLET ORAL EVERY 6 HOURS PRN
Qty: 100 TABLET | Refills: 0 | Status: SHIPPED | OUTPATIENT
Start: 2021-08-16 | End: 2021-09-14 | Stop reason: SDUPTHER

## 2021-08-16 RX ORDER — GABAPENTIN 100 MG/1
100 CAPSULE ORAL 3 TIMES DAILY
Qty: 90 CAPSULE | Refills: 0 | Status: SHIPPED | OUTPATIENT
Start: 2021-08-16 | End: 2021-10-05

## 2021-08-16 NOTE — PROGRESS NOTES
Subjective Chief complaint: Chronic hip pain  Cabrera Avina is a 81 y.o. male with chronic pain, atrial fibrillation, GERD, constipation, history of lymphoma who presents for follow-up on his chronic hip pain.    Patient presents for follow-up on chronic pain.  Patient continues to have chronic hip pain.  Patient is currently taking Percocet to help with his pain.  He reports that his pain is stable.  He did try gabapentin however it was such a low dose that he did not really notice any difference with his neuropathy.  He is agreeable to try an increased dose.  He has been counseled on the risk of interaction between Percocet and gabapentin.  Patient is taking his medication appropriately.  Urine sample was left today to collect UDS.  He denies any side effects from medication.    Patient has chronic atrial fibrillation that is anticoagulated with Coumadin.  Patient had been taking 7 mg daily.  He reports his INR was supratherapeutic.  He then held his medication and restarted at 6 mg.  His INR today is therapeutic.  He is currently taking 7 mg for about 3 days, will then take a 6 mg and restart with the sevens.  He has a good understanding of how to monitor his INR and monitor for any signs of bleeding.    Anxiety depression is controlled on current medication.  Patient does have a benzodiazepine that he takes at night to help with sleep.  He is taking the medication appropriately.  He has been counseled on the risk of narcotic pain medication in combination with benzodiazepines.    Chronic constipation controlled with medication on a as needed basis.    No new concerns.      Pain  This is a chronic problem. The current episode started more than 1 year ago. The problem occurs constantly. The problem has been unchanged. Associated symptoms include arthralgias, myalgias and weakness. Pertinent negatives include no chest pain. The symptoms are aggravated by exertion, walking and twisting. Treatments tried:  "percocet, gabapentin  The treatment provided moderate relief.   Atrial Fibrillation  Presents for follow-up visit. Symptoms include weakness. Symptoms are negative for chest pain. The symptoms have been stable. Past medical history includes atrial fibrillation. There are no medication compliance problems.   Anxiety  Presents for follow-up visit. Symptoms include depressed mood and nervous/anxious behavior. Patient reports no chest pain. Symptoms occur most days. The severity of symptoms is moderate.     Compliance with medications is %. Treatment side effects: none         The following portions of the patient's history were reviewed and updated as appropriate: allergies, current medications, past family history, past medical history, past social history, past surgical history and problem list.        Review of Systems   Constitutional: Negative for activity change and appetite change.   Cardiovascular: Negative for chest pain and leg swelling.   Gastrointestinal: Positive for constipation.   Musculoskeletal: Positive for arthralgias, back pain, gait problem and myalgias.   Neurological: Positive for weakness.   Hematological: Bruises/bleeds easily.   Psychiatric/Behavioral: Positive for sleep disturbance. The patient is nervous/anxious.    All other systems reviewed and are negative.      Objective   Blood pressure 128/68, pulse 57, temperature 98 °F (36.7 °C), temperature source Infrared, resp. rate 16, height 180.3 cm (71\"), weight 80.9 kg (178 lb 6.4 oz), SpO2 97 %.  Physical Exam  Vitals and nursing note reviewed.   Constitutional:       General: He is not in acute distress.     Appearance: He is well-developed. He is not diaphoretic.   HENT:      Head: Normocephalic and atraumatic.      Nose: Nose normal.   Eyes:      General:         Right eye: No discharge.         Left eye: No discharge.      Conjunctiva/sclera: Conjunctivae normal.   Neck:      Thyroid: No thyromegaly.      Trachea: No tracheal " deviation.   Cardiovascular:      Rate and Rhythm: Normal rate and regular rhythm.      Pulses: Normal pulses.   Pulmonary:      Effort: Pulmonary effort is normal. No respiratory distress.      Breath sounds: Normal breath sounds. No stridor. No wheezing.   Chest:      Chest wall: No tenderness.   Abdominal:      General: There is no distension.      Palpations: Abdomen is soft.      Tenderness: There is no abdominal tenderness.   Musculoskeletal:         General: Tenderness present.      Cervical back: Normal range of motion.      Lumbar back: Tenderness present. Decreased range of motion.      Right lower leg: Edema present.      Left lower leg: Edema present.   Lymphadenopathy:      Cervical: No cervical adenopathy.   Skin:     General: Skin is warm and dry.      Findings: Bruising present.   Neurological:      Mental Status: He is alert and oriented to person, place, and time.      Motor: No abnormal muscle tone.      Coordination: Coordination normal.      Gait: Gait abnormal.   Psychiatric:         Behavior: Behavior normal.         Thought Content: Thought content normal.         Judgment: Judgment normal.         Lab Results (last 24 hours)     Procedure Component Value Units Date/Time    POC Protime / INR [[id number]]  (Abnormal) Collected: 08/16/21 1140    Specimen: Blood Updated: 08/16/21 1141     Protime 25.6 seconds      INR 2.1          Assessment/Plan   Problems Addressed this Visit        Cardiac and Vasculature    Permanent atrial fibrillation (CMS/HCC)    Relevant Orders    POC Protime / INR (Completed)       Musculoskeletal and Injuries    Chronic hip pain - Primary    Relevant Medications    oxyCODONE-acetaminophen (PERCOCET)  MG per tablet       Neuro    Neuropathy    Relevant Medications    gabapentin (NEURONTIN) 100 MG capsule      Other Visit Diagnoses     Medication management        Relevant Orders    Compliance Drug Analysis, Ur - Urine, Clean Catch    Anxiety and depression           Diagnoses       Codes Comments    Chronic pain of right hip    -  Primary ICD-10-CM: M25.551, G89.29  ICD-9-CM: 719.45, 338.29     Permanent atrial fibrillation (CMS/[facility])     ICD-10-CM: I48.21  ICD-9-CM: 427.31     Medication management     ICD-10-CM: Z79.899  ICD-9-CM: V58.69     Neuropathy     ICD-10-CM: G62.9  ICD-9-CM: 355.9     Anxiety and depression     ICD-10-CM: F41.9, F32.9  ICD-9-CM: 300.00, 311         Plan:    #1 chronic hip pain: chronic, controlled on pain medication.  Advised patient on risk and benefits of medication.  UDS obtained today.  Should be positive for medication.  Nii reviewed.  Controlled contract in the chart.  Advised on risk and benefits of medication including interaction with benzodiazepine.    2.  Neuropathy: Chronic, uncontrolled.  Advised patient to increase dose of medication.  Advised on risk and benefits of the medication including sedation.    3.  Atrial fibrillation on chronic anticoagulation: Chronic, controlled.  Patient is on Coumadin.  Advised on therapeutic goal.  Patient has good understanding of titration of medication.  Otherwise controlled on digoxin and diltiazem.    4.  Anxiety and depression: Chronic, stable.  Continue on Lexapro.            This document has been electronically signed by Sunita Crawford MD on August 16, 2021 18:23 CDT

## 2021-08-19 ENCOUNTER — OFFICE VISIT (OUTPATIENT)
Dept: ONCOLOGY | Facility: CLINIC | Age: 81
End: 2021-08-19

## 2021-08-19 VITALS
SYSTOLIC BLOOD PRESSURE: 120 MMHG | TEMPERATURE: 96.7 F | WEIGHT: 175.1 LBS | HEIGHT: 71 IN | DIASTOLIC BLOOD PRESSURE: 66 MMHG | RESPIRATION RATE: 16 BRPM | BODY MASS INDEX: 24.51 KG/M2 | HEART RATE: 67 BPM | OXYGEN SATURATION: 97 %

## 2021-08-19 DIAGNOSIS — C85.90 LYMPHOMA IN REMISSION (HCC): Primary | ICD-10-CM

## 2021-08-19 PROCEDURE — 99213 OFFICE O/P EST LOW 20 MIN: CPT | Performed by: INTERNAL MEDICINE

## 2021-08-20 LAB — DRUGS UR: NORMAL

## 2021-08-24 DIAGNOSIS — G47.09 OTHER INSOMNIA: ICD-10-CM

## 2021-08-24 RX ORDER — TEMAZEPAM 15 MG/1
CAPSULE ORAL
Qty: 30 CAPSULE | Refills: 2 | Status: SHIPPED | OUTPATIENT
Start: 2021-08-24 | End: 2021-11-15 | Stop reason: SDUPTHER

## 2021-09-01 ENCOUNTER — OFFICE VISIT (OUTPATIENT)
Dept: UROLOGY | Facility: CLINIC | Age: 81
End: 2021-09-01

## 2021-09-01 VITALS — WEIGHT: 180 LBS | TEMPERATURE: 97.7 F | HEIGHT: 71 IN | BODY MASS INDEX: 25.2 KG/M2

## 2021-09-01 DIAGNOSIS — Z80.42 FAMILY HISTORY OF PROSTATE CANCER: ICD-10-CM

## 2021-09-01 DIAGNOSIS — N28.1 RENAL CYST: ICD-10-CM

## 2021-09-01 DIAGNOSIS — N20.0 NEPHROLITHIASIS: ICD-10-CM

## 2021-09-01 DIAGNOSIS — N40.0 BENIGN PROSTATIC HYPERPLASIA, UNSPECIFIED WHETHER LOWER URINARY TRACT SYMPTOMS PRESENT: Primary | ICD-10-CM

## 2021-09-01 PROCEDURE — 99214 OFFICE O/P EST MOD 30 MIN: CPT | Performed by: UROLOGY

## 2021-09-14 ENCOUNTER — OFFICE VISIT (OUTPATIENT)
Dept: FAMILY MEDICINE CLINIC | Facility: CLINIC | Age: 81
End: 2021-09-14

## 2021-09-14 VITALS
BODY MASS INDEX: 25.34 KG/M2 | RESPIRATION RATE: 16 BRPM | OXYGEN SATURATION: 97 % | HEART RATE: 58 BPM | HEIGHT: 71 IN | TEMPERATURE: 97.7 F | DIASTOLIC BLOOD PRESSURE: 62 MMHG | SYSTOLIC BLOOD PRESSURE: 126 MMHG | WEIGHT: 181 LBS

## 2021-09-14 DIAGNOSIS — Z79.01 CHRONIC ANTICOAGULATION: ICD-10-CM

## 2021-09-14 DIAGNOSIS — G89.29 CHRONIC PAIN OF RIGHT HIP: Primary | ICD-10-CM

## 2021-09-14 DIAGNOSIS — G47.09 OTHER INSOMNIA: ICD-10-CM

## 2021-09-14 DIAGNOSIS — M25.551 CHRONIC PAIN OF RIGHT HIP: Primary | ICD-10-CM

## 2021-09-14 DIAGNOSIS — F41.9 ANXIETY: ICD-10-CM

## 2021-09-14 DIAGNOSIS — I48.21 PERMANENT ATRIAL FIBRILLATION (HCC): ICD-10-CM

## 2021-09-14 DIAGNOSIS — K59.09 CHRONIC CONSTIPATION: ICD-10-CM

## 2021-09-14 LAB
INR PPP: 2 (ref 0.9–1.1)
PROTHROMBIN TIME: 23.9 SECONDS

## 2021-09-14 PROCEDURE — 99214 OFFICE O/P EST MOD 30 MIN: CPT | Performed by: FAMILY MEDICINE

## 2021-09-14 PROCEDURE — 85610 PROTHROMBIN TIME: CPT | Performed by: FAMILY MEDICINE

## 2021-09-14 PROCEDURE — 36416 COLLJ CAPILLARY BLOOD SPEC: CPT | Performed by: FAMILY MEDICINE

## 2021-09-14 RX ORDER — OXYCODONE AND ACETAMINOPHEN 10; 325 MG/1; MG/1
1 TABLET ORAL EVERY 6 HOURS PRN
Qty: 100 TABLET | Refills: 0 | Status: SHIPPED | OUTPATIENT
Start: 2021-09-14 | End: 2021-10-14 | Stop reason: SDUPTHER

## 2021-09-14 NOTE — PROGRESS NOTES
"Subjective cc: chronic pain   Cabrera Avina is a 81 y.o. male who presents for follow up on chronic back/hip pain, a fib on chronic anticoag, insomnia and chronic constipation.   He has been taking gabapentin 200mg BID and feels like they are helping in the evenings to allow him to rest.   He is still using his pain medication to help manage his chronic pain and keep him going and functioning   Pt is taking coumadin for his a fib - INR WNL today - continue on current dose   Insomnia well controlled on current medication    History of Present Illness     The following portions of the patient's history were reviewed and updated as appropriate: allergies, current medications, past family history, past medical history, past social history, past surgical history and problem list.        Review of Systems   Gastrointestinal: Positive for constipation.        GERD - controlled    Musculoskeletal: Positive for arthralgias, back pain and myalgias.   Psychiatric/Behavioral: Positive for dysphoric mood and sleep disturbance. The patient is nervous/anxious.    All other systems reviewed and are negative.      Objective   Blood pressure 126/62, pulse 58, temperature 97.7 °F (36.5 °C), temperature source Infrared, resp. rate 16, height 180.3 cm (71\"), weight 82.1 kg (181 lb), SpO2 97 %.  Physical Exam  Vitals and nursing note reviewed.   Constitutional:       General: He is not in acute distress.     Appearance: He is well-developed. He is not diaphoretic.   HENT:      Head: Normocephalic and atraumatic.      Nose: Nose normal.   Eyes:      General:         Right eye: No discharge.         Left eye: No discharge.      Conjunctiva/sclera: Conjunctivae normal.   Neck:      Thyroid: No thyromegaly.      Trachea: No tracheal deviation.   Cardiovascular:      Rate and Rhythm: Normal rate. Rhythm irregular.      Pulses: Normal pulses.   Pulmonary:      Effort: Pulmonary effort is normal. No respiratory distress.      Breath " sounds: Normal breath sounds. No stridor. No wheezing.   Chest:      Chest wall: No tenderness.   Abdominal:      General: There is no distension.      Palpations: Abdomen is soft.      Tenderness: There is no abdominal tenderness.   Musculoskeletal:         General: Tenderness present.      Cervical back: Normal range of motion.      Lumbar back: Tenderness present. No bony tenderness. Decreased range of motion. Positive right straight leg raise test and positive left straight leg raise test.      Right lower leg: No edema.      Left lower leg: No edema.   Lymphadenopathy:      Cervical: No cervical adenopathy.   Skin:     General: Skin is warm and dry.   Neurological:      Mental Status: He is alert and oriented to person, place, and time. Mental status is at baseline.      Motor: No abnormal muscle tone.      Coordination: Coordination normal.      Gait: Gait abnormal.   Psychiatric:         Behavior: Behavior normal.         Thought Content: Thought content normal.         Judgment: Judgment normal.       Lab Results (most recent)     Procedure Component Value Units Date/Time    POC Protime / INR [[id number]]  (Abnormal) Collected: 09/14/21 1130    Specimen: Blood Updated: 09/14/21 1130     Protime 23.9 seconds      INR 2.0          Assessment/Plan   Problems Addressed this Visit        Cardiac and Vasculature    Permanent atrial fibrillation (CMS/HCC)    Relevant Orders    POC Protime / INR (Completed)       Coag and Thromboembolic    Chronic anticoagulation       Mental Health    Anxiety       Musculoskeletal and Injuries    Chronic hip pain - Primary    Relevant Medications    oxyCODONE-acetaminophen (PERCOCET)  MG per tablet       Sleep    Other insomnia      Other Visit Diagnoses     Chronic constipation          Diagnoses       Codes Comments    Chronic pain of right hip    -  Primary ICD-10-CM: M25.551, G89.29  ICD-9-CM: 719.45, 338.29     Permanent atrial fibrillation (CMS/HCC)     ICD-10-CM:  I48.21  ICD-9-CM: 427.31     Chronic anticoagulation     ICD-10-CM: Z79.01  ICD-9-CM: V58.61     Anxiety     ICD-10-CM: F41.9  ICD-9-CM: 300.00     Other insomnia     ICD-10-CM: G47.09  ICD-9-CM: 780.52     Chronic constipation     ICD-10-CM: K59.09  ICD-9-CM: 564.00         PLAN:     #1 chronic hip pain: chronic, controlled, robbie reviewed, controlled contract in the chart, UDS UTD, advised on risks and benefits of medication, refill given     #2 a fib: chronic, controlle don current medication, INR well controlled     #3 chronic constipation: chronic, controlled     #4 insomnia: chronic, controlled, GUERO RANDALL, Robbie reviewed, controlled contract int he chart, sarabjit on current medications           This document has been electronically signed by Sunita Crawford MD on September 30, 2021 17:29 CDT

## 2021-09-17 NOTE — PROGRESS NOTES
Subjective    Mr. Avina is 81 y.o. male    Chief Complaint: BPH.    History of Present Illness  Patient with a family history of prostate cancer.  He is concerned about his PSA.  He is AUA symptom score is 14/35.  He is bothered by incomplete emptying, frequency, intermittency, urgency, weak stream, straining, nocturia x3.  He denies dysuria or gross hematuria.  Former patient of Dr. Jeff for nephrolithiasis who has failed ESWL in the past.  He is here for follow-up with imaging.      The following portions of the patient's history were reviewed and updated as appropriate: allergies, current medications, past family history, past medical history, past social history, past surgical history and problem list.    Review of Systems   Constitutional: Negative for chills and fever.   Gastrointestinal: Negative for abdominal pain, anal bleeding and blood in stool.   Genitourinary: Positive for urgency. Negative for dysuria, frequency and hematuria.         Current Outpatient Medications:   •  aspirin 81 MG EC tablet, Take 1 tablet by mouth Daily., Disp: 90 tablet, Rfl: 3  •  cyclobenzaprine (FLEXERIL) 10 MG tablet, Take 1 tablet by mouth 3 (Three) Times a Day As Needed for Muscle Spasms. (Patient taking differently: Take 10 mg by mouth 3 (Three) Times a Day As Needed for Muscle Spasms (rare).), Disp: 90 tablet, Rfl: 0  •  digoxin (LANOXIN) 250 MCG tablet, TAKE ONE TABLET BY MOUTH DAILY, Disp: 90 tablet, Rfl: 3  •  dilTIAZem CD (CARDIZEM CD) 240 MG 24 hr capsule, TAKE ONE CAPSULE BY MOUTH DAILY, Disp: 90 capsule, Rfl: 3  •  escitalopram (Lexapro) 20 MG tablet, Take 1 tablet by mouth Daily., Disp: 90 tablet, Rfl: 3  •  gabapentin (NEURONTIN) 100 MG capsule, Take 1 capsule by mouth 3 (Three) Times a Day., Disp: 90 capsule, Rfl: 0  •  Iron-Vitamins (GERITOL COMPLETE PO), Take 2 tablets by mouth Every Other Day., Disp: , Rfl:   •  oxyCODONE-acetaminophen (PERCOCET)  MG per tablet, Take 1 tablet by mouth Every 6 (Six)  Hours As Needed for Severe Pain . Must last 30 days, Disp: 100 tablet, Rfl: 0  •  temazepam (RESTORIL) 15 MG capsule, TAKE ONE CAPSULE BY MOUTH NIGHTLY AS NEEDED FOR SLEEP, Disp: 30 capsule, Rfl: 2  •  warfarin (COUMADIN) 2 MG tablet, TAKE 3 AND 0NE-HALF TABLETS BY MOUTH DAILY -- 7 MG, 7 MG, 8 MG, THEN REPEAT, Disp: 360 tablet, Rfl: 5    Past Medical History:   Diagnosis Date   • Anemia    • Anxiety     chronic   • Atrial fibrillation (CMS/HCC)    • Cancer (CMS/HCC)     non-hodgkins lymphoma   • Cholecystitis    • Colon polyp    • Colon polyp    • Congestive heart failure (CMS/HCC) 8/13/2019   • Constipation    • GERD (gastroesophageal reflux disease)    • History of ERCP 2005   • Iron deficiency anemia 11/19/2019   • Nephrolithiasis     stones removed   • Jaylyn-rectal abscess    • Rectal abscess        Past Surgical History:   Procedure Laterality Date   • CHOLECYSTECTOMY     • COLONOSCOPY  05/2012    3 yr recall   • COLONOSCOPY  09/18/2015    5 yr recall   • KIDNEY STONE SURGERY     • RECTAL SURGERY      X 2   • TOTAL HIP ARTHROPLASTY         Social History     Socioeconomic History   • Marital status:      Spouse name: Not on file   • Number of children: Not on file   • Years of education: Not on file   • Highest education level: Not on file   Tobacco Use   • Smoking status: Never Smoker   • Smokeless tobacco: Never Used   Vaping Use   • Vaping Use: Never used   Substance and Sexual Activity   • Alcohol use: No   • Drug use: No   • Sexual activity: Defer       Family History   Problem Relation Age of Onset   • Colon cancer Mother    • No Known Problems Father    • Prostate cancer Brother    • No Known Problems Daughter    • No Known Problems Son    • No Known Problems Maternal Grandmother    • No Known Problems Maternal Grandfather    • No Known Problems Paternal Grandmother    • No Known Problems Paternal Grandfather    • Prostate cancer Brother    • Colon polyps Neg Hx        Objective    Temp 98.1 °F  "(36.7 °C) (Temporal)   Ht 180.3 cm (71\")   Wt 81.3 kg (179 lb 4.8 oz)   BMI 25.01 kg/m²     Physical Exam      Renal ultrasound independent review    The renal ultrasound is available for me to review.  Treatment recommendations require an independent review.  This film has been reviewed by the radiologist to determine any non urologic abnormalities that are presents.  However, I very closely inspected the kidneys for size, symmetry, contour, parenchymal thickness, perinephric reaction, presence of calcifications, and intrarenal dilation of the collecting system.       The right kidney appears large amount of cysts    The left kidney appears stable large cyst with calcification    The bladder appears normal on thisultrsaound.  The bladder appears normal in thickness.  There no masses or stones seen on this exam.    KUB independent review    A KUB is available for me to review today.  The image is inspected for a bowel gas pattern and the general bone structure of the spine and pelvis. The kidneys are then inspected closely.  Renal outline is noted if identifiable. The kidney, collecting system, and anticipated path of the ureter are examined for calcifications including those in the true pelvis.  This film reveals:    On the right there 9 mm and 7 mm stone right upper pole.     On the left there is a single renal stone measuring 6 mm mm.         Results for orders placed or performed in visit on 09/22/21   POC Urinalysis Dipstick, Multipro    Specimen: Urine   Result Value Ref Range    Color Yellow Yellow, Straw, Dark Yellow, Janette    Clarity, UA Clear Clear    Glucose, UA Negative Negative, 1000 mg/dL (3+) mg/dL    Bilirubin Negative Negative    Ketones, UA Negative Negative    Specific Gravity  1.010 1.005 - 1.030    Blood, UA Trace (A) Negative    pH, Urine 5.5 5.0 - 8.0    Protein, POC Negative Negative mg/dL    Urobilinogen, UA Normal Normal    Nitrite, UA Negative Negative    Leukocytes Negative Negative "     Assessment and Plan    Diagnoses and all orders for this visit:    1. Benign prostatic hyperplasia, unspecified whether lower urinary tract symptoms present (Primary)  -     POC Urinalysis Dipstick, Multipro    2. Renal cyst    3. Nephrolithiasis    4. Family history of prostate cancer        The patient had a PSA drawn in February 2020 that was 1.8.  PSA prior to that in 2018 was 1.12.  PSA screen has been discontinued.     Patient of Dr. Jeff's he was last seen in March 2019 for calcium oxalate stone disease.  He has had unsuccessful ESWL requiring subsequent ureteroscopy in the past.     History of renal cyst February 2020 which revealed a Bosniak II cyst last imaged on 2/2020.     Plan for KUB renal ultrasound evaluate stone disease as well as renal cyst.  Following this we will have him return in 1 year.

## 2021-09-21 DIAGNOSIS — G47.09 OTHER INSOMNIA: ICD-10-CM

## 2021-09-21 NOTE — TELEPHONE ENCOUNTER
Rx Refill Note  Requested Prescriptions     Pending Prescriptions Disp Refills   • temazepam (RESTORIL) 15 MG capsule [Pharmacy Med Name: TEMAZEPAM 15 MG CAP 15 Capsule] 30 capsule 2     Sig: TAKE ONE CAPSULE BY MOUTH NIGHTLY AS NEEDED FOR SLEEP      Last office visit with prescribing clinician: 9/14/2021      Next office visit with prescribing clinician: 10/14/2021            Ingrid Kaba LPN  09/21/21, 15:17 CDT     TOO EARLY FOR REFILL

## 2021-09-22 ENCOUNTER — APPOINTMENT (OUTPATIENT)
Dept: GENERAL RADIOLOGY | Facility: HOSPITAL | Age: 81
End: 2021-09-22

## 2021-09-22 ENCOUNTER — APPOINTMENT (OUTPATIENT)
Dept: ULTRASOUND IMAGING | Facility: HOSPITAL | Age: 81
End: 2021-09-22

## 2021-09-22 ENCOUNTER — OFFICE VISIT (OUTPATIENT)
Dept: UROLOGY | Facility: CLINIC | Age: 81
End: 2021-09-22

## 2021-09-22 VITALS — BODY MASS INDEX: 25.1 KG/M2 | HEIGHT: 71 IN | TEMPERATURE: 98.1 F | WEIGHT: 179.3 LBS

## 2021-09-22 DIAGNOSIS — N20.0 NEPHROLITHIASIS: ICD-10-CM

## 2021-09-22 DIAGNOSIS — Z80.42 FAMILY HISTORY OF PROSTATE CANCER: ICD-10-CM

## 2021-09-22 DIAGNOSIS — N28.1 RENAL CYST: ICD-10-CM

## 2021-09-22 DIAGNOSIS — N40.0 BENIGN PROSTATIC HYPERPLASIA, UNSPECIFIED WHETHER LOWER URINARY TRACT SYMPTOMS PRESENT: Primary | ICD-10-CM

## 2021-09-22 LAB
BILIRUB BLD-MCNC: NEGATIVE MG/DL
CLARITY, POC: CLEAR
COLOR UR: YELLOW
GLUCOSE UR STRIP-MCNC: NEGATIVE MG/DL
KETONES UR QL: NEGATIVE
LEUKOCYTE EST, POC: NEGATIVE
NITRITE UR-MCNC: NEGATIVE MG/ML
PH UR: 5.5 [PH] (ref 5–8)
PROT UR STRIP-MCNC: NEGATIVE MG/DL
RBC # UR STRIP: ABNORMAL /UL
SP GR UR: 1.01 (ref 1–1.03)
UROBILINOGEN UR QL: NORMAL

## 2021-09-22 PROCEDURE — 81003 URINALYSIS AUTO W/O SCOPE: CPT | Performed by: UROLOGY

## 2021-09-22 PROCEDURE — 99214 OFFICE O/P EST MOD 30 MIN: CPT | Performed by: UROLOGY

## 2021-09-22 RX ORDER — TEMAZEPAM 15 MG/1
CAPSULE ORAL
Qty: 30 CAPSULE | Refills: 2 | OUTPATIENT
Start: 2021-09-22

## 2021-10-02 DIAGNOSIS — G62.9 NEUROPATHY: ICD-10-CM

## 2021-10-04 NOTE — TELEPHONE ENCOUNTER
Rx Refill Note  Requested Prescriptions     Pending Prescriptions Disp Refills   • gabapentin (NEURONTIN) 100 MG capsule [Pharmacy Med Name: GABAPENTIN 100 MG CAPS 100 Capsule] 90 capsule 0     Sig: TAKE ONE CAPSULE BY MOUTH THREE TIMES DAILY      Last office visit with prescribing clinician: 9/14/2021      Next office visit with prescribing clinician: 10/14/2021  Last RX: 08.16.2021     Juanpablo Tong MA  10/04/21, 09:09 CDT

## 2021-10-05 RX ORDER — GABAPENTIN 100 MG/1
CAPSULE ORAL
Qty: 90 CAPSULE | Refills: 0 | Status: SHIPPED | OUTPATIENT
Start: 2021-10-05 | End: 2021-11-15 | Stop reason: SDUPTHER

## 2021-10-14 ENCOUNTER — OFFICE VISIT (OUTPATIENT)
Dept: FAMILY MEDICINE CLINIC | Facility: CLINIC | Age: 81
End: 2021-10-14

## 2021-10-14 VITALS
SYSTOLIC BLOOD PRESSURE: 118 MMHG | OXYGEN SATURATION: 98 % | WEIGHT: 178.6 LBS | HEIGHT: 71 IN | TEMPERATURE: 97.3 F | HEART RATE: 58 BPM | BODY MASS INDEX: 25 KG/M2 | RESPIRATION RATE: 16 BRPM | DIASTOLIC BLOOD PRESSURE: 56 MMHG

## 2021-10-14 DIAGNOSIS — G47.09 OTHER INSOMNIA: ICD-10-CM

## 2021-10-14 DIAGNOSIS — G89.29 CHRONIC PAIN OF RIGHT HIP: ICD-10-CM

## 2021-10-14 DIAGNOSIS — Z79.01 CHRONIC ANTICOAGULATION: ICD-10-CM

## 2021-10-14 DIAGNOSIS — F41.9 ANXIETY: ICD-10-CM

## 2021-10-14 DIAGNOSIS — I48.21 PERMANENT ATRIAL FIBRILLATION (HCC): Primary | ICD-10-CM

## 2021-10-14 DIAGNOSIS — M25.551 CHRONIC PAIN OF RIGHT HIP: ICD-10-CM

## 2021-10-14 DIAGNOSIS — I48.20 CHRONIC ATRIAL FIBRILLATION (HCC): ICD-10-CM

## 2021-10-14 DIAGNOSIS — C85.90 LYMPHOMA IN REMISSION (HCC): ICD-10-CM

## 2021-10-14 DIAGNOSIS — K59.01 SLOW TRANSIT CONSTIPATION: ICD-10-CM

## 2021-10-14 DIAGNOSIS — G62.9 NEUROPATHY: ICD-10-CM

## 2021-10-14 LAB
INR PPP: 2.1 (ref 0.9–1.1)
PROTHROMBIN TIME: 24.7 SECONDS

## 2021-10-14 PROCEDURE — 99214 OFFICE O/P EST MOD 30 MIN: CPT | Performed by: FAMILY MEDICINE

## 2021-10-14 PROCEDURE — 36416 COLLJ CAPILLARY BLOOD SPEC: CPT | Performed by: FAMILY MEDICINE

## 2021-10-14 PROCEDURE — 85610 PROTHROMBIN TIME: CPT | Performed by: FAMILY MEDICINE

## 2021-10-14 RX ORDER — DIGOXIN 250 MCG
250 TABLET ORAL DAILY
Qty: 90 TABLET | Refills: 3 | Status: SHIPPED | OUTPATIENT
Start: 2021-10-14 | End: 2022-07-19 | Stop reason: SDUPTHER

## 2021-10-14 RX ORDER — ESCITALOPRAM OXALATE 20 MG/1
20 TABLET ORAL DAILY
Qty: 90 TABLET | Refills: 3 | Status: SHIPPED | OUTPATIENT
Start: 2021-10-14 | End: 2022-07-19 | Stop reason: SDUPTHER

## 2021-10-14 RX ORDER — OXYCODONE AND ACETAMINOPHEN 10; 325 MG/1; MG/1
1 TABLET ORAL EVERY 6 HOURS PRN
Qty: 100 TABLET | Refills: 0 | Status: SHIPPED | OUTPATIENT
Start: 2021-10-14 | End: 2021-11-15 | Stop reason: SDUPTHER

## 2021-11-03 ENCOUNTER — OFFICE VISIT (OUTPATIENT)
Dept: CARDIOLOGY | Facility: CLINIC | Age: 81
End: 2021-11-03

## 2021-11-03 VITALS
WEIGHT: 183 LBS | DIASTOLIC BLOOD PRESSURE: 62 MMHG | BODY MASS INDEX: 25.62 KG/M2 | HEIGHT: 71 IN | SYSTOLIC BLOOD PRESSURE: 130 MMHG | OXYGEN SATURATION: 98 % | HEART RATE: 67 BPM

## 2021-11-03 DIAGNOSIS — Z79.01 CHRONIC ANTICOAGULATION: ICD-10-CM

## 2021-11-03 DIAGNOSIS — I48.21 PERMANENT ATRIAL FIBRILLATION (HCC): Primary | ICD-10-CM

## 2021-11-03 PROCEDURE — 93000 ELECTROCARDIOGRAM COMPLETE: CPT | Performed by: INTERNAL MEDICINE

## 2021-11-03 PROCEDURE — 99213 OFFICE O/P EST LOW 20 MIN: CPT | Performed by: INTERNAL MEDICINE

## 2021-11-03 NOTE — PROGRESS NOTES
Subjective:     Encounter Date:11/03/2021      Patient ID: Cabrera Avina is a 81 y.o. male with a history of chronic atrial fibrillation, chronically anticoagulated, presenting today for routine follow-up.    Chief Complaint: Here today for routine follow-up    This patient presents today for routine follow-up.  He has a history of chronic atrial fibrillation and remains chronically anticoagulated with Coumadin.  He denies having any palpitations.  He denies having lightheadedness, dizziness, syncope.  No chest pain.  The patient occasionally will have some trace lower extremity edema.  He denies orthopnea, PND.  His breathing has been stable.  No cough, wheezing.  He reports some easy bruising but no significant bleeding while on Coumadin.  He notes some generalized weakness and fatigue and relates this to being more and active through the current global pandemic situation.  He notes that he has been diagnosed with neuropathy.  He notes some lower extremity numbness, burning, and tingling.    Atrial Fibrillation  Presents for follow-up visit. Symptoms are negative for chest pain, dizziness, hemodynamic instability, palpitations, shortness of breath, syncope and tachycardia. The symptoms have been stable. Past medical history includes atrial fibrillation. There are no medication compliance problems.       Current Outpatient Medications:   •  aspirin 81 MG EC tablet, Take 1 tablet by mouth Daily., Disp: 90 tablet, Rfl: 3  •  cyclobenzaprine (FLEXERIL) 10 MG tablet, Take 1 tablet by mouth 3 (Three) Times a Day As Needed for Muscle Spasms. (Patient taking differently: Take 10 mg by mouth 3 (Three) Times a Day As Needed for Muscle Spasms (rare).), Disp: 90 tablet, Rfl: 0  •  digoxin (LANOXIN) 250 MCG tablet, Take 1 tablet by mouth Daily., Disp: 90 tablet, Rfl: 3  •  dilTIAZem CD (CARDIZEM CD) 240 MG 24 hr capsule, TAKE ONE CAPSULE BY MOUTH DAILY, Disp: 90 capsule, Rfl: 3  •  escitalopram (Lexapro) 20 MG  tablet, Take 1 tablet by mouth Daily., Disp: 90 tablet, Rfl: 3  •  gabapentin (NEURONTIN) 100 MG capsule, TAKE ONE CAPSULE BY MOUTH THREE TIMES DAILY, Disp: 90 capsule, Rfl: 0  •  Iron-Vitamins (GERITOL COMPLETE PO), Take 2 tablets by mouth Every Other Day., Disp: , Rfl:   •  oxyCODONE-acetaminophen (PERCOCET)  MG per tablet, Take 1 tablet by mouth Every 6 (Six) Hours As Needed for Severe Pain . Must last 30 days, Disp: 100 tablet, Rfl: 0  •  temazepam (RESTORIL) 15 MG capsule, TAKE ONE CAPSULE BY MOUTH NIGHTLY AS NEEDED FOR SLEEP, Disp: 30 capsule, Rfl: 2  •  warfarin (COUMADIN) 2 MG tablet, TAKE 3 AND 0NE-HALF TABLETS BY MOUTH DAILY -- 7 MG, 7 MG, 8 MG, THEN REPEAT, Disp: 360 tablet, Rfl: 5    No Known Allergies    Social History     Tobacco Use   • Smoking status: Never Smoker   • Smokeless tobacco: Never Used   Substance Use Topics   • Alcohol use: No     Review of Systems   Constitutional: Positive for malaise/fatigue. Negative for fever and weight loss.   Cardiovascular: Positive for leg swelling (trace at ankles occasionally). Negative for chest pain, dyspnea on exertion, orthopnea, palpitations, paroxysmal nocturnal dyspnea and syncope.   Respiratory: Negative for cough, hemoptysis, shortness of breath and wheezing.    Hematologic/Lymphatic: Negative for adenopathy. Bruises/bleeds easily.   Gastrointestinal: Negative for abdominal pain, hematemesis, hematochezia, melena, nausea and vomiting.   Genitourinary: Negative for hematuria.   Neurological: Positive for numbness and paresthesias. Negative for dizziness, headaches and loss of balance.       ECG 12 Lead    Date/Time: 11/3/2021 1:43 PM  Performed by: Arnaldo Colin MD  Authorized by: Arnaldo Colin MD   Comparison: compared with previous ECG from 11/2/2020  Similar to previous ECG  Comparison to previous ECG: Slight axis change compared to previous  Rhythm: atrial fibrillation  Ectopy: unifocal PVCs  Rate: normal  BPM:  "62  Conduction: non-specific intraventricular conduction delay  QRS axis: left  Other findings: non-specific ST-T wave changes and poor R wave progression    Clinical impression: abnormal EKG             Objective:     Vitals reviewed.   Constitutional:       General: Not in acute distress.     Appearance: Well-developed and not in distress.   Eyes:      Extraocular Movements: Extraocular movements intact.      Pupils: Pupils are equal, round, and reactive to light.   HENT:      Head: Normocephalic and atraumatic.   Neck:      Thyroid: No thyroid mass.      Vascular: No JVD.   Pulmonary:      Effort: Pulmonary effort is normal.      Breath sounds: Normal breath sounds. No wheezing. No rhonchi. No rales.   Cardiovascular:      Normal rate. Irregularly irregular rhythm.      Murmurs: There is no murmur.      No gallop.   Pulses:     Intact distal pulses.   Edema:     Peripheral edema absent.   Abdominal:      General: Bowel sounds are normal. There is no distension.      Palpations: Abdomen is soft.      Tenderness: There is no abdominal tenderness.   Musculoskeletal: Normal range of motion.      Extremities: No clubbing present.Skin:     General: Skin is warm and dry. There is no cyanosis.      Findings: No erythema or rash.   Neurological:      Mental Status: Alert and oriented to person, place, and time.      Cranial Nerves: No cranial nerve deficit.       /62   Pulse 67   Ht 180.3 cm (71\")   Wt 83 kg (183 lb)   SpO2 98%   BMI 25.52 kg/m²     Data/Lab Review:     Lab Results   Component Value Date    INR 2.1 (A) 10/14/2021    INR 2.0 (A) 09/14/2021    INR 2.1 (A) 08/16/2021    PROTIME 24.7 10/14/2021    PROTIME 23.9 09/14/2021    PROTIME 25.6 08/16/2021         Assessment:          Diagnosis Plan   1. Permanent atrial fibrillation (HCC)  ECG 12 Lead   2. Chronic anticoagulation            Plan:       1.  Permanent atrial fibrillation: The patient continues to have good control of his heart rate and " minimal symptoms related to atrial fibrillation.  He is tolerating anticoagulant therapy well.  No changes at this time.     2.  Chronic anticoagulation: The patient's INR has been well controlled.  Most recent values are referenced above.     We will see the patient back in 12 months unless otherwise needed sooner.

## 2021-11-15 ENCOUNTER — OFFICE VISIT (OUTPATIENT)
Dept: FAMILY MEDICINE CLINIC | Facility: CLINIC | Age: 81
End: 2021-11-15

## 2021-11-15 VITALS
HEIGHT: 71 IN | OXYGEN SATURATION: 96 % | HEART RATE: 56 BPM | DIASTOLIC BLOOD PRESSURE: 60 MMHG | WEIGHT: 180 LBS | SYSTOLIC BLOOD PRESSURE: 140 MMHG | RESPIRATION RATE: 16 BRPM | BODY MASS INDEX: 25.2 KG/M2 | TEMPERATURE: 96.9 F

## 2021-11-15 VITALS
SYSTOLIC BLOOD PRESSURE: 140 MMHG | TEMPERATURE: 96.9 F | RESPIRATION RATE: 16 BRPM | DIASTOLIC BLOOD PRESSURE: 60 MMHG | WEIGHT: 180 LBS | HEART RATE: 56 BPM | OXYGEN SATURATION: 96 % | BODY MASS INDEX: 25.2 KG/M2 | HEIGHT: 71 IN

## 2021-11-15 DIAGNOSIS — G62.9 NEUROPATHY: ICD-10-CM

## 2021-11-15 DIAGNOSIS — I48.21 PERMANENT ATRIAL FIBRILLATION (HCC): ICD-10-CM

## 2021-11-15 DIAGNOSIS — G89.29 CHRONIC PAIN OF RIGHT HIP: Primary | ICD-10-CM

## 2021-11-15 DIAGNOSIS — Z00.00 MEDICARE ANNUAL WELLNESS VISIT, SUBSEQUENT: Primary | ICD-10-CM

## 2021-11-15 DIAGNOSIS — F41.9 ANXIETY: ICD-10-CM

## 2021-11-15 DIAGNOSIS — M25.551 CHRONIC PAIN OF RIGHT HIP: Primary | ICD-10-CM

## 2021-11-15 DIAGNOSIS — Z79.01 CHRONIC ANTICOAGULATION: ICD-10-CM

## 2021-11-15 DIAGNOSIS — K59.01 SLOW TRANSIT CONSTIPATION: ICD-10-CM

## 2021-11-15 DIAGNOSIS — G47.09 OTHER INSOMNIA: ICD-10-CM

## 2021-11-15 DIAGNOSIS — C85.90 LYMPHOMA IN REMISSION (HCC): ICD-10-CM

## 2021-11-15 LAB
INR PPP: 3 (ref 0.9–1.1)
PROTHROMBIN TIME: 35.6 SECONDS

## 2021-11-15 PROCEDURE — G0439 PPPS, SUBSEQ VISIT: HCPCS | Performed by: FAMILY MEDICINE

## 2021-11-15 PROCEDURE — 96160 PT-FOCUSED HLTH RISK ASSMT: CPT | Performed by: FAMILY MEDICINE

## 2021-11-15 PROCEDURE — 1125F AMNT PAIN NOTED PAIN PRSNT: CPT | Performed by: FAMILY MEDICINE

## 2021-11-15 PROCEDURE — 1170F FXNL STATUS ASSESSED: CPT | Performed by: FAMILY MEDICINE

## 2021-11-15 PROCEDURE — 99214 OFFICE O/P EST MOD 30 MIN: CPT | Performed by: FAMILY MEDICINE

## 2021-11-15 PROCEDURE — 1160F RVW MEDS BY RX/DR IN RCRD: CPT | Performed by: FAMILY MEDICINE

## 2021-11-15 PROCEDURE — 36416 COLLJ CAPILLARY BLOOD SPEC: CPT | Performed by: FAMILY MEDICINE

## 2021-11-15 PROCEDURE — 85610 PROTHROMBIN TIME: CPT | Performed by: FAMILY MEDICINE

## 2021-11-15 RX ORDER — OXYCODONE AND ACETAMINOPHEN 10; 325 MG/1; MG/1
1 TABLET ORAL EVERY 6 HOURS PRN
Qty: 100 TABLET | Refills: 0 | Status: SHIPPED | OUTPATIENT
Start: 2021-11-15 | End: 2021-12-16 | Stop reason: SDUPTHER

## 2021-11-15 RX ORDER — GABAPENTIN 100 MG/1
100 CAPSULE ORAL 3 TIMES DAILY
Qty: 90 CAPSULE | Refills: 0 | Status: SHIPPED | OUTPATIENT
Start: 2021-11-15 | End: 2021-12-16 | Stop reason: SDUPTHER

## 2021-11-15 RX ORDER — ESCITALOPRAM OXALATE 20 MG/1
TABLET ORAL
Qty: 90 TABLET | Refills: 3 | OUTPATIENT
Start: 2021-11-15

## 2021-11-15 RX ORDER — TEMAZEPAM 15 MG/1
15 CAPSULE ORAL NIGHTLY PRN
Qty: 30 CAPSULE | Refills: 2 | Status: SHIPPED | OUTPATIENT
Start: 2021-11-15 | End: 2022-02-11 | Stop reason: SDUPTHER

## 2021-11-15 NOTE — PROGRESS NOTES
Subjective cc: chronic pain   Cabrera Avina is a 81 y.o. male with hypertension, anxiety, insomnia, chronic right hip pain, history of lymphoma, and atrial fibrillation presents for follow-up.    Patient had seen GI and discuss colonoscopy options.  At this time they have decided it was greater risk than benefit and he has declined colonoscopy screening.  Reviewed GI note at which GI agreed this was a reasonable decision.  Patient has seen cardiology recently.  Reviewed cardiology note.  No significant changes.  Patient continues to take digoxin, diltiazem, and Coumadin.  Patient had been taking 7 mg daily.  He reports yesterday he took an 8 mg tablet because he was concerned it could be too low.  INR is currently at 3.0.  Discussed monitoring and dosage adjustment with patient.  He denies any signs of active bleeding.  He will continue to have this monitored closely.  He denies any symptoms of palpitations and feels as if his atrial fibrillation is currently well controlled.  He does have peripheral neuropathy which is most likely from his chronic back and hip pain.  He does use low-dose gabapentin in the evenings to have rest.  He reports this medication is working well and denies any side effects.  He does have chronic right hip pain and lower back pain.  He uses Percocet daily to help him stay active.  He denies any side effects to this medication.  Nii is reviewed.  He has been advised on risk and benefits of this medication especially in combination with temazepam and gabapentin.  Patient is in agreement and takes it responsibly.  He continues to have anxiety and depression.  This is overall controlled with Lexapro.  He felt like he needed a refill of this medication however our records indicate there should be a new refill at the pharmacy.  He continues to take temazepam at bedtime.  He understands not to take this in combination with his other narcotic prescriptions.  He is due for a pneumonia  "vaccine.  He recently had his Covid booster.  We will plan to do this at his next follow-up appointment.  He has no new concerns or complaints  He has follow-up scheduled with oncology in December.    History of Present Illness     The following portions of the patient's history were reviewed and updated as appropriate: allergies, current medications, past family history, past medical history, past social history, past surgical history and problem list.        Review of Systems   Constitutional: Negative for activity change, appetite change and unexpected weight change.   Cardiovascular: Negative for chest pain and palpitations.   Gastrointestinal: Positive for constipation.   Musculoskeletal: Positive for arthralgias, back pain, gait problem and myalgias.   Hematological: Bruises/bleeds easily.   Psychiatric/Behavioral: Positive for dysphoric mood. The patient is nervous/anxious.    All other systems reviewed and are negative.      Objective   Blood pressure 140/60, pulse 56, temperature 96.9 °F (36.1 °C), temperature source Infrared, resp. rate 16, height 180.3 cm (71\"), weight 81.6 kg (180 lb), SpO2 96 %.  Physical Exam  Vitals and nursing note reviewed.   Constitutional:       General: He is not in acute distress.     Appearance: He is well-developed. He is not diaphoretic.   HENT:      Head: Normocephalic and atraumatic.      Right Ear: External ear normal.      Left Ear: External ear normal.      Nose: Nose normal.   Eyes:      General:         Right eye: No discharge.         Left eye: No discharge.      Conjunctiva/sclera: Conjunctivae normal.   Neck:      Thyroid: No thyromegaly.      Trachea: No tracheal deviation.   Cardiovascular:      Rate and Rhythm: Normal rate. Rhythm irregular.      Pulses: Normal pulses.   Pulmonary:      Effort: Pulmonary effort is normal. No respiratory distress.      Breath sounds: Normal breath sounds. No stridor. No wheezing.   Chest:      Chest wall: No tenderness. "   Abdominal:      General: There is no distension.      Palpations: Abdomen is soft.      Tenderness: There is no abdominal tenderness.   Musculoskeletal:         General: Tenderness present.      Cervical back: Normal range of motion.      Right lower leg: No edema.      Left lower leg: No edema.   Lymphadenopathy:      Cervical: No cervical adenopathy.   Skin:     General: Skin is warm and dry.      Findings: Bruising present.   Neurological:      Mental Status: He is alert and oriented to person, place, and time.      Motor: No abnormal muscle tone.      Coordination: Coordination normal.      Gait: Gait abnormal.   Psychiatric:         Behavior: Behavior normal.         Thought Content: Thought content normal.         Judgment: Judgment normal.         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)       Mental Health    Anxiety       Musculoskeletal and Injuries    Chronic hip pain - Primary    Relevant Medications    oxyCODONE-acetaminophen (PERCOCET)  MG per tablet       Neuro    Neuropathy    Relevant Medications    gabapentin (NEURONTIN) 100 MG capsule       Sleep    Other insomnia    Relevant Medications    temazepam (RESTORIL) 15 MG capsule      Diagnoses       Codes Comments    Chronic pain of right hip    -  Primary ICD-10-CM: M25.551, G89.29  ICD-9-CM: 719.45, 338.29     Permanent atrial fibrillation (HCC)     ICD-10-CM: I48.21  ICD-9-CM: 427.31     Neuropathy      ICD-10-CM: G62.9  ICD-9-CM: 355.9     Other insomnia     ICD-10-CM: G47.09  ICD-9-CM: 780.52     Chronic anticoagulation     ICD-10-CM: Z79.01  ICD-9-CM: V58.61     Slow transit constipation     ICD-10-CM: K59.01  ICD-9-CM: 564.01     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  Anxiety and depression: Chronic, controlled.  Continue on current medication.  Refill not needed at this time.    2.  Insomnia: Chronic, stable.  Controlled with temazepam as needed.  Advised on risk and benefits of this medication including sedation and overdose especially in combination with opiate medication.  Patient understands not to take at the same time.  Advised patient it can cause sedation risk in combination with gabapentin as well.  Nii reviewed.  Controlled contract in the chart.  UDS up-to-date.  Patient has been strongly advised on risk and benefits of medication.    3.  Chronic hip pain: Chronic, stable.  Patient continues to have significant pain.  It is made manageable by use of his Percocet.  Prescription will be refilled.  Patient has been strongly advised on risk and benefits of medication including overdose and death especially in combination with benzodiazepines.  Nii reviewed.  Controlled contract in the chart.  UDS up-to-date.    4.  Constipation: Chronic, stable.  Continue on current medication.    5.  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  Has appointment scheduled with oncology next month.  Encourage patient to keep this visit.    6.  Atrial fibrillation: Chronic, stable.  Patient's vital signs are normal.  His INR is within range.  Advised he is on the upper limits of normal and he will cut back on the dosage of his Coumadin.  We will continue to monitor it closely and contact the office with any worsening symptoms or signs of active bleeding.  Reviewed cardiology note in office today.          This document  has been electronically signed by Sunita Crawford MD on November 15, 2021 16:50 CST

## 2021-11-15 NOTE — PATIENT INSTRUCTIONS
Medicare Wellness  Personal Prevention Plan of Service     Date of Office Visit:  11/15/2021  Encounter Provider:  Sunita Crawford MD  Place of Service:  [facility]  Patient Name: Cabrera Avina  :  1940    As part of the Medicare Wellness portion of your visit today, we are providing you with this personalized preventive plan of services (PPPS). This plan is based upon recommendations of the [place] Preventive Services Task Force (USPSTF) and the Advisory Committee on Immunization Practices (ACIP).    This lists the preventive care services that should be considered, and provides dates of when you are due. Items listed as completed are up-to-date and do not require any further intervention.    Health Maintenance   Topic Date Due   • ZOSTER VACCINE (2 of 2) 2017   • Pneumococcal Vaccine 65+ (1 of 2 - PPSV23) 2019   • ANNUAL WELLNESS VISIT  11/15/2022   • COLORECTAL CANCER SCREENING  2025   • TDAP/TD VACCINES (2 - Td or Tdap) 10/26/2026   • COVID-19 Vaccine  Completed   • INFLUENZA VACCINE  Completed       No orders of the defined types were placed in this encounter.      Return in about 1 year (around 11/15/2022), or if symptoms worsen or fail to improve, for Medicare Wellness.

## 2021-11-15 NOTE — TELEPHONE ENCOUNTER
Rx Refill Note  Requested Prescriptions     Pending Prescriptions Disp Refills   • escitalopram (LEXAPRO) 20 MG tablet [Pharmacy Med Name: ESCITALOPRAM 20 MG TABLET 20 Tablet] 90 tablet 3     Sig: TAKE ONE TABLET BY MOUTH DAILY      Last office visit with prescribing clinician: 10/14/2021      Next office visit with prescribing clinician: 11/15/2021     LRX:10/14/2021-1 year- refill not due      Silvana Crespo MA  11/15/21, 13:28 CST

## 2021-11-15 NOTE — PROGRESS NOTES
The ABCs of the Annual Wellness Visit  Subsequent Medicare Wellness Visit    Chief Complaint   Patient presents with   • Medicare Wellness-subsequent     Pt here for medicare wellness exam       Subjective    History of Present Illness:  Cabrera Avina is a 81 y.o. male who presents for a Subsequent Medicare Wellness Visit.    The following portions of the patient's history were reviewed and   updated as appropriate: allergies, current medications, past family history, past medical history, past social history, past surgical history and problem list.    Compared to one year ago, the patient feels his physical   health is the same.    Compared to one year ago, the patient feels his mental   health is the same.    Recent Hospitalizations:  He was not admitted to the hospital during the last year.       Current Medical Providers:  Patient Care Team:  Sunita Crawford MD as PCP - General (Family Medicine)  Arnaldo Colin MD as Cardiologist (Cardiology)  West Jeff MD as Consulting Physician (Urology)  Paul Rachel MD as Consulting Physician (Hematology and Oncology)  Ander Miguel MD as Consulting Physician (Gastroenterology)    Outpatient Medications Prior to Visit   Medication Sig Dispense Refill   • aspirin 81 MG EC tablet Take 1 tablet by mouth Daily. 90 tablet 3   • cyclobenzaprine (FLEXERIL) 10 MG tablet Take 1 tablet by mouth 3 (Three) Times a Day As Needed for Muscle Spasms. (Patient taking differently: Take 10 mg by mouth 3 (Three) Times a Day As Needed for Muscle Spasms (rare).) 90 tablet 0   • digoxin (LANOXIN) 250 MCG tablet Take 1 tablet by mouth Daily. 90 tablet 3   • dilTIAZem CD (CARDIZEM CD) 240 MG 24 hr capsule TAKE ONE CAPSULE BY MOUTH DAILY 90 capsule 3   • escitalopram (Lexapro) 20 MG tablet Take 1 tablet by mouth Daily. 90 tablet 3   • gabapentin (NEURONTIN) 100 MG capsule TAKE ONE CAPSULE BY MOUTH THREE TIMES DAILY 90 capsule 0   • Iron-Vitamins (GERITOL  COMPLETE PO) Take 2 tablets by mouth Every Other Day.     • oxyCODONE-acetaminophen (PERCOCET)  MG per tablet Take 1 tablet by mouth Every 6 (Six) Hours As Needed for Severe Pain . Must last 30 days 100 tablet 0   • temazepam (RESTORIL) 15 MG capsule TAKE ONE CAPSULE BY MOUTH NIGHTLY AS NEEDED FOR SLEEP 30 capsule 2   • warfarin (COUMADIN) 2 MG tablet TAKE 3 AND 0NE-HALF TABLETS BY MOUTH DAILY -- 7 MG, 7 MG, 8 MG, THEN REPEAT 360 tablet 5     No facility-administered medications prior to visit.       Opioid medication/s are on active medication list.  and I have evaluated his active treatment plan and pain score trends (see table).  Vitals:    11/15/21 1303   PainSc:   8   PainLoc: Generalized     I have reviewed the chart for potential of high risk medication and harmful drug interactions in the elderly.            Aspirin is on active medication list. Aspirin use is indicated based on review of current medical condition/s. Pros and cons of this therapy have been discussed today. Benefits of this medication outweigh potential harm.  Patient has been encouraged to continue taking this medication.  .      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  ACP discussion was held with the patient during this visit. Patient has an advance directive (not in EMR), copy requested.          Objective    Vitals:    11/15/21 1303   BP: 140/60   BP Location: Left arm   Patient Position: Sitting   Cuff Size: Adult   Pulse: 56   Resp:  "16   Temp: 96.9 °F (36.1 °C)   TempSrc: Infrared   SpO2: 96%   Weight: 81.6 kg (180 lb)   Height: 180.3 cm (71\")  Comment: Per patient   PainSc:   8   PainLoc: Generalized     BMI Readings from Last 1 Encounters:   11/15/21 25.10 kg/m²   BMI is above normal parameters. Recommendations include: educational material, exercise counseling and nutrition counseling    Does the patient have evidence of cognitive impairment? No    Physical Exam            HEALTH RISK ASSESSMENT    Smoking Status:  Social History     Tobacco Use   Smoking Status Never Smoker   Smokeless Tobacco Never Used     Alcohol Consumption:  Social History     Substance and Sexual Activity   Alcohol Use No     Fall Risk Screen:    STEADI Fall Risk Assessment was completed, and patient is at LOW risk for falls.Assessment completed on:11/15/2021    Depression Screening:  PHQ-2/PHQ-9 Depression Screening 11/15/2021   Little interest or pleasure in doing things 0   Feeling down, depressed, or hopeless 1   Trouble falling or staying asleep, or sleeping too much -   Feeling tired or having little energy -   Poor appetite or overeating -   Feeling bad about yourself - or that you are a failure or have let yourself or your family down -   Trouble concentrating on things, such as reading the newspaper or watching television -   Moving or speaking so slowly that other people could have noticed. Or the opposite - being so fidgety or restless that you have been moving around a lot more than usual -   Thoughts that you would be better off dead, or of hurting yourself in some way -   Total Score 1   If you checked off any problems, how difficult have these problems made it for you to do your work, take care of things at home, or get along with other people? -       Health Habits and Functional and Cognitive Screening:  Functional & Cognitive Status 11/15/2021   Do you have difficulty preparing food and eating? No   Do you have difficulty bathing yourself, getting " dressed or grooming yourself? No   Do you have difficulty using the toilet? No   Do you have difficulty moving around from place to place? No   Do you have trouble with steps or getting out of a bed or a chair? No   Current Diet Well Balanced Diet   Dental Exam Up to date   Eye Exam Not up to date   Exercise (times per week) 6 times per week   Current Exercises Include Walking   Current Exercise Activities Include -   Do you need help using the phone?  No   Are you deaf or do you have serious difficulty hearing?  No   Do you need help with transportation? No   Do you need help shopping? No   Do you need help preparing meals?  No   Do you need help with housework?  No   Do you need help with laundry? No   Do you need help taking your medications? No   Do you need help managing money? No   Do you ever drive or ride in a car without wearing a seat belt? No   Have you felt unusual stress, anger or loneliness in the last month? No   Who do you live with? Spouse   If you need help, do you have trouble finding someone available to you? No   Have you been bothered in the last four weeks by sexual problems? No   Do you have difficulty concentrating, remembering or making decisions? No       Age-appropriate Screening Schedule:  Refer to the list below for future screening recommendations based on patient's age, sex and/or medical conditions. Orders for these recommended tests are listed in the plan section. The patient has been provided with a written plan.    Health Maintenance   Topic Date Due   • ZOSTER VACCINE (2 of 2) 02/13/2017   • INFLUENZA VACCINE  08/01/2021   • TDAP/TD VACCINES (2 - Td or Tdap) 10/26/2026              Assessment/Plan   CMS Preventative Services Quick Reference  Risk Factors Identified During Encounter  Cardiovascular Disease  Chronic Pain   Depression/Dysphoria  Immunizations Discussed/Encouraged (specific Immunizations; Influenza, Pneumococcal 23 and Shingrix  Polypharmacy  Tobacco Use/Dependance  (use dotphrase .tobaccocessation for documentation)  The above risks/problems have been discussed with the patient.  Follow up actions/plans if indicated are seen below in the Assessment/Plan Section.  Pertinent information has been shared with the patient in the After Visit Summary.    Diagnoses and all orders for this visit:    1. Medicare annual wellness visit, subsequent (Primary)        Follow Up:   Return in about 1 year (around 11/15/2022), or if symptoms worsen or fail to improve, for Medicare Wellness.     An After Visit Summary and PPPS were made available to the patient.      I spent 25 minutes caring for Cabrera on this date of service. This time includes time spent by me in the following activities:preparing for the visit, reviewing tests, obtaining and/or reviewing a separately obtained history, performing a medically appropriate examination and/or evaluation , counseling and educating the patient/family/caregiver, ordering medications, tests, or procedures, referring and communicating with other health care professionals , documenting information in the medical record and independently interpreting results and communicating that information with the patient/family/caregiver             This document has been electronically signed by Sunita Crawford MD on November 15, 2021 13:18 CST

## 2021-11-17 DIAGNOSIS — G47.09 OTHER INSOMNIA: ICD-10-CM

## 2021-11-18 RX ORDER — TEMAZEPAM 15 MG/1
CAPSULE ORAL
Qty: 30 CAPSULE | Refills: 2 | OUTPATIENT
Start: 2021-11-18

## 2021-11-18 NOTE — TELEPHONE ENCOUNTER
Rx Refill Note  Requested Prescriptions     Pending Prescriptions Disp Refills   • temazepam (RESTORIL) 15 MG capsule [Pharmacy Med Name: TEMAZEPAM 15 MG CAP 15 Capsule] 30 capsule 2     Sig: TAKE ONE CAPSULE BY MOUTH NIGHTLY AS NEEDED FOR SLEEP **MAY MAKE DROWSY**      Last office visit with prescribing clinician: 11/15/2021      Next office visit with prescribing clinician: 12/16/2021  Last refill: 11/15/2021    Catherine Young MA  11/18/21, 10:13 CST

## 2021-12-09 ENCOUNTER — LAB (OUTPATIENT)
Dept: LAB | Facility: HOSPITAL | Age: 81
End: 2021-12-09

## 2021-12-09 DIAGNOSIS — C85.90 LYMPHOMA IN REMISSION (HCC): ICD-10-CM

## 2021-12-09 LAB
ALBUMIN SERPL-MCNC: 4 G/DL (ref 3.5–5.2)
ALBUMIN/GLOB SERPL: 1.1 G/DL
ALP SERPL-CCNC: 73 U/L (ref 39–117)
ALT SERPL W P-5'-P-CCNC: 11 U/L (ref 1–41)
ANION GAP SERPL CALCULATED.3IONS-SCNC: 7 MMOL/L (ref 5–15)
AST SERPL-CCNC: 18 U/L (ref 1–40)
B2 MICROGLOB SERPL-MCNC: 2.4 MG/L (ref 0.8–2.2)
BASOPHILS # BLD AUTO: 0.05 10*3/MM3 (ref 0–0.2)
BASOPHILS NFR BLD AUTO: 1.2 % (ref 0–1.5)
BILIRUB SERPL-MCNC: 0.6 MG/DL (ref 0–1.2)
BUN SERPL-MCNC: 13 MG/DL (ref 8–23)
BUN/CREAT SERPL: 13.8 (ref 7–25)
CALCIUM SPEC-SCNC: 9.3 MG/DL (ref 8.6–10.5)
CHLORIDE SERPL-SCNC: 103 MMOL/L (ref 98–107)
CO2 SERPL-SCNC: 30 MMOL/L (ref 22–29)
CREAT SERPL-MCNC: 0.94 MG/DL (ref 0.76–1.27)
DEPRECATED RDW RBC AUTO: 47.4 FL (ref 37–54)
EOSINOPHIL # BLD AUTO: 0.23 10*3/MM3 (ref 0–0.4)
EOSINOPHIL NFR BLD AUTO: 5.6 % (ref 0.3–6.2)
ERYTHROCYTE [DISTWIDTH] IN BLOOD BY AUTOMATED COUNT: 13.2 % (ref 12.3–15.4)
FERRITIN SERPL-MCNC: 326.2 NG/ML (ref 30–400)
FOLATE SERPL-MCNC: 8.53 NG/ML (ref 4.78–24.2)
GFR SERPL CREATININE-BSD FRML MDRD: 77 ML/MIN/1.73
GLOBULIN UR ELPH-MCNC: 3.5 GM/DL
GLUCOSE SERPL-MCNC: 114 MG/DL (ref 65–99)
HCT VFR BLD AUTO: 43.2 % (ref 37.5–51)
HGB BLD-MCNC: 14.2 G/DL (ref 13–17.7)
IMM GRANULOCYTES # BLD AUTO: 0.01 10*3/MM3 (ref 0–0.05)
IMM GRANULOCYTES NFR BLD AUTO: 0.2 % (ref 0–0.5)
IRON 24H UR-MRATE: 95 MCG/DL (ref 59–158)
IRON SATN MFR SERPL: 28 % (ref 20–50)
LDH SERPL-CCNC: 203 U/L (ref 135–225)
LYMPHOCYTES # BLD AUTO: 0.93 10*3/MM3 (ref 0.7–3.1)
LYMPHOCYTES NFR BLD AUTO: 22.5 % (ref 19.6–45.3)
MCH RBC QN AUTO: 31.8 PG (ref 26.6–33)
MCHC RBC AUTO-ENTMCNC: 32.9 G/DL (ref 31.5–35.7)
MCV RBC AUTO: 96.6 FL (ref 79–97)
MONOCYTES # BLD AUTO: 0.49 10*3/MM3 (ref 0.1–0.9)
MONOCYTES NFR BLD AUTO: 11.8 % (ref 5–12)
NEUTROPHILS NFR BLD AUTO: 2.43 10*3/MM3 (ref 1.7–7)
NEUTROPHILS NFR BLD AUTO: 58.7 % (ref 42.7–76)
NRBC BLD AUTO-RTO: 0 /100 WBC (ref 0–0.2)
PLATELET # BLD AUTO: 208 10*3/MM3 (ref 140–450)
PMV BLD AUTO: 9.8 FL (ref 6–12)
POTASSIUM SERPL-SCNC: 3.9 MMOL/L (ref 3.5–5.2)
PROT SERPL-MCNC: 7.5 G/DL (ref 6–8.5)
RBC # BLD AUTO: 4.47 10*6/MM3 (ref 4.14–5.8)
SODIUM SERPL-SCNC: 140 MMOL/L (ref 136–145)
TIBC SERPL-MCNC: 337 MCG/DL (ref 298–536)
TRANSFERRIN SERPL-MCNC: 226 MG/DL (ref 200–360)
VIT B12 BLD-MCNC: 345 PG/ML (ref 211–946)
WBC NRBC COR # BLD: 4.14 10*3/MM3 (ref 3.4–10.8)

## 2021-12-09 PROCEDURE — 83615 LACTATE (LD) (LDH) ENZYME: CPT

## 2021-12-09 PROCEDURE — 36415 COLL VENOUS BLD VENIPUNCTURE: CPT

## 2021-12-09 PROCEDURE — 80053 COMPREHEN METABOLIC PANEL: CPT

## 2021-12-09 PROCEDURE — 82728 ASSAY OF FERRITIN: CPT

## 2021-12-09 PROCEDURE — 85025 COMPLETE CBC W/AUTO DIFF WBC: CPT

## 2021-12-09 PROCEDURE — 83540 ASSAY OF IRON: CPT

## 2021-12-09 PROCEDURE — 84466 ASSAY OF TRANSFERRIN: CPT

## 2021-12-09 PROCEDURE — 82746 ASSAY OF FOLIC ACID SERUM: CPT

## 2021-12-09 PROCEDURE — 82232 ASSAY OF BETA-2 PROTEIN: CPT

## 2021-12-09 PROCEDURE — 82607 VITAMIN B-12: CPT

## 2021-12-11 NOTE — PROGRESS NOTES
[facility] HEMATOLOGY AND ONCOLOGY  [address]  [facility] [address] [phone]    Patient Name: Cabrera Avina  Encounter Date: 12/16/2021  YOB: 1940  Patient Number: [id number]       REASON FOR FOLLOWUP:  Mr. Cabrera Avina is a 81-year-old male who returns in follow-up of intermediate grade B-cell lymphoma.  He is seen 198 months following the completion of R-CHOP chemotherapy and 146 months after the completion of Rituxan maintenance.  He is also followed for an iron deficiency anemia and for chronic anticoagulation. He is seen 25 months after the most recent dose of Injectafer.  The patient is here alone.     I have reviewed the HPI and verified with the patient the accuracy of it. No changes to interval history since the information was documented. Paul Rachel MD 12/16/21       DIAGNOSTIC ABNORMALITIES: B-cell Lymphoma.   1. Periportal lymph node biopsy, 02/01/2005. Findings consistent with large B-cell lymphoma with an intermediate to high proliferative rate.  2. CT of the chest, 02/03/2005. Mediastinal, left hilar, and upper abdominal lymphadenopathy consistent with the patient's history of lymphoma.    PREVIOUS INTERVENTIONS: B-cell lymphoma   1. Definitive Rituxan, 375 mg/m2, 02/05/2005 through 02/25/2005. Four weeks completed.  2. Definitive R-CHOP, from 01/04/2005 through 07/07/2005. Five cycles completed. Cycle #2 delayed by admission for management of deep perirectal abscess.  3. Maintenance Rituxan (every 3 months), 10/14/2005 through 11/11/2007. Six cycles completed.    DIAGNOSTIC ABNORMALITIES: Anemia, iron deficiency   1. Anemia substrates on 05/27/2008: Iron 43, %saturation 12, TIBC 366, UIBC 323, ferritin 27, vitamin B12 of 295, folate 7.2.    PREVIOUS INTERVENTIONS: Anemia.   1. INFeD, 1000 mg IVPB, 06/04/2008.  2. INFeD, 1000 mg IVPB, 09/02/2010 and 09/09/2010.  3. INFeD 500 mg, 08/25/2016;  11/28/2016.  4. Injectafer 750 mg IV, 11/20/2019    Problem List Items Addressed This Visit     None        Oncology/Hematology History Overview Note   DIAGNOSTIC ABNORMALITIES: B-cell Lymphoma.  Periportal lymph node biopsy, 02/01/2005.  Findings consistent with large B-cell lymphoma with an intermediate to high proliferative rate.  CT of the chest, 02/03/2005.   Mediastinal, left hilar, and upper abdominal lymphadenopathy consistent with the patient's history of lymphoma.  PREVIOUS INTERVENTIONS:   B-cell lymphoma  Definitive Rituxan, 375 mg/m2, 02/05/2005 through 02/25/2005. Four weeks completed.  Definitive R-CHOP, from 01/04/2005 through 07/07/2005.  Five cycles completed.  Cycle #2 delayed by admission for management of deep perirectal abscess.  Maintenance Rituxan (every 3 months), 10/14/2005 through 11/11/2007.  Six cycles completed.  DIAGNOSTIC ABNORMALITIES:   Anemia, iron deficiency  Anemia substrates on 05/27/2008: Iron 43, %saturation 12, TIBC 366, UIBC 323, ferritin 27, vitamin B12 of 295, folate 7.2.  PREVIOUS INTERVENTIONS:   Anemia.  INFeD, 1000 mg IVPB, 06/04/2008.  INFeD, 1000 mg IVPB, 09/02/2010 and 09/09/2010.  INFeD 500 mg, 08/25/2016; 11/28/2016.     Lymphoma in remission (HCC)   4/28/2016 Initial Diagnosis    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)         PAST MEDICAL HISTORY:  ALLERGIES:  No Known Allergies  CURRENT MEDICATIONS:  Outpatient Encounter Medications as of 12/16/2021   Medication Sig Dispense Refill   • aspirin 81 MG EC tablet Take 1 tablet by mouth Daily. 90 tablet 3   • cyclobenzaprine (FLEXERIL) 10 MG tablet Take 1 tablet by mouth 3 (Three) Times a Day As Needed for Muscle Spasms. (Patient taking differently: Take 10 mg by mouth 3 (Three) Times a Day As Needed for Muscle Spasms (rare).) 90 tablet 0   • digoxin (LANOXIN) 250 MCG tablet Take 1 tablet by mouth Daily. 90 tablet 3   • dilTIAZem CD (CARDIZEM CD) 240 MG 24 hr capsule TAKE ONE CAPSULE BY MOUTH DAILY 90 capsule 3   •  escitalopram (Lexapro) 20 MG tablet Take 1 tablet by mouth Daily. 90 tablet 3   • gabapentin (NEURONTIN) 100 MG capsule Take 1 capsule by mouth 3 (Three) Times a Day. 90 capsule 0   • Iron-Vitamins (GERITOL COMPLETE PO) Take 2 tablets by mouth Every Other Day.     • oxyCODONE-acetaminophen (PERCOCET)  MG per tablet Take 1 tablet by mouth Every 6 (Six) Hours As Needed for Severe Pain . Must last 30 days 100 tablet 0   • temazepam (RESTORIL) 15 MG capsule Take 1 capsule by mouth At Night As Needed for Sleep or Anxiety. 30 capsule 2   • warfarin (COUMADIN) 2 MG tablet TAKE 3 AND 0NE-HALF TABLETS BY MOUTH DAILY -- 7 MG, 7 MG, 8 MG, THEN REPEAT 360 tablet 5     No facility-administered encounter medications on file as of 12/16/2021.     ADULT ILLNESSES:   History of malignant lymphoma (situation) ( ICD-10:Z85.72 ;Personal history of non-Hodgkin lymphomas   Adenomatous colonic polyps ( ICD-10:D12.6 ;Benign neoplasm of colon, unspecified   Anemia ( ICD-10:D64.9 ;Anemia, unspecified   Anxiety ( chronic; ICD-10:F41.9 ;Anxiety disorder, unspecified )   Atrial fibrillation ( on chronic anticoagulation; ICD-10:I48.91 ;Unspecified atrial fibrillation )   Degenerative joint disease ( ICD-10:M16.10 ;Unilateral primary osteoarthritis, unspecified hip   Drug intolerance ( oral iron; ICD-10:T45.4x5D ;Adverse effect of iron and its compounds, subsequent encounter )   Hypertension ( ICD-10:I10 ;Essential (primary) hypertension   Iron deficiency anemia ( ICD-10:D50.9 ;Iron deficiency anemia, unspecified   Microscopic hematuria ( ICD-10:R31.29 ;Other microscopic hematuria   Nephrolithiasis ( ICD-10:N20.0 ;Calculus of kidney   Neuropathy ( ICD-10:G62.9 ;Polyneuropathy, unspecified   Orthostatic hypotension ( ICD-10:I95.1 ;Orthostatic hypotension   Perirectal abscess ( deep, severe; ICD-10:K61.1 ;Rectal abscess )   Polycystic kidney disease ( ICD-10:Q61.3 ;Polycystic kidney, unspecified   Vitamin B12 deficiency ( ICD-10:E53.8  ;Deficiency of other specified B group vitamins    SURGERIES:   Punch biopsy of skin lesion, right lower lip, 01/21/2015: 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 Dr. Hatch   Drainage of abscess, recurrent rectal abscess, 06/01/2006   Laparoscopic cholecystectomy and lymph node biopsy   Hip replacement, right, 06/15/2010      ADULT ILLNESSES:  Patient Active Problem List   Diagnosis Code   • Slow transit constipation K59.01   • Gastroesophageal reflux disease without esophagitis K21.9   • Lymphoma in remission (Formerly [facility]) C85.90   • Anxiety F41.9   • Chronic hip pain M25.559, G89.29   • Permanent atrial fibrillation (Formerly [facility]) I48.21   • Other insomnia G47.09   • Adenomatous polyp of colon D12.6   • Tobacco use Z72.0   • Chronic anticoagulation Z79.01   • PAD (peripheral artery disease) (Former[facility]) I73.9   • Congestive heart failure (Formerly [facility]) I50.9   • BMI 24.0-24.9, adult Z68.24   • Leukopenia D72.819   • Iron deficiency anemia D50.9   • Neuropathy G62.9     SURGERIES:  Past Surgical History:   Procedure Laterality Date   • CHOLECYSTECTOMY     • COLONOSCOPY  05/2012    3 yr recall   • COLONOSCOPY  09/18/2015    5 yr recall   • KIDNEY STONE SURGERY     • RECTAL SURGERY      X 2   • TOTAL HIP ARTHROPLASTY       HEALTH MAINTENANCE ITEMS:  Health Maintenance Due   Topic Date Due   • ZOSTER VACCINE (2 of 2) 02/13/2017   • Pneumococcal Vaccine 65+ (1 of 2 - PPSV23) 12/31/2019   • COVID-19 Vaccine (3 - Booster) 09/10/2021       <no information>  Last Completed Colonoscopy          COLORECTAL CANCER SCREENING (COLONOSCOPY - Every 10 Years) Next due on 12/1/2025 12/01/2015  COLONOSCOPY (Done)    09/18/2015  SCANNED - COLONOSCOPY    05/31/2012  SCANNED - COLONOSCOPY              Immunization History   Administered Date(s) Administered   • COVID-19 (MODERNA) 1st, 2nd, 3rd Dose Only 02/14/2021, 03/10/2021   • FLUAD TRI 65YR+ 11/05/2019  "  • Fluad Quad 65+ 11/05/2019, 10/09/2020   • Pneumococcal Conjugate 13-Valent (PCV13) 11/05/2019     Last Completed Mammogram     This patient has no relevant Health Maintenance data.            FAMILY HISTORY:  Family History   Problem Relation Age of Onset   • Colon cancer Mother    • No Known Problems Father    • Prostate cancer Brother    • No Known Problems Daughter    • No Known Problems Son    • No Known Problems Maternal Grandmother    • No Known Problems Maternal Grandfather    • No Known Problems Paternal Grandmother    • No Known Problems Paternal Grandfather    • Prostate cancer Brother    • Colon polyps Neg Hx      SOCIAL HISTORY:  Social History     Socioeconomic History   • Marital status:    Tobacco Use   • Smoking status: Never Smoker   • Smokeless tobacco: Never Used   Vaping Use   • Vaping Use: Never used   Substance and Sexual Activity   • Alcohol use: No   • Drug use: No   • Sexual activity: Defer       REVIEW OF SYSTEMS:  PS:  ECOG 1-2.   Constitutional:  His appetite is fair, \"I eat enough for me.\"  His energy remains low to fair.  \"As usual.\"  He is as active as his norm and manages all his ADLs including chores, running errands, and driving.  Says his hip and lower back pains still inhibit his activities inspite of hip replacement.  Says he is still walking his usual 1/2 mile weather permitting.  He has no fevers, chills, or drenching night sweats.  He has no weight changes (had lost 10 pounds at his 2 prior visits-had gained 9 pounds at his visit prior to that) since his last visit. Still says he wants to stay around 175-180 pounds.  He sleeps more restfully on temazepam.  Ear/Nose/Throat/Mouth:   The patient reports no ear pains, but has lingering sinus symptoms, but no sore throat, nosebleeds.  No headaches. The patient denies any hoarseness.   Ocular:   The patient reports no eye pain, significant change in visual acuity, double vision, or blurry vision.   Respiratory:  He " "reports no chronic cough, shortness of breathing, phlegm production, or chest wall pain.  Cardiovascular:  He reports no exertional chest pain, chest pressure, or chest heaviness.  He reports no claudication. He reports no palpitations or symptomatic orthostasis.  Gastrointestinal:  He has no pica, dysphagia, nausea, vomiting, postprandial abdominal pain, bloating, cramping, change in bowel habits, or discoloration of the stool.  He has had no rectal bleeding.  He has intermittent bouts of constipation modulated by daily stool softeners (Dulcolax).  Says he is still using supplemental fiber daily.  Says he moves his bowels every 3 days, \"ever since chemo years ago.\" He has no diarrhea. Last colonoscopy, 09/2015.  Polyps. Repeat 5 years. Is oral iron intolerant (worsening constipation).  Genitourinary:    He reports no urinary burning, frequency, dribbling, or discoloration.  He reports no difficulty controlling his bladder. \"Same gotta go when I gotta go.\" He reports no need to urinate frequently through the night.  Musculoskeletal:  He continues to have chronic trouble with right hip pain.  \"usual, 24/7.\"  He still uses maintenance Percocet, up to 1-2 doses daily.  He reports no unexplained arthralgias, myalgias, but has noted more frequent bouts of nighttime leg cramping.  Extremities:  He reports no trouble with fluid retention or significant leg swelling.  Endocrine:  He reports no problems with excess thirst, excessive urination, vasomotor instability, or unexplained fatigue.  Heme/Lymphatic:  He reports no bleeding, petechial rashes, or swollen glands.  Skin:  He reports no itching, rashes, or lesions which won't heal.  Neuro:  He reports night-time stocking-glove pain in his lower extremities.  \"Same.\" Says he had previously been seen by Dr. Kinney previously. \"He didn't find any problems.\"  Says Dr. Rosario says he does not have significant vascular disease requiring surgery.  He reports no loss of " "consciousness, seizures, fainting spells, or dizziness. He reports no weakness of his face, arms, or legs.  He reports no difficulty with speech.  He reports no tremors.  Psych:  He seems generally satisfied with life.  He reports no depression.  He reports no mood swings.        VITAL SIGNS: /84   Pulse 53   Temp 96.2 °F (35.7 °C)   Resp 16   Ht 180.3 cm (71\")   Wt 79.7 kg (175 lb 9.6 oz)   SpO2 95%   BMI 24.49 kg/m²       PHYSICAL EXAMINATION:  General:  He is a pleasant, cooperative, slender, and fairly well-kept elderly male in no distress.  He arrived in the exam room ambulatory. He appears to be his stated age.  His skin color is pale. ECOG 1  Head/Neck:  The patient is anicteric and atraumatic.  He is wearing a surgical mask today. The neck is supple without evidence of jugular venous distention or cervical adenopathy.  Eyes:  The pupils are equal, round, and reactive to light.  The extraocular movements are full.  There is no scleral jaundice or erythema.  Chest:  The respiratory efforts are normal and unhindered.  The chest is clear to auscultation.  There are no wheezes, rales, or asymmetry of breath sounds.  The port in the right anterior chest wall has been removed and the site has healed. No tenderness or erythema.   Cardiac/Vascular:  The patient has an irregularly irregular cardiac rate and rhythm without murmurs.  The peripheral pulses are equal and full.  Abdomen:  The belly is soft and flat.  There is no rebound or guarding. There is no organomegaly, mass-effect, or tenderness.  Bowel sounds are normal.  Ortho:  There is no overt joint stiffness.  There is subtle foreshortening of height.  There are no overt joint changes which suggest degenerative arthritis.  Extremities:  There is no evidence of cyanosis, clubbing, with trace bipedal edema.  Rheumatologic:  There is no overt evidence of rheumatoid deformities of the hands.  There is no sausaging of the fingers.  There is no sign of " active synovitis.  Cutaneous:  Few areas of senile purpura are present on his forearms.  There are no overt rashes, disseminated lesions, purpura, or petechiae.  Lymphatics:  There is no evidence of adenopathy in the cervical, supraclavicular, or axillary areas.  Neurologic:  The patient is alert, oriented, cooperative, and pleasant.  He is appropriately conversant.  He ambulated into the exam room and transferred from chair to exam table aided.  There is no overt dysfunction of the motor, sensory, or cerebellar systems.  Psych:  Impatient.  Mood and affect are appropriate for circumstance.  Eye contact is appropriate.        LABS    Lab Results - Last 18 Months   Lab Units 12/09/21  1105 07/19/21  1325 03/15/21  1238 11/02/20  1330 06/30/20  1241   HEMOGLOBIN g/dL 14.2 13.9 14.2 14.0 13.8   HEMATOCRIT % 43.2 42.7 44.0 42.1 42.6   MCV fL 96.6 94.5 93.0 92.7 93.2   WBC 10*3/mm3 4.14 4.11 3.87 4.98 5.56   RDW % 13.2 13.8 13.5 13.4 13.1   MPV fL 9.8 10.0 9.7 9.6 9.9   PLATELETS 10*3/mm3 208 184 202 192 216   IMM GRAN % % 0.2 0.2 0.3 0.2 0.2   NEUTROS ABS 10*3/mm3 2.43 2.56 2.52 3.28 3.78   LYMPHS ABS 10*3/mm3 0.93 0.78 0.64* 0.91 1.11   MONOS ABS 10*3/mm3 0.49 0.53 0.50 0.55 0.49   EOS ABS 10*3/mm3 0.23 0.17 0.17 0.16 0.13   BASOS ABS 10*3/mm3 0.05 0.06 0.03 0.07 0.04   IMMATURE GRANS (ABS) 10*3/mm3 0.01 0.01 0.01 0.01 0.01   NRBC /100 WBC 0.0 0.0 0.0 0.0 0.0       Lab Results - Last 18 Months   Lab Units 12/09/21  1105 07/19/21  1325 03/15/21  1238 11/02/20  1330 06/30/20  1241   GLUCOSE mg/dL 114* 103* 103* 113* 125*   SODIUM mmol/L 140 137 138 138 139   POTASSIUM mmol/L 3.9 4.4 4.0 4.1 4.2   CO2 mmol/L 30.0* 28.0 30.0* 28.0 27.0   CHLORIDE mmol/L 103 104 98 102 101   ANION GAP mmol/L 7.0 5.0 10.0 8.0 11.0   CREATININE mg/dL 0.94 0.96 0.99 0.98 1.05   BUN mg/dL 13 10 13 19 15   BUN / CREAT RATIO  13.8 10.4 13.1 19.4 14.3   CALCIUM mg/dL 9.3 9.2 9.4 9.1 9.4   EGFR IF NONAFRICN AM mL/min/1.73 77 75 73 74 68   ALK PHOS  U/L 73 67 83 65 61   TOTAL PROTEIN g/dL 7.5 6.9 7.5 6.9 7.2   ALT (SGPT) U/L 11 10 13 12 9   AST (SGOT) U/L 18 19 20 18 18   BILIRUBIN mg/dL 0.6 0.6 0.5 0.6 0.5   ALBUMIN g/dL 4.00 3.90 3.70 3.90 3.80   GLOBULIN gm/dL 3.5 3.0 3.8 3.0 3.4       Lab Results - Last 18 Months   Lab Units 12/09/21  1105 07/19/21  1325 03/15/21  1238 11/02/20  1330 06/30/20  1241   LDH U/L 203 179 184 223 184       Lab Results - Last 18 Months   Lab Units 12/09/21  1105 07/19/21  1325 03/15/21  1238 11/02/20  1330 06/30/20  1241   IRON mcg/dL 95 86 84 97 83   TIBC mcg/dL 337 322 359 338 331   IRON SATURATION % 28 27 23 29 25   FERRITIN ng/mL 326.20 289.50 277.60 268.30 302.70   FOLATE ng/mL 8.53 11.20 9.74 14.00 13.10       ASSESSMENT:  1.    Intermediate to high-grade lymphoma.  History of. No evidence of disease.  05/16/2005:          Stage IV-E.          Baseline Tumor [place]:   Mediastinum, left hilum, upper abdomen, and liver (clinically).          Complications of Tumor:   Hyperbilirubinemia. Improved.          Response to Therapy:  Clinical remission per CT scan 05/16/2005, 11/2011 and 02/10/2020.          -02/03/2020-CT chest without contrast.  Impression: Atheromatous disease of the thoracic aorta and coronary arteries.  Mild cardiomegaly.  Borderline pulmonary artery dilatation.  No infiltrate or effusion.          -02/03/2020-CT abdomen and pelvis with contrast.  Impression: Prior cholecystectomy.  Mild prominence of biliary tree felt to be related.  Complex renal cyst on the left is stable in size with septation and calcification.  Dominant mid renal cyst on the right side is slightly larger.  Upper pole of the right renal collecting system is mildly dilated and contains 2 stones measuring 8 to 9 mm.  5 mm nonobstructive stone in the left mid kidney.  Ureter is nondilated.  Prostate mildly enlarged 5.1 cm.  Moderate stool in the colon.  No small bowel distention.          Prognosis:  Fair.          IPI at baseline:  3.  2.     Normocytic anemia from iron deficiency and chronic disease. Last Injectafer, 11/20/2019.    --Hemoglobin, 13.9 on 07/19/2021 (prior range:  Hgb 12.9 - 14.7).  3.    Leukopenia, NOS. normal since 02/07/2020   4.    Iron deficiency and oral iron intolerant (constipation).  Repleted since receipt of INFeD  5.    Variable B2M.    --Stable, 2.4 on 12/09/2021 (prior range:  1.6 - 3.3).  6.    Adenomatous colon polyp, colonoscopy 09/18/2015.  7.    Atrial fibrillation on chronic anticoagulation.  8.    Microscopic hematuria.  Management per Dr. Mayes.  9.    Anxiety, chronic, stable on medications (Celexa).  10.  Lower extremity neuropathy.  Mild.  Not a problem of late.  11.  Hypertension.  Fair control on Cardizem.  12.  Low B12, 203 on 08/17/2017.  Repleted.  13.  Peripheral vascular disease.  Arterial studies and has been seen by Dr. Rosario of vascular surgery.              a.    Ultrasound ankle/brachial performed on 07/17/2018 at [facility]. Suspected atherosclerosis in the lower extremity arteries, supported by noncompressible posterior tibialis and dorsalis pedis arteries.              b.    Abdominal aortic ultrasound performed on 08/10/2018 at [facility].  No abdominal aortic aneurysm.  14.  COVID vaccination # 2, 03/10/2021    PLAN:  1.      Re:  Apprised of labs from 12/09/2021 with low normal WBC, normal diff, normal hemoglobin (resolution of anemia), normoglycemia with normal CMP, normal LDH, normal B2M, repleted serum iron, repleted (> 20%) Fe sat, repleted (> 100) ferritin, repleted folate, and repleted B12.   2.    Stay off B12 shots.   3.    Previously apprised of CT scans, 02/10/2020 - Large renal cyst (stable since 2011).  Stable. Otherwise GURPREET  4.    Continue other currently identified medications.  5.    Continue Coumadin.  PT/INR followed by Dr. Crawford.  6.    Continue ongoing management per primary care physician and other specialists.    7.    Return to office in 4 months with  pre-office CBC with differential, iron, TIBC, iron saturation, ferritin, B12, folate, CMP, B2M, and LDH.      MEDICAL DECISION MAKING: Low Complexity  AMOUNT OF DATA: Moderate    I spent 20 minutes caring for Cabrera on this date of service. This time includes time spent by me in the following activities: preparing for the visit, reviewing tests, performing a medically appropriate examination and/or evaluation, counseling and educating the patient/family/caregiver, ordering medications, tests, or procedures and documenting information in the medical record.       cc:   Azar Mayes MD          [facility]          Sunita Crawford MD - [place]          Jorge Alberto Rosario MD

## 2021-12-16 ENCOUNTER — OFFICE VISIT (OUTPATIENT)
Dept: FAMILY MEDICINE CLINIC | Facility: CLINIC | Age: 81
End: 2021-12-16

## 2021-12-16 ENCOUNTER — OFFICE VISIT (OUTPATIENT)
Dept: ONCOLOGY | Facility: CLINIC | Age: 81
End: 2021-12-16

## 2021-12-16 VITALS
DIASTOLIC BLOOD PRESSURE: 84 MMHG | TEMPERATURE: 96.2 F | HEART RATE: 53 BPM | RESPIRATION RATE: 16 BRPM | SYSTOLIC BLOOD PRESSURE: 120 MMHG | BODY MASS INDEX: 24.58 KG/M2 | HEIGHT: 71 IN | OXYGEN SATURATION: 95 % | WEIGHT: 175.6 LBS

## 2021-12-16 VITALS
WEIGHT: 176.2 LBS | HEART RATE: 47 BPM | OXYGEN SATURATION: 96 % | HEIGHT: 71 IN | BODY MASS INDEX: 24.67 KG/M2 | DIASTOLIC BLOOD PRESSURE: 70 MMHG | TEMPERATURE: 98 F | RESPIRATION RATE: 16 BRPM | SYSTOLIC BLOOD PRESSURE: 128 MMHG

## 2021-12-16 DIAGNOSIS — C85.90 LYMPHOMA IN REMISSION (HCC): Primary | ICD-10-CM

## 2021-12-16 DIAGNOSIS — F32.A ANXIETY AND DEPRESSION: ICD-10-CM

## 2021-12-16 DIAGNOSIS — I48.21 PERMANENT ATRIAL FIBRILLATION (HCC): Primary | ICD-10-CM

## 2021-12-16 DIAGNOSIS — G62.9 NEUROPATHY: ICD-10-CM

## 2021-12-16 DIAGNOSIS — M25.551 CHRONIC PAIN OF RIGHT HIP: ICD-10-CM

## 2021-12-16 DIAGNOSIS — G89.29 CHRONIC PAIN OF RIGHT HIP: ICD-10-CM

## 2021-12-16 DIAGNOSIS — F41.9 ANXIETY AND DEPRESSION: ICD-10-CM

## 2021-12-16 LAB
INR PPP: 3.1 (ref 0.9–1.1)
PROTHROMBIN TIME: 37.4 SECONDS

## 2021-12-16 PROCEDURE — 36416 COLLJ CAPILLARY BLOOD SPEC: CPT | Performed by: FAMILY MEDICINE

## 2021-12-16 PROCEDURE — 85610 PROTHROMBIN TIME: CPT | Performed by: FAMILY MEDICINE

## 2021-12-16 PROCEDURE — 99213 OFFICE O/P EST LOW 20 MIN: CPT | Performed by: INTERNAL MEDICINE

## 2021-12-16 PROCEDURE — 99214 OFFICE O/P EST MOD 30 MIN: CPT | Performed by: FAMILY MEDICINE

## 2021-12-16 RX ORDER — GABAPENTIN 100 MG/1
100 CAPSULE ORAL 3 TIMES DAILY
Qty: 90 CAPSULE | Refills: 0 | Status: SHIPPED | OUTPATIENT
Start: 2021-12-16 | End: 2022-01-13 | Stop reason: SDUPTHER

## 2021-12-16 RX ORDER — OXYCODONE AND ACETAMINOPHEN 10; 325 MG/1; MG/1
1 TABLET ORAL EVERY 6 HOURS PRN
Qty: 100 TABLET | Refills: 0 | Status: SHIPPED | OUTPATIENT
Start: 2021-12-16 | End: 2022-01-13 | Stop reason: SDUPTHER

## 2021-12-16 NOTE — PROGRESS NOTES
"Subjective cc: chronic pain   Cabrera Avina is a 81 y.o. male with HTN, a fib, chronic pain, and anxiety who presents for follow up.   Reports he fell 2 weeks ago - hit his head but no LOC - recovering okay - denies frequent falls - first fall in more than 1 year - reports he just got too tired   He has been taking 7 mg daily - INR slightly elevated today at 3.1 - will hold tonight and then resume with 7 daily and 6 2x per week   Pain is controlled on current medicaiton   Saw oncology this AM - no changes       History of Present Illness     The following portions of the patient's history were reviewed and updated as appropriate: allergies, current medications, past family history, past medical history, past social history, past surgical history and problem list.        Review of Systems   Constitutional: Negative for activity change and appetite change.   Musculoskeletal: Positive for arthralgias, back pain, gait problem and myalgias.   Hematological: Bruises/bleeds easily.   Psychiatric/Behavioral: Positive for sleep disturbance. The patient is nervous/anxious.    All other systems reviewed and are negative.      Objective   Blood pressure 128/70, pulse (!) 47, temperature 98 °F (36.7 °C), temperature source Infrared, resp. rate 16, height 180.3 cm (71\"), weight 79.9 kg (176 lb 3.2 oz), SpO2 96 %.  Physical Exam  Vitals and nursing note reviewed.   Constitutional:       General: He is not in acute distress.     Appearance: He is well-developed. He is ill-appearing. He is not diaphoretic.   HENT:      Head: Normocephalic and atraumatic.      Right Ear: External ear normal.      Left Ear: External ear normal.      Nose: Nose normal.   Eyes:      General:         Right eye: No discharge.         Left eye: No discharge.      Conjunctiva/sclera: Conjunctivae normal.   Neck:      Thyroid: No thyromegaly.      Trachea: No tracheal deviation.   Cardiovascular:      Rate and Rhythm: Bradycardia present. Rhythm " irregular.      Pulses: Normal pulses.   Pulmonary:      Effort: Pulmonary effort is normal. No respiratory distress.      Breath sounds: Normal breath sounds. No stridor. No wheezing.   Chest:      Chest wall: No tenderness.   Abdominal:      General: There is no distension.      Palpations: Abdomen is soft.      Tenderness: There is no abdominal tenderness.   Musculoskeletal:         General: Tenderness present.      Cervical back: Normal range of motion.   Lymphadenopathy:      Cervical: No cervical adenopathy.   Skin:     General: Skin is warm and dry.   Neurological:      Mental Status: He is alert and oriented to person, place, and time.      Motor: No abnormal muscle tone.      Coordination: Coordination normal.      Gait: Gait abnormal.   Psychiatric:         Behavior: Behavior normal.         Thought Content: Thought content normal.         Judgment: Judgment normal.         Assessment/Plan   Problems Addressed this Visit        Cardiac and Vasculature    Permanent atrial fibrillation (HCC) - Primary    Relevant Orders    POC Protime / INR (Completed)       Musculoskeletal and Injuries    Chronic hip pain    Relevant Medications    oxyCODONE-acetaminophen (PERCOCET)  MG per tablet       Neuro    Neuropathy    Relevant Medications    gabapentin (NEURONTIN) 100 MG capsule      Other Visit Diagnoses     Anxiety and depression          Diagnoses       Codes Comments    Permanent atrial fibrillation (HCC)    -  Primary ICD-10-CM: I48.21  ICD-9-CM: 427.31     Neuropathy     ICD-10-CM: G62.9  ICD-9-CM: 355.9     Chronic pain of right hip     ICD-10-CM: M25.551, G89.29  ICD-9-CM: 719.45, 338.29     Anxiety and depression     ICD-10-CM: F41.9, F32.A  ICD-9-CM: 300.00, 311         PLAN:     #1 chronic hip pain; chronic, uncontrolled, advised on risks and benefits of chronic pain medication, laney is allowing him to function, denies any side effects, refill given, robbie reviewed, UDS UTD     #2 neuropathy:  chronic, stable, refill given on gabapentin, advised on risks and benefits of medication, Nii OROZCO reviewed     #3 anxiety/depression: chronic, controlled, continue on current medication    #4 chronic a fib: chronic, stable, rate controlled, reviewed INR and dosing with pt today, will monitor           This document has been electronically signed by Sunita Crawford MD on December 30, 2021 18:36 CST

## 2021-12-30 ENCOUNTER — CLINICAL SUPPORT (OUTPATIENT)
Dept: FAMILY MEDICINE CLINIC | Facility: CLINIC | Age: 81
End: 2021-12-30

## 2021-12-30 DIAGNOSIS — I48.21 PERMANENT ATRIAL FIBRILLATION: Primary | ICD-10-CM

## 2021-12-30 LAB
INR PPP: 2.6 (ref 0.9–1.1)
PROTHROMBIN TIME: 31 SECONDS

## 2021-12-30 PROCEDURE — 85610 PROTHROMBIN TIME: CPT | Performed by: NURSE PRACTITIONER

## 2021-12-30 PROCEDURE — 36416 COLLJ CAPILLARY BLOOD SPEC: CPT | Performed by: NURSE PRACTITIONER

## 2022-01-13 ENCOUNTER — OFFICE VISIT (OUTPATIENT)
Dept: FAMILY MEDICINE CLINIC | Facility: CLINIC | Age: 82
End: 2022-01-13

## 2022-01-13 VITALS
SYSTOLIC BLOOD PRESSURE: 110 MMHG | WEIGHT: 177 LBS | RESPIRATION RATE: 16 BRPM | TEMPERATURE: 97.6 F | HEART RATE: 62 BPM | OXYGEN SATURATION: 98 % | HEIGHT: 71 IN | BODY MASS INDEX: 24.78 KG/M2 | DIASTOLIC BLOOD PRESSURE: 58 MMHG

## 2022-01-13 DIAGNOSIS — M25.551 CHRONIC PAIN OF RIGHT HIP: ICD-10-CM

## 2022-01-13 DIAGNOSIS — I48.21 PERMANENT ATRIAL FIBRILLATION: Primary | ICD-10-CM

## 2022-01-13 DIAGNOSIS — G62.9 NEUROPATHY: ICD-10-CM

## 2022-01-13 DIAGNOSIS — G89.29 CHRONIC PAIN OF RIGHT HIP: ICD-10-CM

## 2022-01-13 DIAGNOSIS — F41.9 ANXIETY: ICD-10-CM

## 2022-01-13 DIAGNOSIS — Z79.01 CHRONIC ANTICOAGULATION: ICD-10-CM

## 2022-01-13 PROCEDURE — 99214 OFFICE O/P EST MOD 30 MIN: CPT | Performed by: FAMILY MEDICINE

## 2022-01-13 RX ORDER — GABAPENTIN 100 MG/1
100 CAPSULE ORAL 3 TIMES DAILY
Qty: 90 CAPSULE | Refills: 0 | Status: SHIPPED | OUTPATIENT
Start: 2022-01-13 | End: 2022-02-11 | Stop reason: SDUPTHER

## 2022-01-13 RX ORDER — OXYCODONE AND ACETAMINOPHEN 10; 325 MG/1; MG/1
1 TABLET ORAL EVERY 6 HOURS PRN
Qty: 100 TABLET | Refills: 0 | Status: SHIPPED | OUTPATIENT
Start: 2022-01-13 | End: 2022-02-11 | Stop reason: SDUPTHER

## 2022-02-03 NOTE — PROGRESS NOTES
"Subjective cc: chronic pain   Cabrera Avina is a 81 y.o. male with a fib, PAD, CHF, constipation, GERD, and chronic pain.    All chronic issues stable   No new concerns        History of Present Illness     The following portions of the patient's history were reviewed and updated as appropriate: allergies, current medications, past family history, past medical history, past social history, past surgical history and problem list.        Review of Systems   Musculoskeletal: Positive for arthralgias, back pain, gait problem and myalgias.   Hematological: Bruises/bleeds easily.   Psychiatric/Behavioral: Positive for sleep disturbance. The patient is nervous/anxious.    All other systems reviewed and are negative.      Objective   Blood pressure 110/58, pulse 62, temperature 97.6 °F (36.4 °C), temperature source Infrared, resp. rate 16, height 180.3 cm (71\"), weight 80.3 kg (177 lb), SpO2 98 %.  Physical Exam  Vitals and nursing note reviewed.   Constitutional:       General: He is not in acute distress.     Appearance: He is well-developed. He is not diaphoretic.   HENT:      Head: Normocephalic and atraumatic.      Right Ear: External ear normal.      Left Ear: External ear normal.      Nose: Nose normal.   Eyes:      General:         Right eye: No discharge.         Left eye: No discharge.      Conjunctiva/sclera: Conjunctivae normal.   Neck:      Thyroid: No thyromegaly.      Trachea: No tracheal deviation.   Cardiovascular:      Rate and Rhythm: Normal rate and regular rhythm.      Heart sounds: Murmur heard.       Pulmonary:      Effort: Pulmonary effort is normal. No respiratory distress.      Breath sounds: Normal breath sounds. No stridor. No wheezing.   Chest:      Chest wall: No tenderness.   Abdominal:      General: There is no distension.      Palpations: Abdomen is soft.      Tenderness: There is no abdominal tenderness.   Musculoskeletal:         General: Tenderness present.      Cervical back: " Normal range of motion.      Right lower leg: No edema.      Left lower leg: No edema.   Lymphadenopathy:      Cervical: No cervical adenopathy.   Skin:     General: Skin is warm and dry.   Neurological:      Mental Status: He is alert and oriented to person, place, and time.      Motor: No abnormal muscle tone.      Coordination: Coordination normal.   Psychiatric:         Behavior: Behavior normal.         Thought Content: Thought content normal.         Judgment: Judgment normal.         Assessment/Plan   Problems Addressed this Visit        Cardiac and Vasculature    Permanent atrial fibrillation (HCC) - Primary       Coag and Thromboembolic    Chronic anticoagulation       Mental Health    Anxiety       Musculoskeletal and Injuries    Chronic hip pain    Relevant Medications    oxyCODONE-acetaminophen (PERCOCET)  MG per tablet       Neuro    Neuropathy    Relevant Medications    gabapentin (NEURONTIN) 100 MG capsule      Diagnoses       Codes Comments    Permanent atrial fibrillation (HCC)    -  Primary ICD-10-CM: I48.21  ICD-9-CM: 427.31     Neuropathy     ICD-10-CM: G62.9  ICD-9-CM: 355.9     Chronic pain of right hip     ICD-10-CM: M25.551, G89.29  ICD-9-CM: 719.45, 338.29     Chronic anticoagulation     ICD-10-CM: Z79.01  ICD-9-CM: V58.61     Anxiety     ICD-10-CM: F41.9  ICD-9-CM: 300.00         PLAN:     #1 chronic pain: chronic, stable, refill on medication, UDS UTD, Robbie reviewed, advised on risks and benefits of medication     #2 neuropathy:  Chronic, stable, continue on current medication, robbie reviewed UDS UTD, advised on risks and beenfits of medication     #3 a fib: chronic, stable on current medications, INR checked today     #4 anxiety:chronic, stable, continue on current medications, refill when due           This document has been electronically signed by Sunita Crawford MD on February 3, 2022 11:44 CST

## 2022-02-11 ENCOUNTER — OFFICE VISIT (OUTPATIENT)
Dept: FAMILY MEDICINE CLINIC | Facility: CLINIC | Age: 82
End: 2022-02-11

## 2022-02-11 VITALS
DIASTOLIC BLOOD PRESSURE: 78 MMHG | RESPIRATION RATE: 20 BRPM | HEIGHT: 71 IN | HEART RATE: 55 BPM | TEMPERATURE: 97.5 F | SYSTOLIC BLOOD PRESSURE: 140 MMHG | WEIGHT: 175.2 LBS | OXYGEN SATURATION: 98 % | BODY MASS INDEX: 24.53 KG/M2

## 2022-02-11 DIAGNOSIS — I48.20 CHRONIC ATRIAL FIBRILLATION: ICD-10-CM

## 2022-02-11 DIAGNOSIS — G89.29 CHRONIC PAIN OF RIGHT HIP: Primary | ICD-10-CM

## 2022-02-11 DIAGNOSIS — M25.551 CHRONIC PAIN OF RIGHT HIP: Primary | ICD-10-CM

## 2022-02-11 DIAGNOSIS — G47.09 OTHER INSOMNIA: ICD-10-CM

## 2022-02-11 DIAGNOSIS — G62.9 NEUROPATHY: ICD-10-CM

## 2022-02-11 DIAGNOSIS — I48.21 PERMANENT ATRIAL FIBRILLATION: ICD-10-CM

## 2022-02-11 LAB
INR PPP: 2 (ref 0.9–1.1)
PROTHROMBIN TIME: 24.5 SECONDS

## 2022-02-11 PROCEDURE — 36416 COLLJ CAPILLARY BLOOD SPEC: CPT | Performed by: FAMILY MEDICINE

## 2022-02-11 PROCEDURE — 85610 PROTHROMBIN TIME: CPT | Performed by: FAMILY MEDICINE

## 2022-02-11 PROCEDURE — 99214 OFFICE O/P EST MOD 30 MIN: CPT | Performed by: FAMILY MEDICINE

## 2022-02-11 RX ORDER — TEMAZEPAM 15 MG/1
15 CAPSULE ORAL NIGHTLY PRN
Qty: 30 CAPSULE | Refills: 2 | Status: SHIPPED | OUTPATIENT
Start: 2022-02-11 | End: 2022-05-19 | Stop reason: SDUPTHER

## 2022-02-11 RX ORDER — OXYCODONE AND ACETAMINOPHEN 10; 325 MG/1; MG/1
1 TABLET ORAL EVERY 6 HOURS PRN
Qty: 100 TABLET | Refills: 0 | Status: SHIPPED | OUTPATIENT
Start: 2022-02-11 | End: 2022-03-11 | Stop reason: SDUPTHER

## 2022-02-11 RX ORDER — GABAPENTIN 100 MG/1
100 CAPSULE ORAL 3 TIMES DAILY
Qty: 90 CAPSULE | Refills: 0 | Status: SHIPPED | OUTPATIENT
Start: 2022-02-11 | End: 2022-03-11 | Stop reason: SDUPTHER

## 2022-02-11 RX ORDER — DILTIAZEM HYDROCHLORIDE 240 MG/1
240 CAPSULE, COATED, EXTENDED RELEASE ORAL DAILY
Qty: 90 CAPSULE | Refills: 3 | Status: SHIPPED | OUTPATIENT
Start: 2022-02-11 | End: 2023-01-16 | Stop reason: SDUPTHER

## 2022-02-11 NOTE — PROGRESS NOTES
"Subjective cc: chronic pain   Cabrera Avina is a 81 y.o. male with a fib, PAD, CHF, constipation, GERD, and chronic pain.       He presents today for follow up on his pain - he is taking his meidcaiton daily - staying about the same - helpinghim to function - denies any side effects     A fib is stable - INR checked today - will continue with current dose     HTN: controlled     Anxiety and depression: controlled with medication    Pain  This is a chronic problem. The current episode started more than 1 year ago. The problem occurs constantly. The problem has been unchanged. Associated symptoms include arthralgias and myalgias. The symptoms are aggravated by exertion. He has tried acetaminophen and oral narcotics for the symptoms. The treatment provided moderate relief.        The following portions of the patient's history were reviewed and updated as appropriate: allergies, current medications, past family history, past medical history, past social history, past surgical history and problem list.        Review of Systems   Musculoskeletal: Positive for arthralgias, back pain, gait problem and myalgias.   Hematological: Bruises/bleeds easily.   Psychiatric/Behavioral: Positive for sleep disturbance. The patient is nervous/anxious.    All other systems reviewed and are negative.      Objective   Blood pressure 140/78, pulse 55, temperature 97.5 °F (36.4 °C), temperature source Infrared, resp. rate 20, height 180.3 cm (71\"), weight 79.5 kg (175 lb 3.2 oz), SpO2 98 %.  Physical Exam  Vitals and nursing note reviewed.   Constitutional:       General: He is not in acute distress.     Appearance: He is well-developed. He is not diaphoretic.   HENT:      Head: Normocephalic and atraumatic.      Right Ear: External ear normal.      Left Ear: External ear normal.      Nose: Nose normal.   Eyes:      General:         Right eye: No discharge.         Left eye: No discharge.      Conjunctiva/sclera: Conjunctivae normal. "   Neck:      Thyroid: No thyromegaly.      Trachea: No tracheal deviation.   Cardiovascular:      Rate and Rhythm: Normal rate and regular rhythm.      Heart sounds: Murmur heard.       Pulmonary:      Effort: Pulmonary effort is normal. No respiratory distress.      Breath sounds: Normal breath sounds. No stridor. No wheezing.   Chest:      Chest wall: No tenderness.   Abdominal:      General: There is no distension.      Palpations: Abdomen is soft.      Tenderness: There is no abdominal tenderness.   Musculoskeletal:         General: Tenderness present.      Cervical back: Normal range of motion.      Right lower leg: No edema.      Left lower leg: No edema.   Lymphadenopathy:      Cervical: No cervical adenopathy.   Skin:     General: Skin is warm and dry.   Neurological:      Mental Status: He is alert and oriented to person, place, and time.      Motor: No abnormal muscle tone.      Coordination: Coordination normal.   Psychiatric:         Behavior: Behavior normal.         Thought Content: Thought content normal.         Judgment: Judgment normal.         Assessment/Plan   Problems Addressed this Visit        Cardiac and Vasculature    Permanent atrial fibrillation (HCC)    Relevant Medications    dilTIAZem CD (CARDIZEM CD) 240 MG 24 hr capsule    Other Relevant Orders    POC Protime / INR (Completed)       Musculoskeletal and Injuries    Chronic hip pain - Primary    Relevant Medications    oxyCODONE-acetaminophen (PERCOCET)  MG per tablet       Neuro    Neuropathy    Relevant Medications    gabapentin (NEURONTIN) 100 MG capsule       Sleep    Other insomnia    Relevant Medications    temazepam (RESTORIL) 15 MG capsule      Other Visit Diagnoses     Chronic atrial fibrillation (HCC)        Relevant Medications    dilTIAZem CD (CARDIZEM CD) 240 MG 24 hr capsule      Diagnoses       Codes Comments    Chronic pain of right hip    -  Primary ICD-10-CM: M25.551, G89.29  ICD-9-CM: 719.45, 338.29      Permanent atrial fibrillation (HCC)     ICD-10-CM: I48.21  ICD-9-CM: 427.31     Chronic atrial fibrillation (HCC)     ICD-10-CM: I48.20  ICD-9-CM: 427.31     Neuropathy     ICD-10-CM: G62.9  ICD-9-CM: 355.9     Other insomnia     ICD-10-CM: G47.09  ICD-9-CM: 780.52         PLAN:     #1 chronic pain: chronic, stable, continue on current medication, advised on risks and benefits of medication morena in combination with benzos     #2 neuropathy: chronic, stable, refill on gabapentin, robbie reviewed    #3 a fib: chronic, stable, continue on anticoag - reviewed INR     #4 insomnia: chronic, controlled, continue on restoril - robbie reviewed, advised on risks and beenfits of meidcaitons    Past information, including assessment and plan reviewed and updated as appropriate            This document has been electronically signed by Sunita Crawford MD on February 11, 2022 12:15 CST

## 2022-02-15 DIAGNOSIS — G47.09 OTHER INSOMNIA: ICD-10-CM

## 2022-02-15 RX ORDER — TEMAZEPAM 15 MG/1
CAPSULE ORAL
Qty: 30 CAPSULE | Refills: 2 | OUTPATIENT
Start: 2022-02-15

## 2022-02-15 NOTE — TELEPHONE ENCOUNTER
Refill too soon     Rx Refill Note  Requested Prescriptions     Pending Prescriptions Disp Refills   • temazepam (RESTORIL) 15 MG capsule [Pharmacy Med Name: TEMAZEPAM 15 MG CAP 15 Capsule] 30 capsule 2     Sig: TAKE ONE CAPSULE BY MOUTH NIGHTLY AS NEEDED FOR SLEEP OR ANXIETY      Last office visit with prescribing clinician: 2/11/2022      Next office visit with prescribing clinician: 3/11/2022            Joanna Turk MA  02/15/22, 13:34 CST

## 2022-03-11 ENCOUNTER — OFFICE VISIT (OUTPATIENT)
Dept: FAMILY MEDICINE CLINIC | Facility: CLINIC | Age: 82
End: 2022-03-11

## 2022-03-11 VITALS
WEIGHT: 182.4 LBS | DIASTOLIC BLOOD PRESSURE: 77 MMHG | TEMPERATURE: 97.5 F | OXYGEN SATURATION: 97 % | HEIGHT: 71 IN | RESPIRATION RATE: 16 BRPM | BODY MASS INDEX: 25.54 KG/M2 | SYSTOLIC BLOOD PRESSURE: 155 MMHG | HEART RATE: 58 BPM

## 2022-03-11 DIAGNOSIS — G89.29 CHRONIC PAIN OF RIGHT HIP: ICD-10-CM

## 2022-03-11 DIAGNOSIS — M25.551 CHRONIC PAIN OF RIGHT HIP: ICD-10-CM

## 2022-03-11 DIAGNOSIS — G62.9 NEUROPATHY: ICD-10-CM

## 2022-03-11 DIAGNOSIS — I48.21 PERMANENT ATRIAL FIBRILLATION: Primary | ICD-10-CM

## 2022-03-11 LAB
INR PPP: 2.4 (ref 0.9–1.1)
PROTHROMBIN TIME: 28.7 SECONDS

## 2022-03-11 PROCEDURE — 36416 COLLJ CAPILLARY BLOOD SPEC: CPT | Performed by: FAMILY MEDICINE

## 2022-03-11 PROCEDURE — 85610 PROTHROMBIN TIME: CPT | Performed by: FAMILY MEDICINE

## 2022-03-11 PROCEDURE — 99214 OFFICE O/P EST MOD 30 MIN: CPT | Performed by: FAMILY MEDICINE

## 2022-03-11 RX ORDER — OXYCODONE AND ACETAMINOPHEN 10; 325 MG/1; MG/1
1 TABLET ORAL EVERY 6 HOURS PRN
Qty: 100 TABLET | Refills: 0 | Status: SHIPPED | OUTPATIENT
Start: 2022-03-11 | End: 2022-04-18 | Stop reason: SDUPTHER

## 2022-03-11 RX ORDER — GABAPENTIN 100 MG/1
100 CAPSULE ORAL 3 TIMES DAILY
Qty: 90 CAPSULE | Refills: 0 | Status: SHIPPED | OUTPATIENT
Start: 2022-03-11 | End: 2022-04-18 | Stop reason: SDUPTHER

## 2022-03-11 NOTE — PROGRESS NOTES
"Subjective cc: chronic pain   Cabrera Avina is a 81 y.o. male with a fib, CHF, GERD, constipation, chronic hip pain, anxiety, and h/o lymphoma who presents for follow up on his chronic pain and anxiety.    He denies any new issues   He is using his pain medication to allow him to perform his daily activities without too much pain   Anxiety is controlled with current medicaitons   Has been following with oncology - no new changes   INR checked today - discussed dosing      History of Present Illness     The following portions of the patient's history were reviewed and updated as appropriate: allergies, current medications, past family history, past medical history, past social history, past surgical history and problem list.        Review of Systems   Constitutional: Negative for activity change and fatigue.   Gastrointestinal: Positive for constipation.   Musculoskeletal: Positive for arthralgias, back pain and myalgias.   Hematological: Bruises/bleeds easily.   Psychiatric/Behavioral: Positive for dysphoric mood and sleep disturbance. The patient is nervous/anxious.    All other systems reviewed and are negative.      Objective   Blood pressure 155/77, pulse 58, temperature 97.5 °F (36.4 °C), temperature source Infrared, resp. rate 16, height 180.3 cm (71\"), weight 82.7 kg (182 lb 6.4 oz), SpO2 97 %.  Physical Exam  Vitals and nursing note reviewed.   Constitutional:       General: He is not in acute distress.     Appearance: He is well-developed. He is ill-appearing. He is not diaphoretic.   HENT:      Head: Normocephalic and atraumatic.      Right Ear: External ear normal.      Left Ear: External ear normal.      Nose: Nose normal.   Eyes:      General:         Right eye: No discharge.         Left eye: No discharge.      Conjunctiva/sclera: Conjunctivae normal.   Neck:      Thyroid: No thyromegaly.      Trachea: No tracheal deviation.   Cardiovascular:      Rate and Rhythm: Normal rate. Rhythm irregular. "      Heart sounds: Murmur heard.   Pulmonary:      Effort: Pulmonary effort is normal. No respiratory distress.      Breath sounds: Normal breath sounds. No stridor. No wheezing.   Chest:      Chest wall: No tenderness.   Abdominal:      General: There is no distension.      Palpations: Abdomen is soft.      Tenderness: There is no abdominal tenderness.   Musculoskeletal:         General: Tenderness present.      Cervical back: Normal range of motion.      Right lower leg: No edema.      Left lower leg: No edema.   Lymphadenopathy:      Cervical: No cervical adenopathy.   Skin:     General: Skin is warm and dry.      Findings: Bruising present.   Neurological:      Mental Status: He is alert and oriented to person, place, and time.      Motor: No abnormal muscle tone.      Coordination: Coordination normal.      Gait: Gait abnormal.   Psychiatric:         Behavior: Behavior normal.         Thought Content: Thought content normal.         Judgment: Judgment normal.         Assessment/Plan   Problems Addressed this Visit        Cardiac and Vasculature    Permanent atrial fibrillation (HCC) - Primary    Relevant Orders    POC Protime / INR (Completed)       Musculoskeletal and Injuries    Chronic hip pain    Relevant Medications    oxyCODONE-acetaminophen (PERCOCET)  MG per tablet       Neuro    Neuropathy    Relevant Medications    gabapentin (NEURONTIN) 100 MG capsule      Diagnoses       Codes Comments    Permanent atrial fibrillation (HCC)    -  Primary ICD-10-CM: I48.21  ICD-9-CM: 427.31     Chronic pain of right hip     ICD-10-CM: M25.551, G89.29  ICD-9-CM: 719.45, 338.29     Neuropathy     ICD-10-CM: G62.9  ICD-9-CM: 355.9         PLAN:     #1 A fib on chronic anticoag: reviewed INR today, advised on monitoring for any bleeding     #2 chronic hip pain: chronic, stable, refill on pain medication, robbie reviewed, UDS UTD, controlled contract in chart     #3 neuropathy: chronic, stable, controlled on current  emdications, refill given, UDS UTD, controlled contract in the chart, Nii sr           This document has been electronically signed by Sunita Crawford MD on March 30, 2022 21:42 CDT

## 2022-03-28 DIAGNOSIS — I48.20 CHRONIC ATRIAL FIBRILLATION: ICD-10-CM

## 2022-03-28 RX ORDER — DILTIAZEM HYDROCHLORIDE 240 MG/1
CAPSULE, COATED, EXTENDED RELEASE ORAL
Qty: 90 CAPSULE | Refills: 3 | OUTPATIENT
Start: 2022-03-28

## 2022-04-05 ENCOUNTER — CLINICAL SUPPORT (OUTPATIENT)
Dept: FAMILY MEDICINE CLINIC | Facility: CLINIC | Age: 82
End: 2022-04-05

## 2022-04-05 DIAGNOSIS — I48.21 PERMANENT ATRIAL FIBRILLATION: Primary | ICD-10-CM

## 2022-04-05 LAB
INR PPP: 2 (ref 0.9–1.1)
PROTHROMBIN TIME: 24.6 SECONDS

## 2022-04-05 PROCEDURE — 36416 COLLJ CAPILLARY BLOOD SPEC: CPT | Performed by: FAMILY MEDICINE

## 2022-04-05 PROCEDURE — 85610 PROTHROMBIN TIME: CPT | Performed by: FAMILY MEDICINE

## 2022-04-13 ENCOUNTER — APPOINTMENT (OUTPATIENT)
Dept: LAB | Facility: HOSPITAL | Age: 82
End: 2022-04-13

## 2022-04-14 ENCOUNTER — LAB (OUTPATIENT)
Dept: LAB | Facility: HOSPITAL | Age: 82
End: 2022-04-14

## 2022-04-14 DIAGNOSIS — C85.90 LYMPHOMA IN REMISSION: ICD-10-CM

## 2022-04-14 LAB
ALBUMIN SERPL-MCNC: 3.9 G/DL (ref 3.5–5.2)
ALBUMIN/GLOB SERPL: 1.3 G/DL
ALP SERPL-CCNC: 76 U/L (ref 39–117)
ALT SERPL W P-5'-P-CCNC: 9 U/L (ref 1–41)
ANION GAP SERPL CALCULATED.3IONS-SCNC: 9 MMOL/L (ref 5–15)
AST SERPL-CCNC: 16 U/L (ref 1–40)
BASOPHILS # BLD AUTO: 0.04 10*3/MM3 (ref 0–0.2)
BASOPHILS NFR BLD AUTO: 1 % (ref 0–1.5)
BILIRUB SERPL-MCNC: 0.6 MG/DL (ref 0–1.2)
BUN SERPL-MCNC: 15 MG/DL (ref 8–23)
BUN/CREAT SERPL: 16.7 (ref 7–25)
CALCIUM SPEC-SCNC: 9.2 MG/DL (ref 8.6–10.5)
CHLORIDE SERPL-SCNC: 103 MMOL/L (ref 98–107)
CO2 SERPL-SCNC: 28 MMOL/L (ref 22–29)
CREAT SERPL-MCNC: 0.9 MG/DL (ref 0.76–1.27)
DEPRECATED RDW RBC AUTO: 46.5 FL (ref 37–54)
EGFRCR SERPLBLD CKD-EPI 2021: 86.3 ML/MIN/1.73
EOSINOPHIL # BLD AUTO: 0.13 10*3/MM3 (ref 0–0.4)
EOSINOPHIL NFR BLD AUTO: 3.1 % (ref 0.3–6.2)
ERYTHROCYTE [DISTWIDTH] IN BLOOD BY AUTOMATED COUNT: 13 % (ref 12.3–15.4)
FERRITIN SERPL-MCNC: 279.2 NG/ML (ref 30–400)
GLOBULIN UR ELPH-MCNC: 2.9 GM/DL
GLUCOSE SERPL-MCNC: 116 MG/DL (ref 65–99)
HCT VFR BLD AUTO: 43.9 % (ref 37.5–51)
HGB BLD-MCNC: 14.4 G/DL (ref 13–17.7)
IMM GRANULOCYTES # BLD AUTO: 0.01 10*3/MM3 (ref 0–0.05)
IMM GRANULOCYTES NFR BLD AUTO: 0.2 % (ref 0–0.5)
IRON 24H UR-MRATE: 99 MCG/DL (ref 59–158)
IRON SATN MFR SERPL: 28 % (ref 20–50)
LDH SERPL-CCNC: 188 U/L (ref 135–225)
LYMPHOCYTES # BLD AUTO: 0.83 10*3/MM3 (ref 0.7–3.1)
LYMPHOCYTES NFR BLD AUTO: 20 % (ref 19.6–45.3)
MCH RBC QN AUTO: 31.6 PG (ref 26.6–33)
MCHC RBC AUTO-ENTMCNC: 32.8 G/DL (ref 31.5–35.7)
MCV RBC AUTO: 96.5 FL (ref 79–97)
MONOCYTES # BLD AUTO: 0.47 10*3/MM3 (ref 0.1–0.9)
MONOCYTES NFR BLD AUTO: 11.4 % (ref 5–12)
NEUTROPHILS NFR BLD AUTO: 2.66 10*3/MM3 (ref 1.7–7)
NEUTROPHILS NFR BLD AUTO: 64.3 % (ref 42.7–76)
NRBC BLD AUTO-RTO: 0 /100 WBC (ref 0–0.2)
PLATELET # BLD AUTO: 197 10*3/MM3 (ref 140–450)
PMV BLD AUTO: 9.5 FL (ref 6–12)
POTASSIUM SERPL-SCNC: 4 MMOL/L (ref 3.5–5.2)
PROT SERPL-MCNC: 6.8 G/DL (ref 6–8.5)
RBC # BLD AUTO: 4.55 10*6/MM3 (ref 4.14–5.8)
SODIUM SERPL-SCNC: 140 MMOL/L (ref 136–145)
TIBC SERPL-MCNC: 352 MCG/DL (ref 298–536)
TRANSFERRIN SERPL-MCNC: 236 MG/DL (ref 200–360)
WBC NRBC COR # BLD: 4.14 10*3/MM3 (ref 3.4–10.8)

## 2022-04-14 PROCEDURE — 82232 ASSAY OF BETA-2 PROTEIN: CPT

## 2022-04-14 PROCEDURE — 83540 ASSAY OF IRON: CPT

## 2022-04-14 PROCEDURE — 84466 ASSAY OF TRANSFERRIN: CPT

## 2022-04-14 PROCEDURE — 83615 LACTATE (LD) (LDH) ENZYME: CPT

## 2022-04-14 PROCEDURE — 82746 ASSAY OF FOLIC ACID SERUM: CPT

## 2022-04-14 PROCEDURE — 85025 COMPLETE CBC W/AUTO DIFF WBC: CPT

## 2022-04-14 PROCEDURE — 36415 COLL VENOUS BLD VENIPUNCTURE: CPT

## 2022-04-14 PROCEDURE — 82607 VITAMIN B-12: CPT

## 2022-04-14 PROCEDURE — 80053 COMPREHEN METABOLIC PANEL: CPT

## 2022-04-14 PROCEDURE — 82728 ASSAY OF FERRITIN: CPT

## 2022-04-15 ENCOUNTER — TELEPHONE (OUTPATIENT)
Dept: ONCOLOGY | Facility: CLINIC | Age: 82
End: 2022-04-15

## 2022-04-15 LAB
B2 MICROGLOB SERPL-MCNC: 2.5 MG/L (ref 0.8–2.2)
FOLATE SERPL-MCNC: 10.4 NG/ML (ref 4.78–24.2)
VIT B12 BLD-MCNC: 281 PG/ML (ref 211–946)

## 2022-04-15 RX ORDER — CYANOCOBALAMIN/FOLIC AC/VIT B6 1-2.5-25MG
1 TABLET ORAL DAILY
Qty: 30 TABLET | Refills: 0 | Status: SHIPPED | OUTPATIENT
Start: 2022-04-15 | End: 2022-05-18

## 2022-04-15 NOTE — TELEPHONE ENCOUNTER
Caller: Cabrera Avina    Relationship to patient: Self    Best call back number: [phone]    Patient is needing: TO CHECK TO SEE IF RX WAS SENT TO PHARMACY YET. HE CALLED HIS PHARMACY AND THEY HAVE NOT YET RECEIVED A SCRIPT.

## 2022-04-15 NOTE — TELEPHONE ENCOUNTER
Spoke with patient and let him know that his b12 is low. Cabrera is agreeable to [facility]. He request the script be sent to [facility].

## 2022-04-15 NOTE — TELEPHONE ENCOUNTER
----- Message from Paul Rachel MD sent at 4/15/2022  1:08 PM CDT -----  B12 281-call in [facility] p.o. daily x1 dispense 30

## 2022-04-18 ENCOUNTER — OFFICE VISIT (OUTPATIENT)
Dept: FAMILY MEDICINE CLINIC | Facility: CLINIC | Age: 82
End: 2022-04-18

## 2022-04-18 VITALS
HEIGHT: 71 IN | RESPIRATION RATE: 16 BRPM | WEIGHT: 176.2 LBS | DIASTOLIC BLOOD PRESSURE: 78 MMHG | BODY MASS INDEX: 24.67 KG/M2 | HEART RATE: 61 BPM | OXYGEN SATURATION: 98 % | SYSTOLIC BLOOD PRESSURE: 134 MMHG | TEMPERATURE: 97.9 F

## 2022-04-18 DIAGNOSIS — E53.8 VITAMIN B12 DEFICIENCY: ICD-10-CM

## 2022-04-18 DIAGNOSIS — I48.21 PERMANENT ATRIAL FIBRILLATION: Primary | ICD-10-CM

## 2022-04-18 DIAGNOSIS — G62.9 NEUROPATHY: ICD-10-CM

## 2022-04-18 DIAGNOSIS — M25.551 CHRONIC PAIN OF RIGHT HIP: ICD-10-CM

## 2022-04-18 DIAGNOSIS — K59.04 CHRONIC IDIOPATHIC CONSTIPATION: ICD-10-CM

## 2022-04-18 DIAGNOSIS — G47.09 OTHER INSOMNIA: ICD-10-CM

## 2022-04-18 DIAGNOSIS — G89.29 CHRONIC PAIN OF RIGHT HIP: ICD-10-CM

## 2022-04-18 LAB
INR PPP: 2.2 (ref 0.9–1.1)
PROTHROMBIN TIME: 26.9 SECONDS

## 2022-04-18 PROCEDURE — 85610 PROTHROMBIN TIME: CPT | Performed by: FAMILY MEDICINE

## 2022-04-18 PROCEDURE — 99214 OFFICE O/P EST MOD 30 MIN: CPT | Performed by: FAMILY MEDICINE

## 2022-04-18 PROCEDURE — 36416 COLLJ CAPILLARY BLOOD SPEC: CPT | Performed by: FAMILY MEDICINE

## 2022-04-18 RX ORDER — GABAPENTIN 100 MG/1
100 CAPSULE ORAL 3 TIMES DAILY
Qty: 90 CAPSULE | Refills: 0 | Status: SHIPPED | OUTPATIENT
Start: 2022-04-18 | End: 2022-05-19 | Stop reason: SDUPTHER

## 2022-04-18 RX ORDER — OXYCODONE AND ACETAMINOPHEN 10; 325 MG/1; MG/1
1 TABLET ORAL EVERY 6 HOURS PRN
Qty: 100 TABLET | Refills: 0 | Status: SHIPPED | OUTPATIENT
Start: 2022-04-18 | End: 2022-05-19 | Stop reason: SDUPTHER

## 2022-04-18 NOTE — PROGRESS NOTES
"Subjective cc: chronic hip pain  Cabrera Avina is a 81 y.o. male.     History of Present Illness   Dr. Rachel did some blood tests which showed his vitamin B12 was low on 04/13/2022. He picked up the prescription on 04/16/2022. His INR was 2.2 today, so no changes will be made to his Coumadin dose.     He reports the pain in his hip is still the same. The patient is still using his pain medication and reports the gabapentin sure helps him sleep at night. He does report constipation from the medication. The patient said he is taking laxatives, and sometimes it works well and sometimes it is the 4th day before he goes. He notes the stools are very hard, and says he drinks a lot of water and soda.    A review of systems was performed, and pertinent findings are noted in the HPI.    The following portions of the patient's history were reviewed and updated as appropriate: allergies, current medications, past family history, past medical history, past social history, past surgical history and problem list.        Review of Systems   Gastrointestinal: Positive for constipation.   Musculoskeletal: Positive for arthralgias, gait problem and myalgias.   Hematological: Bruises/bleeds easily.   Psychiatric/Behavioral: Positive for sleep disturbance. The patient is nervous/anxious.    All other systems reviewed and are negative.      Objective   Blood pressure 134/78, pulse 61, temperature 97.9 °F (36.6 °C), temperature source Infrared, resp. rate 16, height 180.3 cm (71\"), weight 79.9 kg (176 lb 3.2 oz), SpO2 98 %.  Physical Exam  Vitals and nursing note reviewed.   Constitutional:       General: He is not in acute distress.     Appearance: He is well-developed. He is not diaphoretic.   HENT:      Head: Normocephalic and atraumatic.      Right Ear: External ear normal.      Left Ear: External ear normal.      Nose: Nose normal.   Eyes:      General:         Right eye: No discharge.         Left eye: No discharge.     "  Conjunctiva/sclera: Conjunctivae normal.   Neck:      Thyroid: No thyromegaly.      Trachea: No tracheal deviation.   Cardiovascular:      Rate and Rhythm: Normal rate. Rhythm irregular.      Pulses: Normal pulses.   Pulmonary:      Effort: Pulmonary effort is normal. No respiratory distress.      Breath sounds: Normal breath sounds. No stridor. No wheezing.   Chest:      Chest wall: No tenderness.   Abdominal:      General: There is no distension.      Palpations: Abdomen is soft.      Tenderness: There is no abdominal tenderness.   Musculoskeletal:         General: Tenderness present.      Cervical back: Normal range of motion.      Right lower leg: No edema.      Left lower leg: No edema.   Lymphadenopathy:      Cervical: No cervical adenopathy.   Skin:     General: Skin is warm and dry.      Findings: Bruising present.   Neurological:      Mental Status: He is alert and oriented to person, place, and time.      Motor: Weakness present. No abnormal muscle tone.      Coordination: Coordination normal.      Gait: Gait abnormal.   Psychiatric:         Behavior: Behavior normal.         Thought Content: Thought content normal.         Judgment: Judgment normal.         Assessment/Plan   Problems Addressed this Visit        Cardiac and Vasculature    Permanent atrial fibrillation (HCC) - Primary    Relevant Orders    POC Protime / INR (Completed)       Musculoskeletal and Injuries    Chronic hip pain    Relevant Medications    oxyCODONE-acetaminophen (PERCOCET)  MG per tablet       Neuro    Neuropathy    Relevant Medications    gabapentin (NEURONTIN) 100 MG capsule       Sleep    Other insomnia      Other Visit Diagnoses     Vitamin B12 deficiency        Chronic idiopathic constipation          Diagnoses       Codes Comments    Permanent atrial fibrillation (HCC)    -  Primary ICD-10-CM: I48.21  ICD-9-CM: 427.31     Vitamin B12 deficiency     ICD-10-CM: E53.8  ICD-9-CM: 266.2     Other insomnia     ICD-10-CM:  G47.09  ICD-9-CM: 780.52     Chronic pain of right hip     ICD-10-CM: M25.551, G89.29  ICD-9-CM: 719.45, 338.29     Neuropathy     ICD-10-CM: G62.9  ICD-9-CM: 355.9     Chronic idiopathic constipation     ICD-10-CM: K59.04  ICD-9-CM: 564.00         1. Chronic right hip pain: Chronic, stable. No significant changes in his hip pain.   · The patient does use medication which allows him to stay active.   · Advised on risks and benefits of medication. SIMBA reviewed. Control contract is in the chart. UDS is up to date. Refill given.     2. Paroxysmal atrial fibrillation: Chronic, stable.   · The patient is currently rate controlled. He is anticoagulated with Coumadin. His INR was checked today and found to be within normal limits.   · The patient advised to continue on current dose of Coumadin.     3. Insomnia: Chronic, stable.   · Continue on Restoril as needed.  Refill not needed at this time.   · Advised patient on risk of using this in combination with his pain medications.     4. Neuropathy: Chronic, stable.   · The patient is using gabapentin.   · He was initially given a prescription to be used three times daily; however, he experiences constipation if he uses it frequently, therefore, we will decrease to once at nighttime.     5. Constipation: Chronic.  · Controlled with over the counter laxatives.           Transcribed from ambient dictation for Sunita Crawford MD by Angella Fatima.  04/18/22   17:25 CDT    Patient verbalized consent to the visit recording.  I have personally performed the services described in this document as transcribed by the above individual, and it is both accurate and complete.  Sunita Crawford MD  4/18/2022  23:15 CDT          This document has been electronically signed by Sunita Crawford MD on April 18, 2022 23:15 CDT

## 2022-05-01 NOTE — PROGRESS NOTES
[facility] HEMATOLOGY AND ONCOLOGY  [address]  [facility] [address] [phone]    Patient Name: Cabrera Avina  Encounter Date: 05/04/2022  YOB: 1940  Patient Number: [id number]       REASON FOR FOLLOWUP:  Mr. Cabrera Avina is a 81-year-old male who returns in follow-up of intermediate grade B-cell lymphoma.  He is seen 202 months following the completion of R-CHOP chemotherapy and 150 months after the completion of Rituxan maintenance.  He is also followed for an iron deficiency anemia and for chronic anticoagulation. He is seen 29 months after the most recent dose of Injectafer.  The patient is here alone.     I have reviewed the HPI and verified with the patient the accuracy of it. No changes to interval history since the information was documented. Paul Rachel MD 05/04/22       DIAGNOSTIC ABNORMALITIES: B-cell Lymphoma.   1. Periportal lymph node biopsy, 02/01/2005. Findings consistent with large B-cell lymphoma with an intermediate to high proliferative rate.  2. CT of the chest, 02/03/2005. Mediastinal, left hilar, and upper abdominal lymphadenopathy consistent with the patient's history of lymphoma.    PREVIOUS INTERVENTIONS: B-cell lymphoma   1. Definitive Rituxan, 375 mg/m2, 02/05/2005 through 02/25/2005. Four weeks completed.  2. Definitive R-CHOP, from 01/04/2005 through 07/07/2005. Five cycles completed. Cycle #2 delayed by admission for management of deep perirectal abscess.  3. Maintenance Rituxan (every 3 months), 10/14/2005 through 11/11/2007. Six cycles completed.    DIAGNOSTIC ABNORMALITIES: Anemia, iron deficiency   1. Anemia substrates on 05/27/2008: Iron 43, %saturation 12, TIBC 366, UIBC 323, ferritin 27, vitamin B12 of 295, folate 7.2.    PREVIOUS INTERVENTIONS: Anemia.   1. INFeD, 1000 mg IVPB, 06/04/2008.  2. INFeD, 1000 mg IVPB, 09/02/2010 and 09/09/2010.  3. INFeD 500 mg, 08/25/2016;  11/28/2016.  4. Injectafer 750 mg IV, 11/20/2019    Problem List Items Addressed This Visit        Other    Lymphoma in remission (HCC) - Primary        Oncology/Hematology History Overview Note   DIAGNOSTIC ABNORMALITIES: B-cell Lymphoma.  Periportal lymph node biopsy, 02/01/2005.  Findings consistent with large B-cell lymphoma with an intermediate to high proliferative rate.  CT of the chest, 02/03/2005.   Mediastinal, left hilar, and upper abdominal lymphadenopathy consistent with the patient's history of lymphoma.  PREVIOUS INTERVENTIONS:   B-cell lymphoma  Definitive Rituxan, 375 mg/m2, 02/05/2005 through 02/25/2005. Four weeks completed.  Definitive R-CHOP, from 01/04/2005 through 07/07/2005.  Five cycles completed.  Cycle #2 delayed by admission for management of deep perirectal abscess.  Maintenance Rituxan (every 3 months), 10/14/2005 through 11/11/2007.  Six cycles completed.  DIAGNOSTIC ABNORMALITIES:   Anemia, iron deficiency  Anemia substrates on 05/27/2008: Iron 43, %saturation 12, TIBC 366, UIBC 323, ferritin 27, vitamin B12 of 295, folate 7.2.  PREVIOUS INTERVENTIONS:   Anemia.  INFeD, 1000 mg IVPB, 06/04/2008.  INFeD, 1000 mg IVPB, 09/02/2010 and 09/09/2010.  INFeD 500 mg, 08/25/2016; 11/28/2016.     Lymphoma in remission (HCC)   4/28/2016 Initial Diagnosis    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)         PAST MEDICAL HISTORY:  ALLERGIES:  No Known Allergies  CURRENT MEDICATIONS:  Outpatient Encounter Medications as of 5/4/2022   Medication Sig Dispense Refill   • aspirin 81 MG EC tablet Take 1 tablet by mouth Daily. 90 tablet 3   • cyclobenzaprine (FLEXERIL) 10 MG tablet Take 1 tablet by mouth 3 (Three) Times a Day As Needed for Muscle Spasms. (Patient taking differently: Take 10 mg by mouth 3 (Three) Times a Day As Needed for Muscle Spasms (rare).) 90 tablet 0   • digoxin (LANOXIN) 250 MCG tablet Take 1 tablet by mouth Daily. 90 tablet 3   • dilTIAZem CD (CARDIZEM CD) 240 MG 24 hr capsule Take 1 capsule  by mouth Daily. 90 capsule 3   • escitalopram (Lexapro) 20 MG tablet Take 1 tablet by mouth Daily. 90 tablet 3   • folic acid-vit B6-vit B12 (Folbee) 2.5-25-1 MG tablet tablet Take 1 tablet by mouth Daily. 30 tablet 0   • gabapentin (NEURONTIN) 100 MG capsule Take 1 capsule by mouth 3 (Three) Times a Day. 90 capsule 0   • Iron-Vitamins (GERITOL COMPLETE PO) Take 2 tablets by mouth Every Other Day.     • oxyCODONE-acetaminophen (PERCOCET)  MG per tablet Take 1 tablet by mouth Every 6 (Six) Hours As Needed for Severe Pain . Must last 30 days 100 tablet 0   • temazepam (RESTORIL) 15 MG capsule Take 1 capsule by mouth At Night As Needed for Sleep or Anxiety. 30 capsule 2   • warfarin (COUMADIN) 2 MG tablet TAKE 3 AND 0NE-HALF TABLETS BY MOUTH DAILY -- 7 MG, 7 MG, 8 MG, THEN REPEAT 360 tablet 5     No facility-administered encounter medications on file as of 5/4/2022.     ADULT ILLNESSES:   History of malignant lymphoma (situation) ( ICD-10:Z85.72 ;Personal history of non-Hodgkin lymphomas   Adenomatous colonic polyps ( ICD-10:D12.6 ;Benign neoplasm of colon, unspecified   Anemia ( ICD-10:D64.9 ;Anemia, unspecified   Anxiety ( chronic; ICD-10:F41.9 ;Anxiety disorder, unspecified )   Atrial fibrillation ( on chronic anticoagulation; ICD-10:I48.91 ;Unspecified atrial fibrillation )   Degenerative joint disease ( ICD-10:M16.10 ;Unilateral primary osteoarthritis, unspecified hip   Drug intolerance ( oral iron; ICD-10:T45.4x5D ;Adverse effect of iron and its compounds, subsequent encounter )   Hypertension ( ICD-10:I10 ;Essential (primary) hypertension   Iron deficiency anemia ( ICD-10:D50.9 ;Iron deficiency anemia, unspecified   Microscopic hematuria ( ICD-10:R31.29 ;Other microscopic hematuria   Nephrolithiasis ( ICD-10:N20.0 ;Calculus of kidney   Neuropathy ( ICD-10:G62.9 ;Polyneuropathy, unspecified   Orthostatic hypotension ( ICD-10:I95.1 ;Orthostatic hypotension   Perirectal abscess ( deep, severe; ICD-10:K61.1  ;Rectal abscess )   Polycystic kidney disease ( ICD-10:Q61.3 ;Polycystic kidney, unspecified   Vitamin B12 deficiency ( ICD-10:E53.8 ;Deficiency of other specified B group vitamins    SURGERIES:   Punch biopsy of skin lesion, right lower lip, 01/21/2015: 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 Dr. Hatch   Drainage of abscess, recurrent rectal abscess, 06/01/2006   Laparoscopic cholecystectomy and lymph node biopsy   Hip replacement, right, 06/15/2010      ADULT ILLNESSES:  Patient Active Problem List   Diagnosis Code   • Slow transit constipation K59.01   • Gastroesophageal reflux disease without esophagitis K21.9   • Lymphoma in remission (HCC) C85.90   • Anxiety F41.9   • Chronic hip pain M25.559, G89.29   • Permanent atrial fibrillation (HCC) I48.21   • Other insomnia G47.09   • Adenomatous polyp of colon D12.6   • Tobacco use Z72.0   • Chronic anticoagulation Z79.01   • PAD (peripheral artery disease) (HCC) I73.9   • Congestive heart failure (HCC) I50.9   • BMI 24.0-24.9, adult Z68.24   • Leukopenia D72.819   • Iron deficiency anemia D50.9   • Neuropathy G62.9     SURGERIES:  Past Surgical History:   Procedure Laterality Date   • CHOLECYSTECTOMY     • COLONOSCOPY  05/2012    3 yr recall   • COLONOSCOPY  09/18/2015    5 yr recall   • KIDNEY STONE SURGERY     • RECTAL SURGERY      X 2   • TOTAL HIP ARTHROPLASTY       HEALTH MAINTENANCE ITEMS:  There are no preventive care reminders to display for this patient.    <no information>  Last Completed Colonoscopy          COLORECTAL CANCER SCREENING  Completed    12/01/2015  COLONOSCOPY (Done)    09/18/2015  SCANNED - COLONOSCOPY    05/31/2012  SCANNED - COLONOSCOPY              Immunization History   Administered Date(s) Administered   • COVID-19 (MODERNA) 1st, 2nd, 3rd Dose Only 02/14/2021, 03/10/2021, 11/02/2021   • FLUAD TRI 65YR+ 11/05/2019   • Fluad Quad  "65+ 11/05/2019, 10/09/2020   • Pneumococcal Conjugate 13-Valent (PCV13) 11/05/2019     Last Completed Mammogram     This patient has no relevant Health Maintenance data.            FAMILY HISTORY:  Family History   Problem Relation Age of Onset   • Colon cancer Mother    • No Known Problems Father    • Prostate cancer Brother    • No Known Problems Daughter    • No Known Problems Son    • No Known Problems Maternal Grandmother    • No Known Problems Maternal Grandfather    • No Known Problems Paternal Grandmother    • No Known Problems Paternal Grandfather    • Prostate cancer Brother    • Colon polyps Neg Hx      SOCIAL HISTORY:  Social History     Socioeconomic History   • Marital status:    Tobacco Use   • Smoking status: Never Smoker   • Smokeless tobacco: Never Used   Vaping Use   • Vaping Use: Never used   Substance and Sexual Activity   • Alcohol use: No   • Drug use: No   • Sexual activity: Defer       REVIEW OF SYSTEMS:  PS:  ECOG 1-2.   Constitutional:  His appetite is fair, \"I eat enough for me I guess.\"  His energy remains low to fair.  \"Good and not great days.\"  He is as active as his norm and manages all his ADLs including chores, running errands, and driving.  Says his hip and lower back pains still inhibit his activities inspite of hip replacement.  Says he is still walking his usual \"at least 1/2 mile\" weather permitting.  He has no fevers, chills, or drenching night sweats.  He has regained 4 pounds (no weight changes at his prior visit) since his last visit. Still says he wants to stay around 175-180 pounds.  He sleeps more restfully on temazepam.  Ear/Nose/Throat/Mouth:   The patient reports no ear pains, but has lingering sinus symptoms, but no sore throat, nosebleeds.  No headaches. The patient denies any hoarseness.   Ocular:   The patient reports no eye pain, significant change in visual acuity, double vision, or blurry vision.   Respiratory:  He reports no chronic cough, shortness of " "breathing, phlegm production, or chest wall pain.  Cardiovascular:  He reports no exertional chest pain, chest pressure, or chest heaviness.  He reports no claudication. He reports no palpitations or symptomatic orthostasis.  Gastrointestinal:  He has no pica, dysphagia, nausea, vomiting, postprandial abdominal pain, bloating, cramping, change in bowel habits, or discoloration of the stool.  He has had no rectal bleeding.  He has intermittent bouts of constipation modulated by daily stool softeners (Miralax).  Says he is still using supplemental fiber daily.  Says he moves his bowels every 3 days, \"just about- ever since chemo years ago.\" He has some loose stools but no overt diarrhea. Last colonoscopy, 09/2015.  Polyps. Repeat 5 years. Is oral iron intolerant (worsening constipation).  Genitourinary:    He reports no urinary burning, frequency, dribbling, or discoloration.  He reports no difficulty controlling his bladder. \"Still same gotta go when I gotta go.\" He reports no need to urinate frequently through the night.  Musculoskeletal:  He continues to have chronic trouble with right hip pain.  \"usual, 24/7.\"  He still uses maintenance Percocet, 1 dose daily.  He reports no unexplained arthralgias, myalgias, but has noted more frequent bouts of nighttime leg cramping.  Extremities:  He reports no trouble with fluid retention or significant leg swelling.  Endocrine:  He reports no problems with excess thirst, excessive urination, vasomotor instability, or unexplained fatigue.  Heme/Lymphatic:  He reports no bleeding, petechial rashes, or swollen glands.  Skin:  He reports no itching, rashes, or lesions which won't heal.  Neuro:  He reports night-time stocking-glove pain in his lower extremities.  \"Same.\" Has started oral Folbee since 04/29/2022.  Says he had previously been seen by Dr. Kinney previously. \"He didn't find any problems.\"  Says Dr. Rosairo says he does not have significant vascular disease requiring " "surgery.  He reports no loss of consciousness, seizures, fainting spells, or dizziness. He reports no weakness of his face, arms, or legs.  He reports no difficulty with speech.  He reports no tremors.  Psych:  He seems generally satisfied with life.  He reports no depression.  He reports no mood swings.      VITAL SIGNS: /70   Pulse 64   Temp 97.8 °F (36.6 °C)   Resp 16   Ht 180.3 cm (71\")   Wt 81.1 kg (178 lb 14.4 oz)   SpO2 98%   BMI 24.95 kg/m²       PHYSICAL EXAMINATION:  General:  He is a pleasant, cooperative, slender, and fairly well-kept elderly male in no distress.  He arrived in the exam room ambulatory. He appears to be his stated age.  His skin color is pale. ECOG 1  Head/Neck:  The patient is anicteric and atraumatic.  He is wearing a surgical mask today. The neck is supple without evidence of jugular venous distention or cervical adenopathy.  There is a nontender 1 x 2 mm lower lip node  Eyes:  The pupils are equal, round, and reactive to light.  The extraocular movements are full.  There is no scleral jaundice or erythema.  Chest:  The respiratory efforts are normal and unhindered.  The chest is clear to auscultation.  There are no wheezes, rales, or asymmetry of breath sounds.  The port in the right anterior chest wall has been removed and the site has healed. No tenderness or erythema.   Cardiac/Vascular:  The patient has an irregularly irregular cardiac rate and rhythm without murmurs.  The peripheral pulses are equal and full.  Abdomen:  The belly is soft and flat.  There is no rebound or guarding. There is no organomegaly, mass-effect, or tenderness.  Bowel sounds are normal.  Ortho:  There is no overt joint stiffness.  There is subtle foreshortening of height.  There are no overt joint changes which suggest degenerative arthritis.  Extremities:  There is no evidence of cyanosis, clubbing, with trace bipedal edema.  Rheumatologic:  There is no overt evidence of rheumatoid " deformities of the hands.  There is no sausaging of the fingers.  There is no sign of active synovitis.  Cutaneous:  Few areas of senile purpura are present on his forearms.  There are no overt rashes, disseminated lesions, purpura, or petechiae.  Lymphatics:  There is no evidence of adenopathy in the cervical, supraclavicular, or axillary areas.  Neurologic:  The patient is alert, oriented, cooperative, and pleasant.  He is appropriately conversant.  He ambulated into the exam room and transferred from chair to exam table aided.  There is no overt dysfunction of the motor, sensory, or cerebellar systems.  Psych:  Impatient.  Mood and affect are appropriate for circumstance.  Eye contact is appropriate.        LABS    Lab Results - Last 18 Months   Lab Units 04/14/22  1410 12/09/21  1105 07/19/21  1325 03/15/21  1238   HEMOGLOBIN g/dL 14.4 14.2 13.9 14.2   HEMATOCRIT % 43.9 43.2 42.7 44.0   MCV fL 96.5 96.6 94.5 93.0   WBC 10*3/mm3 4.14 4.14 4.11 3.87   RDW % 13.0 13.2 13.8 13.5   MPV fL 9.5 9.8 10.0 9.7   PLATELETS 10*3/mm3 197 208 184 202   IMM GRAN % % 0.2 0.2 0.2 0.3   NEUTROS ABS 10*3/mm3 2.66 2.43 2.56 2.52   LYMPHS ABS 10*3/mm3 0.83 0.93 0.78 0.64*   MONOS ABS 10*3/mm3 0.47 0.49 0.53 0.50   EOS ABS 10*3/mm3 0.13 0.23 0.17 0.17   BASOS ABS 10*3/mm3 0.04 0.05 0.06 0.03   IMMATURE GRANS (ABS) 10*3/mm3 0.01 0.01 0.01 0.01   NRBC /100 WBC 0.0 0.0 0.0 0.0       Lab Results - Last 18 Months   Lab Units 04/14/22  1410 12/09/21  1105 07/19/21  1325 03/15/21  1238   GLUCOSE mg/dL 116* 114* 103* 103*   SODIUM mmol/L 140 140 137 138   POTASSIUM mmol/L 4.0 3.9 4.4 4.0   CO2 mmol/L 28.0 30.0* 28.0 30.0*   CHLORIDE mmol/L 103 103 104 98   ANION GAP mmol/L 9.0 7.0 5.0 10.0   CREATININE mg/dL 0.90 0.94 0.96 0.99   BUN mg/dL 15 13 10 13   BUN / CREAT RATIO  16.7 13.8 10.4 13.1   CALCIUM mg/dL 9.2 9.3 9.2 9.4   EGFR IF NONAFRICN AM mL/min/1.73  --  77 75 73   ALK PHOS U/L 76 73 67 83   TOTAL PROTEIN g/dL 6.8 7.5 6.9 7.5   ALT  (SGPT) U/L 9 11 10 13   AST (SGOT) U/L 16 18 19 20   BILIRUBIN mg/dL 0.6 0.6 0.6 0.5   ALBUMIN g/dL 3.90 4.00 3.90 3.70   GLOBULIN gm/dL 2.9 3.5 3.0 3.8       Lab Results - Last 18 Months   Lab Units 04/14/22  1410 12/09/21  1105 07/19/21  1325 03/15/21  1238   LDH U/L 188 203 179 184       Lab Results - Last 18 Months   Lab Units 04/14/22  1410 12/09/21  1105 07/19/21  1325 03/15/21  1238   IRON mcg/dL 99 95 86 84   TIBC mcg/dL 352 337 322 359   IRON SATURATION % 28 28 27 23   FERRITIN ng/mL 279.20 326.20 289.50 277.60   FOLATE ng/mL 10.40 8.53 11.20 9.74       ASSESSMENT:  1.    Intermediate to high-grade lymphoma.  History of. No evidence of disease.  05/16/2005:          Stage IV-E.          Baseline Tumor [place]:   Mediastinum, left hilum, upper abdomen, and liver (clinically).          Complications of Tumor:   Hyperbilirubinemia. Improved.          Response to Therapy:  Clinical remission per CT scan 05/16/2005, 11/2011 and 02/10/2020.          -02/03/2020-CT chest without contrast.  Impression: Atheromatous disease of the thoracic aorta and coronary arteries.  Mild cardiomegaly.  Borderline pulmonary artery dilatation.  No infiltrate or effusion.          -02/03/2020-CT abdomen and pelvis with contrast.  Impression: Prior cholecystectomy.  Mild prominence of biliary tree felt to be related.  Complex renal cyst on the left is stable in size with septation and calcification.  Dominant mid renal cyst on the right side is slightly larger.  Upper pole of the right renal collecting system is mildly dilated and contains 2 stones measuring 8 to 9 mm.  5 mm nonobstructive stone in the left mid kidney.  Ureter is nondilated.  Prostate mildly enlarged 5.1 cm.  Moderate stool in the colon.  No small bowel distention.          Prognosis:  Fair.          IPI at baseline:  3.  2.    Normocytic anemia from iron deficiency and chronic disease. Last Injectafer, 11/20/2019.    --Hemoglobin, 14.4 on 04/14/2022 (prior range:   Hgb 12.9 - 14.7).  3.    Leukopenia, NOS. normal since 02/07/2020   4.    Iron deficiency and oral iron intolerant (constipation).  Repleted since receipt of INFeD  5.    Variable B2M.    --Stable, 2.5 on 04/14/2022 (prior range:  1.6 - 3.3).  6.    Adenomatous colon polyp, colonoscopy 09/18/2015.  7.    Atrial fibrillation on chronic anticoagulation.  8.    Microscopic hematuria.  Management per Dr. Mayes.  9.    Anxiety, chronic, stable on medications (Celexa).  10.  Lower extremity neuropathy.  Mild.  Not a problem of late.  11.  Hypertension.  Fair control on Cardizem.  12.  Low B12, 203 on 08/17/2017.  Repleted.  13.  Peripheral vascular disease.  Arterial studies and has been seen by Dr. Rosario of vascular surgery.              a.    Ultrasound ankle/brachial performed on 07/17/2018 at [facility]. Suspected atherosclerosis in the lower extremity arteries, supported by noncompressible posterior tibialis and dorsalis pedis arteries.              b.    Abdominal aortic ultrasound performed on 08/10/2018 at [facility].  No abdominal aortic aneurysm.  14.  COVID vaccination # 2, 03/10/2021  15.  1 x 2 mm lower lip nodule    PLAN:  1.      Re:  Apprised of labs from 04/14/2022 with low normal WBC, normal diff, normal hemoglobin (resolution of anemia), normoglycemia with normal CMP, normal LDH, normal B2M, repleted serum iron, repleted (> 20%) Fe sat, repleted (> 100) ferritin, repleted folate, slightly depressed (281-Folbee called in) B12.   2.    Refer to Dr. Mariee Re:  Lower lip lesion  3.    Previously apprised of CT scans, 02/10/2020 - Large renal cyst (stable since 2011).  Stable. Otherwise GURPREET  4.    Continue other currently identified medications.  5.    Continue Coumadin.  PT/INR followed by Dr. Crawford.  6.    Continue ongoing management per primary care physician and other specialists.    7.    Return to office in 4 months with pre-office CBC with differential, iron, TIBC, iron  saturation, ferritin, B12, folate, CMP, B2M, and LDH.      MEDICAL DECISION MAKING: Low Complexity  AMOUNT OF DATA: Moderate    I spent 22 minutes caring for Cabrera on this date of service. This time includes time spent by me in the following activities: preparing for the visit, reviewing tests, performing a medically appropriate examination and/or evaluation, counseling and educating the patient/family/caregiver, ordering medications, tests, or procedures and documenting information in the medical record.       cc:   Azar Mayes MD          [facility]          Howell Amber Crawford MD [facility]          Jorge Alberto Rosario MD

## 2022-05-04 ENCOUNTER — OFFICE VISIT (OUTPATIENT)
Dept: ONCOLOGY | Facility: CLINIC | Age: 82
End: 2022-05-04

## 2022-05-04 VITALS
SYSTOLIC BLOOD PRESSURE: 124 MMHG | BODY MASS INDEX: 25.05 KG/M2 | HEART RATE: 64 BPM | TEMPERATURE: 97.8 F | OXYGEN SATURATION: 98 % | HEIGHT: 71 IN | RESPIRATION RATE: 16 BRPM | DIASTOLIC BLOOD PRESSURE: 70 MMHG | WEIGHT: 178.9 LBS

## 2022-05-04 DIAGNOSIS — C85.90 LYMPHOMA IN REMISSION: Primary | ICD-10-CM

## 2022-05-04 PROCEDURE — 99214 OFFICE O/P EST MOD 30 MIN: CPT | Performed by: INTERNAL MEDICINE

## 2022-05-05 ENCOUNTER — TELEPHONE (OUTPATIENT)
Dept: ONCOLOGY | Facility: CLINIC | Age: 82
End: 2022-05-05

## 2022-05-05 NOTE — TELEPHONE ENCOUNTER
I spoke with Cabrera. I let him know that the referral was placed yesterday and Dr. Mariee's office had received it. I let him know that we needed to give them a bit of time to work on the referral before they call him with an appointment. He asked for their phone number so I gave him the number to call.

## 2022-05-05 NOTE — TELEPHONE ENCOUNTER
Caller: Cabrera Avina    Relationship: Self    Best call back number: [phone]    What is the best time to reach you: AS SOON AS POSSIBLE    Who are you requesting to speak with (clinical staff, provider,  specific staff member): DR STEELE NURSE      What was the call regarding:     WAS IN TO SEE DR STEELE YESTERDAY AND HIS NURSE WAS SUPPOSED TO MAKE APPOINTMENT WITH DR AGUERO , AND CALL HIM YESTERDAY  BUT HAS NOT HEARD FROM THE NURSE REGARDING THE APPOINTMENT     CALLING TO FOLLOW UP     Do you require a callback: YES

## 2022-05-18 RX ORDER — CYANOCOBALAMIN/FOLIC AC/VIT B6 1-2.5-25MG
1 TABLET ORAL DAILY
Qty: 30 TABLET | Refills: 0 | Status: SHIPPED | OUTPATIENT
Start: 2022-05-18 | End: 2023-01-25

## 2022-05-19 ENCOUNTER — OFFICE VISIT (OUTPATIENT)
Dept: FAMILY MEDICINE CLINIC | Facility: CLINIC | Age: 82
End: 2022-05-19

## 2022-05-19 VITALS
TEMPERATURE: 97.6 F | OXYGEN SATURATION: 98 % | SYSTOLIC BLOOD PRESSURE: 152 MMHG | BODY MASS INDEX: 24.72 KG/M2 | HEIGHT: 71 IN | DIASTOLIC BLOOD PRESSURE: 68 MMHG | RESPIRATION RATE: 16 BRPM | WEIGHT: 176.6 LBS | HEART RATE: 69 BPM

## 2022-05-19 DIAGNOSIS — M25.551 CHRONIC PAIN OF RIGHT HIP: ICD-10-CM

## 2022-05-19 DIAGNOSIS — I48.21 PERMANENT ATRIAL FIBRILLATION: Primary | ICD-10-CM

## 2022-05-19 DIAGNOSIS — G89.29 CHRONIC PAIN OF RIGHT HIP: ICD-10-CM

## 2022-05-19 DIAGNOSIS — G47.09 OTHER INSOMNIA: ICD-10-CM

## 2022-05-19 DIAGNOSIS — G62.9 NEUROPATHY: ICD-10-CM

## 2022-05-19 LAB
INR PPP: 2.1 (ref 0.9–1.1)
PROTHROMBIN TIME: 25.7 SECONDS

## 2022-05-19 PROCEDURE — 99214 OFFICE O/P EST MOD 30 MIN: CPT | Performed by: FAMILY MEDICINE

## 2022-05-19 PROCEDURE — 85610 PROTHROMBIN TIME: CPT | Performed by: FAMILY MEDICINE

## 2022-05-19 PROCEDURE — 36416 COLLJ CAPILLARY BLOOD SPEC: CPT | Performed by: FAMILY MEDICINE

## 2022-05-19 RX ORDER — TEMAZEPAM 15 MG/1
15 CAPSULE ORAL NIGHTLY PRN
Qty: 30 CAPSULE | Refills: 2 | Status: SHIPPED | OUTPATIENT
Start: 2022-05-19 | End: 2022-08-16 | Stop reason: SDUPTHER

## 2022-05-19 RX ORDER — GABAPENTIN 100 MG/1
100 CAPSULE ORAL 3 TIMES DAILY
Qty: 90 CAPSULE | Refills: 0 | Status: SHIPPED | OUTPATIENT
Start: 2022-05-19 | End: 2022-06-13 | Stop reason: SDUPTHER

## 2022-05-19 RX ORDER — OXYCODONE AND ACETAMINOPHEN 10; 325 MG/1; MG/1
1 TABLET ORAL EVERY 6 HOURS PRN
Qty: 100 TABLET | Refills: 0 | Status: SHIPPED | OUTPATIENT
Start: 2022-05-19 | End: 2022-06-13 | Stop reason: SDUPTHER

## 2022-05-25 ENCOUNTER — TELEPHONE (OUTPATIENT)
Dept: FAMILY MEDICINE CLINIC | Facility: CLINIC | Age: 82
End: 2022-05-25

## 2022-05-25 NOTE — TELEPHONE ENCOUNTER
Pt called asking for muscle relaxer to be sent to [facility]. Pt states that he declined at visit on 5/19/2022, but back is really hurting now. OV note is still open. Please advise.

## 2022-05-26 DIAGNOSIS — G89.29 CHRONIC PAIN OF RIGHT HIP: ICD-10-CM

## 2022-05-26 DIAGNOSIS — M25.551 CHRONIC PAIN OF RIGHT HIP: ICD-10-CM

## 2022-05-26 RX ORDER — CYCLOBENZAPRINE HCL 10 MG
10 TABLET ORAL 3 TIMES DAILY PRN
Qty: 90 TABLET | Refills: 0 | Status: SHIPPED | OUTPATIENT
Start: 2022-05-26 | End: 2022-07-19 | Stop reason: SDUPTHER

## 2022-05-31 DIAGNOSIS — I48.20 CHRONIC ATRIAL FIBRILLATION: ICD-10-CM

## 2022-05-31 RX ORDER — WARFARIN SODIUM 2 MG/1
TABLET ORAL
Qty: 360 TABLET | Refills: 5 | Status: SHIPPED | OUTPATIENT
Start: 2022-05-31 | End: 2023-02-16 | Stop reason: SDUPTHER

## 2022-05-31 NOTE — TELEPHONE ENCOUNTER
Rx Refill Note  Requested Prescriptions     Pending Prescriptions Disp Refills   • warfarin (COUMADIN) 2 MG tablet [Pharmacy Med Name: WARFARIN SODIUM 2 MG TABS 2 Tablet] 360 tablet 5     Sig: TAKE 3 AND ONE HALF TABLET BY MOUTH DAILY      Last office visit with prescribing clinician: 5/19/2022  Next office visit with prescribing clinician: 6/13/2022     Last refill: 3/30/2021     Catherine Young MA  05/31/22, 14:06 CDT

## 2022-06-13 ENCOUNTER — OFFICE VISIT (OUTPATIENT)
Dept: FAMILY MEDICINE CLINIC | Facility: CLINIC | Age: 82
End: 2022-06-13

## 2022-06-13 VITALS
BODY MASS INDEX: 24.16 KG/M2 | WEIGHT: 172.6 LBS | DIASTOLIC BLOOD PRESSURE: 64 MMHG | TEMPERATURE: 96.6 F | SYSTOLIC BLOOD PRESSURE: 140 MMHG | HEIGHT: 71 IN | OXYGEN SATURATION: 97 % | RESPIRATION RATE: 16 BRPM | HEART RATE: 56 BPM

## 2022-06-13 DIAGNOSIS — G47.09 OTHER INSOMNIA: ICD-10-CM

## 2022-06-13 DIAGNOSIS — I48.21 PERMANENT ATRIAL FIBRILLATION: Primary | ICD-10-CM

## 2022-06-13 DIAGNOSIS — G89.29 CHRONIC PAIN OF RIGHT HIP: ICD-10-CM

## 2022-06-13 DIAGNOSIS — M25.551 CHRONIC PAIN OF RIGHT HIP: ICD-10-CM

## 2022-06-13 DIAGNOSIS — G62.9 NEUROPATHY: ICD-10-CM

## 2022-06-13 LAB
INR PPP: 2.1 (ref 0.9–1.1)
PROTHROMBIN TIME: 25.6 SECONDS

## 2022-06-13 PROCEDURE — 36416 COLLJ CAPILLARY BLOOD SPEC: CPT | Performed by: FAMILY MEDICINE

## 2022-06-13 PROCEDURE — 99214 OFFICE O/P EST MOD 30 MIN: CPT | Performed by: FAMILY MEDICINE

## 2022-06-13 PROCEDURE — 85610 PROTHROMBIN TIME: CPT | Performed by: FAMILY MEDICINE

## 2022-06-13 RX ORDER — GABAPENTIN 100 MG/1
100 CAPSULE ORAL 3 TIMES DAILY
Qty: 90 CAPSULE | Refills: 0 | Status: SHIPPED | OUTPATIENT
Start: 2022-06-13 | End: 2022-07-19 | Stop reason: SDUPTHER

## 2022-06-13 RX ORDER — OXYCODONE AND ACETAMINOPHEN 10; 325 MG/1; MG/1
1 TABLET ORAL EVERY 6 HOURS PRN
Qty: 100 TABLET | Refills: 0 | Status: SHIPPED | OUTPATIENT
Start: 2022-06-13 | End: 2022-07-19 | Stop reason: SDUPTHER

## 2022-06-21 NOTE — PROGRESS NOTES
"Subjective Chief complaint: Chronic pain  Cabrera Avina is a 82 y.o. male who presents for follow-up on his chronic pain.  Patient reports the majority of his pain is in his right hip.  He does have an abnormal gait.  He is tries to remain active.  He is using medication daily.  No side effects from medication.  It does keep his pain controlled.  Would like a refill today.  Patient continues to have neuropathy for which he takes gabapentin.  He reports this helps his symptoms.  Insomnia is currently treated with Restoril.  Chronic atrial fibrillation is rate controlled and patient is anticoagulated with Coumadin.  Denies any blood loss.  No changes with hematology oncology  No new concerns    History of Present Illness     The following portions of the patient's history were reviewed and updated as appropriate: allergies, current medications, past family history, past medical history, past social history, past surgical history and problem list.        Review of Systems    Objective   Blood pressure 140/64, pulse 56, temperature 96.6 °F (35.9 °C), temperature source Infrared, resp. rate 16, height 180.3 cm (71\"), weight 78.3 kg (172 lb 9.6 oz), SpO2 97 %.  Physical Exam  Vitals and nursing note reviewed.   Constitutional:       General: He is not in acute distress.     Appearance: He is well-developed. He is not diaphoretic.   HENT:      Head: Normocephalic and atraumatic.      Right Ear: External ear normal.      Left Ear: External ear normal.      Nose: Nose normal.   Eyes:      General:         Right eye: No discharge.         Left eye: No discharge.      Conjunctiva/sclera: Conjunctivae normal.   Neck:      Thyroid: No thyromegaly.      Trachea: No tracheal deviation.   Cardiovascular:      Rate and Rhythm: Normal rate. Rhythm irregular.      Pulses: Normal pulses.   Pulmonary:      Effort: Pulmonary effort is normal. No respiratory distress.      Breath sounds: Normal breath sounds. No stridor. No " wheezing.   Chest:      Chest wall: No tenderness.   Abdominal:      General: There is no distension.      Palpations: Abdomen is soft.      Tenderness: There is no abdominal tenderness.   Musculoskeletal:         General: Tenderness present.      Cervical back: Normal range of motion.      Lumbar back: Tenderness present. Decreased range of motion.      Right hip: Tenderness and bony tenderness present. Decreased range of motion.   Lymphadenopathy:      Cervical: No cervical adenopathy.   Skin:     General: Skin is warm and dry.      Findings: Bruising present.   Neurological:      Mental Status: He is alert and oriented to person, place, and time.      Motor: Weakness present. No abnormal muscle tone.      Coordination: Coordination normal.      Gait: Gait abnormal.   Psychiatric:         Behavior: Behavior normal.         Thought Content: Thought content normal.         Judgment: Judgment normal.         Assessment & Plan   Problems Addressed this Visit        Cardiac and Vasculature    Permanent atrial fibrillation (HCC) - Primary    Relevant Orders    POC Protime / INR (Completed)       Musculoskeletal and Injuries    Chronic hip pain    Relevant Medications    oxyCODONE-acetaminophen (PERCOCET)  MG per tablet       Neuro    Neuropathy    Relevant Medications    gabapentin (NEURONTIN) 100 MG capsule       Sleep    Other insomnia      Diagnoses       Codes Comments    Permanent atrial fibrillation (HCC)    -  Primary ICD-10-CM: I48.21  ICD-9-CM: 427.31     Neuropathy     ICD-10-CM: G62.9  ICD-9-CM: 355.9     Chronic pain of right hip     ICD-10-CM: M25.551, G89.29  ICD-9-CM: 719.45, 338.29     Other insomnia     ICD-10-CM: G47.09  ICD-9-CM: 780.52       Plan:    Atrial fibrillation: Chronic, stable.  Patient is currently rate controlled.  Anticoagulated with Coumadin.  INR completed today.  Advised on dosing adjustments.  Continue on current medications.    2.  Chronic hip pain: Chronic, stable.  Patient  continues to have right hip pain.  Medication does allow him to function.  Nii reviewed.  Controlled contract in the chart.  Advised on risk and benefits of medication.  UDS up-to-date.    3.  Insomnia: Chronic, stable.  Continue on current medication.    4.  Neuropathy: Chronic, stable.  Continue gabapentin.  Refill given.  Nii reviewed.  Controlled contract in the chart.  UDS up-to-date.          This document has been electronically signed by Sunita Crawford MD on June 21, 2022 10:10 CDT

## 2022-06-30 ENCOUNTER — OFFICE VISIT (OUTPATIENT)
Dept: FAMILY MEDICINE CLINIC | Facility: CLINIC | Age: 82
End: 2022-06-30

## 2022-06-30 VITALS
WEIGHT: 172.4 LBS | DIASTOLIC BLOOD PRESSURE: 79 MMHG | BODY MASS INDEX: 24.14 KG/M2 | HEART RATE: 72 BPM | RESPIRATION RATE: 16 BRPM | HEIGHT: 71 IN | TEMPERATURE: 96.8 F | SYSTOLIC BLOOD PRESSURE: 135 MMHG | OXYGEN SATURATION: 98 %

## 2022-06-30 DIAGNOSIS — S51.011A SKIN TEAR OF ELBOW WITHOUT COMPLICATION, RIGHT, INITIAL ENCOUNTER: Primary | ICD-10-CM

## 2022-06-30 PROCEDURE — 99213 OFFICE O/P EST LOW 20 MIN: CPT | Performed by: NURSE PRACTITIONER

## 2022-06-30 RX ORDER — CEPHALEXIN 500 MG/1
500 CAPSULE ORAL 2 TIMES DAILY
Qty: 14 CAPSULE | Refills: 0 | Status: SHIPPED | OUTPATIENT
Start: 2022-06-30 | End: 2022-07-19

## 2022-06-30 NOTE — PROGRESS NOTES
"Chief Complaint  Arm Injury (Patient reports that he fell on Tuesday and he has a skin tear to right elbow. )    Subjective        Cabrera Avina presents to [facility]  History of Present Illness  The patient presents today for a skin tear of his right elbow. He fell on Tuesday, 06/28/2022, 2 days ago and suffered a skin tear to the right elbow. He reports that he cut off the excess skin that was avulsed and has been dressing the right elbow skin tear with Vaseline gauze, antibiotic ointment and wrapping the right elbow skin tear nightly. He has been leaving the right elbow skin tear open to air. The right elbow skin tear is approximately palm-sized to that area. There is no drainage from the right elbow skin tear. The right elbow skin tear appears to be healing appropriately and is non-infected appearing. There is mild surrounding bruising and swelling noted in the area. He has no tenderness to the bones of the right upper extremity. He reports no fevers. The patient presented to the office today to inquire about if he is utilizing the correct treatment for his right elbow skin tear.    Objective   Vital Signs:  /79 (BP Location: Left arm, Patient Position: Sitting, Cuff Size: Adult)   Pulse 72   Temp 96.8 °F (36 °C) (Infrared)   Resp 16   Ht 180.3 cm (71\")   Wt 78.2 kg (172 lb 6.4 oz)   SpO2 98%   BMI 24.04 kg/m²   Estimated body mass index is 24.04 kg/m² as calculated from the following:    Height as of this encounter: 180.3 cm (71\").    Weight as of this encounter: 78.2 kg (172 lb 6.4 oz).    BMI is within normal parameters. No other follow-up for BMI required.      Physical Exam  Vitals and nursing note reviewed.   Constitutional:       Appearance: He is well-developed.   HENT:      Head: Normocephalic and atraumatic.   Eyes:      Conjunctiva/sclera: Conjunctivae normal.   Cardiovascular:      Rate and Rhythm: Normal rate and regular rhythm.   Pulmonary:     "  Effort: Pulmonary effort is normal.   Musculoskeletal:      Cervical back: Normal range of motion.   Skin:     Findings: Wound present.          Neurological:      General: No focal deficit present.      Mental Status: He is alert and oriented to person, place, and time.   Psychiatric:         Mood and Affect: Mood normal.         Behavior: Behavior normal.        Result Review :                Assessment and Plan   Diagnoses and all orders for this visit:    1. Skin tear of elbow without complication, right, initial encounter (Primary)    Other orders  -     cephalexin (Keflex) 500 MG capsule; Take 1 capsule by mouth 2 (Two) Times a Day.  Dispense: 14 capsule; Refill: 0    1. Skin tear of the right elbow.  - He will continue the current treatment. The patient was provided with non-adherent gauze and Coban to use in addition to the antibiotic ointment. I advised that this will take some time to heal, and the main goal is to keep the right elbow skin tear free from infection. I advised him to keep the right elbow skin tear covered to protect it from touching anything.  - We will prescribe a course of Keflex to prevent infection.  - The patient will follow up if he experiences any worsening symptoms.         Follow Up   Return in about 1 week (around 7/7/2022), or if symptoms worsen or fail to improve.  Patient was given instructions and counseling regarding his condition or for health maintenance advice. Please see specific information pulled into the AVS if appropriate.     Transcribed from ambient dictation for JOCELINE Loredo by BRIT LUKE.  06/30/22   14:15 CDT    Patient verbalized consent to the visit recording.  I have personally performed the services described in this document as transcribed by the above individual, and it is both accurate and complete.  JOCELINE Loredo  6/30/2022  14:59 CDT

## 2022-07-19 ENCOUNTER — OFFICE VISIT (OUTPATIENT)
Dept: FAMILY MEDICINE CLINIC | Facility: CLINIC | Age: 82
End: 2022-07-19

## 2022-07-19 VITALS
SYSTOLIC BLOOD PRESSURE: 146 MMHG | OXYGEN SATURATION: 98 % | TEMPERATURE: 98 F | BODY MASS INDEX: 24.3 KG/M2 | RESPIRATION RATE: 18 BRPM | HEIGHT: 71 IN | DIASTOLIC BLOOD PRESSURE: 70 MMHG | HEART RATE: 49 BPM | WEIGHT: 173.6 LBS

## 2022-07-19 DIAGNOSIS — Z79.01 CHRONIC ANTICOAGULATION: ICD-10-CM

## 2022-07-19 DIAGNOSIS — I48.21 PERMANENT ATRIAL FIBRILLATION: Primary | ICD-10-CM

## 2022-07-19 DIAGNOSIS — F41.9 ANXIETY: ICD-10-CM

## 2022-07-19 DIAGNOSIS — M25.551 CHRONIC PAIN OF RIGHT HIP: ICD-10-CM

## 2022-07-19 DIAGNOSIS — G89.29 CHRONIC PAIN OF RIGHT HIP: ICD-10-CM

## 2022-07-19 DIAGNOSIS — I48.20 CHRONIC ATRIAL FIBRILLATION: ICD-10-CM

## 2022-07-19 DIAGNOSIS — G62.9 NEUROPATHY: ICD-10-CM

## 2022-07-19 DIAGNOSIS — K59.01 SLOW TRANSIT CONSTIPATION: ICD-10-CM

## 2022-07-19 PROCEDURE — 99214 OFFICE O/P EST MOD 30 MIN: CPT | Performed by: FAMILY MEDICINE

## 2022-07-19 RX ORDER — CYCLOBENZAPRINE HCL 10 MG
10 TABLET ORAL 3 TIMES DAILY PRN
Qty: 90 TABLET | Refills: 0 | Status: SHIPPED | OUTPATIENT
Start: 2022-07-19 | End: 2022-10-24

## 2022-07-19 RX ORDER — GABAPENTIN 100 MG/1
100 CAPSULE ORAL 3 TIMES DAILY
Qty: 90 CAPSULE | Refills: 0 | Status: SHIPPED | OUTPATIENT
Start: 2022-07-19 | End: 2022-08-16 | Stop reason: SDUPTHER

## 2022-07-19 RX ORDER — OXYCODONE AND ACETAMINOPHEN 10; 325 MG/1; MG/1
1 TABLET ORAL EVERY 6 HOURS PRN
Qty: 100 TABLET | Refills: 0 | Status: SHIPPED | OUTPATIENT
Start: 2022-07-19 | End: 2022-08-16 | Stop reason: SDUPTHER

## 2022-07-19 RX ORDER — DIGOXIN 250 MCG
250 TABLET ORAL DAILY
Qty: 90 TABLET | Refills: 3 | Status: SHIPPED | OUTPATIENT
Start: 2022-07-19

## 2022-07-19 RX ORDER — ESCITALOPRAM OXALATE 20 MG/1
20 TABLET ORAL DAILY
Qty: 90 TABLET | Refills: 3 | Status: SHIPPED | OUTPATIENT
Start: 2022-07-19

## 2022-07-21 ENCOUNTER — OFFICE VISIT (OUTPATIENT)
Dept: GASTROENTEROLOGY | Facility: CLINIC | Age: 82
End: 2022-07-21

## 2022-07-21 VITALS
DIASTOLIC BLOOD PRESSURE: 74 MMHG | HEART RATE: 69 BPM | BODY MASS INDEX: 23.94 KG/M2 | WEIGHT: 171 LBS | TEMPERATURE: 96.8 F | OXYGEN SATURATION: 99 % | SYSTOLIC BLOOD PRESSURE: 140 MMHG | HEIGHT: 71 IN

## 2022-07-21 DIAGNOSIS — K59.00 CONSTIPATION, UNSPECIFIED CONSTIPATION TYPE: Primary | ICD-10-CM

## 2022-07-21 DIAGNOSIS — K21.9 GASTROESOPHAGEAL REFLUX DISEASE WITHOUT ESOPHAGITIS: ICD-10-CM

## 2022-07-21 PROCEDURE — 99213 OFFICE O/P EST LOW 20 MIN: CPT | Performed by: INTERNAL MEDICINE

## 2022-07-21 NOTE — PROGRESS NOTES
[facility]    Chief Complaint   Patient presents with   • Constipation       Subjective     HPI    Cabrera Avina is a 82 y.o. male who presents with a chief complaint of constipation.    He comes in for yearly checkup.  He tells me he is doing well.  He does note that he is not as spry and active this past year.  He states he just does not have the stamina.  He did fall the other day and suffered an abrasion around his right elbow.  That is healing slowly.  Everything else going well.  Denies any breakthrough heartburn.    Regarding his chronic constipation he still battles it.  He states he will go 2 days without a bowel movement and on Thursday he will take a laxative.  He is getting an over-the-counter laxative he believes is called a saline flush that he takes every Wednesday and I will clean him out.  He tried MiraLAX in the past but did not stick with it very long.  He is not passing blood or mucus.  No abdominal pain.  Everything else is going well.      Last July we did talk about the benefits of surveillance colonoscopy and the risk involved.  He had elected to forego any future surveillance colonoscopies.  =============== copy of July 2021 HPI= 9=======================  ==    HPI     Cabrera Avina is a 81 y.o. male who presents with a chief complaint of constipation.     He tells me he is doing well.  Still suffers with constipation.  He states he controls this with taken 2 bisacodyl tablets every 3 days.  He states his biggest complaint is having burning and tingling in both his lower extremities.  He has been to neurosurgery and sounds like he has had a neurological work-up.  He states he controls the discomfort with 1 Percocet in the morning sometimes he will take 2 of the 4 prescribed daily.  Regarding his bowel habits though they are relatively unchanged.  He is not passing bloody mucus.  No abdominal cramping.  Regarding reflux is not had any heartburn for several  years.  He thinks he had heartburn 1 time the last couple years.        He was due for surveillance colonoscopy last year but he chose not to pursue secondary to the coronavirus pandemic and his age.  ============== July 2020 HPI===================================  HPI     Cabrera Avina is a 80 y.o. male who presents with a chief complaint of constipation.     He tells me he is doing well is not really have any troubles with his constipation.  He is here for his annual checkup.  He states he is tired with fatigue but nothing else is new.  He is concerned about the coronavirus and him catching it.  Therefore he has been isolating a lot at home.  He does get out walk.  He states he does not go anywhere else.  This is really the first time he is been anywhere.  He does check in with his primary care once a month.  He also has had a telephone office visit with Dr. Navarro on for checkup.  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              ===================== May 23, 2019 HPI=======================  HPI     Cabrera Avina is a 79 y.o. male who presents with a chief complaint of constipation.     He comes in for an annual checkup.  He tells me he is doing okay.  He still suffers with constipation but he is doing okay with an over-the-counter supplement.  He is taking Perigose daily.  ( Mariela can)   he states with this he will move his bowels every 3 to 4 days.  Last year we gave him Linzess to try.  He states that did not help much.  MiraLAX was not very helpful either.  Denies any blood or mucus.     Regards to his reflux is under control.  He has been off omeprazole now for 2 years.  Denies heartburn or regurgitation.     Past Medical History:   Diagnosis Date   • Anemia    • Anxiety     chronic   • Atrial fibrillation (HCC)    • Cancer (HCC)     non-hodgkins lymphoma   • Cholecystitis    • Colon polyp    • Colon polyp    • Congestive heart failure (HCC) 8/13/2019   •  Constipation    • GERD (gastroesophageal reflux disease)    • History of ERCP 2005   • Iron deficiency anemia 11/19/2019   • Nephrolithiasis     stones removed   • Jaylyn-rectal abscess    • Rectal abscess        Past Surgical History:   Procedure Laterality Date   • CHOLECYSTECTOMY     • COLONOSCOPY  05/2012    3 yr recall   • COLONOSCOPY  09/18/2015    5 yr recall   • KIDNEY STONE SURGERY     • RECTAL SURGERY      X 2   • TOTAL HIP ARTHROPLASTY           Current Outpatient Medications:   •  aspirin 81 MG EC tablet, Take 1 tablet by mouth Daily., Disp: 90 tablet, Rfl: 3  •  cyclobenzaprine (FLEXERIL) 10 MG tablet, Take 1 tablet by mouth 3 (Three) Times a Day As Needed for Muscle Spasms. (Patient taking differently: Take 10 mg by mouth As Needed for Muscle Spasms (rare).), Disp: 90 tablet, Rfl: 0  •  digoxin (LANOXIN) 250 MCG tablet, Take 1 tablet by mouth Daily., Disp: 90 tablet, Rfl: 3  •  dilTIAZem CD (CARDIZEM CD) 240 MG 24 hr capsule, Take 1 capsule by mouth Daily., Disp: 90 capsule, Rfl: 3  •  escitalopram (Lexapro) 20 MG tablet, Take 1 tablet by mouth Daily. (Patient taking differently: Take 20 mg by mouth As Needed (rare).), Disp: 90 tablet, Rfl: 3  •  gabapentin (NEURONTIN) 100 MG capsule, Take 1 capsule by mouth 3 (Three) Times a Day., Disp: 90 capsule, Rfl: 0  •  oxyCODONE-acetaminophen (PERCOCET)  MG per tablet, Take 1 tablet by mouth Every 6 (Six) Hours As Needed for Severe Pain . Must last 30 days, Disp: 100 tablet, Rfl: 0  •  temazepam (RESTORIL) 15 MG capsule, Take 1 capsule by mouth At Night As Needed for Sleep or Anxiety., Disp: 30 capsule, Rfl: 2  •  warfarin (COUMADIN) 2 MG tablet, TAKE 3 AND ONE HALF TABLET BY MOUTH DAILY, Disp: 360 tablet, Rfl: 5  •  Folbee 2.5-25-1 MG tablet tablet, Take 1 tablet by mouth Daily., Disp: 30 tablet, Rfl: 0  •  Iron-Vitamins (GERITOL COMPLETE PO), Take 2 tablets by mouth Every Other Day., Disp: , Rfl:     No Known Allergies    Social History     Socioeconomic  History   • Marital status:    Tobacco Use   • Smoking status: Never Smoker   • Smokeless tobacco: Never Used   Vaping Use   • Vaping Use: Never used   Substance and Sexual Activity   • Alcohol use: No   • Drug use: No   • Sexual activity: Defer       Family History   Problem Relation Age of Onset   • Colon cancer Mother    • No Known Problems Father    • Prostate cancer Brother    • No Known Problems Daughter    • No Known Problems Son    • No Known Problems Maternal Grandmother    • No Known Problems Maternal Grandfather    • No Known Problems Paternal Grandmother    • No Known Problems Paternal Grandfather    • Prostate cancer Brother    • Colon polyps Neg Hx        Review of Systems  No blood in stools, appetite is good.  No abdominal pain    Objective     Vitals:    07/21/22 1350   BP: 140/74   Pulse: 69   Temp: 96.8 °F (36 °C)   SpO2: 99%       Physical Exam  No acute distress. Vital signs as documented.  Sclera anicteric.  Neck without noticeable JVD. Lungs clear to auscultation. Heart exam notable for irregular rhythm consistent with his underlying atrial for him, normal sounds. Abdomen is soft, nontender, non distended, normal bowel sounds and without evidence of organomegaly, masses.  Neuro alert, moves extremities.        Assessment & Plan   Problem List Items Addressed This Visit        Gastrointestinal Abdominal     Constipation - Primary    Gastroesophageal reflux disease without esophagitis            Regarding his constipation he is at his baseline.  I did talk to him about taking laxatives.  I once again suggested MiraLAX and titrate the dose up or down and take it daily to help promote routine bowel movements.  He is willing to give this a try again.  His reflux is under control.  We will make no changes there.  We did discuss colonoscopy as he did reiterate that he does not wish any future surveillance colonoscopies which is reasonable.    We will see him back in a year, sooner if  needed    Continue ongoing management by primary care provider and other specialists.     Body mass index is 23.85 kg/m².  Stable BMI      EMR Dragon/transcription disclaimer:  Much of this encounter note is electronic transcription/translation of spoken language to printed text.  The electronic translation of spoken language may be erroneous, or at times, nonsensical words or phrases may be inadvertently transcribed.  Although I have reviewed the note for such errors, some may still exist.    Ander Miguel MD  14:27 CDT  07/21/22

## 2022-07-27 DIAGNOSIS — G47.09 OTHER INSOMNIA: ICD-10-CM

## 2022-07-27 RX ORDER — TEMAZEPAM 15 MG/1
15 CAPSULE ORAL NIGHTLY PRN
Qty: 30 CAPSULE | Refills: 2 | OUTPATIENT
Start: 2022-07-27

## 2022-07-27 NOTE — TELEPHONE ENCOUNTER
Refill not due until 08/19/2022    Rx Refill Note  Requested Prescriptions     Pending Prescriptions Disp Refills   • temazepam (RESTORIL) 15 MG capsule [Pharmacy Med Name: TEMAZEPAM 15 MG CAP 15 Capsule] 30 capsule 2     Sig: Take 1 capsule by mouth At Night As Needed for Sleep or Anxiety.      Last office visit with prescribing clinician: 7/19/2022      Next office visit with prescribing clinician: 8/16/2022            Jonana Turk MA  07/27/22, 09:24 CDT

## 2022-08-16 ENCOUNTER — OFFICE VISIT (OUTPATIENT)
Dept: FAMILY MEDICINE CLINIC | Facility: CLINIC | Age: 82
End: 2022-08-16

## 2022-08-16 VITALS
RESPIRATION RATE: 20 BRPM | TEMPERATURE: 97.8 F | BODY MASS INDEX: 23.74 KG/M2 | WEIGHT: 169.6 LBS | HEIGHT: 71 IN | SYSTOLIC BLOOD PRESSURE: 140 MMHG | HEART RATE: 50 BPM | OXYGEN SATURATION: 95 % | DIASTOLIC BLOOD PRESSURE: 62 MMHG

## 2022-08-16 DIAGNOSIS — I48.21 PERMANENT ATRIAL FIBRILLATION: Primary | ICD-10-CM

## 2022-08-16 DIAGNOSIS — G62.9 NEUROPATHY: ICD-10-CM

## 2022-08-16 DIAGNOSIS — M25.551 CHRONIC PAIN OF RIGHT HIP: ICD-10-CM

## 2022-08-16 DIAGNOSIS — G89.29 CHRONIC PAIN OF RIGHT HIP: ICD-10-CM

## 2022-08-16 DIAGNOSIS — G47.09 OTHER INSOMNIA: ICD-10-CM

## 2022-08-16 DIAGNOSIS — Z79.01 CHRONIC ANTICOAGULATION: ICD-10-CM

## 2022-08-16 LAB
INR PPP: 2.5 (ref 0.9–1.1)
PROTHROMBIN TIME: 30 SECONDS

## 2022-08-16 PROCEDURE — 36416 COLLJ CAPILLARY BLOOD SPEC: CPT | Performed by: FAMILY MEDICINE

## 2022-08-16 PROCEDURE — 99214 OFFICE O/P EST MOD 30 MIN: CPT | Performed by: FAMILY MEDICINE

## 2022-08-16 PROCEDURE — 85610 PROTHROMBIN TIME: CPT | Performed by: FAMILY MEDICINE

## 2022-08-16 RX ORDER — TEMAZEPAM 15 MG/1
15 CAPSULE ORAL NIGHTLY PRN
Qty: 30 CAPSULE | Refills: 2 | Status: SHIPPED | OUTPATIENT
Start: 2022-08-16 | End: 2022-11-17 | Stop reason: SDUPTHER

## 2022-08-16 RX ORDER — GABAPENTIN 100 MG/1
100 CAPSULE ORAL 3 TIMES DAILY
Qty: 90 CAPSULE | Refills: 0 | Status: SHIPPED | OUTPATIENT
Start: 2022-08-16 | End: 2022-09-20 | Stop reason: SDUPTHER

## 2022-08-16 RX ORDER — OXYCODONE AND ACETAMINOPHEN 10; 325 MG/1; MG/1
1 TABLET ORAL EVERY 6 HOURS PRN
Qty: 100 TABLET | Refills: 0 | Status: SHIPPED | OUTPATIENT
Start: 2022-08-16 | End: 2022-09-20 | Stop reason: SDUPTHER

## 2022-09-01 ENCOUNTER — LAB (OUTPATIENT)
Dept: LAB | Facility: HOSPITAL | Age: 82
End: 2022-09-01

## 2022-09-01 DIAGNOSIS — C85.90 LYMPHOMA IN REMISSION: ICD-10-CM

## 2022-09-01 LAB
ALBUMIN SERPL-MCNC: 4.3 G/DL (ref 3.5–5.2)
ALBUMIN/GLOB SERPL: 1.3 G/DL
ALP SERPL-CCNC: 76 U/L (ref 39–117)
ALT SERPL W P-5'-P-CCNC: 8 U/L (ref 1–41)
ANION GAP SERPL CALCULATED.3IONS-SCNC: 5 MMOL/L (ref 5–15)
AST SERPL-CCNC: 15 U/L (ref 1–40)
BASOPHILS # BLD AUTO: 0.04 10*3/MM3 (ref 0–0.2)
BASOPHILS NFR BLD AUTO: 1 % (ref 0–1.5)
BILIRUB SERPL-MCNC: 0.6 MG/DL (ref 0–1.2)
BUN SERPL-MCNC: 7 MG/DL (ref 8–23)
BUN/CREAT SERPL: 7 (ref 7–25)
CALCIUM SPEC-SCNC: 9.6 MG/DL (ref 8.6–10.5)
CHLORIDE SERPL-SCNC: 103 MMOL/L (ref 98–107)
CO2 SERPL-SCNC: 31 MMOL/L (ref 22–29)
CREAT SERPL-MCNC: 1 MG/DL (ref 0.76–1.27)
DEPRECATED RDW RBC AUTO: 49.7 FL (ref 37–54)
EGFRCR SERPLBLD CKD-EPI 2021: 75.6 ML/MIN/1.73
EOSINOPHIL # BLD AUTO: 0.21 10*3/MM3 (ref 0–0.4)
EOSINOPHIL NFR BLD AUTO: 5.1 % (ref 0.3–6.2)
ERYTHROCYTE [DISTWIDTH] IN BLOOD BY AUTOMATED COUNT: 13.9 % (ref 12.3–15.4)
FERRITIN SERPL-MCNC: 280.8 NG/ML (ref 30–400)
GLOBULIN UR ELPH-MCNC: 3.4 GM/DL
GLUCOSE SERPL-MCNC: 126 MG/DL (ref 65–99)
HCT VFR BLD AUTO: 41.9 % (ref 37.5–51)
HGB BLD-MCNC: 13.3 G/DL (ref 13–17.7)
IMM GRANULOCYTES # BLD AUTO: 0.01 10*3/MM3 (ref 0–0.05)
IMM GRANULOCYTES NFR BLD AUTO: 0.2 % (ref 0–0.5)
IRON 24H UR-MRATE: 86 MCG/DL (ref 59–158)
IRON SATN MFR SERPL: 25 % (ref 20–50)
LDH SERPL-CCNC: 198 U/L (ref 135–225)
LYMPHOCYTES # BLD AUTO: 0.87 10*3/MM3 (ref 0.7–3.1)
LYMPHOCYTES NFR BLD AUTO: 21.1 % (ref 19.6–45.3)
MCH RBC QN AUTO: 30.6 PG (ref 26.6–33)
MCHC RBC AUTO-ENTMCNC: 31.7 G/DL (ref 31.5–35.7)
MCV RBC AUTO: 96.3 FL (ref 79–97)
MONOCYTES # BLD AUTO: 0.35 10*3/MM3 (ref 0.1–0.9)
MONOCYTES NFR BLD AUTO: 8.5 % (ref 5–12)
NEUTROPHILS NFR BLD AUTO: 2.65 10*3/MM3 (ref 1.7–7)
NEUTROPHILS NFR BLD AUTO: 64.1 % (ref 42.7–76)
NRBC BLD AUTO-RTO: 0 /100 WBC (ref 0–0.2)
PLATELET # BLD AUTO: 226 10*3/MM3 (ref 140–450)
PMV BLD AUTO: 9.5 FL (ref 6–12)
POTASSIUM SERPL-SCNC: 3.8 MMOL/L (ref 3.5–5.2)
PROT SERPL-MCNC: 7.7 G/DL (ref 6–8.5)
RBC # BLD AUTO: 4.35 10*6/MM3 (ref 4.14–5.8)
SODIUM SERPL-SCNC: 139 MMOL/L (ref 136–145)
TIBC SERPL-MCNC: 341 MCG/DL (ref 298–536)
TRANSFERRIN SERPL-MCNC: 229 MG/DL (ref 200–360)
WBC NRBC COR # BLD: 4.13 10*3/MM3 (ref 3.4–10.8)

## 2022-09-01 PROCEDURE — 82746 ASSAY OF FOLIC ACID SERUM: CPT

## 2022-09-01 PROCEDURE — 82232 ASSAY OF BETA-2 PROTEIN: CPT

## 2022-09-01 PROCEDURE — 82728 ASSAY OF FERRITIN: CPT

## 2022-09-01 PROCEDURE — 80053 COMPREHEN METABOLIC PANEL: CPT

## 2022-09-01 PROCEDURE — 36415 COLL VENOUS BLD VENIPUNCTURE: CPT

## 2022-09-01 PROCEDURE — 83615 LACTATE (LD) (LDH) ENZYME: CPT

## 2022-09-01 PROCEDURE — 83540 ASSAY OF IRON: CPT

## 2022-09-01 PROCEDURE — 82607 VITAMIN B-12: CPT

## 2022-09-01 PROCEDURE — 85025 COMPLETE CBC W/AUTO DIFF WBC: CPT

## 2022-09-01 PROCEDURE — 84466 ASSAY OF TRANSFERRIN: CPT

## 2022-09-02 LAB
B2 MICROGLOB SERPL-MCNC: 2.6 MG/L (ref 0.8–2.2)
FOLATE SERPL-MCNC: 15.6 NG/ML (ref 4.78–24.2)
VIT B12 BLD-MCNC: 534 PG/ML (ref 211–946)

## 2022-09-08 ENCOUNTER — OFFICE VISIT (OUTPATIENT)
Dept: ONCOLOGY | Facility: CLINIC | Age: 82
End: 2022-09-08

## 2022-09-08 VITALS
TEMPERATURE: 97.7 F | DIASTOLIC BLOOD PRESSURE: 78 MMHG | OXYGEN SATURATION: 97 % | SYSTOLIC BLOOD PRESSURE: 148 MMHG | BODY MASS INDEX: 23.45 KG/M2 | HEIGHT: 71 IN | RESPIRATION RATE: 18 BRPM | WEIGHT: 167.5 LBS | HEART RATE: 49 BPM

## 2022-09-08 DIAGNOSIS — C85.90 LYMPHOMA IN REMISSION: Primary | ICD-10-CM

## 2022-09-08 PROCEDURE — 99214 OFFICE O/P EST MOD 30 MIN: CPT | Performed by: INTERNAL MEDICINE

## 2022-09-08 RX ORDER — DRONABINOL 5 MG/1
5 CAPSULE ORAL
Qty: 30 CAPSULE | Refills: 0 | Status: SHIPPED | OUTPATIENT
Start: 2022-09-08 | End: 2023-01-09 | Stop reason: SDUPTHER

## 2022-09-13 ENCOUNTER — CLINICAL SUPPORT (OUTPATIENT)
Dept: FAMILY MEDICINE CLINIC | Facility: CLINIC | Age: 82
End: 2022-09-13

## 2022-09-13 DIAGNOSIS — I48.21 PERMANENT ATRIAL FIBRILLATION: Primary | ICD-10-CM

## 2022-09-13 LAB
INR PPP: 2.1 (ref 0.9–1.1)
PROTHROMBIN TIME: 25 SECONDS

## 2022-09-13 PROCEDURE — 36416 COLLJ CAPILLARY BLOOD SPEC: CPT | Performed by: NURSE PRACTITIONER

## 2022-09-13 PROCEDURE — 85610 PROTHROMBIN TIME: CPT | Performed by: NURSE PRACTITIONER

## 2022-09-20 ENCOUNTER — OFFICE VISIT (OUTPATIENT)
Dept: FAMILY MEDICINE CLINIC | Facility: CLINIC | Age: 82
End: 2022-09-20

## 2022-09-20 VITALS
DIASTOLIC BLOOD PRESSURE: 58 MMHG | BODY MASS INDEX: 24.08 KG/M2 | SYSTOLIC BLOOD PRESSURE: 120 MMHG | TEMPERATURE: 98.4 F | RESPIRATION RATE: 16 BRPM | WEIGHT: 172 LBS | OXYGEN SATURATION: 97 % | HEIGHT: 71 IN | HEART RATE: 49 BPM

## 2022-09-20 DIAGNOSIS — I48.21 PERMANENT ATRIAL FIBRILLATION: Primary | ICD-10-CM

## 2022-09-20 DIAGNOSIS — M25.551 CHRONIC PAIN OF RIGHT HIP: ICD-10-CM

## 2022-09-20 DIAGNOSIS — G62.9 NEUROPATHY: ICD-10-CM

## 2022-09-20 DIAGNOSIS — G89.29 CHRONIC PAIN OF RIGHT HIP: ICD-10-CM

## 2022-09-20 LAB
INR PPP: 2.8 (ref 0.9–1.1)
PROTHROMBIN TIME: 33.6 SECONDS

## 2022-09-20 PROCEDURE — 99214 OFFICE O/P EST MOD 30 MIN: CPT | Performed by: FAMILY MEDICINE

## 2022-09-20 PROCEDURE — 36416 COLLJ CAPILLARY BLOOD SPEC: CPT | Performed by: FAMILY MEDICINE

## 2022-09-20 PROCEDURE — 85610 PROTHROMBIN TIME: CPT | Performed by: FAMILY MEDICINE

## 2022-09-20 RX ORDER — GABAPENTIN 100 MG/1
100 CAPSULE ORAL 3 TIMES DAILY
Qty: 90 CAPSULE | Refills: 0 | Status: SHIPPED | OUTPATIENT
Start: 2022-09-20 | End: 2022-10-20 | Stop reason: SDUPTHER

## 2022-09-20 RX ORDER — OXYCODONE AND ACETAMINOPHEN 10; 325 MG/1; MG/1
1 TABLET ORAL EVERY 6 HOURS PRN
Qty: 100 TABLET | Refills: 0 | Status: SHIPPED | OUTPATIENT
Start: 2022-09-20 | End: 2022-10-20 | Stop reason: SDUPTHER

## 2022-09-20 NOTE — PROGRESS NOTES
Subjective    Mr. Avina is 82 y.o. male    Chief Complaint: BPH    History of Present Illness  Patient with a family history of prostate cancer, nephrolithiasis, renal cyst, BPH.    He is AUA symptom score is 10/35.  He is bothered by incomplete emptying, frequency, intermittency, urgency, weak stream, straining, nocturia x3.  He denies dysuria or gross hematuria.  Patient has failed ESWL in past.  No pharmacologic therapy for BPH.        The following portions of the patient's history were reviewed and updated as appropriate: allergies, current medications, past family history, past medical history, past social history, past surgical history and problem list.    Review of Systems   Constitutional: Negative for chills and fever.   Gastrointestinal: Negative for abdominal pain, anal bleeding and blood in stool.   Genitourinary: Positive for frequency and urgency. Negative for dysuria and hematuria.         Current Outpatient Medications:   •  aspirin 81 MG EC tablet, Take 1 tablet by mouth Daily., Disp: 90 tablet, Rfl: 3  •  cyclobenzaprine (FLEXERIL) 10 MG tablet, Take 1 tablet by mouth 3 (Three) Times a Day As Needed for Muscle Spasms. (Patient taking differently: Take 10 mg by mouth As Needed for Muscle Spasms (rare).), Disp: 90 tablet, Rfl: 0  •  dilTIAZem CD (CARDIZEM CD) 240 MG 24 hr capsule, Take 1 capsule by mouth Daily., Disp: 90 capsule, Rfl: 3  •  dronabinol (Marinol) 5 MG capsule, Take 1 capsule by mouth 2 (Two) Times a Day Before Meals., Disp: 30 capsule, Rfl: 0  •  escitalopram (Lexapro) 20 MG tablet, Take 1 tablet by mouth Daily. (Patient taking differently: Take 20 mg by mouth As Needed (rare).), Disp: 90 tablet, Rfl: 3  •  Folbee 2.5-25-1 MG tablet tablet, Take 1 tablet by mouth Daily., Disp: 30 tablet, Rfl: 0  •  gabapentin (NEURONTIN) 100 MG capsule, Take 1 capsule by mouth 3 (Three) Times a Day., Disp: 90 capsule, Rfl: 0  •  Iron-Vitamins (GERITOL COMPLETE PO), Take 2 tablets by mouth Every Other  Day., Disp: , Rfl:   •  oxyCODONE-acetaminophen (PERCOCET)  MG per tablet, Take 1 tablet by mouth Every 6 (Six) Hours As Needed for Severe Pain. Must last 30 days, Disp: 100 tablet, Rfl: 0  •  temazepam (RESTORIL) 15 MG capsule, Take 1 capsule by mouth At Night As Needed for Sleep or Anxiety., Disp: 30 capsule, Rfl: 2  •  warfarin (COUMADIN) 2 MG tablet, TAKE 3 AND ONE HALF TABLET BY MOUTH DAILY, Disp: 360 tablet, Rfl: 5  •  digoxin (LANOXIN) 250 MCG tablet, Take 1 tablet by mouth Daily., Disp: 90 tablet, Rfl: 3    Past Medical History:   Diagnosis Date   • Anemia    • Anxiety     chronic   • Atrial fibrillation (HCC)    • Cancer (HCC)     non-hodgkins lymphoma   • Cholecystitis    • Colon polyp    • Colon polyp    • Congestive heart failure (HCC) 8/13/2019   • Constipation    • GERD (gastroesophageal reflux disease)    • History of ERCP 2005   • Iron deficiency anemia 11/19/2019   • Nephrolithiasis     stones removed   • Jaylyn-rectal abscess    • Rectal abscess        Past Surgical History:   Procedure Laterality Date   • CHOLECYSTECTOMY     • COLONOSCOPY  05/2012    3 yr recall   • COLONOSCOPY  09/18/2015    5 yr recall   • KIDNEY STONE SURGERY     • RECTAL SURGERY      X 2   • TOTAL HIP ARTHROPLASTY         Social History     Socioeconomic History   • Marital status:    Tobacco Use   • Smoking status: Never Smoker   • Smokeless tobacco: Never Used   Vaping Use   • Vaping Use: Never used   Substance and Sexual Activity   • Alcohol use: No   • Drug use: No   • Sexual activity: Defer       Family History   Problem Relation Age of Onset   • Colon cancer Mother    • No Known Problems Father    • Prostate cancer Brother    • No Known Problems Daughter    • No Known Problems Son    • No Known Problems Maternal Grandmother    • No Known Problems Maternal Grandfather    • No Known Problems Paternal Grandmother    • No Known Problems Paternal Grandfather    • Prostate cancer Brother    • Colon polyps Neg Hx         Objective    There were no vitals taken for this visit.    Physical Exam        Results for orders placed or performed in visit on 09/20/22   POC Protime / INR    Specimen: Blood   Result Value Ref Range    Protime 33.6 seconds    INR 2.8 (A) 0.9 - 1.1     KUB independent review    A KUB is available for me to review today.  The image is inspected for a bowel gas pattern and the general bone structure of the spine and pelvis. The kidneys are then inspected closely.  Renal outline is noted if identifiable. The kidney, collecting system, and anticipated path of the ureter are examined for calcifications including those in the true pelvis.  This film reveals:    On the right there are multiple renal stones. 11 and 7mm.    On the left there is a single renal stone measuring 5 mm.    Renal ultrasound independent review    The renal ultrasound is available for me to review.  Treatment recommendations require an independent review.  This film has been reviewed by the radiologist to determine any non urologic abnormalities that are presents.  However, I very closely inspected the kidneys for size, symmetry, contour, parenchymal thickness, perinephric reaction, presence of calcifications, and intrarenal dilation of the collecting system.       The right kidney appears multiple simple cysts    The left kidney appears 13cm septated cyst stable    The bladder appears normal on thisultrsaound.  The bladder appears normal in thickness.  There no masses or stones seen on this exam.         Assessment and Plan    Diagnoses and all orders for this visit:    1. Benign prostatic hyperplasia, unspecified whether lower urinary tract symptoms present (Primary)  -     POC Urinalysis Dipstick, Multipro    2. Nephrolithiasis  -     XR Abdomen KUB; Future    3. Renal cyst  -     US Renal Bilateral; Future    4. Family history of prostate cancer      The patient had a PSA drawn in February 2020 that was 1.8.  PSA prior to that in 2018  was 1.12.  PSA screen has been discontinued.     calcium oxalate stone disease.  He has had unsuccessful ESWL requiring subsequent ureteroscopy in the past.     History of renal cyst February 2020 which revealed a Bosniak II cyst last imaged on 2/2020.         Follow up in one year with KUB and US.

## 2022-09-22 ENCOUNTER — OFFICE VISIT (OUTPATIENT)
Dept: UROLOGY | Facility: CLINIC | Age: 82
End: 2022-09-22

## 2022-09-22 ENCOUNTER — APPOINTMENT (OUTPATIENT)
Dept: GENERAL RADIOLOGY | Facility: HOSPITAL | Age: 82
End: 2022-09-22

## 2022-09-22 ENCOUNTER — APPOINTMENT (OUTPATIENT)
Dept: ULTRASOUND IMAGING | Facility: HOSPITAL | Age: 82
End: 2022-09-22

## 2022-09-22 DIAGNOSIS — Z80.42 FAMILY HISTORY OF PROSTATE CANCER: ICD-10-CM

## 2022-09-22 DIAGNOSIS — N20.0 NEPHROLITHIASIS: ICD-10-CM

## 2022-09-22 DIAGNOSIS — N40.0 BENIGN PROSTATIC HYPERPLASIA, UNSPECIFIED WHETHER LOWER URINARY TRACT SYMPTOMS PRESENT: Primary | ICD-10-CM

## 2022-09-22 DIAGNOSIS — N28.1 RENAL CYST: ICD-10-CM

## 2022-09-22 PROCEDURE — 99214 OFFICE O/P EST MOD 30 MIN: CPT | Performed by: UROLOGY

## 2022-09-27 ENCOUNTER — CLINICAL SUPPORT (OUTPATIENT)
Dept: FAMILY MEDICINE CLINIC | Facility: CLINIC | Age: 82
End: 2022-09-27

## 2022-09-27 DIAGNOSIS — I48.21 PERMANENT ATRIAL FIBRILLATION: Primary | ICD-10-CM

## 2022-09-27 LAB — INR PPP: 2.6 (ref 0.9–1.1)

## 2022-09-27 PROCEDURE — 36416 COLLJ CAPILLARY BLOOD SPEC: CPT | Performed by: FAMILY MEDICINE

## 2022-09-27 PROCEDURE — 85610 PROTHROMBIN TIME: CPT | Performed by: FAMILY MEDICINE

## 2022-10-10 ENCOUNTER — CLINICAL SUPPORT (OUTPATIENT)
Dept: FAMILY MEDICINE CLINIC | Facility: CLINIC | Age: 82
End: 2022-10-10

## 2022-10-10 DIAGNOSIS — Z79.01 CHRONIC ANTICOAGULATION: Primary | ICD-10-CM

## 2022-10-10 LAB — INR PPP: 2 (ref 0.9–1.1)

## 2022-10-10 PROCEDURE — 85610 PROTHROMBIN TIME: CPT | Performed by: FAMILY MEDICINE

## 2022-10-10 PROCEDURE — 36416 COLLJ CAPILLARY BLOOD SPEC: CPT | Performed by: FAMILY MEDICINE

## 2022-10-20 ENCOUNTER — OFFICE VISIT (OUTPATIENT)
Dept: FAMILY MEDICINE CLINIC | Facility: CLINIC | Age: 82
End: 2022-10-20

## 2022-10-20 VITALS
TEMPERATURE: 97.8 F | WEIGHT: 173 LBS | DIASTOLIC BLOOD PRESSURE: 82 MMHG | HEART RATE: 58 BPM | OXYGEN SATURATION: 97 % | RESPIRATION RATE: 20 BRPM | SYSTOLIC BLOOD PRESSURE: 138 MMHG | BODY MASS INDEX: 24.22 KG/M2 | HEIGHT: 71 IN

## 2022-10-20 DIAGNOSIS — M25.551 CHRONIC PAIN OF RIGHT HIP: Primary | ICD-10-CM

## 2022-10-20 DIAGNOSIS — G89.29 CHRONIC PAIN OF RIGHT HIP: Primary | ICD-10-CM

## 2022-10-20 DIAGNOSIS — G62.9 NEUROPATHY: ICD-10-CM

## 2022-10-20 DIAGNOSIS — F41.9 ANXIETY: ICD-10-CM

## 2022-10-20 DIAGNOSIS — Z23 NEED FOR INFLUENZA VACCINATION: ICD-10-CM

## 2022-10-20 DIAGNOSIS — G47.09 OTHER INSOMNIA: ICD-10-CM

## 2022-10-20 DIAGNOSIS — I48.21 PERMANENT ATRIAL FIBRILLATION: ICD-10-CM

## 2022-10-20 LAB — INR PPP: 1.9 (ref 0.9–1.1)

## 2022-10-20 PROCEDURE — 85610 PROTHROMBIN TIME: CPT | Performed by: FAMILY MEDICINE

## 2022-10-20 PROCEDURE — 99214 OFFICE O/P EST MOD 30 MIN: CPT | Performed by: FAMILY MEDICINE

## 2022-10-20 PROCEDURE — 90662 IIV NO PRSV INCREASED AG IM: CPT | Performed by: FAMILY MEDICINE

## 2022-10-20 PROCEDURE — 36416 COLLJ CAPILLARY BLOOD SPEC: CPT | Performed by: FAMILY MEDICINE

## 2022-10-20 PROCEDURE — G0008 ADMIN INFLUENZA VIRUS VAC: HCPCS | Performed by: FAMILY MEDICINE

## 2022-10-20 RX ORDER — GABAPENTIN 100 MG/1
100 CAPSULE ORAL 3 TIMES DAILY
Qty: 90 CAPSULE | Refills: 0 | Status: SHIPPED | OUTPATIENT
Start: 2022-10-20 | End: 2022-11-17 | Stop reason: SDUPTHER

## 2022-10-20 RX ORDER — OXYCODONE AND ACETAMINOPHEN 10; 325 MG/1; MG/1
1 TABLET ORAL EVERY 6 HOURS PRN
Qty: 100 TABLET | Refills: 0 | Status: SHIPPED | OUTPATIENT
Start: 2022-10-20 | End: 2022-11-17 | Stop reason: SDUPTHER

## 2022-10-24 DIAGNOSIS — G89.29 CHRONIC PAIN OF RIGHT HIP: ICD-10-CM

## 2022-10-24 DIAGNOSIS — M25.551 CHRONIC PAIN OF RIGHT HIP: ICD-10-CM

## 2022-10-24 DIAGNOSIS — G47.09 OTHER INSOMNIA: ICD-10-CM

## 2022-10-24 RX ORDER — TEMAZEPAM 15 MG/1
15 CAPSULE ORAL NIGHTLY PRN
Qty: 30 CAPSULE | Refills: 2 | OUTPATIENT
Start: 2022-10-24

## 2022-10-24 RX ORDER — CYCLOBENZAPRINE HCL 10 MG
TABLET ORAL
Qty: 90 TABLET | Refills: 0 | Status: SHIPPED | OUTPATIENT
Start: 2022-10-24

## 2022-10-24 NOTE — TELEPHONE ENCOUNTER
Pls call pt.  She had an abnormal test that showed concerning findings, she should make appt with me to discuss results.   Also pls send letter.   She needs to follow up with myself and/or Dr Gutierrez regarding the abnormal findings in her lungs found on the ct scan.    Temazepam too soon     Rx Refill Note  Requested Prescriptions     Pending Prescriptions Disp Refills   • cyclobenzaprine (FLEXERIL) 10 MG tablet [Pharmacy Med Name: CYCLOBENZAPRINE HCL 10 MG T 10 Tablet] 90 tablet 0     Sig: TAKE ONE TABLET BY MOUTH THREE TIMES DAILY AS NEEDED FOR MUSCLE SPASMS.   • temazepam (RESTORIL) 15 MG capsule [Pharmacy Med Name: TEMAZEPAM 15 MG CAP 15 Capsule] 30 capsule 2     Sig: Take 1 capsule by mouth At Night As Needed for Sleep or Anxiety.      Last office visit with prescribing clinician: 10/20/2022      Next office visit with prescribing clinician: 11/17/2022            Joanna Turk MA  10/24/22, 12:28 CDT

## 2022-11-07 ENCOUNTER — CLINICAL SUPPORT (OUTPATIENT)
Dept: FAMILY MEDICINE CLINIC | Facility: CLINIC | Age: 82
End: 2022-11-07

## 2022-11-07 ENCOUNTER — TELEPHONE (OUTPATIENT)
Dept: FAMILY MEDICINE CLINIC | Facility: CLINIC | Age: 82
End: 2022-11-07

## 2022-11-07 DIAGNOSIS — I48.21 PERMANENT ATRIAL FIBRILLATION: Primary | ICD-10-CM

## 2022-11-07 LAB
INR PPP: 2.4 (ref 0.9–1.1)
PROTHROMBIN TIME: 28.4 SECONDS

## 2022-11-07 PROCEDURE — 85610 PROTHROMBIN TIME: CPT | Performed by: NURSE PRACTITIONER

## 2022-11-07 PROCEDURE — 36416 COLLJ CAPILLARY BLOOD SPEC: CPT | Performed by: NURSE PRACTITIONER

## 2022-11-07 NOTE — TELEPHONE ENCOUNTER
Fabrice called again and would like for Ingrid to call him back, He stated that he would really like to talk to Ingrid. He also made an appointment for his PTINR this afternoon.

## 2022-11-07 NOTE — TELEPHONE ENCOUNTER
Mr. Avina called and requested to talk to Silvana HOFFMAN Or Ingrid.I asked if I could take a message and have one of you call him back. He didn't state what he wanted to talk about.

## 2022-11-17 ENCOUNTER — OFFICE VISIT (OUTPATIENT)
Dept: FAMILY MEDICINE CLINIC | Facility: CLINIC | Age: 82
End: 2022-11-17

## 2022-11-17 VITALS
TEMPERATURE: 96.3 F | HEART RATE: 56 BPM | OXYGEN SATURATION: 98 % | DIASTOLIC BLOOD PRESSURE: 66 MMHG | SYSTOLIC BLOOD PRESSURE: 142 MMHG | BODY MASS INDEX: 23.38 KG/M2 | HEIGHT: 71 IN | WEIGHT: 167 LBS | RESPIRATION RATE: 16 BRPM

## 2022-11-17 VITALS
DIASTOLIC BLOOD PRESSURE: 66 MMHG | WEIGHT: 167 LBS | HEIGHT: 71 IN | TEMPERATURE: 96.3 F | OXYGEN SATURATION: 98 % | HEART RATE: 56 BPM | RESPIRATION RATE: 16 BRPM | SYSTOLIC BLOOD PRESSURE: 142 MMHG | BODY MASS INDEX: 23.38 KG/M2

## 2022-11-17 DIAGNOSIS — G89.29 CHRONIC PAIN OF RIGHT HIP: ICD-10-CM

## 2022-11-17 DIAGNOSIS — Z00.00 MEDICARE ANNUAL WELLNESS VISIT, SUBSEQUENT: Primary | ICD-10-CM

## 2022-11-17 DIAGNOSIS — C85.90 LYMPHOMA IN REMISSION: ICD-10-CM

## 2022-11-17 DIAGNOSIS — M25.551 CHRONIC PAIN OF RIGHT HIP: ICD-10-CM

## 2022-11-17 DIAGNOSIS — G47.09 OTHER INSOMNIA: ICD-10-CM

## 2022-11-17 DIAGNOSIS — Z79.01 CHRONIC ANTICOAGULATION: ICD-10-CM

## 2022-11-17 DIAGNOSIS — G62.9 NEUROPATHY: ICD-10-CM

## 2022-11-17 DIAGNOSIS — I48.21 PERMANENT ATRIAL FIBRILLATION: Primary | ICD-10-CM

## 2022-11-17 LAB
INR PPP: 2.1 (ref 0.9–1.1)
PROTHROMBIN TIME: 25.8 SECONDS

## 2022-11-17 PROCEDURE — 99214 OFFICE O/P EST MOD 30 MIN: CPT | Performed by: FAMILY MEDICINE

## 2022-11-17 PROCEDURE — 36416 COLLJ CAPILLARY BLOOD SPEC: CPT | Performed by: FAMILY MEDICINE

## 2022-11-17 PROCEDURE — G0439 PPPS, SUBSEQ VISIT: HCPCS | Performed by: FAMILY MEDICINE

## 2022-11-17 PROCEDURE — 85610 PROTHROMBIN TIME: CPT | Performed by: FAMILY MEDICINE

## 2022-11-17 PROCEDURE — 1125F AMNT PAIN NOTED PAIN PRSNT: CPT | Performed by: FAMILY MEDICINE

## 2022-11-17 PROCEDURE — 1159F MED LIST DOCD IN RCRD: CPT | Performed by: FAMILY MEDICINE

## 2022-11-17 PROCEDURE — 1170F FXNL STATUS ASSESSED: CPT | Performed by: FAMILY MEDICINE

## 2022-11-17 PROCEDURE — 96160 PT-FOCUSED HLTH RISK ASSMT: CPT | Performed by: FAMILY MEDICINE

## 2022-11-17 RX ORDER — OXYCODONE AND ACETAMINOPHEN 10; 325 MG/1; MG/1
1 TABLET ORAL EVERY 6 HOURS PRN
Qty: 100 TABLET | Refills: 0 | Status: SHIPPED | OUTPATIENT
Start: 2022-11-17 | End: 2022-12-16 | Stop reason: SDUPTHER

## 2022-11-17 RX ORDER — TEMAZEPAM 15 MG/1
15 CAPSULE ORAL NIGHTLY PRN
Qty: 30 CAPSULE | Refills: 2 | Status: SHIPPED | OUTPATIENT
Start: 2022-11-17 | End: 2023-01-23

## 2022-11-17 RX ORDER — GABAPENTIN 100 MG/1
100 CAPSULE ORAL 3 TIMES DAILY
Qty: 90 CAPSULE | Refills: 0 | Status: SHIPPED | OUTPATIENT
Start: 2022-11-17 | End: 2022-12-16 | Stop reason: SDUPTHER

## 2022-11-17 NOTE — PROGRESS NOTES
The ABCs of the Annual Wellness Visit  Subsequent Medicare Wellness Visit    Chief Complaint   Patient presents with   • Medicare Wellness-subsequent     Patient here for medicare wellness exam.       Subjective    History of Present Illness:  Cabrera Avina is a 82 y.o. male who presents for a Subsequent Medicare Wellness Visit.    The following portions of the patient's history were reviewed and   updated as appropriate: allergies, current medications, past family history, past medical history, past social history, past surgical history and problem list.    Compared to one year ago, the patient feels his physical   health is the same.    Compared to one year ago, the patient feels his mental   health is the same.    Recent Hospitalizations:  He was not admitted to the hospital during the last year.       Current Medical Providers:  Patient Care Team:  Sunita Crawford MD as PCP - General (Family Medicine)  Arnaldo Colin MD as Cardiologist (Cardiology)  West Jeff MD as Consulting Physician (Urology)  Paul Rachel MD as Consulting Physician (Hematology and Oncology)  Ander Miguel MD as Consulting Physician (Gastroenterology)    Outpatient Medications Prior to Visit   Medication Sig Dispense Refill   • aspirin 81 MG EC tablet Take 1 tablet by mouth Daily. 90 tablet 3   • cyclobenzaprine (FLEXERIL) 10 MG tablet TAKE ONE TABLET BY MOUTH THREE TIMES DAILY AS NEEDED FOR MUSCLE SPASMS. 90 tablet 0   • digoxin (LANOXIN) 250 MCG tablet Take 1 tablet by mouth Daily. 90 tablet 3   • dilTIAZem CD (CARDIZEM CD) 240 MG 24 hr capsule Take 1 capsule by mouth Daily. 90 capsule 3   • dronabinol (Marinol) 5 MG capsule Take 1 capsule by mouth 2 (Two) Times a Day Before Meals. 30 capsule 0   • escitalopram (Lexapro) 20 MG tablet Take 1 tablet by mouth Daily. (Patient taking differently: Take 20 mg by mouth As Needed (rare).) 90 tablet 3   • Folbee 2.5-25-1 MG tablet tablet Take 1 tablet by mouth  Daily. 30 tablet 0   • Iron-Vitamins (GERITOL COMPLETE PO) Take 2 tablets by mouth Every Other Day.     • warfarin (COUMADIN) 2 MG tablet TAKE 3 AND ONE HALF TABLET BY MOUTH DAILY 360 tablet 5   • gabapentin (NEURONTIN) 100 MG capsule Take 1 capsule by mouth 3 (Three) Times a Day. 90 capsule 0   • oxyCODONE-acetaminophen (PERCOCET)  MG per tablet Take 1 tablet by mouth Every 6 (Six) Hours As Needed for Severe Pain. Must last 30 days 100 tablet 0   • temazepam (RESTORIL) 15 MG capsule Take 1 capsule by mouth At Night As Needed for Sleep or Anxiety. 30 capsule 2     No facility-administered medications prior to visit.       Opioid medication/s are on active medication list.  and I have evaluated his active treatment plan and pain score trends (see table).  Vitals:    11/17/22 1114   PainSc:   8   PainLoc: Generalized     I have reviewed the chart for potential of high risk medication and harmful drug interactions in the elderly.            Aspirin is on active medication list. Aspirin use is contraindicated for this patient due to: current use of warfarin.  Patient has been instructed to discontinue this medication. .      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  ACP discussion was held with the patient during this visit. Patient has an advance directive (not in EMR), copy requested.          Objective    Vitals:    11/17/22 1114   BP: 142/66   BP Location: Left arm   Patient Position: Sitting   Cuff Size: Adult   Pulse: 56  "  Resp: 16   Temp: 96.3 °F (35.7 °C)   TempSrc: Infrared   SpO2: 98%   Weight: 75.8 kg (167 lb)   Height: 180.3 cm (71\")   PainSc:   8   PainLoc: Generalized     Estimated body mass index is 23.29 kg/m² as calculated from the following:    Height as of this encounter: 180.3 cm (71\").    Weight as of this encounter: 75.8 kg (167 lb).    BMI is within normal parameters. No other follow-up for BMI required.      Does the patient have evidence of cognitive impairment? No    Physical Exam            HEALTH RISK ASSESSMENT    Smoking Status:  Social History     Tobacco Use   Smoking Status Never   Smokeless Tobacco Never     Alcohol Consumption:  Social History     Substance and Sexual Activity   Alcohol Use No     Fall Risk Screen:    STEADI Fall Risk Assessment was completed, and patient is at MODERATE risk for falls. Assessment completed on:10/20/2022    Depression Screening:  PHQ-2/PHQ-9 Depression Screening 11/17/2022   Retired PHQ-9 Total Score -   Retired Total Score -   Little Interest or Pleasure in Doing Things 0-->not at all   Feeling Down, Depressed or Hopeless 0-->not at all   PHQ-9: Brief Depression Severity Measure Score 0       Health Habits and Functional and Cognitive Screening:  Functional & Cognitive Status 11/17/2022   Do you have difficulty preparing food and eating? No   Do you have difficulty bathing yourself, getting dressed or grooming yourself? No   Do you have difficulty using the toilet? No   Do you have difficulty moving around from place to place? No   Do you have trouble with steps or getting out of a bed or a chair? No   Current Diet Well Balanced Diet   Dental Exam Up to date   Eye Exam Up to date   Exercise (times per week) 6 times per week   Current Exercises Include Walking   Current Exercise Activities Include -   Do you need help using the phone?  No   Are you deaf or do you have serious difficulty hearing?  No   Do you need help with transportation? No   Do you need help shopping? " No   Do you need help preparing meals?  No   Do you need help with housework?  No   Do you need help with laundry? No   Do you need help taking your medications? No   Do you need help managing money? No   Do you ever drive or ride in a car without wearing a seat belt? No   Have you felt unusual stress, anger or loneliness in the last month? No   Who do you live with? Spouse   If you need help, do you have trouble finding someone available to you? No   Have you been bothered in the last four weeks by sexual problems? No   Do you have difficulty concentrating, remembering or making decisions? No       Age-appropriate Screening Schedule:  Refer to the list below for future screening recommendations based on patient's age, sex and/or medical conditions. Orders for these recommended tests are listed in the plan section. The patient has been provided with a written plan.    Health Maintenance   Topic Date Due   • ZOSTER VACCINE (2 of 2) 09/01/2023 (Originally 2/13/2017)   • TDAP/TD VACCINES (2 - Td or Tdap) 10/26/2026   • INFLUENZA VACCINE  Completed              Assessment & Plan   CMS Preventative Services Quick Reference  Risk Factors Identified During Encounter  Chronic Pain   Depression/Dysphoria  Fall Risk-High or Moderate  Immunizations Discussed/Encouraged (specific Immunizations; Tdap, Influenza, Prevnar 20 (Pneumococcal 20-valent conjugate), Shingrix and COVID19  Inactivity/Sedentary  Polypharmacy  Tobacco Use/Dependance (use dotphrase .tobaccocessation for documentation)  The above risks/problems have been discussed with the patient.  Follow up actions/plans if indicated are seen below in the Assessment/Plan Section.  Pertinent information has been shared with the patient in the After Visit Summary.    Diagnoses and all orders for this visit:    1. Medicare annual wellness visit, subsequent (Primary)        Follow Up:   Return in about 1 year (around 11/17/2023), or if symptoms worsen or fail to improve, for  Medicare Wellness.     An After Visit Summary and PPPS were made available to the patient.          I spent 30 minutes caring for Cabrera on this date of service. This time includes time spent by me in the following activities:preparing for the visit, reviewing tests, obtaining and/or reviewing a separately obtained history, performing a medically appropriate examination and/or evaluation , counseling and educating the patient/family/caregiver, ordering medications, tests, or procedures, referring and communicating with other health care professionals , documenting information in the medical record, independently interpreting results and communicating that information with the patient/family/caregiver and care coordination               This document has been electronically signed by Sunita Crawford MD on November 17, 2022 17:44 CST

## 2022-12-16 ENCOUNTER — OFFICE VISIT (OUTPATIENT)
Dept: FAMILY MEDICINE CLINIC | Facility: CLINIC | Age: 82
End: 2022-12-16

## 2022-12-16 VITALS
DIASTOLIC BLOOD PRESSURE: 72 MMHG | HEART RATE: 57 BPM | TEMPERATURE: 98.2 F | SYSTOLIC BLOOD PRESSURE: 142 MMHG | WEIGHT: 163 LBS | RESPIRATION RATE: 18 BRPM | BODY MASS INDEX: 22.82 KG/M2 | OXYGEN SATURATION: 99 % | HEIGHT: 71 IN

## 2022-12-16 DIAGNOSIS — I48.21 PERMANENT ATRIAL FIBRILLATION: Primary | ICD-10-CM

## 2022-12-16 DIAGNOSIS — G89.29 CHRONIC PAIN OF RIGHT HIP: ICD-10-CM

## 2022-12-16 DIAGNOSIS — Z23 NEED FOR VACCINATION: ICD-10-CM

## 2022-12-16 DIAGNOSIS — Z79.01 CHRONIC ANTICOAGULATION: ICD-10-CM

## 2022-12-16 DIAGNOSIS — M25.551 CHRONIC PAIN OF RIGHT HIP: ICD-10-CM

## 2022-12-16 DIAGNOSIS — G62.9 NEUROPATHY: ICD-10-CM

## 2022-12-16 LAB
INR PPP: 2.2 (ref 0.9–1.1)
PROTHROMBIN TIME: 26.9 SECONDS

## 2022-12-16 PROCEDURE — 90677 PCV20 VACCINE IM: CPT | Performed by: FAMILY MEDICINE

## 2022-12-16 PROCEDURE — 99214 OFFICE O/P EST MOD 30 MIN: CPT | Performed by: FAMILY MEDICINE

## 2022-12-16 PROCEDURE — 85610 PROTHROMBIN TIME: CPT | Performed by: FAMILY MEDICINE

## 2022-12-16 PROCEDURE — G0009 ADMIN PNEUMOCOCCAL VACCINE: HCPCS | Performed by: FAMILY MEDICINE

## 2022-12-16 PROCEDURE — 36416 COLLJ CAPILLARY BLOOD SPEC: CPT | Performed by: FAMILY MEDICINE

## 2022-12-16 RX ORDER — GABAPENTIN 100 MG/1
100 CAPSULE ORAL 3 TIMES DAILY
Qty: 90 CAPSULE | Refills: 0 | Status: SHIPPED | OUTPATIENT
Start: 2022-12-16 | End: 2023-01-16 | Stop reason: SDUPTHER

## 2022-12-16 RX ORDER — OXYCODONE AND ACETAMINOPHEN 10; 325 MG/1; MG/1
1 TABLET ORAL EVERY 6 HOURS PRN
Qty: 100 TABLET | Refills: 0 | Status: SHIPPED | OUTPATIENT
Start: 2022-12-16 | End: 2023-01-16 | Stop reason: SDUPTHER

## 2023-01-02 ENCOUNTER — APPOINTMENT (OUTPATIENT)
Dept: LAB | Facility: HOSPITAL | Age: 83
End: 2023-01-02
Payer: MEDICARE

## 2023-01-03 NOTE — PROGRESS NOTES
[facility] HEMATOLOGY AND ONCOLOGY  [address]  [facility] [address] [phone]    Patient Name: Cabrera Avina  Encounter Date: 01/09/2023  YOB: 1940  Patient Number: [id number]       REASON FOR FOLLOWUP:  Mr. Cabrera Avina is a 82-year-old male who returns in follow-up of intermediate grade B-cell lymphoma.  He is seen 210 months following the completion of R-CHOP chemotherapy and 158 months after the completion of Rituxan maintenance.  He is also followed for an iron deficiency anemia and for chronic anticoagulation. He is seen 37 months after the most recent dose of Injectafer.  The patient is here alone.     I have reviewed the HPI and verified with the patient the accuracy of it. No changes to interval history since the information was documented. Paul Rachel MD 01/09/23     DIAGNOSTIC ABNORMALITIES: B-cell Lymphoma.   1. Periportal lymph node biopsy, 02/01/2005. Findings consistent with large B-cell lymphoma with an intermediate to high proliferative rate.  2. CT of the chest, 02/03/2005. Mediastinal, left hilar, and upper abdominal lymphadenopathy consistent with the patient's history of lymphoma.    PREVIOUS INTERVENTIONS: B-cell lymphoma   1. Definitive Rituxan, 375 mg/m2, 02/05/2005 through 02/25/2005. Four weeks completed.  2. Definitive R-CHOP, from 01/04/2005 through 07/07/2005. Five cycles completed. Cycle #2 delayed by admission for management of deep perirectal abscess.  3. Maintenance Rituxan (every 3 months), 10/14/2005 through 11/11/2007. Six cycles completed.    DIAGNOSTIC ABNORMALITIES: Anemia, iron deficiency   1. Anemia substrates on 05/27/2008: Iron 43, %saturation 12, TIBC 366, UIBC 323, ferritin 27, vitamin B12 of 295, folate 7.2.    PREVIOUS INTERVENTIONS: Anemia.   1. INFeD, 1000 mg IVPB, 06/04/2008.  2. INFeD, 1000 mg IVPB, 09/02/2010 and 09/09/2010.  3. INFeD 500 mg, 08/25/2016;  11/28/2016.  4. Injectafer 750 mg IV, 11/20/2019    Problem List Items Addressed This Visit        Other    Lymphoma in remission (HCC) - Primary    Relevant Orders    Beta 2 Microglobulin, Serum    CBC & Differential    Comprehensive Metabolic Panel    Ferritin    Iron Profile    Lactate Dehydrogenase    Vitamin B12 & Folate     Oncology/Hematology History Overview Note   DIAGNOSTIC ABNORMALITIES: B-cell Lymphoma.  Periportal lymph node biopsy, 02/01/2005.  Findings consistent with large B-cell lymphoma with an intermediate to high proliferative rate.  CT of the chest, 02/03/2005.   Mediastinal, left hilar, and upper abdominal lymphadenopathy consistent with the patient's history of lymphoma.  PREVIOUS INTERVENTIONS:   B-cell lymphoma  Definitive Rituxan, 375 mg/m2, 02/05/2005 through 02/25/2005. Four weeks completed.  Definitive R-CHOP, from 01/04/2005 through 07/07/2005.  Five cycles completed.  Cycle #2 delayed by admission for management of deep perirectal abscess.  Maintenance Rituxan (every 3 months), 10/14/2005 through 11/11/2007.  Six cycles completed.  DIAGNOSTIC ABNORMALITIES:   Anemia, iron deficiency  Anemia substrates on 05/27/2008: Iron 43, %saturation 12, TIBC 366, UIBC 323, ferritin 27, vitamin B12 of 295, folate 7.2.  PREVIOUS INTERVENTIONS:   Anemia.  INFeD, 1000 mg IVPB, 06/04/2008.  INFeD, 1000 mg IVPB, 09/02/2010 and 09/09/2010.  INFeD 500 mg, 08/25/2016; 11/28/2016.     Lymphoma in remission (HCC)   4/28/2016 Initial Diagnosis    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)         PAST MEDICAL HISTORY:  ALLERGIES:  No Known Allergies  CURRENT MEDICATIONS:  Outpatient Encounter Medications as of 1/9/2023   Medication Sig Dispense Refill   • aspirin 81 MG EC tablet Take 1 tablet by mouth Daily. 90 tablet 3   • cyclobenzaprine (FLEXERIL) 10 MG tablet TAKE ONE TABLET BY MOUTH THREE TIMES DAILY AS NEEDED FOR MUSCLE SPASMS. 90 tablet 0   • digoxin (LANOXIN) 250 MCG tablet Take 1 tablet by mouth Daily. 90 tablet  3   • dilTIAZem CD (CARDIZEM CD) 240 MG 24 hr capsule Take 1 capsule by mouth Daily. 90 capsule 3   • dronabinol (Marinol) 5 MG capsule Take 1 capsule by mouth 2 (Two) Times a Day Before Meals. 30 capsule 0   • escitalopram (Lexapro) 20 MG tablet Take 1 tablet by mouth Daily. (Patient taking differently: Take 20 mg by mouth As Needed (rare).) 90 tablet 3   • Folbee 2.5-25-1 MG tablet tablet Take 1 tablet by mouth Daily. 30 tablet 0   • gabapentin (NEURONTIN) 100 MG capsule Take 1 capsule by mouth 3 (Three) Times a Day. 90 capsule 0   • Iron-Vitamins (GERITOL COMPLETE PO) Take 2 tablets by mouth Every Other Day.     • oxyCODONE-acetaminophen (PERCOCET)  MG per tablet Take 1 tablet by mouth Every 6 (Six) Hours As Needed for Severe Pain. Must last 30 days 100 tablet 0   • temazepam (RESTORIL) 15 MG capsule Take 1 capsule by mouth At Night As Needed for Sleep or Anxiety. 30 capsule 2   • warfarin (COUMADIN) 2 MG tablet TAKE 3 AND ONE HALF TABLET BY MOUTH DAILY 360 tablet 5     No facility-administered encounter medications on file as of 1/9/2023.     ADULT ILLNESSES:   History of malignant lymphoma (situation) ( ICD-10:Z85.72 ;Personal history of non-Hodgkin lymphomas   Adenomatous colonic polyps ( ICD-10:D12.6 ;Benign neoplasm of colon, unspecified   Anemia ( ICD-10:D64.9 ;Anemia, unspecified   Anxiety ( chronic; ICD-10:F41.9 ;Anxiety disorder, unspecified )   Atrial fibrillation ( on chronic anticoagulation; ICD-10:I48.91 ;Unspecified atrial fibrillation )   Degenerative joint disease ( ICD-10:M16.10 ;Unilateral primary osteoarthritis, unspecified hip   Drug intolerance ( oral iron; ICD-10:T45.4x5D ;Adverse effect of iron and its compounds, subsequent encounter )   Hypertension ( ICD-10:I10 ;Essential (primary) hypertension   Iron deficiency anemia ( ICD-10:D50.9 ;Iron deficiency anemia, unspecified   Microscopic hematuria ( ICD-10:R31.29 ;Other microscopic hematuria   Nephrolithiasis ( ICD-10:N20.0 ;Calculus of  kidney   Neuropathy ( ICD-10:G62.9 ;Polyneuropathy, unspecified   Orthostatic hypotension ( ICD-10:I95.1 ;Orthostatic hypotension   Perirectal abscess ( deep, severe; ICD-10:K61.1 ;Rectal abscess )   Polycystic kidney disease ( ICD-10:Q61.3 ;Polycystic kidney, unspecified   Vitamin B12 deficiency ( ICD-10:E53.8 ;Deficiency of other specified B group vitamins    SURGERIES:   Punch biopsy of skin lesion, right lower lip, 01/21/2015: 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 Dr. Hatch   Drainage of abscess, recurrent rectal abscess, 06/01/2006   Laparoscopic cholecystectomy and lymph node biopsy   Hip replacement, right, 06/15/2010      ADULT ILLNESSES:  Patient Active Problem List   Diagnosis Code   • Constipation K59.00   • Gastroesophageal reflux disease without esophagitis K21.9   • Lymphoma in remission ([facility]) C85.90   • Anxiety F41.9   • Chronic hip pain M25.559, G89.29   • Permanent atrial fibrillation (HCC) I48.21   • Other insomnia G47.09   • Adenomatous polyp of colon D12.6   • Tobacco use Z72.0   • Chronic anticoagulation Z79.01   • PAD (peripheral artery disease) (HCC) I73.9   • Congestive heart failure (HCC) I50.9   • BMI 24.0-24.9, adult Z68.24   • Leukopenia D72.819   • Iron deficiency anemia D50.9   • Neuropathy G62.9     SURGERIES:  Past Surgical History:   Procedure Laterality Date   • CHOLECYSTECTOMY     • COLONOSCOPY  05/2012    3 yr recall   • COLONOSCOPY  09/18/2015    5 yr recall   • KIDNEY STONE SURGERY     • RECTAL SURGERY      X 2   • TOTAL HIP ARTHROPLASTY       HEALTH MAINTENANCE ITEMS:  Health Maintenance Due   Topic Date Due   • COVID-19 Vaccine (5 - Booster) 08/05/2022       <no information>  Last Completed Colonoscopy          COLORECTAL CANCER SCREENING (COLONOSCOPY - Every 10 Years) Next due on 12/1/2025 12/01/2015  COLONOSCOPY (Done)    09/18/2015  SCANNED - COLONOSCOPY     05/31/2012  SCANNED - COLONOSCOPY              Immunization History   Administered Date(s) Administered   • COVID-19 (MODERNA) 1st, 2nd, 3rd Dose Only 02/14/2021, 03/10/2021, 11/02/2021   • COVID-19 (MODERNA) BOOSTER 06/10/2022   • FLUAD TRI 65YR+ 11/05/2019   • Fluad Quad 65+ 11/05/2019, 10/09/2020   • Fluzone High-Dose 65+yrs 10/20/2022   • Pneumococcal Conjugate 13-Valent (PCV13) 11/05/2019   • Pneumococcal Conjugate 20-Valent (PCV20) 12/16/2022     Last Completed Mammogram     This patient has no relevant Health Maintenance data.            FAMILY HISTORY:  Family History   Problem Relation Age of Onset   • Colon cancer Mother    • No Known Problems Father    • Prostate cancer Brother    • No Known Problems Daughter    • No Known Problems Son    • No Known Problems Maternal Grandmother    • No Known Problems Maternal Grandfather    • No Known Problems Paternal Grandmother    • No Known Problems Paternal Grandfather    • Prostate cancer Brother    • Colon polyps Neg Hx      SOCIAL HISTORY:  Social History     Socioeconomic History   • Marital status:    Tobacco Use   • Smoking status: Never   • Smokeless tobacco: Never   Vaping Use   • Vaping Use: Never used   Substance and Sexual Activity   • Alcohol use: No   • Drug use: No   • Sexual activity: Defer       REVIEW OF SYSTEMS:  PS:  ECOG 1-2.   Constitutional:  His appetite is fair, \"I still eat enough for me.\"  His energy remains low to fair.  \"Up and down days.\"  He is as active as his norm and manages all his ADLs including chores, running errands, and driving.  Says his hip and lower back pains still inhibit his activities inspite of hip replacement.  Says he is still walking his usual \"at least 1/2 mile on most days with the dogs\" weather permitting.  He has no fevers, chills, or drenching night sweats.  He has regained 1 lb (had lost 11 lbs at his prior visit) since his last visit. Still says he wants to stay around 175-180 pounds.  He sleeps more  restfully on temazepam.  Ear/Nose/Throat/Mouth:   The patient reports no ear pains, but has lingering sinus symptoms, but no sore throat, nosebleeds.  No headaches. The patient denies any hoarseness.   Ocular:   The patient reports no eye pain, significant change in visual acuity, double vision, or blurry vision.   Respiratory:  He reports no chronic cough, shortness of breathing, phlegm production, or chest wall pain.  Cardiovascular:  He reports no exertional chest pain, chest pressure, or chest heaviness.  He reports no claudication. He reports no palpitations or symptomatic orthostasis.  Gastrointestinal:  He has no pica, dysphagia, nausea, vomiting, postprandial abdominal pain, bloating, cramping, change in bowel habits, or discoloration of the stool.  He has had no rectal bleeding.  He has chronic constipation modulated by daily stool softeners (Miralax).  Says he is still using supplemental fiber daily.  Says he moves his bowels every 3 days, \"just about- ever since chemo years ago.\" He has occasional loose stools but no overt diarrhea. Last colonoscopy, 09/2015.  Polyps. Repeat 5 years. Is oral iron intolerant (worsening constipation).  Genitourinary:    He reports no urinary burning, frequency, dribbling, or discoloration.  He reports no difficulty controlling his bladder. \"Still the usual gotta go when I gotta go.\" He reports no need to urinate frequently through the night.  Musculoskeletal:  He continues to have chronic trouble with right hip pain.  \"usual, 24/7.\"  He still uses maintenance Percocet, 1 dose daily as needed.  \"At the most.\"  He reports no unexplained arthralgias, myalgias, but has noted more frequent bouts of nighttime leg cramping.  Extremities:  He reports no trouble with fluid retention or significant leg swelling.  Endocrine:  He reports no problems with excess thirst, excessive urination, vasomotor instability, or unexplained fatigue.  Heme/Lymphatic:  He reports no bleeding,  petechial rashes, or swollen glands.  Skin:  He reports no itching, rashes, or lesions which won't heal.  Neuro:  He reports night-time stocking-glove pain in his lower extremities.  \"Same.\"  Says he had previously been seen by Dr. Kinney previously. \"He didn't find any problems.\"  Says Dr. Rosario says he does not have significant vascular disease requiring surgery.  He reports no loss of consciousness, seizures, fainting spells, or dizziness. He reports no weakness of his face, arms, or legs.  He reports no difficulty with speech.  He reports no tremors.  Psych:  He seems generally satisfied with life.  He reports no depression.  He reports no mood swings.      VITAL SIGNS: /76   Pulse 52   Temp 97.6 °F (36.4 °C) (Temporal)   Resp 18   Ht 180.3 cm (70.98\")   Wt 76.5 kg (168 lb 11.2 oz)   SpO2 97%   BMI 23.54 kg/m²       PHYSICAL EXAMINATION:  General:  He is a pleasant, cooperative, slender, and fairly well-kept elderly male in no distress.  He arrived in the exam room ambulatory. He appears to be his stated age.  His skin color is pale. ECOG 1 (same)  Head/Neck:  The patient is anicteric and atraumatic.  He is wearing a surgical mask today. The neck is supple without evidence of jugular venous distention or cervical adenopathy.  Poor dentition  Eyes:  The pupils are equal, round, and reactive to light.  The extraocular movements are full.  There is no scleral jaundice or erythema.  Chest:  The respiratory efforts are normal and unhindered.  The chest is clear to auscultation.  There are no wheezes, rales, or asymmetry of breath sounds.  The port in the right anterior chest wall has been removed and the site has healed. No tenderness or erythema.   Cardiac/Vascular:  The patient has an irregularly irregular cardiac rate and rhythm without murmurs.  The peripheral pulses are equal and full.  Abdomen:  The belly is soft and flat.  There is no rebound or guarding. There is no organomegaly, mass-effect,  or tenderness.  Bowel sounds are normal.  Ortho:  There is no overt joint stiffness.  There is subtle foreshortening of height.  There are no overt joint changes which suggest degenerative arthritis.  Extremities:  There is no evidence of cyanosis, clubbing, with trace bipedal edema.  Rheumatologic:  There is no overt evidence of rheumatoid deformities of the hands.  There is no sausaging of the fingers.  There is no sign of active synovitis.  Cutaneous:  Few areas of senile purpura are present on his forearms.  There are no overt rashes, disseminated lesions, purpura, or petechiae.  Lymphatics:  There is no evidence of adenopathy in the cervical, supraclavicular, or axillary areas.  Neurologic:  The patient is alert, oriented, cooperative, and pleasant.  He is appropriately conversant.  He ambulated into the exam room and transferred from chair to exam table aided.  Gait is slow, stooped and antalgic (back pains) but independent.  There is no overt dysfunction of the motor, sensory, or cerebellar systems.  Psych:  Impatient.  Mood and affect are appropriate for circumstance.  Eye contact is appropriate.        LABS    Lab Results - Last 18 Months   Lab Units 01/04/23  1239 09/01/22  1420 04/14/22  1410 12/09/21  1105 07/19/21  1325   HEMOGLOBIN g/dL 13.5 13.3 14.4 14.2 13.9   HEMATOCRIT % 43.3 41.9 43.9 43.2 42.7   MCV fL 97.5* 96.3 96.5 96.6 94.5   WBC 10*3/mm3 4.48 4.13 4.14 4.14 4.11   RDW % 13.9 13.9 13.0 13.2 13.8   MPV fL 10.1 9.5 9.5 9.8 10.0   PLATELETS 10*3/mm3 200 226 197 208 184   IMM GRAN % % 0.4 0.2 0.2 0.2 0.2   NEUTROS ABS 10*3/mm3 2.96 2.65 2.66 2.43 2.56   LYMPHS ABS 10*3/mm3 0.81 0.87 0.83 0.93 0.78   MONOS ABS 10*3/mm3 0.53 0.35 0.47 0.49 0.53   EOS ABS 10*3/mm3 0.11 0.21 0.13 0.23 0.17   BASOS ABS 10*3/mm3 0.05 0.04 0.04 0.05 0.06   IMMATURE GRANS (ABS) 10*3/mm3 0.02 0.01 0.01 0.01 0.01   NRBC /100 WBC 0.0 0.0 0.0 0.0 0.0       Lab Results - Last 18 Months   Lab Units 01/04/23  2707  09/01/22  1420 04/14/22  1410 12/09/21  1105 07/19/21  1325   GLUCOSE mg/dL 107* 126* 116* 114* 103*   SODIUM mmol/L 140 139 140 140 137   POTASSIUM mmol/L 4.9 3.8 4.0 3.9 4.4   CO2 mmol/L 30.0* 31.0* 28.0 30.0* 28.0   CHLORIDE mmol/L 103 103 103 103 104   ANION GAP mmol/L 7.0 5.0 9.0 7.0 5.0   CREATININE mg/dL 0.96 1.00 0.90 0.94 0.96   BUN mg/dL 17 7* 15 13 10   BUN / CREAT RATIO  17.7 7.0 16.7 13.8 10.4   CALCIUM mg/dL 9.5 9.6 9.2 9.3 9.2   EGFR IF NONAFRICN AM mL/min/1.73  --   --   --  77 75   ALK PHOS U/L 73 76 76 73 67   TOTAL PROTEIN g/dL 8.0 7.7 6.8 7.5 6.9   ALT (SGPT) U/L 15 8 9 11 10   AST (SGOT) U/L 19 15 16 18 19   BILIRUBIN mg/dL 0.6 0.6 0.6 0.6 0.6   ALBUMIN g/dL 4.3 4.30 3.90 4.00 3.90   GLOBULIN gm/dL 3.7 3.4 2.9 3.5 3.0       Lab Results - Last 18 Months   Lab Units 01/04/23  1239 09/01/22  1420 04/14/22  1410 12/09/21  1105 07/19/21  1325   LDH U/L 190 198 188 203 179       Lab Results - Last 18 Months   Lab Units 01/04/23  1239 09/01/22  1420 04/14/22  1410 12/09/21  1105 07/19/21  1325   IRON mcg/dL 89 86 99 95 86   TIBC mcg/dL 364 341 352 337 322   IRON SATURATION % 24 25 28 28 27   FERRITIN ng/mL 320.80 280.80 279.20 326.20 289.50   FOLATE ng/mL 11.50 15.60 10.40 8.53 11.20       ASSESSMENT:  1.    Intermediate to high-grade lymphoma.  History of. No evidence of disease.  05/16/2005:          Stage IV-E.          Baseline Tumor [place]:   Mediastinum, left hilum, upper abdomen, and liver (clinically).          Complications of Tumor:   Hyperbilirubinemia. Improved.          Response to Therapy:  Clinical remission per CT scan 05/16/2005, 11/2011 and 02/10/2020.          -02/03/2020-CT chest without contrast.  Impression: Atheromatous disease of the thoracic aorta and coronary arteries.  Mild cardiomegaly.  Borderline pulmonary artery dilatation.  No infiltrate or effusion.          -02/03/2020-CT abdomen and pelvis with contrast.  Impression: Prior cholecystectomy.  Mild prominence of biliary  tree felt to be related.  Complex renal cyst on the left is stable in size with septation and calcification.  Dominant mid renal cyst on the right side is slightly larger.  Upper pole of the right renal collecting system is mildly dilated and contains 2 stones measuring 8 to 9 mm.  5 mm nonobstructive stone in the left mid kidney.  Ureter is nondilated.  Prostate mildly enlarged 5.1 cm.  Moderate stool in the colon.  No small bowel distention.          Prognosis:  Fair.          IPI at baseline:  3.  2.    Normocytic anemia from iron deficiency and chronic disease. Last Injectafer, 11/20/2019.    --Hemoglobin, 13.5 on 01/04/2023 (prior range:  Hgb 12.9 - 14.7).  3.    Leukopenia, NOS. normal since 02/07/2020   4.    Iron deficiency and oral iron intolerant (constipation).  Repleted since receipt of INFeD  5.    Variable B2M.    --Stable, 2.6 on 01/04/2023 (prior range:  1.6 - 3.3).  6.    Adenomatous colon polyp, colonoscopy 09/18/2015.  7.    Atrial fibrillation on chronic anticoagulation.  8.    Microscopic hematuria.  Management per Dr. Mayes.  9.    Anxiety, chronic, stable on medications (Celexa).  10.  Lower extremity neuropathy.  Mild.  Unchanged.  11.  Hypertension.  Fair control on Cardizem.  12.  Low B12, 203 on 08/17/2017.  Repleted.  13.  Peripheral vascular disease.  Arterial studies and has been seen by Dr. Rosario of vascular surgery.              a.    Ultrasound ankle/brachial performed on 07/17/2018 at [facility]. Suspected atherosclerosis in the lower extremity arteries, supported by noncompressible posterior tibialis and dorsalis pedis arteries.              b.    Abdominal aortic ultrasound performed on 08/10/2018 at [facility].  No abdominal aortic aneurysm.  14.  COVID vaccination # 2, 03/10/2021  15.  1 x 2 mm lower lip nodule-was supposed to see Dr. Mariee but lesion resolved on its own  16.  Poor appetite and weight loss.  Willing to try an appetite stimulant    PLAN:  1.     Apprised of labs from 01/04/2023 with low normal WBC, normal diff, normal hemoglobin (resolution of anemia, glucose 107 otherwise normal CMP, normal LDH, normal B2M, repleted serum iron, repleted (> 20%) Fe sat, repleted (320) ferritin, repleted folate, repleted B12.   2.    Previously reviewed annual office follow-up with Dr. Miguel, 07/21/2022.  Has elected to forego any future colonoscopies.    3.    Rx:  Marinol 5 mg po qd # 60    4.    Continue other currently identified medications.  5.    Continue Coumadin.  PT/INR followed by Dr. Crawford.  6.    Continue ongoing management per primary care physician and other specialists.    7.    Return to office in 6 months with pre-office CBC with differential, iron, TIBC, iron saturation, ferritin, B12, folate, CMP, B2M, and LDH.      I spent 35 minutes caring for Cabrera on this date of service. This time includes time spent by me in the following activities: preparing for the visit, reviewing tests, performing a medically appropriate examination and/or evaluation, counseling and educating the patient/family/caregiver, ordering medications, tests, or procedures and documenting information in the medical record.       cc:   Azar Mayes MD          [facility]          Sunita Crawford MD - [place]          Jorge Alberto Rosario MD

## 2023-01-04 ENCOUNTER — LAB (OUTPATIENT)
Dept: LAB | Facility: HOSPITAL | Age: 83
End: 2023-01-04
Payer: MEDICARE

## 2023-01-04 DIAGNOSIS — C85.90 LYMPHOMA IN REMISSION: ICD-10-CM

## 2023-01-04 LAB
ALBUMIN SERPL-MCNC: 4.3 G/DL (ref 3.5–5.2)
ALBUMIN/GLOB SERPL: 1.2 G/DL
ALP SERPL-CCNC: 73 U/L (ref 39–117)
ALT SERPL W P-5'-P-CCNC: 15 U/L (ref 1–41)
ANION GAP SERPL CALCULATED.3IONS-SCNC: 7 MMOL/L (ref 5–15)
AST SERPL-CCNC: 19 U/L (ref 1–40)
B2 MICROGLOB SERPL-MCNC: 2.6 MG/L (ref 0.8–2.2)
BASOPHILS # BLD AUTO: 0.05 10*3/MM3 (ref 0–0.2)
BASOPHILS NFR BLD AUTO: 1.1 % (ref 0–1.5)
BILIRUB SERPL-MCNC: 0.6 MG/DL (ref 0–1.2)
BUN SERPL-MCNC: 17 MG/DL (ref 8–23)
BUN/CREAT SERPL: 17.7 (ref 7–25)
CALCIUM SPEC-SCNC: 9.5 MG/DL (ref 8.6–10.5)
CHLORIDE SERPL-SCNC: 103 MMOL/L (ref 98–107)
CO2 SERPL-SCNC: 30 MMOL/L (ref 22–29)
CREAT SERPL-MCNC: 0.96 MG/DL (ref 0.76–1.27)
DEPRECATED RDW RBC AUTO: 49.8 FL (ref 37–54)
EGFRCR SERPLBLD CKD-EPI 2021: 79.4 ML/MIN/1.73
EOSINOPHIL # BLD AUTO: 0.11 10*3/MM3 (ref 0–0.4)
EOSINOPHIL NFR BLD AUTO: 2.5 % (ref 0.3–6.2)
ERYTHROCYTE [DISTWIDTH] IN BLOOD BY AUTOMATED COUNT: 13.9 % (ref 12.3–15.4)
FERRITIN SERPL-MCNC: 320.8 NG/ML (ref 30–400)
FOLATE SERPL-MCNC: 11.5 NG/ML (ref 4.78–24.2)
GLOBULIN UR ELPH-MCNC: 3.7 GM/DL
GLUCOSE SERPL-MCNC: 107 MG/DL (ref 65–99)
HCT VFR BLD AUTO: 43.3 % (ref 37.5–51)
HGB BLD-MCNC: 13.5 G/DL (ref 13–17.7)
IMM GRANULOCYTES # BLD AUTO: 0.02 10*3/MM3 (ref 0–0.05)
IMM GRANULOCYTES NFR BLD AUTO: 0.4 % (ref 0–0.5)
IRON 24H UR-MRATE: 89 MCG/DL (ref 59–158)
IRON SATN MFR SERPL: 24 % (ref 20–50)
LDH SERPL-CCNC: 190 U/L (ref 135–225)
LYMPHOCYTES # BLD AUTO: 0.81 10*3/MM3 (ref 0.7–3.1)
LYMPHOCYTES NFR BLD AUTO: 18.1 % (ref 19.6–45.3)
MCH RBC QN AUTO: 30.4 PG (ref 26.6–33)
MCHC RBC AUTO-ENTMCNC: 31.2 G/DL (ref 31.5–35.7)
MCV RBC AUTO: 97.5 FL (ref 79–97)
MONOCYTES # BLD AUTO: 0.53 10*3/MM3 (ref 0.1–0.9)
MONOCYTES NFR BLD AUTO: 11.8 % (ref 5–12)
NEUTROPHILS NFR BLD AUTO: 2.96 10*3/MM3 (ref 1.7–7)
NEUTROPHILS NFR BLD AUTO: 66.1 % (ref 42.7–76)
NRBC BLD AUTO-RTO: 0 /100 WBC (ref 0–0.2)
PLATELET # BLD AUTO: 200 10*3/MM3 (ref 140–450)
PMV BLD AUTO: 10.1 FL (ref 6–12)
POTASSIUM SERPL-SCNC: 4.9 MMOL/L (ref 3.5–5.2)
PROT SERPL-MCNC: 8 G/DL (ref 6–8.5)
RBC # BLD AUTO: 4.44 10*6/MM3 (ref 4.14–5.8)
SODIUM SERPL-SCNC: 140 MMOL/L (ref 136–145)
TIBC SERPL-MCNC: 364 MCG/DL (ref 298–536)
TRANSFERRIN SERPL-MCNC: 244 MG/DL (ref 200–360)
VIT B12 BLD-MCNC: 478 PG/ML (ref 211–946)
WBC NRBC COR # BLD: 4.48 10*3/MM3 (ref 3.4–10.8)

## 2023-01-04 PROCEDURE — 36415 COLL VENOUS BLD VENIPUNCTURE: CPT

## 2023-01-04 PROCEDURE — 82746 ASSAY OF FOLIC ACID SERUM: CPT

## 2023-01-04 PROCEDURE — 85025 COMPLETE CBC W/AUTO DIFF WBC: CPT

## 2023-01-04 PROCEDURE — 82232 ASSAY OF BETA-2 PROTEIN: CPT

## 2023-01-04 PROCEDURE — 84466 ASSAY OF TRANSFERRIN: CPT

## 2023-01-04 PROCEDURE — 82607 VITAMIN B-12: CPT

## 2023-01-04 PROCEDURE — 80053 COMPREHEN METABOLIC PANEL: CPT

## 2023-01-04 PROCEDURE — 83615 LACTATE (LD) (LDH) ENZYME: CPT

## 2023-01-04 PROCEDURE — 82728 ASSAY OF FERRITIN: CPT

## 2023-01-04 PROCEDURE — 83540 ASSAY OF IRON: CPT

## 2023-01-09 ENCOUNTER — OFFICE VISIT (OUTPATIENT)
Dept: ONCOLOGY | Facility: CLINIC | Age: 83
End: 2023-01-09
Payer: MEDICARE

## 2023-01-09 VITALS
TEMPERATURE: 97.6 F | SYSTOLIC BLOOD PRESSURE: 138 MMHG | HEIGHT: 71 IN | OXYGEN SATURATION: 97 % | BODY MASS INDEX: 23.62 KG/M2 | WEIGHT: 168.7 LBS | RESPIRATION RATE: 18 BRPM | HEART RATE: 52 BPM | DIASTOLIC BLOOD PRESSURE: 76 MMHG

## 2023-01-09 DIAGNOSIS — C85.90 LYMPHOMA IN REMISSION: Primary | ICD-10-CM

## 2023-01-09 PROCEDURE — 99214 OFFICE O/P EST MOD 30 MIN: CPT | Performed by: INTERNAL MEDICINE

## 2023-01-09 RX ORDER — DRONABINOL 5 MG/1
5 CAPSULE ORAL
Qty: 60 CAPSULE | Refills: 0 | Status: SHIPPED | OUTPATIENT
Start: 2023-01-09 | End: 2023-01-25

## 2023-01-16 ENCOUNTER — OFFICE VISIT (OUTPATIENT)
Dept: FAMILY MEDICINE CLINIC | Facility: CLINIC | Age: 83
End: 2023-01-16
Payer: MEDICARE

## 2023-01-16 VITALS
WEIGHT: 168.8 LBS | DIASTOLIC BLOOD PRESSURE: 64 MMHG | HEART RATE: 60 BPM | BODY MASS INDEX: 23.63 KG/M2 | SYSTOLIC BLOOD PRESSURE: 140 MMHG | HEIGHT: 71 IN | OXYGEN SATURATION: 98 % | RESPIRATION RATE: 16 BRPM

## 2023-01-16 DIAGNOSIS — I48.20 CHRONIC ATRIAL FIBRILLATION: ICD-10-CM

## 2023-01-16 DIAGNOSIS — I48.21 PERMANENT ATRIAL FIBRILLATION: ICD-10-CM

## 2023-01-16 DIAGNOSIS — G62.9 NEUROPATHY: ICD-10-CM

## 2023-01-16 DIAGNOSIS — Z79.01 CHRONIC ANTICOAGULATION: ICD-10-CM

## 2023-01-16 DIAGNOSIS — M25.551 CHRONIC PAIN OF RIGHT HIP: Primary | ICD-10-CM

## 2023-01-16 DIAGNOSIS — G89.29 CHRONIC PAIN OF RIGHT HIP: Primary | ICD-10-CM

## 2023-01-16 LAB
INR PPP: 1.9 (ref 0.9–1.1)
PROTHROMBIN TIME: 22.1 SECONDS

## 2023-01-16 PROCEDURE — 85610 PROTHROMBIN TIME: CPT | Performed by: FAMILY MEDICINE

## 2023-01-16 PROCEDURE — 36416 COLLJ CAPILLARY BLOOD SPEC: CPT | Performed by: FAMILY MEDICINE

## 2023-01-16 PROCEDURE — 99214 OFFICE O/P EST MOD 30 MIN: CPT | Performed by: FAMILY MEDICINE

## 2023-01-16 RX ORDER — GABAPENTIN 100 MG/1
100 CAPSULE ORAL 3 TIMES DAILY
Qty: 90 CAPSULE | Refills: 0 | Status: SHIPPED | OUTPATIENT
Start: 2023-01-16 | End: 2023-02-16 | Stop reason: SDUPTHER

## 2023-01-16 RX ORDER — DILTIAZEM HYDROCHLORIDE 240 MG/1
240 CAPSULE, COATED, EXTENDED RELEASE ORAL DAILY
Qty: 90 CAPSULE | Refills: 3 | Status: SHIPPED | OUTPATIENT
Start: 2023-01-16

## 2023-01-16 RX ORDER — OXYCODONE AND ACETAMINOPHEN 10; 325 MG/1; MG/1
1 TABLET ORAL EVERY 6 HOURS PRN
Qty: 100 TABLET | Refills: 0 | Status: SHIPPED | OUTPATIENT
Start: 2023-01-16 | End: 2023-02-16 | Stop reason: SDUPTHER

## 2023-01-19 ENCOUNTER — TELEPHONE (OUTPATIENT)
Dept: FAMILY MEDICINE CLINIC | Facility: CLINIC | Age: 83
End: 2023-01-19
Payer: MEDICARE

## 2023-01-19 NOTE — TELEPHONE ENCOUNTER
Cabrera would like a print out of his expenses, he would like for Silvana to call him when it is ready for .

## 2023-01-20 NOTE — TELEPHONE ENCOUNTER
2022 receipts printed from our office visits. Called pt back to notify ready for  at the  [facility], line just rang.

## 2023-01-23 DIAGNOSIS — G47.09 OTHER INSOMNIA: ICD-10-CM

## 2023-01-23 RX ORDER — TEMAZEPAM 15 MG/1
CAPSULE ORAL
Qty: 30 CAPSULE | Refills: 2 | Status: SHIPPED | OUTPATIENT
Start: 2023-01-23

## 2023-01-23 NOTE — TELEPHONE ENCOUNTER
Rx Refill Note  Requested Prescriptions     Pending Prescriptions Disp Refills   • temazepam (RESTORIL) 15 MG capsule [Pharmacy Med Name: TEMAZEPAM 15 MG CAP 15 Capsule] 30 capsule 2     Sig: TAKE ONE CAPSULE BY MOUTH AT NIGHT AS NEEDED FOR ANXIETY OR SLEEP      Last office visit with prescribing clinician: 1/16/2023   Last telemedicine visit with prescribing clinician: 2/16/2023   Next office visit with prescribing clinician: 2/16/2023     Catherine Young MA  01/23/23, 12:21 CST

## 2023-01-25 ENCOUNTER — OFFICE VISIT (OUTPATIENT)
Dept: CARDIOLOGY | Facility: CLINIC | Age: 83
End: 2023-01-25
Payer: MEDICARE

## 2023-01-25 VITALS
BODY MASS INDEX: 23.8 KG/M2 | HEIGHT: 71 IN | HEART RATE: 68 BPM | SYSTOLIC BLOOD PRESSURE: 130 MMHG | DIASTOLIC BLOOD PRESSURE: 84 MMHG | WEIGHT: 170 LBS | OXYGEN SATURATION: 98 %

## 2023-01-25 DIAGNOSIS — Z79.01 CHRONIC ANTICOAGULATION: ICD-10-CM

## 2023-01-25 DIAGNOSIS — I48.21 PERMANENT ATRIAL FIBRILLATION: Primary | ICD-10-CM

## 2023-01-25 PROCEDURE — 93000 ELECTROCARDIOGRAM COMPLETE: CPT | Performed by: INTERNAL MEDICINE

## 2023-01-25 PROCEDURE — 99214 OFFICE O/P EST MOD 30 MIN: CPT | Performed by: INTERNAL MEDICINE

## 2023-01-25 NOTE — PROGRESS NOTES
Subjective:     Encounter Date:01/25/2023      Patient ID: Cabrera Avina is a 82 y.o. male with chronic atrial fibrillation on chronic anticoagulation with warfarin, presenting today for follow-up.    Chief Complaint: Here for follow-up of atrial fibrillation    History of Present Illness    This patient presents today for follow-up of chronic atrial fibrillation.  He is known to have chronic atrial fibrillation but is essentially asymptomatic.  He denies have any significant palpitations.  No lightheadedness, dizziness or syncope.  He denies having significant chest pain, shortness of breath, dyspnea with exertion.  He remains anticoagulated with warfarin and is tolerating this well with no significant bleeding issues.  He does note some easy bruising but otherwise no major bleeding.  He remains on Cardizem, digoxin.  He tolerates these medications well no significant side effects.  Overall, he says that he is stable from a cardiac standpoint at this time.    Atrial Fibrillation  Presents for follow-up visit. Symptoms are negative for chest pain, dizziness, hemodynamic instability, palpitations, shortness of breath, syncope, tachycardia and weakness. The symptoms have been stable. Past medical history includes atrial fibrillation. There are no medication compliance problems.         Current Outpatient Medications:   •  aspirin 81 MG EC tablet, Take 1 tablet by mouth Daily., Disp: 90 tablet, Rfl: 3  •  cyclobenzaprine (FLEXERIL) 10 MG tablet, TAKE ONE TABLET BY MOUTH THREE TIMES DAILY AS NEEDED FOR MUSCLE SPASMS., Disp: 90 tablet, Rfl: 0  •  digoxin (LANOXIN) 250 MCG tablet, Take 1 tablet by mouth Daily., Disp: 90 tablet, Rfl: 3  •  dilTIAZem CD (CARDIZEM CD) 240 MG 24 hr capsule, Take 1 capsule by mouth Daily., Disp: 90 capsule, Rfl: 3  •  escitalopram (Lexapro) 20 MG tablet, Take 1 tablet by mouth Daily. (Patient taking differently: Take 20 mg by mouth As Needed (rare).), Disp: 90 tablet, Rfl: 3  •   gabapentin (NEURONTIN) 100 MG capsule, Take 1 capsule by mouth 3 (Three) Times a Day., Disp: 90 capsule, Rfl: 0  •  oxyCODONE-acetaminophen (PERCOCET)  MG per tablet, Take 1 tablet by mouth Every 6 (Six) Hours As Needed for Severe Pain. Must last 30 days, Disp: 100 tablet, Rfl: 0  •  temazepam (RESTORIL) 15 MG capsule, TAKE ONE CAPSULE BY MOUTH AT NIGHT AS NEEDED FOR ANXIETY OR SLEEP, Disp: 30 capsule, Rfl: 2  •  warfarin (COUMADIN) 2 MG tablet, TAKE 3 AND ONE HALF TABLET BY MOUTH DAILY, Disp: 360 tablet, Rfl: 5    No Known Allergies    Social History     Tobacco Use   • Smoking status: Never   • Smokeless tobacco: Never   Substance Use Topics   • Alcohol use: No     Review of Systems   Constitutional: Negative for malaise/fatigue and weight loss.   Cardiovascular: Negative for chest pain, dyspnea on exertion, leg swelling, palpitations and syncope.   Respiratory: Negative for hemoptysis and shortness of breath.    Hematologic/Lymphatic: Bruises/bleeds easily.   Gastrointestinal: Negative for bloating, hematemesis, hematochezia, melena, nausea and vomiting.   Genitourinary: Negative for dysuria and hematuria.   Neurological: Negative for dizziness, light-headedness and weakness.       ECG 12 Lead    Date/Time: 1/25/2023 2:32 PM  Performed by: Arnaldo Colin MD  Authorized by: Arnaldo Colin MD   Comparison: compared with previous ECG from 11/3/2021  Similar to previous ECG  Rhythm: atrial fibrillation  Ectopy: unifocal PVCs  Rate: normal  BPM: 65  Conduction: conduction normal  QRS axis: left  Other findings: non-specific ST-T wave changes    Clinical impression: abnormal EKG               Objective:     Vitals reviewed.   Constitutional:       General: Not in acute distress.     Appearance: Well-developed and not in distress.   HENT:      Head: Normocephalic and atraumatic.   Pulmonary:      Effort: Pulmonary effort is normal.      Breath sounds: Normal breath sounds. No wheezing. No  "rhonchi. No rales.   Cardiovascular:      Normal rate. Irregularly irregular rhythm.      Murmurs: There is no murmur.      No gallop.   Pulses:     Intact distal pulses.   Edema:     Peripheral edema absent.   Abdominal:      General: Bowel sounds are normal. There is no distension.      Palpations: Abdomen is soft.      Tenderness: There is no abdominal tenderness.   Skin:     General: Skin is warm and dry. There is no cyanosis.      Findings: No erythema or rash.   Neurological:      Mental Status: Alert and oriented to person, place, and time.      Cranial Nerves: No cranial nerve deficit.       /84   Pulse 68   Ht 180.3 cm (71\")   Wt 77.1 kg (170 lb)   SpO2 98%   BMI 23.71 kg/m²     Data/Lab Review:     Lab Results   Component Value Date    INR 1.9 (A) 01/16/2023    INR 2.2 (A) 12/16/2022    INR 2.1 (A) 11/17/2022    PROTIME 22.1 01/16/2023    PROTIME 26.9 12/16/2022    PROTIME 25.8 11/17/2022     Lab Results   Component Value Date    WBC 4.48 01/04/2023    HGB 13.5 01/04/2023    HCT 43.3 01/04/2023    MCV 97.5 (H) 01/04/2023     01/04/2023     Lab Results   Component Value Date    GLUCOSE 107 (H) 01/04/2023    CALCIUM 9.5 01/04/2023     01/04/2023    K 4.9 01/04/2023    CO2 30.0 (H) 01/04/2023     01/04/2023    BUN 17 01/04/2023    CREATININE 0.96 01/04/2023    EGFRIFAFRI 69 02/03/2020    EGFRIFNONA 77 12/09/2021    BCR 17.7 01/04/2023    ANIONGAP 7.0 01/04/2023           Assessment:          Diagnosis Plan   1. Permanent atrial fibrillation (HCC)  ECG 12 Lead      2. Chronic anticoagulation             Plan:       1.  Permanent atrial fibrillation: The patient remains stable at this time.  His heart rate is well controlled and he does not endorse any significant symptoms related to atrial fibrillation.  He remains anticoagulated with warfarin and is tolerating this therapy well.  Continue rate controlling agents.  No changes recommended at this time.     2.  Chronic " anticoagulation: The patient remains chronically anticoagulated with warfarin.  He is tolerating this well with most recent INR values referenced above.  Also, most recent CBC shows no evidence of anemia.     Will see the patient again in 12 months unless otherwise needed sooner

## 2023-02-16 ENCOUNTER — OFFICE VISIT (OUTPATIENT)
Dept: FAMILY MEDICINE CLINIC | Facility: CLINIC | Age: 83
End: 2023-02-16
Payer: MEDICARE

## 2023-02-16 VITALS
BODY MASS INDEX: 23.24 KG/M2 | DIASTOLIC BLOOD PRESSURE: 82 MMHG | TEMPERATURE: 98.2 F | HEIGHT: 71 IN | RESPIRATION RATE: 18 BRPM | SYSTOLIC BLOOD PRESSURE: 160 MMHG | HEART RATE: 62 BPM | WEIGHT: 166 LBS | OXYGEN SATURATION: 97 %

## 2023-02-16 DIAGNOSIS — G89.29 CHRONIC PAIN OF RIGHT HIP: ICD-10-CM

## 2023-02-16 DIAGNOSIS — M25.551 CHRONIC PAIN OF RIGHT HIP: ICD-10-CM

## 2023-02-16 DIAGNOSIS — G62.9 NEUROPATHY: ICD-10-CM

## 2023-02-16 DIAGNOSIS — I48.20 CHRONIC ATRIAL FIBRILLATION: ICD-10-CM

## 2023-02-16 LAB
INR PPP: 2.3 (ref 0.9–1.1)
PROTHROMBIN TIME: 27.5 SECONDS

## 2023-02-16 PROCEDURE — 85610 PROTHROMBIN TIME: CPT | Performed by: FAMILY MEDICINE

## 2023-02-16 PROCEDURE — 99213 OFFICE O/P EST LOW 20 MIN: CPT | Performed by: FAMILY MEDICINE

## 2023-02-16 PROCEDURE — 36416 COLLJ CAPILLARY BLOOD SPEC: CPT | Performed by: FAMILY MEDICINE

## 2023-02-16 RX ORDER — OXYCODONE AND ACETAMINOPHEN 10; 325 MG/1; MG/1
1 TABLET ORAL EVERY 6 HOURS PRN
Qty: 100 TABLET | Refills: 0 | Status: SHIPPED | OUTPATIENT
Start: 2023-02-16 | End: 2023-03-16 | Stop reason: SDUPTHER

## 2023-02-16 RX ORDER — GABAPENTIN 100 MG/1
100 CAPSULE ORAL 3 TIMES DAILY
Qty: 90 CAPSULE | Refills: 0 | Status: SHIPPED | OUTPATIENT
Start: 2023-02-16 | End: 2023-03-16 | Stop reason: SDUPTHER

## 2023-02-16 RX ORDER — WARFARIN SODIUM 2 MG/1
TABLET ORAL
Qty: 360 TABLET | Refills: 5 | Status: SHIPPED | OUTPATIENT
Start: 2023-02-16

## 2023-02-16 NOTE — PROGRESS NOTES
"Subjective cc: chronic hip and back pain   Cabrera Avina is a 82 y.o. male who presents with complaint of chronic back and hip pain.    He is stable with his chronic pain - he needs refills on medication - no new concerns   HTN is mildly elevated today   Following with oncology   Weight is stable   inr checked today - reviewed dose of coumadin   Anxiety/insomnia controlled on current medication      History of Present Illness     The following portions of the patient's history were reviewed and updated as appropriate: allergies, current medications, past family history, past medical history, past social history, past surgical history and problem list.        Review of Systems    Objective   Blood pressure 158/99, pulse 62, temperature 98.2 °F (36.8 °C), temperature source Infrared, resp. rate 18, height 180.3 cm (71\"), weight 75.3 kg (166 lb), SpO2 97 %.  Physical Exam  Vitals and nursing note reviewed.   Constitutional:       General: He is not in acute distress.     Appearance: He is well-developed. He is not diaphoretic.   HENT:      Head: Normocephalic and atraumatic.      Right Ear: External ear normal.      Left Ear: External ear normal.      Nose: Nose normal.   Eyes:      General:         Right eye: No discharge.         Left eye: No discharge.      Conjunctiva/sclera: Conjunctivae normal.   Neck:      Thyroid: No thyromegaly.      Trachea: No tracheal deviation.   Cardiovascular:      Rate and Rhythm: Normal rate. Rhythm irregular.      Pulses: Normal pulses.      Heart sounds: Normal heart sounds.   Pulmonary:      Effort: Pulmonary effort is normal. No respiratory distress.      Breath sounds: Normal breath sounds. No stridor. No wheezing.   Chest:      Chest wall: No tenderness.   Abdominal:      General: There is no distension.      Palpations: Abdomen is soft.      Tenderness: There is no abdominal tenderness.   Musculoskeletal:         General: Tenderness present.      Cervical back: Normal " range of motion.   Lymphadenopathy:      Cervical: No cervical adenopathy.   Skin:     General: Skin is warm and dry.      Findings: Bruising present.   Neurological:      Mental Status: He is alert and oriented to person, place, and time.      Motor: Weakness present. No abnormal muscle tone.      Coordination: Coordination normal.      Gait: Gait abnormal.   Psychiatric:         Behavior: Behavior normal.         Thought Content: Thought content normal.         Judgment: Judgment normal.         Assessment & Plan   Problems Addressed this Visit        Musculoskeletal and Injuries    Chronic hip pain    Relevant Medications    oxyCODONE-acetaminophen (PERCOCET)  MG per tablet       Neuro    Neuropathy    Relevant Medications    gabapentin (NEURONTIN) 100 MG capsule   Other Visit Diagnoses     Chronic atrial fibrillation (HCC)        Relevant Medications    warfarin (COUMADIN) 2 MG tablet    Other Relevant Orders    POC Protime / INR (Completed)      Diagnoses       Codes Comments    Chronic atrial fibrillation (HCC)     ICD-10-CM: I48.20  ICD-9-CM: 427.31     Chronic pain of right hip     ICD-10-CM: M25.551, G89.29  ICD-9-CM: 719.45, 338.29     Neuropathy     ICD-10-CM: G62.9  ICD-9-CM: 355.9         PLAN:     #1 chronic hip pain: chronic, stable, continue on current medication, robbie reviewed, controlled contract in chart, UDS UTD     #2 chronic a fib: chronic, stable, continue with current medication, INR reviewed, advised on dose adjustments     #3 neuropathy: chronic, continue with current medication, robbie reviewed, controlled contract in the chart, UDS UTD           This document has been electronically signed by Sunita Crawford MD on February 27, 2023 17:27 CST

## 2023-03-16 ENCOUNTER — TELEPHONE (OUTPATIENT)
Dept: FAMILY MEDICINE CLINIC | Facility: CLINIC | Age: 83
End: 2023-03-16

## 2023-03-16 ENCOUNTER — OFFICE VISIT (OUTPATIENT)
Dept: FAMILY MEDICINE CLINIC | Facility: CLINIC | Age: 83
End: 2023-03-16
Payer: MEDICARE

## 2023-03-16 VITALS
WEIGHT: 170.2 LBS | HEART RATE: 64 BPM | OXYGEN SATURATION: 99 % | BODY MASS INDEX: 23.83 KG/M2 | HEIGHT: 71 IN | SYSTOLIC BLOOD PRESSURE: 122 MMHG | RESPIRATION RATE: 16 BRPM | DIASTOLIC BLOOD PRESSURE: 58 MMHG

## 2023-03-16 DIAGNOSIS — M25.551 CHRONIC PAIN OF RIGHT HIP: ICD-10-CM

## 2023-03-16 DIAGNOSIS — G62.9 NEUROPATHY: ICD-10-CM

## 2023-03-16 DIAGNOSIS — K59.04 CHRONIC IDIOPATHIC CONSTIPATION: ICD-10-CM

## 2023-03-16 DIAGNOSIS — K59.00 CONSTIPATION, UNSPECIFIED CONSTIPATION TYPE: Primary | ICD-10-CM

## 2023-03-16 DIAGNOSIS — G89.29 CHRONIC PAIN OF RIGHT HIP: ICD-10-CM

## 2023-03-16 DIAGNOSIS — I48.21 PERMANENT ATRIAL FIBRILLATION: Primary | ICD-10-CM

## 2023-03-16 LAB
INR PPP: 2.7 (ref 0.9–1.1)
PROTHROMBIN TIME: 32.9 SECONDS

## 2023-03-16 PROCEDURE — 36416 COLLJ CAPILLARY BLOOD SPEC: CPT | Performed by: FAMILY MEDICINE

## 2023-03-16 PROCEDURE — 85610 PROTHROMBIN TIME: CPT | Performed by: FAMILY MEDICINE

## 2023-03-16 PROCEDURE — 99214 OFFICE O/P EST MOD 30 MIN: CPT | Performed by: FAMILY MEDICINE

## 2023-03-16 RX ORDER — GABAPENTIN 100 MG/1
100 CAPSULE ORAL 3 TIMES DAILY
Qty: 90 CAPSULE | Refills: 0 | Status: SHIPPED | OUTPATIENT
Start: 2023-03-16

## 2023-03-16 RX ORDER — OXYCODONE AND ACETAMINOPHEN 10; 325 MG/1; MG/1
1 TABLET ORAL EVERY 6 HOURS PRN
Qty: 100 TABLET | Refills: 0 | Status: SHIPPED | OUTPATIENT
Start: 2023-03-16

## 2023-03-16 RX ORDER — LUBIPROSTONE 24 UG/1
24 CAPSULE ORAL 2 TIMES DAILY WITH MEALS
Qty: 60 CAPSULE | Refills: 2 | Status: SHIPPED | OUTPATIENT
Start: 2023-03-16

## 2023-03-31 NOTE — PROGRESS NOTES
"Subjective cc: back pain   Cabrera Avina is a 82 y.o. male who presents for follow up on chronic back and right hip pain - pain is stable- using medication to make more functional - needs refills.  Still constipated at times   Reviewed INR today - in a fib - but no symptoms   BP controlled   Insomnia stable with medication      History of Present Illness     The following portions of the patient's history were reviewed and updated as appropriate: allergies, current medications, past family history, past medical history, past social history, past surgical history and problem list.        Review of Systems    Objective   Blood pressure 122/58, pulse 64, resp. rate 16, height 180.3 cm (71\"), weight 77.2 kg (170 lb 3.2 oz), SpO2 99 %.  Physical Exam  Vitals and nursing note reviewed.   Constitutional:       General: He is not in acute distress.     Appearance: He is well-developed. He is not diaphoretic.   HENT:      Head: Normocephalic and atraumatic.      Right Ear: External ear normal.      Left Ear: External ear normal.      Nose: Nose normal.   Eyes:      General:         Right eye: No discharge.         Left eye: No discharge.      Conjunctiva/sclera: Conjunctivae normal.   Neck:      Thyroid: No thyromegaly.      Trachea: No tracheal deviation.   Cardiovascular:      Rate and Rhythm: Normal rate. Rhythm irregular.      Heart sounds: Normal heart sounds.   Pulmonary:      Effort: Pulmonary effort is normal. No respiratory distress.      Breath sounds: Normal breath sounds. No stridor. No wheezing.   Chest:      Chest wall: No tenderness.   Musculoskeletal:         General: Tenderness present.      Cervical back: Normal range of motion.      Right lower leg: No edema.      Left lower leg: No edema.   Lymphadenopathy:      Cervical: No cervical adenopathy.   Skin:     General: Skin is warm and dry.      Findings: Bruising present.   Neurological:      Mental Status: He is alert and oriented to person, place, " and time.      Motor: Weakness present. No abnormal muscle tone.      Gait: Gait abnormal.   Psychiatric:         Behavior: Behavior normal.         Thought Content: Thought content normal.         Judgment: Judgment normal.         Assessment & Plan   Problems Addressed this Visit        Cardiac and Vasculature    Permanent atrial fibrillation (HCC) - Primary    Relevant Orders    POC Protime / INR (Completed)       Gastrointestinal Abdominal     Constipation    Relevant Medications    lubiprostone (Amitiza) 24 MCG capsule       Musculoskeletal and Injuries    Chronic hip pain    Relevant Medications    oxyCODONE-acetaminophen (PERCOCET)  MG per tablet       Neuro    Neuropathy    Relevant Medications    gabapentin (NEURONTIN) 100 MG capsule   Diagnoses       Codes Comments    Permanent atrial fibrillation (HCC)    -  Primary ICD-10-CM: I48.21  ICD-9-CM: 427.31     Chronic pain of right hip     ICD-10-CM: M25.551, G89.29  ICD-9-CM: 719.45, 338.29     Neuropathy     ICD-10-CM: G62.9  ICD-9-CM: 355.9     Chronic idiopathic constipation     ICD-10-CM: K59.04  ICD-9-CM: 564.00       PLAN:   #1 a fib: chronic, stable, cotnine wtith current medciations - reviewed INR - discussed coumadin dosing     #2 constipation: chronic, uncontrolled, discussed alternative medicaitons     #3 chronic hip pain: chronic, stable, continue with meidcaiton, refill given, UDS UTD, CC in chart     #4 neuropathy: chronic, stable, continue with current medicaiton           This document has been electronically signed by Sunita Crawford MD on March 31, 2023 06:21 CDT

## 2023-04-14 ENCOUNTER — OFFICE VISIT (OUTPATIENT)
Dept: FAMILY MEDICINE CLINIC | Facility: CLINIC | Age: 83
End: 2023-04-14
Payer: MEDICARE

## 2023-04-14 VITALS
SYSTOLIC BLOOD PRESSURE: 126 MMHG | HEIGHT: 71 IN | BODY MASS INDEX: 23.38 KG/M2 | HEART RATE: 59 BPM | DIASTOLIC BLOOD PRESSURE: 70 MMHG | OXYGEN SATURATION: 98 % | TEMPERATURE: 99.3 F | WEIGHT: 167 LBS | RESPIRATION RATE: 20 BRPM

## 2023-04-14 DIAGNOSIS — C85.90 LYMPHOMA IN REMISSION: ICD-10-CM

## 2023-04-14 DIAGNOSIS — G47.09 OTHER INSOMNIA: ICD-10-CM

## 2023-04-14 DIAGNOSIS — G89.29 CHRONIC PAIN OF RIGHT HIP: ICD-10-CM

## 2023-04-14 DIAGNOSIS — Z79.01 CHRONIC ANTICOAGULATION: ICD-10-CM

## 2023-04-14 DIAGNOSIS — G62.9 NEUROPATHY: ICD-10-CM

## 2023-04-14 DIAGNOSIS — I48.21 PERMANENT ATRIAL FIBRILLATION: Primary | ICD-10-CM

## 2023-04-14 DIAGNOSIS — M25.551 CHRONIC PAIN OF RIGHT HIP: ICD-10-CM

## 2023-04-14 LAB
INR PPP: 2.8 (ref 0.9–1.1)
PROTHROMBIN TIME: 33.7 SECONDS

## 2023-04-14 RX ORDER — GABAPENTIN 300 MG/1
300 CAPSULE ORAL 3 TIMES DAILY
Qty: 90 CAPSULE | Refills: 0 | Status: SHIPPED | OUTPATIENT
Start: 2023-04-14

## 2023-04-14 RX ORDER — OXYCODONE AND ACETAMINOPHEN 10; 325 MG/1; MG/1
1 TABLET ORAL EVERY 6 HOURS PRN
Qty: 100 TABLET | Refills: 0 | Status: SHIPPED | OUTPATIENT
Start: 2023-04-14

## 2023-04-14 RX ORDER — TEMAZEPAM 15 MG/1
15 CAPSULE ORAL NIGHTLY PRN
Qty: 30 CAPSULE | Refills: 2 | Status: SHIPPED | OUTPATIENT
Start: 2023-04-14

## 2023-04-14 NOTE — PROGRESS NOTES
"Subjective  cc: chronic hip pain   Cabrera Avina is a 82 y.o. male.     History of Present Illness     The patient is an 82-year-old male with complaints of chronic pain in his lower back and right hip. This has been chronic for him. He continues to have pain. He reports he feels lousy today. We discussed doing updated imaging as he has not had this done in a while. The patient would like to do this at his next visit. We will then plan to get an x-ray of his bilateral hips as well as his lumbar spine at his follow-up visit.    The patient also has chronic atrial fibrillation for which he is rate and rhythm controlled on current medication as well as anticoagulated with Coumadin. His INR is checked in office today and it is 2.8, which is within normal range. The patient will continue on current dose of Coumadin.    The patient has a history of lymphoma, which is currently in remission and he does follow with oncology.    He does have insomnia for which he has been taking temazepam.    He reports that his neuropathy seems to be getting worse and it is in his bilateral lower extremities. He is currently taking gabapentin 100 mg t.i.d. and would be agreeable to taking an increased dose of medication to see if it was more effective.    He does have a prescription for chronic constipation, which he does suffer from. However, he reports he does not like to take that medication and therefore will take over the counter laxatives and have a bowel movement about once every 3 days.      The following portions of the patient's history were reviewed and updated as appropriate: allergies, current medications, past family history, past medical history, past social history, past surgical history and problem list.        Review of Systems    Objective   Blood pressure 126/70, pulse 59, temperature 99.3 °F (37.4 °C), temperature source Infrared, resp. rate 20, height 180.3 cm (71\"), weight 75.8 kg (167 lb), SpO2 98 %.  Physical " Exam  Vitals and nursing note reviewed.   Constitutional:       General: He is not in acute distress.     Appearance: Normal appearance. He is well-developed. He is ill-appearing.   HENT:      Head: Normocephalic and atraumatic.      Right Ear: External ear normal.      Left Ear: External ear normal.   Cardiovascular:      Rate and Rhythm: Normal rate. Rhythm irregular.      Pulses: Normal pulses.   Pulmonary:      Effort: Pulmonary effort is normal.      Breath sounds: Normal air entry.   Musculoskeletal:         General: Tenderness present.   Skin:     General: Skin is warm and dry.      Findings: Bruising present.   Neurological:      Mental Status: He is alert and oriented to person, place, and time.      Motor: Weakness present.      Gait: Gait abnormal.   Psychiatric:         Mood and Affect: Mood normal.         Behavior: Behavior normal.         Thought Content: Thought content normal.         Judgment: Judgment normal.         Assessment & Plan   Problems Addressed this Visit        Cardiac and Vasculature    Permanent atrial fibrillation - Primary    Relevant Orders    POC Protime / INR (Completed)       Coag and Thromboembolic    Chronic anticoagulation       Hematology and Neoplasia    Lymphoma in remission       Musculoskeletal and Injuries    Chronic hip pain    Relevant Medications    oxyCODONE-acetaminophen (PERCOCET)  MG per tablet       Neuro    Neuropathy    Relevant Medications    gabapentin (NEURONTIN) 300 MG capsule       Sleep    Other insomnia    Relevant Medications    temazepam (RESTORIL) 15 MG capsule   Diagnoses       Codes Comments    Permanent atrial fibrillation    -  Primary ICD-10-CM: I48.21  ICD-9-CM: 427.31     Other insomnia     ICD-10-CM: G47.09  ICD-9-CM: 780.52     Chronic pain of right hip     ICD-10-CM: M25.551, G89.29  ICD-9-CM: 719.45, 338.29     Neuropathy     ICD-10-CM: G62.9  ICD-9-CM: 355.9     Chronic anticoagulation     ICD-10-CM: Z79.01  ICD-9-CM: V58.61      Lymphoma in remission     ICD-10-CM: C85.90  ICD-9-CM: 202.80         1. Atrial fibrillation with chronic anticoagulation: Chronic, stable.  - Continue with current medication and current dose of Coumadin.  - Will continue to monitor.  - Contact the office with any bleeding concerns.    2. 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  - Continue follow-up with oncology as scheduled.    3. Chronic hip pain: Chronic, uncontrolled.  - Will refill medication.  - Controlled contract on the chart.  - SIMBA were reviewed and appropriate.  - Will plan to get x-ray of the hip and lower back for further evaluation.    4. Neuropathy: Chronic, worsening.  - Will increase the dose of gabapentin.  - Advised on risks and benefits of medication.  - Return to office in 1 month for reevaluation.    5. Insomnia: Chronic, stable.  - Continue with current medication.  - Refill given on medication.  - Controlled contract in the chart.  - UDS up to date.    Transcribed from ambient dictation for Sunita Crawford MD by Ellen White.  04/14/23   12:07 CDT    Patient or patient representative verbalized consent to the visit recording.  I have personally performed the services described in this document as transcribed by the above individual, and it is both accurate and complete.            This document has been electronically signed by Sunita Crawford MD on April 30, 2023 21:54 CDT

## 2023-04-24 DIAGNOSIS — G47.09 OTHER INSOMNIA: ICD-10-CM

## 2023-04-25 RX ORDER — TEMAZEPAM 15 MG/1
CAPSULE ORAL
Qty: 30 CAPSULE | Refills: 2 | OUTPATIENT
Start: 2023-04-25

## 2023-04-25 NOTE — TELEPHONE ENCOUNTER
Refill sent 4/14/2023    Rx Refill Note  Requested Prescriptions     Pending Prescriptions Disp Refills   • temazepam (RESTORIL) 15 MG capsule [Pharmacy Med Name: TEMAZEPAM 15 MG CAP 15 Capsule] 30 capsule 2     Sig: TAKE ONE CAPSULE BY MOUTH AT NIGHT AS NEEDED FOR ANXIETY OR SLEEP      Last office visit with prescribing clinician: 4/14/2023   Next office visit with prescribing clinician: 5/15/2023         Catherine Young MA  04/25/23, 10:13 CDT

## 2023-05-15 ENCOUNTER — OFFICE VISIT (OUTPATIENT)
Dept: FAMILY MEDICINE CLINIC | Facility: CLINIC | Age: 83
End: 2023-05-15

## 2023-05-15 VITALS
TEMPERATURE: 98.7 F | OXYGEN SATURATION: 98 % | DIASTOLIC BLOOD PRESSURE: 72 MMHG | SYSTOLIC BLOOD PRESSURE: 130 MMHG | HEART RATE: 72 BPM | WEIGHT: 167.8 LBS | BODY MASS INDEX: 23.49 KG/M2 | RESPIRATION RATE: 20 BRPM | HEIGHT: 71 IN

## 2023-05-15 DIAGNOSIS — G62.9 NEUROPATHY: ICD-10-CM

## 2023-05-15 DIAGNOSIS — M25.551 CHRONIC PAIN OF RIGHT HIP: ICD-10-CM

## 2023-05-15 DIAGNOSIS — Z79.899 MEDICATION MANAGEMENT: ICD-10-CM

## 2023-05-15 DIAGNOSIS — I48.21 PERMANENT ATRIAL FIBRILLATION: Primary | ICD-10-CM

## 2023-05-15 DIAGNOSIS — G89.29 CHRONIC PAIN OF RIGHT HIP: ICD-10-CM

## 2023-05-15 LAB
INR PPP: 3.1 (ref 0.9–1.1)
PROTHROMBIN TIME: 37.7 SECONDS

## 2023-05-15 PROCEDURE — 36416 COLLJ CAPILLARY BLOOD SPEC: CPT | Performed by: FAMILY MEDICINE

## 2023-05-15 PROCEDURE — 99214 OFFICE O/P EST MOD 30 MIN: CPT | Performed by: FAMILY MEDICINE

## 2023-05-15 PROCEDURE — 1159F MED LIST DOCD IN RCRD: CPT | Performed by: FAMILY MEDICINE

## 2023-05-15 PROCEDURE — 85610 PROTHROMBIN TIME: CPT | Performed by: FAMILY MEDICINE

## 2023-05-15 PROCEDURE — 1160F RVW MEDS BY RX/DR IN RCRD: CPT | Performed by: FAMILY MEDICINE

## 2023-05-15 RX ORDER — OXYCODONE AND ACETAMINOPHEN 10; 325 MG/1; MG/1
1 TABLET ORAL EVERY 6 HOURS PRN
Qty: 100 TABLET | Refills: 0 | Status: SHIPPED | OUTPATIENT
Start: 2023-05-15

## 2023-05-15 RX ORDER — GABAPENTIN 300 MG/1
300 CAPSULE ORAL 3 TIMES DAILY
Qty: 90 CAPSULE | Refills: 0 | Status: SHIPPED | OUTPATIENT
Start: 2023-05-15

## 2023-05-23 LAB — DRUGS UR: NORMAL

## 2023-05-30 ENCOUNTER — TELEPHONE (OUTPATIENT)
Dept: FAMILY MEDICINE CLINIC | Facility: CLINIC | Age: 83
End: 2023-05-30

## 2023-05-30 ENCOUNTER — CLINICAL SUPPORT (OUTPATIENT)
Dept: FAMILY MEDICINE CLINIC | Facility: CLINIC | Age: 83
End: 2023-05-30

## 2023-05-30 DIAGNOSIS — I48.21 PERMANENT ATRIAL FIBRILLATION: Primary | ICD-10-CM

## 2023-05-30 LAB
INR PPP: 2.4 (ref 0.9–1.1)
PROTHROMBIN TIME: 29.2 SECONDS

## 2023-05-30 NOTE — TELEPHONE ENCOUNTER
Cabrera would like for Ingrid to give him a call sometime today please. He wants to know if he is due to come in for a test. Thank you!

## 2023-05-30 NOTE — PROGRESS NOTES
"Subjective cc: chronic pain   Cabrera Avina is a 83 y.o. male who presents for follow up on chronic pain.    He continues to have chronic hip pain  - medication is working well   His constipation is stable   He is still anticoagulated - INR checked today   Insomnia stable on current medication     Atrial Fibrillation  Past medical history includes atrial fibrillation.      The following portions of the patient's history were reviewed and updated as appropriate: allergies, current medications, past family history, past medical history, past social history, past surgical history, and problem list.        Review of Systems    Objective   Blood pressure 130/72, pulse 72, temperature 98.7 °F (37.1 °C), temperature source Infrared, resp. rate 20, height 180.3 cm (71\"), weight 76.1 kg (167 lb 12.8 oz), SpO2 98 %.  Physical Exam  Vitals and nursing note reviewed.   Constitutional:       General: He is not in acute distress.     Appearance: He is well-developed. He is not diaphoretic.   HENT:      Head: Normocephalic and atraumatic.      Right Ear: External ear normal.      Left Ear: External ear normal.      Nose: Nose normal.   Eyes:      General:         Right eye: No discharge.         Left eye: No discharge.      Conjunctiva/sclera: Conjunctivae normal.   Neck:      Thyroid: No thyromegaly.      Trachea: No tracheal deviation.   Cardiovascular:      Rate and Rhythm: Normal rate. Rhythm irregular.      Heart sounds: Normal heart sounds.   Pulmonary:      Effort: Pulmonary effort is normal. No respiratory distress.      Breath sounds: Normal breath sounds. No stridor. No wheezing.   Chest:      Chest wall: No tenderness.   Abdominal:      General: There is no distension.      Palpations: Abdomen is soft.      Tenderness: There is no abdominal tenderness.   Musculoskeletal:         General: Tenderness present.      Cervical back: Normal range of motion.   Lymphadenopathy:      Cervical: No cervical adenopathy. "   Skin:     General: Skin is warm and dry.   Neurological:      Mental Status: He is alert and oriented to person, place, and time.      Motor: Weakness present. No abnormal muscle tone.      Coordination: Coordination normal.      Gait: Gait abnormal.   Psychiatric:         Behavior: Behavior normal.         Thought Content: Thought content normal.         Judgment: Judgment normal.       Assessment & Plan   Problems Addressed this Visit          Cardiac and Vasculature    Permanent atrial fibrillation - Primary    Relevant Orders    POC Protime / INR (Completed)       Musculoskeletal and Injuries    Chronic hip pain    Relevant Medications    oxyCODONE-acetaminophen (PERCOCET)  MG per tablet       Neuro    Neuropathy    Relevant Medications    gabapentin (NEURONTIN) 300 MG capsule     Other Visit Diagnoses       Medication management        Relevant Orders    ToxASSURE Select 13 (MW) - Urine, Clean Catch (Completed)          Diagnoses         Codes Comments    Permanent atrial fibrillation    -  Primary ICD-10-CM: I48.21  ICD-9-CM: 427.31     Chronic pain of right hip     ICD-10-CM: M25.551, G89.29  ICD-9-CM: 719.45, 338.29     Neuropathy     ICD-10-CM: G62.9  ICD-9-CM: 355.9     Medication management     ICD-10-CM: Z79.899  ICD-9-CM: V58.69           PLAN:     #1 chronic right hip pain: chronic, controlled, continue with current medication, robbie reviewed, controlled contract in chart, UDS UTD     #2 neuropathy: chronic, stable, refill given on medication, continue with current medication, robbie reviewed, controlled contract in chart, UDS UTD     #3 a fib: chronic, stable, will continue with anti-coag - reviewed INR           This document has been electronically signed by Sunita Crawford MD on May 29, 2023 20:18 CDT

## 2023-06-14 DIAGNOSIS — I48.20 CHRONIC ATRIAL FIBRILLATION: ICD-10-CM

## 2023-06-14 RX ORDER — WARFARIN SODIUM 2 MG/1
TABLET ORAL
Qty: 360 TABLET | Refills: 5 | Status: SHIPPED | OUTPATIENT
Start: 2023-06-14

## 2023-06-14 NOTE — TELEPHONE ENCOUNTER
Rx Refill Note  Requested Prescriptions     Pending Prescriptions Disp Refills    warfarin (COUMADIN) 2 MG tablet [Pharmacy Med Name: WARFARIN SODIUM 2 MG TABS 2 Tablet] 360 tablet 5     Sig: TAKE 3 AND ONE HALF TABLET BY MOUTH DAILY      Last office visit with prescribing clinician: 5/15/2023   Last telemedicine visit with prescribing clinician: Visit date not found   Next office visit with prescribing clinician: 6/16/2023                         Would you like a call back once the refill request has been completed: [] Yes [] No    If the office needs to give you a call back, can they leave a voicemail: [] Yes [] No    Ingrid Kaba LPN  06/14/23, 08:48 CDT

## 2023-07-13 ENCOUNTER — OFFICE VISIT (OUTPATIENT)
Dept: FAMILY MEDICINE CLINIC | Facility: CLINIC | Age: 83
End: 2023-07-13
Payer: MEDICARE

## 2023-07-13 VITALS
HEIGHT: 71 IN | BODY MASS INDEX: 22.96 KG/M2 | SYSTOLIC BLOOD PRESSURE: 122 MMHG | OXYGEN SATURATION: 98 % | WEIGHT: 164 LBS | TEMPERATURE: 98.4 F | RESPIRATION RATE: 18 BRPM | HEART RATE: 63 BPM | DIASTOLIC BLOOD PRESSURE: 76 MMHG

## 2023-07-13 DIAGNOSIS — F41.9 ANXIETY: ICD-10-CM

## 2023-07-13 DIAGNOSIS — I48.20 CHRONIC ATRIAL FIBRILLATION: Primary | ICD-10-CM

## 2023-07-13 DIAGNOSIS — G62.9 NEUROPATHY: ICD-10-CM

## 2023-07-13 DIAGNOSIS — M25.551 CHRONIC PAIN OF RIGHT HIP: ICD-10-CM

## 2023-07-13 DIAGNOSIS — I48.21 PERMANENT ATRIAL FIBRILLATION: ICD-10-CM

## 2023-07-13 DIAGNOSIS — G89.29 CHRONIC PAIN OF RIGHT HIP: ICD-10-CM

## 2023-07-13 DIAGNOSIS — G47.09 OTHER INSOMNIA: ICD-10-CM

## 2023-07-13 DIAGNOSIS — Z79.01 CHRONIC ANTICOAGULATION: ICD-10-CM

## 2023-07-13 LAB
INR PPP: 3.1 (ref 0.9–1.1)
PROTHROMBIN TIME: 37.1 SECONDS

## 2023-07-13 RX ORDER — GABAPENTIN 100 MG/1
100 CAPSULE ORAL 3 TIMES DAILY
Qty: 90 CAPSULE | Refills: 2 | Status: SHIPPED | OUTPATIENT
Start: 2023-07-13

## 2023-07-13 RX ORDER — TEMAZEPAM 15 MG/1
15 CAPSULE ORAL NIGHTLY PRN
Qty: 30 CAPSULE | Refills: 2 | Status: SHIPPED | OUTPATIENT
Start: 2023-07-13

## 2023-07-13 RX ORDER — OXYCODONE AND ACETAMINOPHEN 10; 325 MG/1; MG/1
1 TABLET ORAL EVERY 6 HOURS PRN
Qty: 100 TABLET | Refills: 0 | Status: SHIPPED | OUTPATIENT
Start: 2023-07-13

## 2023-07-13 NOTE — PROGRESS NOTES
"Subjective   Cabrera Avina is a 83 y.o. male.     History of Present Illness  Cabrera Avina presents today for follow-up on his chronic pain.    The patient continues to have chronic hip pain. This is ongoing from his previous cancer treatment. He is currently treated with Percocet, which he takes to help control the pain with use of medication. He is able to be up and participate in daily activities. Without the medication, he is in significant pain. He denies any side effects to the current Percocet prescription and is due for refill.    The patient also has insomnia, which is currently controlled with temazepam.    He has peripheral neuropathy for which we have been using gabapentin 300 mg; however, he feels 300 mg is too strong and he would like to decrease to 100 mg. He uses this medication mainly at nighttime.    The patient does have chronic atrial fibrillation and is chronically anticoagulated with Coumadin. He does follow with our office for his PT/INR. His INR today is slightly above goal at 3.1. He has been taking 7 mg of Coumadin daily. Discussed with patient to hold his Coumadin over the next 2 days and then restart at a lower dose.     The following portions of the patient's history were reviewed and updated as appropriate: allergies, current medications, past family history, past medical history, past social history, past surgical history, and problem list.        Review of Systems    Objective   Blood pressure 122/76, pulse 63, temperature 98.4 °F (36.9 °C), temperature source Infrared, resp. rate 18, height 180.3 cm (71\"), weight 74.4 kg (164 lb), SpO2 98 %.  Physical Exam  Vitals and nursing note reviewed.   Constitutional:       General: He is not in acute distress.     Appearance: He is well-developed. He is ill-appearing. He is not diaphoretic.   HENT:      Head: Normocephalic and atraumatic.      Right Ear: External ear normal.      Left Ear: External ear normal.      Nose: Nose normal. "   Eyes:      General:         Right eye: No discharge.         Left eye: No discharge.      Conjunctiva/sclera: Conjunctivae normal.   Neck:      Thyroid: No thyromegaly.      Trachea: No tracheal deviation.   Cardiovascular:      Rate and Rhythm: Normal rate. Rhythm irregular.      Pulses: Normal pulses.   Pulmonary:      Effort: Pulmonary effort is normal. No respiratory distress.      Breath sounds: Normal breath sounds. No stridor. No wheezing.   Chest:      Chest wall: No tenderness.   Abdominal:      General: There is no distension.      Palpations: Abdomen is soft.      Tenderness: There is no abdominal tenderness.   Musculoskeletal:         General: Tenderness present.      Cervical back: Normal range of motion.   Lymphadenopathy:      Cervical: No cervical adenopathy.   Skin:     General: Skin is warm and dry.      Findings: Bruising present.   Neurological:      Mental Status: He is alert and oriented to person, place, and time.      Motor: Weakness present. No abnormal muscle tone.      Coordination: Coordination normal.      Gait: Gait abnormal.   Psychiatric:         Behavior: Behavior normal.         Thought Content: Thought content normal.         Judgment: Judgment normal.     Assessment & Plan   Problems Addressed this Visit          Cardiac and Vasculature    Permanent atrial fibrillation       Coag and Thromboembolic    Chronic anticoagulation       Mental Health    Anxiety       Musculoskeletal and Injuries    Chronic hip pain    Relevant Medications    oxyCODONE-acetaminophen (PERCOCET)  MG per tablet       Neuro    Neuropathy    Relevant Medications    gabapentin (NEURONTIN) 100 MG capsule       Sleep    Other insomnia    Relevant Medications    temazepam (RESTORIL) 15 MG capsule     Other Visit Diagnoses       Chronic atrial fibrillation    -  Primary    Relevant Orders    POC Protime / INR (Completed)          Diagnoses         Codes Comments    Chronic atrial fibrillation    -  Primary  ICD-10-CM: I48.20  ICD-9-CM: 427.31     Other insomnia     ICD-10-CM: G47.09  ICD-9-CM: 780.52     Chronic pain of right hip     ICD-10-CM: M25.551, G89.29  ICD-9-CM: 719.45, 338.29     Neuropathy     ICD-10-CM: G62.9  ICD-9-CM: 355.9     Chronic anticoagulation     ICD-10-CM: Z79.01  ICD-9-CM: V58.61     Permanent atrial fibrillation     ICD-10-CM: I48.21  ICD-9-CM: 427.31     Anxiety     ICD-10-CM: F41.9  ICD-9-CM: 300.00             1. Permanent atrial fibrillation with chronic anticoagulation: Chronic, stable.  - Patient advised to hold Coumadin x2 doses tonight and tomorrow night. Can then restart at 6 mg. We will alternate 6 mg and 7 mg every other day.  - Patient to present for reevaluation of INR in approximately 2 weeks.  - He understands to contact the office sooner with any concerns of bleeding.    2. Insomnia: Chronic, controlled.  - Contact in the chart.  - SIMBA reviewed and appropriate.  - Refill given on medication.    3. Chronic right hip pain: Chronic, controlled.  - Continue with current Percocet prescription.   - New prescription sent to pharmacy.  - Patient to contact the office with any concerns.    4. Neuropathy: Chronic, stable.  - Patient is interested in decreasing dose of medication secondary to side effects, but we will decrease from 300 mg to 100 mg.    5. Anxiety: Chronic, stable.  - Continue with current medication.        Transcribed from ambient dictation for Sunita Crawford MD by Janett Stroud.  07/13/23   15:34 CDT    Patient or patient representative verbalized consent to the visit recording.  I have personally performed the services described in this document as transcribed by the above individual, and it is both accurate and complete.          This document has been electronically signed by Sunita Crawford MD on July 27, 2023 13:17 CDT

## 2023-07-25 DIAGNOSIS — G47.09 OTHER INSOMNIA: ICD-10-CM

## 2023-07-25 RX ORDER — TEMAZEPAM 15 MG/1
15 CAPSULE ORAL NIGHTLY PRN
Qty: 30 CAPSULE | Refills: 2 | OUTPATIENT
Start: 2023-07-25

## 2023-07-25 NOTE — TELEPHONE ENCOUNTER
Ordered on 7/13/2023    Rx Refill Note  Requested Prescriptions     Pending Prescriptions Disp Refills    temazepam (RESTORIL) 15 MG capsule [Pharmacy Med Name: TEMAZEPAM 15 MG CAP 15 Capsule] 30 capsule 2     Sig: Take 1 capsule by mouth At Night As Needed for Sleep.      Last office visit with prescribing clinician: 7/13/2023   Next office visit with prescribing clinician: 8/11/2023           Catherine Young CMA  07/25/23, 10:10 CDT

## 2023-07-31 ENCOUNTER — OFFICE VISIT (OUTPATIENT)
Dept: FAMILY MEDICINE CLINIC | Facility: CLINIC | Age: 83
End: 2023-07-31
Payer: MEDICARE

## 2023-07-31 VITALS
RESPIRATION RATE: 18 BRPM | OXYGEN SATURATION: 98 % | DIASTOLIC BLOOD PRESSURE: 68 MMHG | HEART RATE: 49 BPM | WEIGHT: 163.4 LBS | TEMPERATURE: 98 F | BODY MASS INDEX: 22.88 KG/M2 | HEIGHT: 71 IN | SYSTOLIC BLOOD PRESSURE: 140 MMHG

## 2023-07-31 DIAGNOSIS — M54.2 CERVICAL PAIN: ICD-10-CM

## 2023-07-31 DIAGNOSIS — I48.21 PERMANENT ATRIAL FIBRILLATION: Primary | ICD-10-CM

## 2023-07-31 DIAGNOSIS — M43.6 TORTICOLLIS, ACUTE: ICD-10-CM

## 2023-07-31 LAB
INR PPP: 2.6 (ref 0.9–1.1)
PROTHROMBIN TIME: 31.6 SECONDS

## 2023-07-31 RX ORDER — TIZANIDINE 2 MG/1
2 TABLET ORAL 2 TIMES DAILY
Qty: 30 TABLET | Refills: 0 | Status: SHIPPED | OUTPATIENT
Start: 2023-07-31

## 2023-07-31 RX ORDER — DEXAMETHASONE SODIUM PHOSPHATE 10 MG/ML
10 INJECTION INTRAMUSCULAR; INTRAVENOUS ONCE
Status: COMPLETED | OUTPATIENT
Start: 2023-07-31 | End: 2023-07-31

## 2023-07-31 RX ADMIN — DEXAMETHASONE SODIUM PHOSPHATE 10 MG: 10 INJECTION INTRAMUSCULAR; INTRAVENOUS at 13:58

## 2023-08-01 ENCOUNTER — TELEPHONE (OUTPATIENT)
Dept: FAMILY MEDICINE CLINIC | Facility: CLINIC | Age: 83
End: 2023-08-01
Payer: MEDICARE

## 2023-08-08 ENCOUNTER — TELEPHONE (OUTPATIENT)
Dept: FAMILY MEDICINE CLINIC | Facility: CLINIC | Age: 83
End: 2023-08-08
Payer: MEDICARE

## 2023-08-08 NOTE — TELEPHONE ENCOUNTER
Pt called for results of xray. They are not resulted. I told the patient we would call when resulted.

## 2023-08-11 ENCOUNTER — OFFICE VISIT (OUTPATIENT)
Dept: FAMILY MEDICINE CLINIC | Facility: CLINIC | Age: 83
End: 2023-08-11
Payer: MEDICARE

## 2023-08-11 VITALS
HEART RATE: 78 BPM | HEIGHT: 71 IN | WEIGHT: 163.4 LBS | OXYGEN SATURATION: 98 % | BODY MASS INDEX: 22.88 KG/M2 | SYSTOLIC BLOOD PRESSURE: 135 MMHG | DIASTOLIC BLOOD PRESSURE: 69 MMHG

## 2023-08-11 DIAGNOSIS — M54.2 NECK PAIN: ICD-10-CM

## 2023-08-11 DIAGNOSIS — M25.551 CHRONIC PAIN OF RIGHT HIP: ICD-10-CM

## 2023-08-11 DIAGNOSIS — G47.09 OTHER INSOMNIA: ICD-10-CM

## 2023-08-11 DIAGNOSIS — K59.00 CONSTIPATION, UNSPECIFIED CONSTIPATION TYPE: ICD-10-CM

## 2023-08-11 DIAGNOSIS — G89.29 CHRONIC PAIN OF RIGHT HIP: ICD-10-CM

## 2023-08-11 DIAGNOSIS — Z79.01 CHRONIC ANTICOAGULATION: ICD-10-CM

## 2023-08-11 DIAGNOSIS — I48.21 PERMANENT ATRIAL FIBRILLATION: Primary | ICD-10-CM

## 2023-08-11 LAB
INR PPP: 2.1 (ref 0.9–1.1)
PROTHROMBIN TIME: 25 SECONDS

## 2023-08-11 RX ORDER — OXYCODONE AND ACETAMINOPHEN 10; 325 MG/1; MG/1
1 TABLET ORAL EVERY 6 HOURS PRN
Qty: 100 TABLET | Refills: 0 | Status: SHIPPED | OUTPATIENT
Start: 2023-08-11

## 2023-08-11 NOTE — PROGRESS NOTES
"Subjective cc: pain   Cabrera Avina is a 83 y.o. male.   Presents for follow upon chronic pain   No new concerns   Medication is allowing him to function   Anxiety controlled   INR reviewed in office - pt compliant with coumadin   Constipation controlled   Insomnia controlled     History of Present Illness     The following portions of the patient's history were reviewed and updated as appropriate: allergies, current medications, past family history, past medical history, past social history, past surgical history, and problem list.        Review of Systems    Objective   Blood pressure 135/69, pulse 78, height 180.3 cm (71\"), weight 74.1 kg (163 lb 6.4 oz), SpO2 98 %.  Physical Exam  Vitals and nursing note reviewed.   Constitutional:       General: He is not in acute distress.     Appearance: He is well-developed. He is not diaphoretic.   HENT:      Head: Normocephalic and atraumatic.      Right Ear: External ear normal.      Left Ear: External ear normal.      Nose: Nose normal.   Eyes:      General:         Right eye: No discharge.         Left eye: No discharge.      Conjunctiva/sclera: Conjunctivae normal.   Neck:      Thyroid: No thyromegaly.      Trachea: No tracheal deviation.   Cardiovascular:      Rate and Rhythm: Normal rate and regular rhythm. Rhythm irregular.      Heart sounds: Normal heart sounds.   Pulmonary:      Effort: Pulmonary effort is normal. No respiratory distress.      Breath sounds: Normal breath sounds. No stridor. No wheezing.   Chest:      Chest wall: No tenderness.   Abdominal:      General: There is no distension.      Palpations: Abdomen is soft.      Tenderness: There is no abdominal tenderness.   Musculoskeletal:         General: Normal range of motion.      Cervical back: Normal range of motion.   Lymphadenopathy:      Cervical: No cervical adenopathy.   Skin:     General: Skin is warm and dry.      Findings: Bruising present.   Neurological:      Mental Status: He is " alert and oriented to person, place, and time.      Motor: Weakness present. No abnormal muscle tone.      Coordination: Coordination normal.      Gait: Gait abnormal.   Psychiatric:         Behavior: Behavior normal.         Thought Content: Thought content normal.         Judgment: Judgment normal.       Assessment & Plan   Problems Addressed this Visit          Cardiac and Vasculature    Permanent atrial fibrillation - Primary    Relevant Orders    POC Protime / INR (Completed)       Coag and Thromboembolic    Chronic anticoagulation       Gastrointestinal Abdominal     Constipation       Musculoskeletal and Injuries    Chronic hip pain    Relevant Medications    oxyCODONE-acetaminophen (PERCOCET)  MG per tablet       Sleep    Other insomnia     Other Visit Diagnoses       Neck pain              Diagnoses         Codes Comments    Permanent atrial fibrillation    -  Primary ICD-10-CM: I48.21  ICD-9-CM: 427.31     Chronic anticoagulation     ICD-10-CM: Z79.01  ICD-9-CM: V58.61     Chronic pain of right hip     ICD-10-CM: M25.551, G89.29  ICD-9-CM: 719.45, 338.29     Neck pain     ICD-10-CM: M54.2  ICD-9-CM: 723.1     Other insomnia     ICD-10-CM: G47.09  ICD-9-CM: 780.52     Constipation, unspecified constipation type     ICD-10-CM: K59.00  ICD-9-CM: 564.00           PLAN:     #1 chronic hip pain: chronic, stable, refill on medication, controlled contract in chart, UDS UTD, refill given     #2 a fib on chronic anticoag: chronic, stable, continue on current meds, reviewed INR     #3 anxiety/insomnia; chronic, controlled     #4 constipation: chronic, controlled, continue on current medications           This document has been electronically signed by Sunita Crawford MD on August 26, 2023 22:42 CDT

## 2023-08-24 ENCOUNTER — TELEPHONE (OUTPATIENT)
Dept: UROLOGY | Facility: CLINIC | Age: 83
End: 2023-08-24

## 2023-08-24 NOTE — TELEPHONE ENCOUNTER
Caller: YANETH BARONE    Relationship: SELF    Best call back number: [phone]     What form or medical record are you requesting: REMINDER LETTER        How would you like to receive the form or medical records (pick-up, mail, fax): MAIL    If mail, what is the address: [address] [place] [id number]       Timeframe paperwork needed: ASAP    Additional notes: PT WOULD LIKE TO BE SNET ANOTHER REMINDER LETTER, STATING HIS APPOINTMENT DATE/TIME AND TEST REQUIRED PRIOR TO APPT.

## 2023-08-25 ENCOUNTER — CLINICAL SUPPORT (OUTPATIENT)
Dept: FAMILY MEDICINE CLINIC | Facility: CLINIC | Age: 83
End: 2023-08-25
Payer: MEDICARE

## 2023-08-25 DIAGNOSIS — I48.21 PERMANENT ATRIAL FIBRILLATION: Primary | ICD-10-CM

## 2023-08-25 LAB
INR PPP: 1.9 (ref 0.9–1.1)
PROTHROMBIN TIME: 23.2 SECONDS

## 2023-08-29 DIAGNOSIS — N20.0 NEPHROLITHIASIS: Primary | ICD-10-CM

## 2023-09-11 ENCOUNTER — OFFICE VISIT (OUTPATIENT)
Dept: FAMILY MEDICINE CLINIC | Facility: CLINIC | Age: 83
End: 2023-09-11
Payer: MEDICARE

## 2023-09-11 VITALS
OXYGEN SATURATION: 98 % | BODY MASS INDEX: 23.45 KG/M2 | TEMPERATURE: 97.4 F | DIASTOLIC BLOOD PRESSURE: 65 MMHG | HEART RATE: 63 BPM | SYSTOLIC BLOOD PRESSURE: 153 MMHG | HEIGHT: 71 IN | WEIGHT: 167.5 LBS

## 2023-09-11 DIAGNOSIS — I48.21 PERMANENT ATRIAL FIBRILLATION: Primary | ICD-10-CM

## 2023-09-11 DIAGNOSIS — M54.2 CERVICAL PAIN: ICD-10-CM

## 2023-09-11 DIAGNOSIS — M43.6 TORTICOLLIS, ACUTE: ICD-10-CM

## 2023-09-11 DIAGNOSIS — G89.29 CHRONIC PAIN OF RIGHT HIP: ICD-10-CM

## 2023-09-11 DIAGNOSIS — M25.551 CHRONIC PAIN OF RIGHT HIP: ICD-10-CM

## 2023-09-11 LAB
INR PPP: 2.7 (ref 0.9–1.1)
PROTHROMBIN TIME: 32.5 SECONDS

## 2023-09-11 PROCEDURE — 85610 PROTHROMBIN TIME: CPT | Performed by: FAMILY MEDICINE

## 2023-09-11 PROCEDURE — 99214 OFFICE O/P EST MOD 30 MIN: CPT | Performed by: FAMILY MEDICINE

## 2023-09-11 PROCEDURE — 36416 COLLJ CAPILLARY BLOOD SPEC: CPT | Performed by: FAMILY MEDICINE

## 2023-09-11 RX ORDER — OXYCODONE AND ACETAMINOPHEN 10; 325 MG/1; MG/1
1 TABLET ORAL EVERY 6 HOURS PRN
Qty: 100 TABLET | Refills: 0 | Status: SHIPPED | OUTPATIENT
Start: 2023-09-11

## 2023-09-11 RX ORDER — TIZANIDINE 2 MG/1
2 TABLET ORAL 2 TIMES DAILY
Qty: 30 TABLET | Refills: 0 | Status: SHIPPED | OUTPATIENT
Start: 2023-09-11 | End: 2023-09-20 | Stop reason: SDUPTHER

## 2023-09-11 NOTE — PROGRESS NOTES
"Subjective cc: pain   Cabrera Avina is a 83 y.o. male.   Follow up on pain mdication - needs refills - meds helping to function   Checked INR in office today - no concerning bleeding   Aniety and insomnia stable with meds   No cardiac concerns today   No new concerns     History of Present Illness     The following portions of the patient's history were reviewed and updated as appropriate: allergies, current medications, past family history, past medical history, past social history, past surgical history, and problem list.        Review of Systems    Objective   Blood pressure 153/65, pulse 63, temperature 97.4 °F (36.3 °C), temperature source Skin, height 180.3 cm (71\"), weight 76 kg (167 lb 8 oz), SpO2 98 %.  Physical Exam  Vitals and nursing note reviewed.   Constitutional:       General: He is not in acute distress.     Appearance: He is well-developed. He is ill-appearing. He is not diaphoretic.   HENT:      Head: Normocephalic and atraumatic.      Right Ear: External ear normal.      Left Ear: External ear normal.      Nose: Nose normal.   Eyes:      General:         Right eye: No discharge.         Left eye: No discharge.      Conjunctiva/sclera: Conjunctivae normal.   Neck:      Thyroid: No thyromegaly.      Trachea: No tracheal deviation.   Cardiovascular:      Rate and Rhythm: Normal rate. Rhythm irregular.      Pulses: Normal pulses.   Pulmonary:      Effort: Pulmonary effort is normal. No respiratory distress.      Breath sounds: Normal breath sounds. No stridor. No wheezing.   Chest:      Chest wall: No tenderness.   Abdominal:      General: There is no distension.      Palpations: Abdomen is soft.      Tenderness: There is no abdominal tenderness.   Musculoskeletal:         General: Tenderness present.      Cervical back: Normal range of motion.   Lymphadenopathy:      Cervical: No cervical adenopathy.   Skin:     General: Skin is warm and dry.      Findings: Bruising present.   Neurological: "      Mental Status: He is alert and oriented to person, place, and time.      Motor: Weakness present. No abnormal muscle tone.      Coordination: Coordination normal.      Gait: Gait abnormal.   Psychiatric:         Behavior: Behavior normal.         Thought Content: Thought content normal.         Judgment: Judgment normal.       BMI is within normal parameters. No other follow-up for BMI required.       Assessment & Plan   Problems Addressed this Visit          Cardiac and Vasculature    Permanent atrial fibrillation - Primary    Relevant Orders    POC Protime / INR (Completed)       Musculoskeletal and Injuries    Chronic hip pain    Relevant Medications    oxyCODONE-acetaminophen (PERCOCET)  MG per tablet     Other Visit Diagnoses       Cervical pain        Relevant Medications    tiZANidine (ZANAFLEX) 2 MG tablet    Torticollis, acute        Relevant Medications    tiZANidine (ZANAFLEX) 2 MG tablet          Diagnoses         Codes Comments    Permanent atrial fibrillation    -  Primary ICD-10-CM: I48.21  ICD-9-CM: 427.31     Cervical pain     ICD-10-CM: M54.2  ICD-9-CM: 723.1     Torticollis, acute     ICD-10-CM: M43.6  ICD-9-CM: 723.5     Chronic pain of right hip     ICD-10-CM: M25.551, G89.29  ICD-9-CM: 719.45, 338.29                        This document has been electronically signed by Sunita Crawford MD on September 30, 2023 19:12 CDT

## 2023-09-20 ENCOUNTER — OFFICE VISIT (OUTPATIENT)
Dept: FAMILY MEDICINE CLINIC | Facility: CLINIC | Age: 83
End: 2023-09-20
Payer: MEDICARE

## 2023-09-20 VITALS
DIASTOLIC BLOOD PRESSURE: 74 MMHG | WEIGHT: 167 LBS | HEIGHT: 71 IN | RESPIRATION RATE: 18 BRPM | TEMPERATURE: 97.8 F | BODY MASS INDEX: 23.38 KG/M2 | OXYGEN SATURATION: 100 % | HEART RATE: 54 BPM | SYSTOLIC BLOOD PRESSURE: 140 MMHG

## 2023-09-20 DIAGNOSIS — I48.21 PERMANENT ATRIAL FIBRILLATION: Primary | ICD-10-CM

## 2023-09-20 DIAGNOSIS — M43.6 TORTICOLLIS, ACUTE: ICD-10-CM

## 2023-09-20 DIAGNOSIS — M40.50 LORDOSIS: ICD-10-CM

## 2023-09-20 DIAGNOSIS — M54.2 CERVICAL PAIN: ICD-10-CM

## 2023-09-20 LAB
INR PPP: 1.7 (ref 0.9–1.1)
PROTHROMBIN TIME: 20.4 SECONDS

## 2023-09-20 RX ORDER — TIZANIDINE 2 MG/1
2 TABLET ORAL 2 TIMES DAILY
Qty: 30 TABLET | Refills: 2 | Status: SHIPPED | OUTPATIENT
Start: 2023-09-20

## 2023-09-20 RX ORDER — DEXAMETHASONE SODIUM PHOSPHATE 10 MG/ML
10 INJECTION INTRAMUSCULAR; INTRAVENOUS ONCE
Status: COMPLETED | OUTPATIENT
Start: 2023-09-20 | End: 2023-09-20

## 2023-09-20 RX ADMIN — DEXAMETHASONE SODIUM PHOSPHATE 10 MG: 10 INJECTION INTRAMUSCULAR; INTRAVENOUS at 14:14

## 2023-09-20 NOTE — PROGRESS NOTES
"Chief Complaint  Neck Pain (Patient here for neck pain. )    Subjective        Cabrera Avina presents to [facility]  Neck Pain   This is a chronic problem. The current episode started more than 1 year ago. The problem occurs constantly. The problem has been gradually worsening. The pain is associated with nothing. The pain is present in the anterior neck.     The patient presents today with severe neck pain. He is having difficulty ambulating and when sitting. He received a steroid injection at the end of 07/2023, and he notes that it helped significantly. We do not like to give steroid injections for at least 90 days after an injection. Being that he is in so much pain, we will give him an injection, but he will have to wait 90 days for the next one. The patient cannot turn his head at all either way. When he lays down, the pain is severe, and his wife tries to put a blanket under his neck, but it does not help. He denies any injuries to his neck, but he was told he had arthritis which is what is causing his neck pain. He also had a PT/INR today, and his INR is down to 1.7 from 2.7 last week. The week before that, his INR was 1.9, and his Coumadin was decreased to 6 mg. He will go back up to 7 mg today, and we will recheck it in a week as the patient does not want to wait 14 days. The patient verbalized understanding. I ordered the next INR for 09/27/2023.    The following portions of the patient's history were reviewed and updated as appropriate: allergies, current medications, past family history, past medical history, past social history, past surgical history and problem list.    BMI is within normal parameters. No other follow-up for BMI required.     Objective   Vital Signs:  /74 (BP Location: Left arm, Patient Position: Sitting, Cuff Size: Adult)   Pulse 54   Temp 97.8 °F (36.6 °C) (Infrared)   Resp 18   Ht 180.3 cm (71\")   Wt 75.8 kg (167 lb)   SpO2 100%  " " BMI 23.29 kg/m²   Estimated body mass index is 23.29 kg/m² as calculated from the following:    Height as of this encounter: 180.3 cm (71\").    Weight as of this encounter: 75.8 kg (167 lb).       Physical Exam  Vitals and nursing note reviewed.   Constitutional:       General: He is awake.      Appearance: Normal appearance. He is well-developed and well-groomed.   HENT:      Head: Normocephalic and atraumatic.      Right Ear: Hearing normal.      Left Ear: Hearing normal.      Nose: Nose normal.      Mouth/Throat:      Lips: Pink.      Pharynx: Oropharynx is clear.   Eyes:      General: Lids are normal.      Conjunctiva/sclera: Conjunctivae normal.   Neck:        Comments: Unable to turn head right to left or touch chest   Cardiovascular:      Rate and Rhythm: Normal rate and regular rhythm.      Heart sounds: Normal heart sounds.   Pulmonary:      Effort: Pulmonary effort is normal.      Breath sounds: Normal breath sounds and air entry.   Musculoskeletal:      Cervical back: Full passive range of motion without pain.      Right lower leg: No edema.      Left lower leg: No edema.   Lymphadenopathy:      Head:      Right side of head: No submental, submandibular or tonsillar adenopathy.      Left side of head: No submental, submandibular or tonsillar adenopathy.   Skin:     General: Skin is warm and dry.   Neurological:      Mental Status: He is alert and oriented to person, place, and time.      Cranial Nerves: No cranial nerve deficit.      Sensory: Sensation is intact.      Motor: Motor function is intact.      Coordination: Coordination is intact.      Gait: Gait is intact.   Psychiatric:         Attention and Perception: Attention and perception normal.         Mood and Affect: Mood and affect normal.         Speech: Speech normal.         Behavior: Behavior normal. Behavior is cooperative.         Thought Content: Thought content normal.         Cognition and Memory: Cognition and memory normal.         " Judgment: Judgment normal.      Result Review :                   Assessment and Plan   Diagnoses and all orders for this visit:    1. Permanent atrial fibrillation (Primary)  -     POC Protime / INR  -     Protime-INR; Future  -     PT/INR low- increase coumadin to 7 mg nighlty and recheck 1 week     2. Cervical pain  3. Torticollis, acute  4. Lordosis  -     dexAMETHasone (DECADRON) injection 10 mg  -     tiZANidine (ZANAFLEX) 2 MG tablet; Take 1 tablet by mouth 2 (Two) Times a Day.  Dispense: 30 tablet; Refill: 2    I cautioned him that it has only been 2 months since his last steroid injection. He is begging for a steroid injection as he is having severe pain. I informed him that he would not be able to receive another injection for 90 days, and he understands.         Follow Up   Return in about 1 week (around 9/27/2023) for Recheck pt inr.  Patient was given instructions and counseling regarding his condition or for health maintenance advice. Please see specific information pulled into the AVS if appropriate.       Transcribed from ambient dictation for Deisy Christianson DNP, JOCELINE by Bibi Bryan.  09/20/23   15:15 CDT    Patient or patient representative verbalized consent to the visit recording.  I have personally performed the services described in this document as transcribed by the above individual, and it is both accurate and complete.      Electronically signed by Deisy Christianson DNP, JOCELINE, 09/22/23, 12:56 PM CDT.

## 2023-09-27 ENCOUNTER — TELEPHONE (OUTPATIENT)
Dept: FAMILY MEDICINE CLINIC | Facility: CLINIC | Age: 83
End: 2023-09-27
Payer: MEDICARE

## 2023-09-27 ENCOUNTER — CLINICAL SUPPORT (OUTPATIENT)
Dept: FAMILY MEDICINE CLINIC | Facility: CLINIC | Age: 83
End: 2023-09-27
Payer: MEDICARE

## 2023-09-27 DIAGNOSIS — I48.21 PERMANENT ATRIAL FIBRILLATION: Primary | ICD-10-CM

## 2023-09-27 LAB
INR PPP: 2.7 (ref 0.9–1.1)
PROTHROMBIN TIME: 32.6 SECONDS

## 2023-09-27 NOTE — TELEPHONE ENCOUNTER
Patient came in to office today for a PT/INR. I notified patient that [employer] is no longer in network with [facility] and asked if he had spoke to Alon about what his options were.   He stated that he had to have the PT/INR done so he would like to get it done anyway, even if he receives a bill.   Ingrid did Mr. Avina PT/INR and after he left he called back and stated that Ingrid had encouraged him to call Alon because sometimes there are exceptions,so he called them and wanted to let us know that [facility]on told him that he is an exception and that he can continue care with our office.

## 2023-09-28 ENCOUNTER — TELEPHONE (OUTPATIENT)
Dept: FAMILY MEDICINE CLINIC | Facility: CLINIC | Age: 83
End: 2023-09-28
Payer: MEDICARE

## 2023-09-28 NOTE — TELEPHONE ENCOUNTER
Patient came in office and stated that he had spoken with a representative that told him [employer] is in network. I called and spoke with Lynne KENYON.   He stated at first that Dr. Sunita Crawford is out of network and then after speaking with patient while I was present, Lynne then stated that Dr. Sunita Crawford is in network.  I advised Lynne that we have been instructed by [employer] previously that we are out of network.  Lynne then put me on hold while he pulled contract and then stated that Dr. Sunita Crawford is in network after reviewing contract.   Called 9:07AM Ref# [id number].

## 2023-10-10 ENCOUNTER — OFFICE VISIT (OUTPATIENT)
Dept: FAMILY MEDICINE CLINIC | Facility: CLINIC | Age: 83
End: 2023-10-10
Payer: MEDICARE

## 2023-10-10 VITALS
TEMPERATURE: 97.8 F | HEART RATE: 52 BPM | BODY MASS INDEX: 23.07 KG/M2 | RESPIRATION RATE: 18 BRPM | WEIGHT: 164.8 LBS | SYSTOLIC BLOOD PRESSURE: 128 MMHG | OXYGEN SATURATION: 98 % | DIASTOLIC BLOOD PRESSURE: 58 MMHG | HEIGHT: 71 IN

## 2023-10-10 DIAGNOSIS — M25.551 CHRONIC PAIN OF RIGHT HIP: ICD-10-CM

## 2023-10-10 DIAGNOSIS — Z23 FLU VACCINE NEED: ICD-10-CM

## 2023-10-10 DIAGNOSIS — G47.09 OTHER INSOMNIA: ICD-10-CM

## 2023-10-10 DIAGNOSIS — I48.21 PERMANENT ATRIAL FIBRILLATION: Primary | ICD-10-CM

## 2023-10-10 DIAGNOSIS — G62.9 NEUROPATHY: ICD-10-CM

## 2023-10-10 DIAGNOSIS — G89.29 CHRONIC PAIN OF RIGHT HIP: ICD-10-CM

## 2023-10-10 LAB
INR PPP: 2.4 (ref 0.9–1.1)
PROTHROMBIN TIME: 28.9 SECONDS

## 2023-10-10 PROCEDURE — 85610 PROTHROMBIN TIME: CPT | Performed by: FAMILY MEDICINE

## 2023-10-10 PROCEDURE — 36416 COLLJ CAPILLARY BLOOD SPEC: CPT | Performed by: FAMILY MEDICINE

## 2023-10-10 PROCEDURE — 99214 OFFICE O/P EST MOD 30 MIN: CPT | Performed by: FAMILY MEDICINE

## 2023-10-10 PROCEDURE — G0008 ADMIN INFLUENZA VIRUS VAC: HCPCS | Performed by: FAMILY MEDICINE

## 2023-10-10 PROCEDURE — 90662 IIV NO PRSV INCREASED AG IM: CPT | Performed by: FAMILY MEDICINE

## 2023-10-10 RX ORDER — GABAPENTIN 100 MG/1
100 CAPSULE ORAL 3 TIMES DAILY
Qty: 90 CAPSULE | Refills: 2 | Status: SHIPPED | OUTPATIENT
Start: 2023-10-10

## 2023-10-10 RX ORDER — TEMAZEPAM 15 MG/1
15 CAPSULE ORAL NIGHTLY PRN
Qty: 30 CAPSULE | Refills: 2 | Status: SHIPPED | OUTPATIENT
Start: 2023-10-10

## 2023-10-10 RX ORDER — OXYCODONE AND ACETAMINOPHEN 10; 325 MG/1; MG/1
1 TABLET ORAL EVERY 6 HOURS PRN
Qty: 100 TABLET | Refills: 0 | Status: SHIPPED | OUTPATIENT
Start: 2023-10-10

## 2023-10-13 DIAGNOSIS — I48.20 CHRONIC ATRIAL FIBRILLATION: ICD-10-CM

## 2023-10-16 RX ORDER — DILTIAZEM HYDROCHLORIDE 240 MG/1
240 CAPSULE, COATED, EXTENDED RELEASE ORAL DAILY
Qty: 90 CAPSULE | Refills: 3 | OUTPATIENT
Start: 2023-10-16

## 2023-10-16 NOTE — TELEPHONE ENCOUNTER
Too early to refill.     Rx Refill Note  Requested Prescriptions     Pending Prescriptions Disp Refills    dilTIAZem CD (CARDIZEM CD) 240 MG 24 hr capsule [Pharmacy Med Name: DILTIAZEM HCL ER [place] 240 Capsule] 90 capsule 3     Sig: Take 1 capsule by mouth Daily.      Last office visit with prescribing clinician: 10/10/2023   Last telemedicine visit with prescribing clinician: Visit date not found   Next office visit with prescribing clinician: 11/13/2023                         Would you like a call back once the refill request has been completed: [] Yes [] No    If the office needs to give you a call back, can they leave a voicemail: [] Yes [] No    Ingrid Kaba LPN  10/16/23, 14:53 CDT

## 2023-10-23 ENCOUNTER — CLINICAL SUPPORT (OUTPATIENT)
Dept: FAMILY MEDICINE CLINIC | Facility: CLINIC | Age: 83
End: 2023-10-23
Payer: MEDICARE

## 2023-10-23 DIAGNOSIS — I48.21 PERMANENT ATRIAL FIBRILLATION: Primary | ICD-10-CM

## 2023-10-23 LAB
INR PPP: 3.2 (ref 0.9–1.1)
PROTHROMBIN TIME: 30.6 SECONDS

## 2023-10-23 PROCEDURE — 36416 COLLJ CAPILLARY BLOOD SPEC: CPT | Performed by: FAMILY MEDICINE

## 2023-10-23 PROCEDURE — 85610 PROTHROMBIN TIME: CPT | Performed by: FAMILY MEDICINE

## 2023-10-29 NOTE — PROGRESS NOTES
"Subjective cc: chronic pain   Cabrera Avina is a 83 y.o. male who presents for chronic pain.    He needs refills on his pain medication - he feels like it is helping him   No new concerns   He is taking coumadin - INR today - no concerning bleeding   Anxiety/depression is controlled      Pain         The following portions of the patient's history were reviewed and updated as appropriate: allergies, current medications, past family history, past medical history, past social history, past surgical history, and problem list.        Review of Systems    Objective   Blood pressure 128/58, pulse 52, temperature 97.8 °F (36.6 °C), temperature source Infrared, resp. rate 18, height 180.3 cm (71\"), weight 74.8 kg (164 lb 12.8 oz), SpO2 98%.  Physical Exam  Vitals and nursing note reviewed.   Constitutional:       General: He is not in acute distress.     Appearance: Normal appearance. He is not toxic-appearing.   HENT:      Head: Normocephalic and atraumatic.      Right Ear: External ear normal.      Left Ear: External ear normal.      Nose: Nose normal.   Eyes:      General:         Right eye: No discharge.         Left eye: No discharge.      Conjunctiva/sclera: Conjunctivae normal.   Cardiovascular:      Rate and Rhythm: Normal rate. Rhythm irregular.      Pulses: Normal pulses.   Pulmonary:      Effort: Pulmonary effort is normal. No respiratory distress.      Breath sounds: Normal breath sounds. No wheezing.   Musculoskeletal:      Right lower leg: No edema.      Left lower leg: No edema.   Skin:     General: Skin is warm and dry.      Findings: Bruising present.   Neurological:      Mental Status: He is alert and oriented to person, place, and time.      Motor: Weakness present.      Gait: Gait abnormal.   Psychiatric:         Mood and Affect: Mood normal.         Behavior: Behavior normal.         Thought Content: Thought content normal.         Judgment: Judgment normal.         BMI is within normal " parameters. No other follow-up for BMI required.       Assessment & Plan   Problems Addressed this Visit          Cardiac and Vasculature    Permanent atrial fibrillation - Primary    Relevant Orders    POC Protime / INR (Completed)       Musculoskeletal and Injuries    Chronic hip pain    Relevant Medications    oxyCODONE-acetaminophen (PERCOCET)  MG per tablet       Neuro    Neuropathy    Relevant Medications    gabapentin (NEURONTIN) 100 MG capsule       Sleep    Other insomnia    Relevant Medications    temazepam (RESTORIL) 15 MG capsule     Other Visit Diagnoses       Flu vaccine need        Relevant Orders    Fluzone High-Dose 65+yrs (3980-3955) (Completed)          Diagnoses         Codes Comments    Permanent atrial fibrillation    -  Primary ICD-10-CM: I48.21  ICD-9-CM: 427.31     Flu vaccine need     ICD-10-CM: Z23  ICD-9-CM: V04.81     Chronic pain of right hip     ICD-10-CM: M25.551, G89.29  ICD-9-CM: 719.45, 338.29     Neuropathy     ICD-10-CM: G62.9  ICD-9-CM: 355.9     Other insomnia     ICD-10-CM: G47.09  ICD-9-CM: 780.52           PLAN:     #1 insomnia: chronic, controlled, continue with current emdication     #2 chronic hip pain: chronic, stable, controlled contract in chart, UDS robbie RANDALL reviewed and appropriate     #3 a fib: chronic, controlled on current medications, INR WNL, monitor for bleeding     #4 neuropathy: chronic, stable, continue on current medciations           This document has been electronically signed by Sunita Crawford MD on October 28, 2023 19:35 CDT

## 2023-11-01 NOTE — TELEPHONE ENCOUNTER
----- Message from Sunita Crawford MD sent at 4/11/2019 12:36 PM CDT -----  Pt was previously on celexa - it is not listed as current medication and I prescribed pt lexapro today - please make sure with pt that he is not still taking celexa because he would not need to take both. If he is taking celexa what dose?    Faxed notes as requested to Rosalind Benz

## 2023-11-06 ENCOUNTER — CLINICAL SUPPORT (OUTPATIENT)
Dept: FAMILY MEDICINE CLINIC | Facility: CLINIC | Age: 83
End: 2023-11-06
Payer: MEDICARE

## 2023-11-06 DIAGNOSIS — I48.21 PERMANENT ATRIAL FIBRILLATION: Primary | ICD-10-CM

## 2023-11-06 LAB
INR PPP: 3 (ref 0.9–1.1)
PROTHROMBIN TIME: 36.2 SECONDS

## 2023-11-06 PROCEDURE — 36416 COLLJ CAPILLARY BLOOD SPEC: CPT | Performed by: NURSE PRACTITIONER

## 2023-11-06 PROCEDURE — 85610 PROTHROMBIN TIME: CPT | Performed by: NURSE PRACTITIONER

## 2023-11-13 ENCOUNTER — OFFICE VISIT (OUTPATIENT)
Dept: FAMILY MEDICINE CLINIC | Facility: CLINIC | Age: 83
End: 2023-11-13
Payer: MEDICARE

## 2023-11-13 VITALS
HEIGHT: 71 IN | SYSTOLIC BLOOD PRESSURE: 142 MMHG | RESPIRATION RATE: 18 BRPM | BODY MASS INDEX: 23.38 KG/M2 | HEART RATE: 52 BPM | WEIGHT: 167 LBS | DIASTOLIC BLOOD PRESSURE: 68 MMHG

## 2023-11-13 DIAGNOSIS — G89.29 CHRONIC PAIN OF RIGHT HIP: ICD-10-CM

## 2023-11-13 DIAGNOSIS — K59.04 CHRONIC IDIOPATHIC CONSTIPATION: ICD-10-CM

## 2023-11-13 DIAGNOSIS — I48.21 PERMANENT ATRIAL FIBRILLATION: Primary | ICD-10-CM

## 2023-11-13 DIAGNOSIS — I48.20 CHRONIC ATRIAL FIBRILLATION: ICD-10-CM

## 2023-11-13 DIAGNOSIS — M25.551 CHRONIC PAIN OF RIGHT HIP: ICD-10-CM

## 2023-11-13 LAB
INR PPP: 2 (ref 0.9–1.1)
PROTHROMBIN TIME: 23.8 SECONDS

## 2023-11-13 PROCEDURE — 85610 PROTHROMBIN TIME: CPT | Performed by: FAMILY MEDICINE

## 2023-11-13 PROCEDURE — 99214 OFFICE O/P EST MOD 30 MIN: CPT | Performed by: FAMILY MEDICINE

## 2023-11-13 PROCEDURE — 36416 COLLJ CAPILLARY BLOOD SPEC: CPT | Performed by: FAMILY MEDICINE

## 2023-11-13 RX ORDER — OXYCODONE AND ACETAMINOPHEN 10; 325 MG/1; MG/1
1 TABLET ORAL EVERY 6 HOURS PRN
Qty: 100 TABLET | Refills: 0 | Status: SHIPPED | OUTPATIENT
Start: 2023-11-13

## 2023-11-13 RX ORDER — WARFARIN SODIUM 2 MG/1
TABLET ORAL
Qty: 360 TABLET | Refills: 5 | Status: SHIPPED | OUTPATIENT
Start: 2023-11-13

## 2023-11-13 RX ORDER — LUBIPROSTONE 24 UG/1
24 CAPSULE ORAL 2 TIMES DAILY WITH MEALS
Qty: 60 CAPSULE | Refills: 2 | Status: SHIPPED | OUTPATIENT
Start: 2023-11-13

## 2023-11-13 NOTE — PROGRESS NOTES
"Subjective cc: chronic pain   Cabrera Avina is a 83 y.o. male who presents with complaint of chronic lower back and hip pain.    INR checked today - reviewed with pt and discussed coumadin dosing   HTN is controlled   Pain is the same - pt is using medication to help him perform daily activities and be comfortable - needs refills      History of Present Illness     The following portions of the patient's history were reviewed and updated as appropriate: allergies, current medications, past family history, past medical history, past social history, past surgical history, and problem list.        Review of Systems    Objective   Blood pressure 142/68, pulse 52, resp. rate 18, height 180.3 cm (70.98\"), weight 75.8 kg (167 lb).  Physical Exam  Vitals and nursing note reviewed.   Constitutional:       General: He is not in acute distress.     Appearance: Normal appearance. He is ill-appearing. He is not toxic-appearing.   HENT:      Head: Normocephalic and atraumatic.      Right Ear: External ear normal.      Left Ear: External ear normal.      Nose: Nose normal.   Cardiovascular:      Rate and Rhythm: Normal rate. Rhythm irregular.      Pulses: Normal pulses.      Heart sounds: Murmur heard.   Pulmonary:      Effort: Pulmonary effort is normal. No respiratory distress.      Breath sounds: Normal breath sounds. No wheezing.   Abdominal:      General: There is no distension.      Palpations: Abdomen is soft.   Skin:     General: Skin is warm and dry.      Findings: Bruising present.   Neurological:      Mental Status: He is alert and oriented to person, place, and time. Mental status is at baseline.      Motor: Weakness present.      Coordination: Coordination abnormal.      Gait: Gait abnormal.   Psychiatric:         Mood and Affect: Mood normal.         Behavior: Behavior normal.         Thought Content: Thought content normal.         Judgment: Judgment normal.         BMI is within normal parameters. No other " follow-up for BMI required.       Assessment & Plan   Problems Addressed this Visit          Cardiac and Vasculature    Permanent atrial fibrillation - Primary    Relevant Medications    warfarin (COUMADIN) 2 MG tablet    Other Relevant Orders    POC Protime / INR (Completed)       Gastrointestinal Abdominal     Constipation    Relevant Medications    lubiprostone (Amitiza) 24 MCG capsule       Musculoskeletal and Injuries    Chronic hip pain    Relevant Medications    oxyCODONE-acetaminophen (PERCOCET)  MG per tablet     Other Visit Diagnoses       Chronic atrial fibrillation        Relevant Medications    warfarin (COUMADIN) 2 MG tablet          Diagnoses         Codes Comments    Permanent atrial fibrillation    -  Primary ICD-10-CM: I48.21  ICD-9-CM: 427.31     Chronic idiopathic constipation     ICD-10-CM: K59.04  ICD-9-CM: 564.00     Chronic atrial fibrillation     ICD-10-CM: I48.20  ICD-9-CM: 427.31     Chronic pain of right hip     ICD-10-CM: M25.551, G89.29  ICD-9-CM: 719.45, 338.29           PLAN:     #1 chronic hip pain: chronic, stable, refill on medication, controlled contract in chart, robbie OROZCO reviewed, advised on risks/benefits of medication     #2 a fib: chronic, stable, rate controlled, continue on current medication, INR in office today, continue on coumadin    #3 constipation: chronic, controlled, continue on current medication            This document has been electronically signed by Sunita Crawford MD on December 2, 2023 21:47 CST

## 2023-11-27 ENCOUNTER — CLINICAL SUPPORT (OUTPATIENT)
Dept: FAMILY MEDICINE CLINIC | Facility: CLINIC | Age: 83
End: 2023-11-27
Payer: MEDICARE

## 2023-11-27 DIAGNOSIS — I48.21 PERMANENT ATRIAL FIBRILLATION: Primary | ICD-10-CM

## 2023-11-27 LAB
INR PPP: 3.7 (ref 0.9–1.1)
PROTHROMBIN TIME: 44.6 SECONDS

## 2023-11-29 ENCOUNTER — CLINICAL SUPPORT (OUTPATIENT)
Dept: FAMILY MEDICINE CLINIC | Facility: CLINIC | Age: 83
End: 2023-11-29
Payer: MEDICARE

## 2023-11-29 DIAGNOSIS — I48.20 CHRONIC ATRIAL FIBRILLATION: Primary | ICD-10-CM

## 2023-11-29 LAB
INR PPP: 1.9 (ref 0.9–1.1)
PROTHROMBIN TIME: 22.6 SECONDS

## 2023-12-05 ENCOUNTER — OFFICE VISIT (OUTPATIENT)
Dept: FAMILY MEDICINE CLINIC | Facility: CLINIC | Age: 83
End: 2023-12-05
Payer: MEDICARE

## 2023-12-05 VITALS
WEIGHT: 165 LBS | BODY MASS INDEX: 23.1 KG/M2 | HEIGHT: 71 IN | SYSTOLIC BLOOD PRESSURE: 124 MMHG | TEMPERATURE: 98.6 F | OXYGEN SATURATION: 99 % | HEART RATE: 68 BPM | RESPIRATION RATE: 18 BRPM | DIASTOLIC BLOOD PRESSURE: 72 MMHG

## 2023-12-05 VITALS
WEIGHT: 165 LBS | HEART RATE: 68 BPM | BODY MASS INDEX: 23.1 KG/M2 | RESPIRATION RATE: 18 BRPM | OXYGEN SATURATION: 99 % | TEMPERATURE: 98.6 F | DIASTOLIC BLOOD PRESSURE: 72 MMHG | SYSTOLIC BLOOD PRESSURE: 124 MMHG | HEIGHT: 71 IN

## 2023-12-05 DIAGNOSIS — G89.29 CHRONIC NECK PAIN: ICD-10-CM

## 2023-12-05 DIAGNOSIS — Z79.01 CHRONIC ANTICOAGULATION: ICD-10-CM

## 2023-12-05 DIAGNOSIS — G47.09 OTHER INSOMNIA: ICD-10-CM

## 2023-12-05 DIAGNOSIS — F41.9 ANXIETY: ICD-10-CM

## 2023-12-05 DIAGNOSIS — Z00.00 MEDICARE ANNUAL WELLNESS VISIT, SUBSEQUENT: Primary | ICD-10-CM

## 2023-12-05 DIAGNOSIS — M25.551 CHRONIC PAIN OF RIGHT HIP: Primary | ICD-10-CM

## 2023-12-05 DIAGNOSIS — M54.2 CHRONIC NECK PAIN: ICD-10-CM

## 2023-12-05 DIAGNOSIS — G89.29 CHRONIC PAIN OF RIGHT HIP: Primary | ICD-10-CM

## 2023-12-05 DIAGNOSIS — G62.9 NEUROPATHY: ICD-10-CM

## 2023-12-05 DIAGNOSIS — M54.2 CERVICAL PAIN: ICD-10-CM

## 2023-12-05 LAB — INR PPP: 2.5 (ref 0.9–1.1)

## 2023-12-05 RX ORDER — TIZANIDINE 2 MG/1
2 TABLET ORAL 2 TIMES DAILY
Qty: 30 TABLET | Refills: 2 | Status: SHIPPED | OUTPATIENT
Start: 2023-12-05

## 2023-12-05 RX ORDER — OXYCODONE AND ACETAMINOPHEN 10; 325 MG/1; MG/1
1 TABLET ORAL EVERY 6 HOURS PRN
Qty: 100 TABLET | Refills: 0 | Status: SHIPPED | OUTPATIENT
Start: 2023-12-05

## 2023-12-05 NOTE — PROGRESS NOTES
The ABCs of the Annual Wellness Visit  Subsequent Medicare Wellness Visit    Subjective      Cabrera Avina is a 83 y.o. male who presents for a Subsequent Medicare Wellness Visit.    The following portions of the patient's history were reviewed and   updated as appropriate: allergies, current medications, past family history, past medical history, past social history, past surgical history, and problem list.    Compared to one year ago, the patient feels his physical   health is the same.    Compared to one year ago, the patient feels his mental   health is the same.    Recent Hospitalizations:  He was not admitted to the hospital during the last year.       Current Medical Providers:  Patient Care Team:  Sunita Crawford MD as PCP - General (Family Medicine)  Arnaldo Colin MD as Cardiologist (Cardiology)  West Jeff MD as Consulting Physician (Urology)  Paul Rachel MD as Consulting Physician (Hematology and Oncology)  Ander Miguel MD as Consulting Physician (Gastroenterology)    Outpatient Medications Prior to Visit   Medication Sig Dispense Refill    aspirin 81 MG EC tablet Take 1 tablet by mouth Daily. 90 tablet 3    digoxin (LANOXIN) 250 MCG tablet Take 1 tablet by mouth Daily. 90 tablet 3    dilTIAZem CD (CARDIZEM CD) 240 MG 24 hr capsule Take 1 capsule by mouth Daily. 90 capsule 3    gabapentin (NEURONTIN) 100 MG capsule Take 1 capsule by mouth 3 (Three) Times a Day. 90 capsule 2    lubiprostone (Amitiza) 24 MCG capsule Take 1 capsule by mouth 2 (Two) Times a Day With Meals. 60 capsule 2    temazepam (RESTORIL) 15 MG capsule Take 1 capsule by mouth At Night As Needed for Sleep. 30 capsule 2    warfarin (COUMADIN) 2 MG tablet TAKE 3 AND ONE HALF TABLET BY MOUTH DAILY 360 tablet 5    oxyCODONE-acetaminophen (PERCOCET)  MG per tablet Take 1 tablet by mouth Every 6 (Six) Hours As Needed for Severe Pain. Must last 30 days 100 tablet 0    tiZANidine (ZANAFLEX) 2 MG  "tablet Take 1 tablet by mouth 2 (Two) Times a Day. 30 tablet 2     No facility-administered medications prior to visit.       Opioid medication/s are on active medication list.  and I have evaluated his active treatment plan and pain score trends (see table).  Vitals:    12/05/23 1115   PainSc:   8   PainLoc: Generalized     I have reviewed the chart for potential of high risk medication and harmful drug interactions in the elderly.          Aspirin is on active medication list. Aspirin use is indicated based on review of current medical condition/s. Pros and cons of this therapy have been discussed today. Benefits of this medication outweigh potential harm.  Patient has been encouraged to continue taking this medication.  .      Patient Active Problem List   Diagnosis    Constipation    Gastroesophageal reflux disease without esophagitis    Lymphoma in remission    Anxiety    Chronic hip pain    Permanent atrial fibrillation    Other insomnia    Adenomatous polyp of colon    Tobacco use    Chronic anticoagulation    PAD (peripheral artery disease)    Congestive heart failure    BMI 24.0-24.9, adult    Leukopenia    Iron deficiency anemia    Neuropathy     Advance Care Planning   Advance Care Planning     Advance Directive is not on file.  ACP discussion was held with the patient during this visit. Patient has an advance directive (not in EMR), copy requested.     Objective    Vitals:    12/05/23 1115   BP: 124/72   BP Location: Left arm   Patient Position: Sitting   Cuff Size: Adult   Pulse: 68   Resp: 18   Temp: 98.6 °F (37 °C)   TempSrc: Infrared   SpO2: 99%   Weight: 74.8 kg (165 lb)   Height: 180.3 cm (71\")  Comment: per patient   PainSc:   8   PainLoc: Generalized     Estimated body mass index is 23.01 kg/m² as calculated from the following:    Height as of this encounter: 180.3 cm (71\").    Weight as of this encounter: 74.8 kg (165 lb).    BMI is within normal parameters. No other follow-up for BMI " required.      Does the patient have evidence of cognitive impairment?   No            HEALTH RISK ASSESSMENT    Smoking Status:  Social History     Tobacco Use   Smoking Status Never   Smokeless Tobacco Never     Alcohol Consumption:  Social History     Substance and Sexual Activity   Alcohol Use No     Fall Risk Screen:    [facility] Fall Risk Assessment was completed, and patient is at MODERATE risk for falls. Assessment completed on:2023    Depression Screenin/10/2023     1:46 PM   PHQ-2/PHQ-9 Depression Screening   Little Interest or Pleasure in Doing Things 0-->not at all   Feeling Down, Depressed or Hopeless 3-->nearly every day   Trouble Falling or Staying Asleep, or Sleeping Too Much 3-->nearly every day   Feeling Tired or Having Little Energy 3-->nearly every day   Poor Appetite or Overeating 3-->nearly every day   Feeling Bad about Yourself - or that You are a Failure or Have Let Yourself or Your Family Down 0-->not at all   Trouble Concentrating on Things, Such as Reading the Newspaper or Watching Television 0-->not at all   Moving or Speaking So Slowly that Other People Could Have Noticed? Or the Opposite - Being So Fidgety 0-->not at all   Thoughts that You Would be Better Off Dead or of Hurting Yourself in Some Way 0-->not at all   PHQ-9: Brief Depression Severity Measure Score 12   If You Checked Off Any Problems, How Difficult Have These Problems Made It For You to Do Your Work, Take Care of Things at Home, or Get Along with Other People? extremely difficult       Health Habits and Functional and Cognitive Screenin/17/2022    11:16 AM   Functional & Cognitive Status   Do you have difficulty preparing food and eating? No   Do you have difficulty bathing yourself, getting dressed or grooming yourself? No   Do you have difficulty using the toilet? No   Do you have difficulty moving around from place to place? No   Do you have trouble with steps or getting out of a bed or a chair? No    Current Diet Well Balanced Diet   Dental Exam Up to date   Eye Exam Up to date   Exercise (times per week) 6 times per week   Current Exercises Include Walking   Do you need help using the phone?  No   Are you deaf or do you have serious difficulty hearing?  No   Do you need help to go to places out of walking distance? No   Do you need help shopping? No   Do you need help preparing meals?  No   Do you need help with housework?  No   Do you need help with laundry? No   Do you need help taking your medications? No   Do you need help managing money? No   Do you ever drive or ride in a car without wearing a seat belt? No   Have you felt unusual stress, anger or loneliness in the last month? No   Who do you live with? Spouse   If you need help, do you have trouble finding someone available to you? No   Have you been bothered in the last four weeks by sexual problems? No   Do you have difficulty concentrating, remembering or making decisions? No       Age-appropriate Screening Schedule:  Refer to the list below for future screening recommendations based on patient's age, sex and/or medical conditions. Orders for these recommended tests are listed in the plan section. The patient has been provided with a written plan.    Health Maintenance   Topic Date Due    ZOSTER VACCINE (2 of 2) 02/13/2017    COVID-19 Vaccine (5 - 2023-24 season) 09/01/2023    ANNUAL WELLNESS VISIT  12/05/2024    COLORECTAL CANCER SCREENING  12/01/2025    TDAP/TD VACCINES (2 - Td or Tdap) 10/26/2026    INFLUENZA VACCINE  Completed    Pneumococcal Vaccine 65+  Completed                  CMS Preventative Services Quick Reference  Risk Factors Identified During Encounter:    {Medicare Wellness Risk Factors:84764}    The above risks/problems have been discussed with the patient.  Pertinent information has been shared with the patient in the After Visit Summary.    Diagnoses and all orders for this visit:    1. Chronic pain of right hip  -      oxyCODONE-acetaminophen (PERCOCET)  MG per tablet; Take 1 tablet by mouth Every 6 (Six) Hours As Needed for Severe Pain. Must last 30 days  Dispense: 100 tablet; Refill: 0    2. Cervical pain  -     tiZANidine (ZANAFLEX) 2 MG tablet; Take 1 tablet by mouth 2 (Two) Times a Day.  Dispense: 30 tablet; Refill: 2    3. Torticollis, acute  -     tiZANidine (ZANAFLEX) 2 MG tablet; Take 1 tablet by mouth 2 (Two) Times a Day.  Dispense: 30 tablet; Refill: 2        Follow Up:   Next Medicare Wellness visit to be scheduled in 1 year.      An After Visit Summary and PPPS were made available to the patient.

## 2023-12-05 NOTE — PROGRESS NOTES
"Kirill Avina is a 83 y.o. male.     History of Present Illness     The following portions of the patient's history were reviewed and updated as appropriate: allergies, current medications, past family history, past medical history, past social history, past surgical history, and problem list.        Review of Systems    Objective   Blood pressure 124/72, pulse 68, temperature 98.6 °F (37 °C), temperature source Infrared, resp. rate 18, height 180.3 cm (71\"), weight 74.8 kg (165 lb), SpO2 99%.  Physical Exam    BMI is within normal parameters. No other follow-up for BMI required.       Assessment & Plan   Problems Addressed this Visit          Musculoskeletal and Injuries    Chronic hip pain    Relevant Medications    oxyCODONE-acetaminophen (PERCOCET)  MG per tablet     Other Visit Diagnoses       Cervical pain        Relevant Medications    tiZANidine (ZANAFLEX) 2 MG tablet    Torticollis, acute        Relevant Medications    tiZANidine (ZANAFLEX) 2 MG tablet          Diagnoses         Codes Comments    Chronic pain of right hip     ICD-10-CM: M25.551, G89.29  ICD-9-CM: 719.45, 338.29     Cervical pain     ICD-10-CM: M54.2  ICD-9-CM: 723.1     Torticollis, acute     ICD-10-CM: M43.6  ICD-9-CM: 723.5                    "

## 2023-12-13 DIAGNOSIS — G62.9 NEUROPATHY: ICD-10-CM

## 2023-12-13 RX ORDER — GABAPENTIN 100 MG/1
100 CAPSULE ORAL 3 TIMES DAILY
Qty: 90 CAPSULE | Refills: 2 | OUTPATIENT
Start: 2023-12-13

## 2023-12-15 NOTE — PROGRESS NOTES
"Subjective cc: chronic pain   Cabrera Avina is a 83 y.o. male who presetns for nela malin on chronic pain medication.    He reports pain is about the same - medication helps but still having pain - inquires on strength of current dosage - reviewed with pt   Constipation is about the same   Anxiety is stable   INR checked today - denies any bleeding issues   HTN controlled   A fib stable - no chest pain or palpitations      History of Present Illness     The following portions of the patient's history were reviewed and updated as appropriate: allergies, current medications, past family history, past medical history, past social history, past surgical history, and problem list.        Review of Systems    Objective   Blood pressure 124/72, pulse 68, temperature 98.6 °F (37 °C), temperature source Infrared, resp. rate 18, height 180.3 cm (71\"), weight 74.8 kg (165 lb), SpO2 99%.  Physical Exam  Vitals and nursing note reviewed.   Constitutional:       General: He is not in acute distress.     Appearance: He is well-developed. He is ill-appearing. He is not diaphoretic.   HENT:      Head: Normocephalic and atraumatic.      Right Ear: External ear normal.      Left Ear: External ear normal.      Nose: Nose normal.   Eyes:      General:         Right eye: No discharge.         Left eye: No discharge.      Conjunctiva/sclera: Conjunctivae normal.   Neck:      Thyroid: No thyromegaly.      Trachea: No tracheal deviation.   Cardiovascular:      Rate and Rhythm: Normal rate. Rhythm irregular.      Pulses: Normal pulses.   Pulmonary:      Effort: Pulmonary effort is normal. No respiratory distress.      Breath sounds: Normal breath sounds. No stridor. No wheezing.   Chest:      Chest wall: No tenderness.   Abdominal:      General: There is no distension.      Palpations: Abdomen is soft.      Tenderness: There is no abdominal tenderness.   Musculoskeletal:         General: Tenderness present.      Cervical back: Normal " range of motion.      Right lower leg: Edema present.      Left lower leg: Edema present.   Lymphadenopathy:      Cervical: No cervical adenopathy.   Skin:     General: Skin is warm and dry.      Findings: Bruising present.   Neurological:      Mental Status: He is alert and oriented to person, place, and time. Mental status is at baseline.      Motor: Weakness present. No abnormal muscle tone.      Coordination: Coordination abnormal.      Gait: Gait abnormal.   Psychiatric:         Behavior: Behavior normal.         Thought Content: Thought content normal.         Judgment: Judgment normal.         BMI is within normal parameters. No other follow-up for BMI required.       Assessment & Plan   Problems Addressed this Visit          Coag and Thromboembolic    Chronic anticoagulation    Relevant Orders    POCT INR (Completed)       Mental Health    Anxiety       Musculoskeletal and Injuries    Chronic hip pain - Primary    Relevant Medications    oxyCODONE-acetaminophen (PERCOCET)  MG per tablet       Neuro    Neuropathy       Sleep    Other insomnia     Other Visit Diagnoses       Cervical pain        Relevant Medications    tiZANidine (ZANAFLEX) 2 MG tablet    Chronic neck pain        Relevant Medications    tiZANidine (ZANAFLEX) 2 MG tablet          Diagnoses         Codes Comments    Chronic pain of right hip    -  Primary ICD-10-CM: M25.551, G89.29  ICD-9-CM: 719.45, 338.29     Cervical pain     ICD-10-CM: M54.2  ICD-9-CM: 723.1     Chronic neck pain     ICD-10-CM: M54.2, G89.29  ICD-9-CM: 723.1, 338.29     Chronic anticoagulation     ICD-10-CM: Z79.01  ICD-9-CM: V58.61     Other insomnia     ICD-10-CM: G47.09  ICD-9-CM: 780.52     Neuropathy     ICD-10-CM: G62.9  ICD-9-CM: 355.9     Anxiety     ICD-10-CM: F41.9  ICD-9-CM: 300.00             PLAN:     #1 chronic hip pain: chronic, stable, continue on current medication, controlled contract in chart, robbie OROZCO reviewed     #2 insomnia: chronic,  stable, continue with current medication     #3 anxiety: chronic, stable, continue with current medication     #4 neuropathy: chronic, stable, controlled contract in chart, robbie reviewed and appropriate, UDS UTD, refill sent           This document has been electronically signed by Sunita Crawford MD on December 15, 2023 13:43 CST

## 2023-12-15 NOTE — PROGRESS NOTES
The ABCs of the Annual Wellness Visit  Subsequent Medicare Wellness Visit    Subjective      Cabrera Avina is a 83 y.o. male who presents for a Subsequent Medicare Wellness Visit.    The following portions of the patient's history were reviewed and   updated as appropriate: allergies, current medications, past family history, past medical history, past social history, past surgical history, and problem list.    Compared to one year ago, the patient feels his physical   health is the same.    Compared to one year ago, the patient feels his mental   health is the same.    Recent Hospitalizations:  He was not admitted to the hospital during the last year.       Current Medical Providers:  Patient Care Team:  Sunita Crawford MD as PCP - General (Family Medicine)  Arnaldo Colin MD as Cardiologist (Cardiology)  West Jeff MD as Consulting Physician (Urology)  Paul Rachel MD as Consulting Physician (Hematology and Oncology)  Ander Miguel MD as Consulting Physician (Gastroenterology)    Outpatient Medications Prior to Visit   Medication Sig Dispense Refill    aspirin 81 MG EC tablet Take 1 tablet by mouth Daily. 90 tablet 3    digoxin (LANOXIN) 250 MCG tablet Take 1 tablet by mouth Daily. 90 tablet 3    dilTIAZem CD (CARDIZEM CD) 240 MG 24 hr capsule Take 1 capsule by mouth Daily. 90 capsule 3    gabapentin (NEURONTIN) 100 MG capsule Take 1 capsule by mouth 3 (Three) Times a Day. 90 capsule 2    lubiprostone (Amitiza) 24 MCG capsule Take 1 capsule by mouth 2 (Two) Times a Day With Meals. 60 capsule 2    oxyCODONE-acetaminophen (PERCOCET)  MG per tablet Take 1 tablet by mouth Every 6 (Six) Hours As Needed for Severe Pain. Must last 30 days 100 tablet 0    temazepam (RESTORIL) 15 MG capsule Take 1 capsule by mouth At Night As Needed for Sleep. 30 capsule 2    tiZANidine (ZANAFLEX) 2 MG tablet Take 1 tablet by mouth 2 (Two) Times a Day. 30 tablet 2    warfarin (COUMADIN) 2 MG  "tablet TAKE 3 AND ONE HALF TABLET BY MOUTH DAILY 360 tablet 5     No facility-administered medications prior to visit.       Opioid medication/s are on active medication list.  and I have evaluated his active treatment plan and pain score trends (see table).  Vitals:    12/05/23 1654   PainSc:   8     I have reviewed the chart for potential of high risk medication and harmful drug interactions in the elderly.          Aspirin is on active medication list. Aspirin use is indicated based on review of current medical condition/s. Pros and cons of this therapy have been discussed today. Benefits of this medication outweigh potential harm.  Patient has been encouraged to continue taking this medication.  .      Patient Active Problem List   Diagnosis    Constipation    Gastroesophageal reflux disease without esophagitis    Lymphoma in remission    Anxiety    Chronic hip pain    Permanent atrial fibrillation    Other insomnia    Adenomatous polyp of colon    Tobacco use    Chronic anticoagulation    PAD (peripheral artery disease)    Congestive heart failure    BMI 24.0-24.9, adult    Leukopenia    Iron deficiency anemia    Neuropathy     Advance Care Planning   Advance Care Planning     Advance Directive is not on file.  ACP discussion was held with the patient during this visit. Patient does not have an advance directive, information provided.     Objective    Vitals:    12/05/23 1654   BP: 124/72   BP Location: Left arm   Patient Position: Sitting   Cuff Size: Adult   Pulse: 68   Resp: 18   Temp: 98.6 °F (37 °C)   TempSrc: Temporal   SpO2: 99%   Weight: 74.8 kg (165 lb)   Height: 180.3 cm (71\")   PainSc:   8     Estimated body mass index is 23.01 kg/m² as calculated from the following:    Height as of this encounter: 180.3 cm (71\").    Weight as of this encounter: 74.8 kg (165 lb).    BMI is within normal parameters. No other follow-up for BMI required.      Does the patient have evidence of cognitive impairment? "   No            HEALTH RISK ASSESSMENT    Smoking Status:  Social History     Tobacco Use   Smoking Status Never   Smokeless Tobacco Never     Alcohol Consumption:  Social History     Substance and Sexual Activity   Alcohol Use No     Fall Risk Screen:    TONGADI Fall Risk Assessment was completed, and patient is at LOW risk for falls.Assessment completed on:2023    Depression Screenin/5/2023     4:57 PM   PHQ-2/PHQ-9 Depression Screening   Little Interest or Pleasure in Doing Things 0-->not at all   Feeling Down, Depressed or Hopeless 0-->not at all   PHQ-9: Brief Depression Severity Measure Score 0       Health Habits and Functional and Cognitive Screenin/5/2023     4:55 PM   Functional & Cognitive Status   Do you have difficulty preparing food and eating? No   Do you have difficulty bathing yourself, getting dressed or grooming yourself? No   Do you have difficulty using the toilet? No   Do you have difficulty moving around from place to place? No   Do you have trouble with steps or getting out of a bed or a chair? Yes   Current Diet Well Balanced Diet   Dental Exam Not up to date   Eye Exam Not up to date   Exercise (times per week) 3 times per week   Current Exercises Include Walking   Do you need help using the phone?  No   Are you deaf or do you have serious difficulty hearing?  No   Do you need help to go to places out of walking distance? No   Do you need help shopping? No   Do you need help preparing meals?  No   Do you need help with housework?  No   Do you need help with laundry? No   Do you need help taking your medications? No   Do you need help managing money? No   Do you ever drive or ride in a car without wearing a seat belt? No   Have you felt unusual stress, anger or loneliness in the last month? No   Who do you live with? Spouse   If you need help, do you have trouble finding someone available to you? No   Have you been bothered in the last four weeks by sexual problems?  No   Do you have difficulty concentrating, remembering or making decisions? No       Age-appropriate Screening Schedule:  Refer to the list below for future screening recommendations based on patient's age, sex and/or medical conditions. Orders for these recommended tests are listed in the plan section. The patient has been provided with a written plan.    Health Maintenance   Topic Date Due    ZOSTER VACCINE (2 of 2) 02/13/2017    COVID-19 Vaccine (5 - 2023-24 season) 09/01/2023    ANNUAL WELLNESS VISIT  12/05/2024    COLORECTAL CANCER SCREENING  12/01/2025    TDAP/TD VACCINES (2 - Td or Tdap) 10/26/2026    INFLUENZA VACCINE  Completed    Pneumococcal Vaccine 65+  Completed                  CMS Preventative Services Quick Reference  Risk Factors Identified During Encounter:    Immunizations Discussed/Encouraged: Tdap, Influenza, Prevnar 20 (Pneumococcal 20-valent conjugate), Shingrix, and COVID19    The above risks/problems have been discussed with the patient.  Pertinent information has been shared with the patient in the After Visit Summary.    Diagnoses and all orders for this visit:    1. Medicare annual wellness visit, subsequent (Primary)        Follow Up:   Next Medicare Wellness visit to be scheduled in 1 year.      An After Visit Summary and PPPS were made available to the patient.            This document has been electronically signed by Sunita Crawford MD on December 15, 2023 13:22 CST

## 2023-12-18 ENCOUNTER — CLINICAL SUPPORT (OUTPATIENT)
Dept: FAMILY MEDICINE CLINIC | Facility: CLINIC | Age: 83
End: 2023-12-18
Payer: MEDICARE

## 2023-12-18 DIAGNOSIS — I48.21 PERMANENT ATRIAL FIBRILLATION: Primary | ICD-10-CM

## 2023-12-18 LAB
INR PPP: 1.6 (ref 0.9–1.1)
PROTHROMBIN TIME: 19.7 SECONDS

## 2023-12-18 NOTE — PROGRESS NOTES
Patient reports no s/s of abnormal bleeding. Patient reports that he is taking coumadin 5mg and 6mg alternating.

## 2024-01-01 ENCOUNTER — OFFICE VISIT (OUTPATIENT)
Dept: FAMILY MEDICINE CLINIC | Facility: CLINIC | Age: 84
End: 2024-01-01
Payer: MEDICARE

## 2024-01-01 VITALS
DIASTOLIC BLOOD PRESSURE: 70 MMHG | SYSTOLIC BLOOD PRESSURE: 169 MMHG | OXYGEN SATURATION: 98 % | HEIGHT: 70 IN | BODY MASS INDEX: 21.9 KG/M2 | HEART RATE: 68 BPM | TEMPERATURE: 98.2 F | WEIGHT: 153 LBS | RESPIRATION RATE: 18 BRPM

## 2024-01-01 DIAGNOSIS — G89.29 CHRONIC PAIN OF RIGHT HIP: ICD-10-CM

## 2024-01-01 DIAGNOSIS — I48.21 PERMANENT ATRIAL FIBRILLATION: Primary | ICD-10-CM

## 2024-01-01 DIAGNOSIS — M25.551 CHRONIC PAIN OF RIGHT HIP: ICD-10-CM

## 2024-01-01 DIAGNOSIS — K59.04 CHRONIC IDIOPATHIC CONSTIPATION: ICD-10-CM

## 2024-01-01 LAB
INR PPP: 1.2 (ref 0.9–1.1)
PROTHROMBIN TIME: 15 SECONDS

## 2024-01-01 PROCEDURE — 99214 OFFICE O/P EST MOD 30 MIN: CPT | Performed by: FAMILY MEDICINE

## 2024-01-01 PROCEDURE — 36416 COLLJ CAPILLARY BLOOD SPEC: CPT | Performed by: FAMILY MEDICINE

## 2024-01-01 PROCEDURE — G2211 COMPLEX E/M VISIT ADD ON: HCPCS | Performed by: FAMILY MEDICINE

## 2024-01-01 PROCEDURE — 85610 PROTHROMBIN TIME: CPT | Performed by: FAMILY MEDICINE

## 2024-01-01 PROCEDURE — 1125F AMNT PAIN NOTED PAIN PRSNT: CPT | Performed by: FAMILY MEDICINE

## 2024-01-01 RX ORDER — LUBIPROSTONE 24 UG/1
24 CAPSULE ORAL 2 TIMES DAILY WITH MEALS
Qty: 60 CAPSULE | Refills: 2 | Status: SHIPPED | OUTPATIENT
Start: 2024-01-01

## 2024-01-01 RX ORDER — OXYCODONE AND ACETAMINOPHEN 10; 325 MG/1; MG/1
1 TABLET ORAL EVERY 6 HOURS PRN
Qty: 100 TABLET | Refills: 0 | Status: SHIPPED | OUTPATIENT
Start: 2024-01-01

## 2024-01-04 DIAGNOSIS — M54.2 CHRONIC NECK PAIN: ICD-10-CM

## 2024-01-04 DIAGNOSIS — M54.2 CERVICAL PAIN: ICD-10-CM

## 2024-01-04 DIAGNOSIS — G89.29 CHRONIC NECK PAIN: ICD-10-CM

## 2024-01-05 ENCOUNTER — OFFICE VISIT (OUTPATIENT)
Dept: FAMILY MEDICINE CLINIC | Facility: CLINIC | Age: 84
End: 2024-01-05
Payer: MEDICARE

## 2024-01-05 VITALS
TEMPERATURE: 98.2 F | HEART RATE: 72 BPM | OXYGEN SATURATION: 97 % | BODY MASS INDEX: 22.82 KG/M2 | RESPIRATION RATE: 20 BRPM | HEIGHT: 71 IN | WEIGHT: 163 LBS | DIASTOLIC BLOOD PRESSURE: 72 MMHG | SYSTOLIC BLOOD PRESSURE: 112 MMHG

## 2024-01-05 DIAGNOSIS — G89.29 CHRONIC PAIN OF RIGHT HIP: ICD-10-CM

## 2024-01-05 DIAGNOSIS — G47.09 OTHER INSOMNIA: ICD-10-CM

## 2024-01-05 DIAGNOSIS — R05.1 ACUTE COUGH: ICD-10-CM

## 2024-01-05 DIAGNOSIS — I48.21 PERMANENT ATRIAL FIBRILLATION: Primary | ICD-10-CM

## 2024-01-05 DIAGNOSIS — M54.2 CERVICAL PAIN: ICD-10-CM

## 2024-01-05 DIAGNOSIS — G89.29 CHRONIC NECK PAIN: ICD-10-CM

## 2024-01-05 DIAGNOSIS — I48.20 CHRONIC ATRIAL FIBRILLATION: ICD-10-CM

## 2024-01-05 DIAGNOSIS — G62.9 NEUROPATHY: ICD-10-CM

## 2024-01-05 DIAGNOSIS — M25.551 CHRONIC PAIN OF RIGHT HIP: ICD-10-CM

## 2024-01-05 DIAGNOSIS — M54.2 CHRONIC NECK PAIN: ICD-10-CM

## 2024-01-05 LAB
INR PPP: 3.5 (ref 0.9–1.1)
PROTHROMBIN TIME: 41.7 SECONDS

## 2024-01-05 PROCEDURE — 85610 PROTHROMBIN TIME: CPT | Performed by: FAMILY MEDICINE

## 2024-01-05 PROCEDURE — 36416 COLLJ CAPILLARY BLOOD SPEC: CPT | Performed by: FAMILY MEDICINE

## 2024-01-05 PROCEDURE — 99214 OFFICE O/P EST MOD 30 MIN: CPT | Performed by: FAMILY MEDICINE

## 2024-01-05 RX ORDER — DILTIAZEM HYDROCHLORIDE 240 MG/1
240 CAPSULE, COATED, EXTENDED RELEASE ORAL DAILY
Qty: 90 CAPSULE | Refills: 3 | Status: SHIPPED | OUTPATIENT
Start: 2024-01-05

## 2024-01-05 RX ORDER — CODEINE PHOSPHATE/GUAIFENESIN 10-100MG/5
5 LIQUID (ML) ORAL 3 TIMES DAILY PRN
Qty: 118 ML | Refills: 0 | Status: SHIPPED | OUTPATIENT
Start: 2024-01-05

## 2024-01-05 RX ORDER — TIZANIDINE 2 MG/1
2 TABLET ORAL 2 TIMES DAILY
Qty: 60 TABLET | Refills: 2 | Status: SHIPPED | OUTPATIENT
Start: 2024-01-05

## 2024-01-05 RX ORDER — TEMAZEPAM 15 MG/1
15 CAPSULE ORAL NIGHTLY PRN
Qty: 30 CAPSULE | Refills: 2 | Status: SHIPPED | OUTPATIENT
Start: 2024-01-05

## 2024-01-05 RX ORDER — GABAPENTIN 100 MG/1
100 CAPSULE ORAL 3 TIMES DAILY
Qty: 90 CAPSULE | Refills: 2 | Status: SHIPPED | OUTPATIENT
Start: 2024-01-05

## 2024-01-05 RX ORDER — OXYCODONE AND ACETAMINOPHEN 10; 325 MG/1; MG/1
1 TABLET ORAL EVERY 6 HOURS PRN
Qty: 100 TABLET | Refills: 0 | Status: SHIPPED | OUTPATIENT
Start: 2024-01-05

## 2024-01-05 RX ORDER — TIZANIDINE 2 MG/1
2 TABLET ORAL 2 TIMES DAILY
Refills: 0 | OUTPATIENT
Start: 2024-01-05

## 2024-01-05 NOTE — TELEPHONE ENCOUNTER
Unsure if patient will take long term. Please update quantity.     Rx Refill Note  Requested Prescriptions     Pending Prescriptions Disp Refills    tiZANidine (ZANAFLEX) 2 MG tablet [Pharmacy Med Name: TIZANIDINE HCL 2 MG TAB 2 Tablet] 30 tablet 2     Sig: Take 1 tablet by mouth 2 (Two) Times a Day.      Last office visit with prescribing clinician: 12/5/2023   Last telemedicine visit with prescribing clinician: Visit date not found   Next office visit with prescribing clinician: 1/5/2024                         Would you like a call back once the refill request has been completed: [] Yes [] No    If the office needs to give you a call back, can they leave a voicemail: [] Yes [] No    Ingrid Kaba LPN  01/05/24, 07:01 CST

## 2024-01-08 ENCOUNTER — LAB (OUTPATIENT)
Dept: LAB | Facility: HOSPITAL | Age: 84
End: 2024-01-08
Payer: MEDICARE

## 2024-01-08 DIAGNOSIS — C85.90 LYMPHOMA IN REMISSION: ICD-10-CM

## 2024-01-08 LAB
ALBUMIN SERPL-MCNC: 3.7 G/DL (ref 3.5–5.2)
ALBUMIN/GLOB SERPL: 1.3 G/DL
ALP SERPL-CCNC: 71 U/L (ref 39–117)
ALT SERPL W P-5'-P-CCNC: 8 U/L (ref 1–41)
ANION GAP SERPL CALCULATED.3IONS-SCNC: 7 MMOL/L (ref 5–15)
AST SERPL-CCNC: 15 U/L (ref 1–40)
B2 MICROGLOB SERPL-MCNC: 3.1 MG/L (ref 0.8–2.2)
BASOPHILS # BLD AUTO: 0.06 10*3/MM3 (ref 0–0.2)
BASOPHILS NFR BLD AUTO: 1.3 % (ref 0–1.5)
BILIRUB SERPL-MCNC: 0.9 MG/DL (ref 0–1.2)
BUN SERPL-MCNC: 10 MG/DL (ref 8–23)
BUN/CREAT SERPL: 11.9 (ref 7–25)
CALCIUM SPEC-SCNC: 9.2 MG/DL (ref 8.6–10.5)
CHLORIDE SERPL-SCNC: 103 MMOL/L (ref 98–107)
CO2 SERPL-SCNC: 31 MMOL/L (ref 22–29)
CREAT SERPL-MCNC: 0.84 MG/DL (ref 0.76–1.27)
DEPRECATED RDW RBC AUTO: 49.9 FL (ref 37–54)
EGFRCR SERPLBLD CKD-EPI 2021: 87.1 ML/MIN/1.73
EOSINOPHIL # BLD AUTO: 0.22 10*3/MM3 (ref 0–0.4)
EOSINOPHIL NFR BLD AUTO: 4.7 % (ref 0.3–6.2)
ERYTHROCYTE [DISTWIDTH] IN BLOOD BY AUTOMATED COUNT: 14.4 % (ref 12.3–15.4)
FERRITIN SERPL-MCNC: 329 NG/ML (ref 30–400)
FOLATE SERPL-MCNC: 7.94 NG/ML (ref 4.78–24.2)
GLOBULIN UR ELPH-MCNC: 2.9 GM/DL
GLUCOSE SERPL-MCNC: 98 MG/DL (ref 65–99)
HCT VFR BLD AUTO: 43.8 % (ref 37.5–51)
HGB BLD-MCNC: 13.7 G/DL (ref 13–17.7)
IMM GRANULOCYTES # BLD AUTO: 0.01 10*3/MM3 (ref 0–0.05)
IMM GRANULOCYTES NFR BLD AUTO: 0.2 % (ref 0–0.5)
IRON 24H UR-MRATE: 98 MCG/DL (ref 59–158)
IRON SATN MFR SERPL: 31 % (ref 20–50)
LDH SERPL-CCNC: 181 U/L (ref 135–225)
LYMPHOCYTES # BLD AUTO: 0.93 10*3/MM3 (ref 0.7–3.1)
LYMPHOCYTES NFR BLD AUTO: 20 % (ref 19.6–45.3)
MCH RBC QN AUTO: 29.5 PG (ref 26.6–33)
MCHC RBC AUTO-ENTMCNC: 31.3 G/DL (ref 31.5–35.7)
MCV RBC AUTO: 94.2 FL (ref 79–97)
MONOCYTES # BLD AUTO: 0.63 10*3/MM3 (ref 0.1–0.9)
MONOCYTES NFR BLD AUTO: 13.5 % (ref 5–12)
NEUTROPHILS NFR BLD AUTO: 2.81 10*3/MM3 (ref 1.7–7)
NEUTROPHILS NFR BLD AUTO: 60.3 % (ref 42.7–76)
NRBC BLD AUTO-RTO: 0 /100 WBC (ref 0–0.2)
PLATELET # BLD AUTO: 220 10*3/MM3 (ref 140–450)
PMV BLD AUTO: 10.2 FL (ref 6–12)
POTASSIUM SERPL-SCNC: 3.5 MMOL/L (ref 3.5–5.2)
PROT SERPL-MCNC: 6.6 G/DL (ref 6–8.5)
RBC # BLD AUTO: 4.65 10*6/MM3 (ref 4.14–5.8)
SODIUM SERPL-SCNC: 141 MMOL/L (ref 136–145)
TIBC SERPL-MCNC: 314 MCG/DL (ref 298–536)
TRANSFERRIN SERPL-MCNC: 211 MG/DL (ref 200–360)
VIT B12 BLD-MCNC: 283 PG/ML (ref 211–946)
WBC NRBC COR # BLD AUTO: 4.66 10*3/MM3 (ref 3.4–10.8)

## 2024-01-08 PROCEDURE — 83540 ASSAY OF IRON: CPT

## 2024-01-08 PROCEDURE — 36415 COLL VENOUS BLD VENIPUNCTURE: CPT

## 2024-01-08 PROCEDURE — 83615 LACTATE (LD) (LDH) ENZYME: CPT

## 2024-01-08 PROCEDURE — 85025 COMPLETE CBC W/AUTO DIFF WBC: CPT

## 2024-01-08 PROCEDURE — 82746 ASSAY OF FOLIC ACID SERUM: CPT

## 2024-01-08 PROCEDURE — 80053 COMPREHEN METABOLIC PANEL: CPT

## 2024-01-08 PROCEDURE — 82728 ASSAY OF FERRITIN: CPT

## 2024-01-08 PROCEDURE — 82232 ASSAY OF BETA-2 PROTEIN: CPT

## 2024-01-08 PROCEDURE — 82607 VITAMIN B-12: CPT

## 2024-01-08 PROCEDURE — 84466 ASSAY OF TRANSFERRIN: CPT

## 2024-01-08 NOTE — TELEPHONE ENCOUNTER
LAST URINE DRUG SCREEN 5/15/2023.      Rx Refill Note  Requested Prescriptions     Pending Prescriptions Disp Refills    oxyCODONE-acetaminophen (PERCOCET)  MG per tablet [Pharmacy Med Name: OXYCODONE-APAP  MG TA  Tablet] 100 tablet 0     Sig: Take 1 tablet by mouth Every 6 (Six) Hours As Needed for Severe Pain. Must last 30 days      Last office visit with prescribing clinician: 1/5/2024   Last telemedicine visit with prescribing clinician: Visit date not found   Next office visit with prescribing clinician: 2/5/2024                         Would you like a call back once the refill request has been completed: [] Yes [] No    If the office needs to give you a call back, can they leave a voicemail: [] Yes [] No    Edwina Tong MA  01/08/24, 10:11 CST

## 2024-01-09 RX ORDER — OXYCODONE AND ACETAMINOPHEN 10; 325 MG/1; MG/1
1 TABLET ORAL EVERY 6 HOURS PRN
Qty: 100 TABLET | Refills: 0 | OUTPATIENT
Start: 2024-01-09

## 2024-01-12 ENCOUNTER — CLINICAL SUPPORT (OUTPATIENT)
Dept: FAMILY MEDICINE CLINIC | Facility: CLINIC | Age: 84
End: 2024-01-12
Payer: MEDICARE

## 2024-01-12 DIAGNOSIS — I48.21 PERMANENT ATRIAL FIBRILLATION: Primary | ICD-10-CM

## 2024-01-12 LAB
INR PPP: 1.5 (ref 0.9–1.1)
PROTHROMBIN TIME: 18.4 SECONDS

## 2024-01-16 NOTE — PROGRESS NOTES
"Subjective cc: chronic pain   Cabrera Avina is a 83 y.o. male who presents for follow up on chronic pain and a fib.  Checked INR while in office today - discussed coumadin   Vitals are normal   No complaints of chest pain or palpitations  Pain medication is helping him to function        Pain         The following portions of the patient's history were reviewed and updated as appropriate: allergies, current medications, past family history, past medical history, past social history, past surgical history, and problem list.        Review of Systems    Objective   Blood pressure 112/72, pulse 72, temperature 98.2 °F (36.8 °C), temperature source Infrared, resp. rate 20, height 180.3 cm (71\"), weight 73.9 kg (163 lb), SpO2 97%.  Physical Exam  Vitals and nursing note reviewed.   Constitutional:       General: He is not in acute distress.     Appearance: He is well-developed. He is not diaphoretic.   HENT:      Head: Normocephalic and atraumatic.      Right Ear: External ear normal.      Left Ear: External ear normal.      Nose: Nose normal.   Eyes:      General:         Right eye: No discharge.         Left eye: No discharge.      Conjunctiva/sclera: Conjunctivae normal.   Neck:      Thyroid: No thyromegaly.      Trachea: No tracheal deviation.   Cardiovascular:      Rate and Rhythm: Normal rate. Rhythm irregular.      Pulses: Normal pulses.   Pulmonary:      Effort: Pulmonary effort is normal. No respiratory distress.      Breath sounds: Normal breath sounds. No stridor. No wheezing.   Chest:      Chest wall: No tenderness.   Abdominal:      General: There is no distension.      Palpations: Abdomen is soft.      Tenderness: There is no abdominal tenderness.   Musculoskeletal:         General: Tenderness present.      Cervical back: Normal range of motion.   Lymphadenopathy:      Cervical: No cervical adenopathy.   Skin:     General: Skin is warm and dry.      Findings: Bruising present.   Neurological:      " Mental Status: He is alert and oriented to person, place, and time.      Motor: Weakness present. No abnormal muscle tone.      Gait: Gait abnormal.   Psychiatric:         Behavior: Behavior normal.         Thought Content: Thought content normal.         Judgment: Judgment normal.         BMI is within normal parameters. No other follow-up for BMI required.       Assessment & Plan   Problems Addressed this Visit          Cardiac and Vasculature    Permanent atrial fibrillation - Primary    Relevant Medications    dilTIAZem CD (CARDIZEM CD) 240 MG 24 hr capsule    Other Relevant Orders    POC Protime / INR (Completed)       Musculoskeletal and Injuries    Chronic hip pain    Relevant Medications    oxyCODONE-acetaminophen (PERCOCET)  MG per tablet       Neuro    Neuropathy    Relevant Medications    gabapentin (NEURONTIN) 100 MG capsule       Sleep    Other insomnia    Relevant Medications    temazepam (RESTORIL) 15 MG capsule     Other Visit Diagnoses       Cervical pain        Relevant Medications    tiZANidine (ZANAFLEX) 2 MG tablet    Chronic neck pain        Relevant Medications    tiZANidine (ZANAFLEX) 2 MG tablet    Chronic atrial fibrillation        Relevant Medications    dilTIAZem CD (CARDIZEM CD) 240 MG 24 hr capsule    Acute cough        Relevant Medications    guaiFENesin-codeine (GUAIFENESIN AC) 100-10 MG/5ML liquid          Diagnoses         Codes Comments    Permanent atrial fibrillation    -  Primary ICD-10-CM: I48.21  ICD-9-CM: 427.31     Cervical pain     ICD-10-CM: M54.2  ICD-9-CM: 723.1     Chronic neck pain     ICD-10-CM: M54.2, G89.29  ICD-9-CM: 723.1, 338.29     Other insomnia     ICD-10-CM: G47.09  ICD-9-CM: 780.52     Chronic pain of right hip     ICD-10-CM: M25.551, G89.29  ICD-9-CM: 719.45, 338.29     Neuropathy     ICD-10-CM: G62.9  ICD-9-CM: 355.9     Chronic atrial fibrillation     ICD-10-CM: I48.20  ICD-9-CM: 427.31     Acute cough     ICD-10-CM: R05.1  ICD-9-CM: 786.2            Refill given on medication   Controlled contract in chart   [facility] PRAKASH Bales reviewed  Reviewed INR  Vitals are normal   Reviewed labs           This document has been electronically signed by Sunita Crawford MD on January 16, 2024 13:44 CST

## 2024-01-20 NOTE — PROGRESS NOTES
[facility] ON[facility] HEMATOLOGY AND ONCOLOGY  [address]  [facility] [address] [phone]    Patient Name: aCbrera Avina  Encounter Date: 01/24/2024  YOB: 1940  Patient Number: [id number]       REASON FOR FOLLOWUP:  Mr. Cabrera Avina is a 83-year-old male who returns in follow-up of intermediate grade B-cell lymphoma.  He is seen ~222 months following the completion of R-CHOP chemotherapy and ~170 months after the completion of Rituxan maintenance.  He is also followed for an iron deficiency anemia and for chronic anticoagulation. He is seen 50 months after the most recent dose of Injectafer.  The patient is here alone.       DIAGNOSTIC ABNORMALITIES: B-cell Lymphoma.   Periportal lymph node biopsy, 02/01/2005. Findings consistent with large B-cell lymphoma with an intermediate to high proliferative rate.  CT of the chest, 02/03/2005. Mediastinal, left hilar, and upper abdominal lymphadenopathy consistent with the patient's history of lymphoma.    PREVIOUS INTERVENTIONS: B-cell lymphoma   Definitive Rituxan, 375 mg/m2, 02/05/2005 through 02/25/2005. Four weeks completed.  Definitive R-CHOP, from 01/04/2005 through 07/07/2005. Five cycles completed. Cycle #2 delayed by admission for management of deep perirectal abscess.  Maintenance Rituxan (every 3 months), 10/14/2005 through 11/11/2007. Six cycles completed.    DIAGNOSTIC ABNORMALITIES: Anemia, iron deficiency   Anemia substrates on 05/27/2008: Iron 43, %saturation 12, TIBC 366, UIBC 323, ferritin 27, vitamin B12 of 295, folate 7.2.    PREVIOUS INTERVENTIONS: Anemia.   INFeD, 1000 mg IVPB, 06/04/2008.  INFeD, 1000 mg IVPB, 09/02/2010 and 09/09/2010.  INFeD 500 mg, 08/25/2016; 11/28/2016.  Injectafer 750 mg IV, 11/20/2019    Problem List Items Addressed This Visit    None        Oncology/Hematology History Overview Note   DIAGNOSTIC ABNORMALITIES: B-cell Lymphoma.  Periportal  lymph node biopsy, 02/01/2005.  Findings consistent with large B-cell lymphoma with an intermediate to high proliferative rate.  CT of the chest, 02/03/2005.   Mediastinal, left hilar, and upper abdominal lymphadenopathy consistent with the patient's history of lymphoma.  PREVIOUS INTERVENTIONS:   B-cell lymphoma  Definitive Rituxan, 375 mg/m2, 02/05/2005 through 02/25/2005. Four weeks completed.  Definitive R-CHOP, from 01/04/2005 through 07/07/2005.  Five cycles completed.  Cycle #2 delayed by admission for management of deep perirectal abscess.  Maintenance Rituxan (every 3 months), 10/14/2005 through 11/11/2007.  Six cycles completed.  DIAGNOSTIC ABNORMALITIES:   Anemia, iron deficiency  Anemia substrates on 05/27/2008: Iron 43, %saturation 12, TIBC 366, UIBC 323, ferritin 27, vitamin B12 of 295, folate 7.2.  PREVIOUS INTERVENTIONS:   Anemia.  INFeD, 1000 mg IVPB, 06/04/2008.  INFeD, 1000 mg IVPB, 09/02/2010 and 09/09/2010.  INFeD 500 mg, 08/25/2016; 11/28/2016.     Lymphoma in remission   4/28/2016 Initial Diagnosis    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)         PAST MEDICAL HISTORY:  ALLERGIES:  No Known Allergies  CURRENT MEDICATIONS:  Outpatient Encounter Medications as of 1/24/2024   Medication Sig Dispense Refill    aspirin 81 MG EC tablet Take 1 tablet by mouth Daily. 90 tablet 3    digoxin (LANOXIN) 250 MCG tablet Take 1 tablet by mouth Daily. 90 tablet 3    dilTIAZem CD (CARDIZEM CD) 240 MG 24 hr capsule Take 1 capsule by mouth Daily. 90 capsule 3    gabapentin (NEURONTIN) 100 MG capsule Take 1 capsule by mouth 3 (Three) Times a Day. 90 capsule 2    guaiFENesin-codeine (GUAIFENESIN AC) 100-10 MG/5ML liquid Take 5 mL by mouth 3 (Three) Times a Day As Needed for Cough. 118 mL 0    lubiprostone (Amitiza) 24 MCG capsule Take 1 capsule by mouth 2 (Two) Times a Day With Meals. 60 capsule 2    oxyCODONE-acetaminophen (PERCOCET)  MG per tablet Take 1 tablet by mouth Every 6 (Six) Hours As Needed for Severe  Pain. Must last 30 days 100 tablet 0    temazepam (RESTORIL) 15 MG capsule Take 1 capsule by mouth At Night As Needed for Sleep. 30 capsule 2    tiZANidine (ZANAFLEX) 2 MG tablet Take 1 tablet by mouth 2 (Two) Times a Day. 60 tablet 2    warfarin (COUMADIN) 2 MG tablet TAKE 3 AND ONE HALF TABLET BY MOUTH DAILY 360 tablet 5     No facility-administered encounter medications on file as of 1/24/2024.     ADULT ILLNESSES:   History of malignant lymphoma (situation) ( ICD-10:Z85.72 ;Personal history of non-Hodgkin lymphomas   Adenomatous colonic polyps ( ICD-10:D12.6 ;Benign neoplasm of colon, unspecified   Anemia ( ICD-10:D64.9 ;Anemia, unspecified   Anxiety ( chronic; ICD-10:F41.9 ;Anxiety disorder, unspecified )   Atrial fibrillation ( on chronic anticoagulation; ICD-10:I48.91 ;Unspecified atrial fibrillation )   Degenerative joint disease ( ICD-10:M16.10 ;Unilateral primary osteoarthritis, unspecified hip   Drug intolerance ( oral iron; ICD-10:T45.4x5D ;Adverse effect of iron and its compounds, subsequent encounter )   Hypertension ( ICD-10:I10 ;Essential (primary) hypertension   Iron deficiency anemia ( ICD-10:D50.9 ;Iron deficiency anemia, unspecified   Microscopic hematuria ( ICD-10:R31.29 ;Other microscopic hematuria   Nephrolithiasis ( ICD-10:N20.0 ;Calculus of kidney   Neuropathy ( ICD-10:G62.9 ;Polyneuropathy, unspecified   Orthostatic hypotension ( ICD-10:I95.1 ;Orthostatic hypotension   Perirectal abscess ( deep, severe; ICD-10:K61.1 ;Rectal abscess )   Polycystic kidney disease ( ICD-10:Q61.3 ;Polycystic kidney, unspecified   Vitamin B12 deficiency ( ICD-10:E53.8 ;Deficiency of other specified B group vitamins    SURGERIES:   Punch biopsy of skin lesion, right lower lip, 01/21/2015: 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 Dr. Hatch   Drainage of abscess, recurrent rectal abscess, 06/01/2006    Laparoscopic cholecystectomy and lymph node biopsy   Hip replacement, right, 06/15/2010      ADULT ILLNESSES:  Patient Active Problem List   Diagnosis Code    Constipation K59.00    Gastroesophageal reflux disease without esophagitis K21.9    Lymphoma in remission C85.90    Anxiety F41.9    Chronic hip pain M25.559, G89.29    Permanent atrial fibrillation I48.21    Other insomnia G47.09    Adenomatous polyp of colon D12.6    Tobacco use Z72.0    Chronic anticoagulation Z79.01    PAD (peripheral artery disease) I73.9    Congestive heart failure I50.9    BMI 24.0-24.9, adult Z68.24    Leukopenia D72.819    Iron deficiency anemia D50.9    Neuropathy G62.9     SURGERIES:  Past Surgical History:   Procedure Laterality Date    CHOLECYSTECTOMY      COLONOSCOPY  05/2012    3 yr recall    COLONOSCOPY  09/18/2015    5 yr recall    KIDNEY STONE SURGERY      RECTAL SURGERY      X 2    TOTAL HIP ARTHROPLASTY       HEALTH MAINTENANCE ITEMS:  Health Maintenance Due   Topic Date Due    ZOSTER VACCINE (2 of 2) 02/13/2017    COVID-19 Vaccine (5 - 2023-24 season) 09/01/2023       <no information>  Last Completed Colonoscopy            COLORECTAL CANCER SCREENING (COLONOSCOPY - Every 10 Years) Next due on 12/1/2025 12/01/2015  COLONOSCOPY (Done)    09/18/2015  SCANNED - COLONOSCOPY    05/31/2012  SCANNED - COLONOSCOPY                  Immunization History   Administered Date(s) Administered    COVID-19 (MODERNA) 1st,2nd,3rd Dose Monovalent 02/14/2021, 03/10/2021, 11/02/2021    COVID-19 (MODERNA) Monovalent Original Booster 06/10/2022    FLUAD TRI 65YR+ 11/05/2019    Fluad Quad 65+ 11/05/2019, 10/09/2020    Fluzone High-Dose 65+yrs 10/20/2022, 10/10/2023    Pneumococcal Conjugate 13-Valent (PCV13) 11/05/2019    Pneumococcal Conjugate 20-Valent (PCV20) 12/16/2022     Last Completed Mammogram       This patient has no relevant Health Maintenance data.              FAMILY HISTORY:  Family History   Problem Relation Age of Onset     "Colon cancer Mother     No Known Problems Father     Prostate cancer Brother     No Known Problems Daughter     No Known Problems Son     No Known Problems Maternal Grandmother     No Known Problems Maternal Grandfather     No Known Problems Paternal Grandmother     No Known Problems Paternal Grandfather     Prostate cancer Brother     Colon polyps Neg Hx      SOCIAL HISTORY:  Social History     Socioeconomic History    Marital status:    Tobacco Use    Smoking status: Never    Smokeless tobacco: Never   Vaping Use    Vaping Use: Never used   Substance and Sexual Activity    Alcohol use: No    Drug use: No    Sexual activity: Defer       REVIEW OF SYSTEMS:  PS:  ECOG 1-2.   Constitutional:  His appetite is fair, \"I don't really get hungry but I eat a lot of fruit.\"  Says Marinol did not help.  His energy remains low.  \"I'm turning 84.\"  He is as active as his norm and manages all his ADLs including chores, running errands, and driving.  Says his hip and lower back pains still inhibit his activities inspite of hip replacement.  Says he is still walking \"some with the dogs\" weather permitting.  He has no fevers, chills, or drenching night sweats.  He has lost 1 lb (in addition to 6 lb at his prior visit) since his last visit. Still says he wants to stay around 175-180 pounds.  He sleeps more restfully on temazepam.  Ear/Nose/Throat/Mouth:   The patient reports no ear pains, but has lingering sinus symptoms, but no sore throat, nosebleeds.  No headaches. The patient denies any hoarseness.   Ocular:   The patient reports no eye pain, significant change in visual acuity, double vision, or blurry vision.   Respiratory:  He reports no chronic cough, shortness of breathing, phlegm production, or chest wall pain.  Cardiovascular:  He reports no exertional chest pain, chest pressure, or chest heaviness.  He reports no claudication. He reports no palpitations or symptomatic orthostasis.  Gastrointestinal:  He has no " "pica, dysphagia, nausea, vomiting, postprandial abdominal pain, bloating, cramping, change in bowel habits, or discoloration of the stool.  He has had no rectal bleeding.  He has chronic constipation modulated by daily stool softeners (and Miralax).  Says he is still using supplemental fiber daily.  Says he moves his bowels every 3--4 days.  He has occasional loose stools but no overt diarrhea. Last colonoscopy, 09/2015.  Polyps. Repeat 5 years. Is oral iron intolerant (worsening constipation).  Genitourinary:    He reports no urinary burning, frequency, dribbling, or discoloration.  He reports no difficulty controlling his bladder. \"Still the usual gotta go when I gotta go.\" He reports no need to urinate frequently through the night.  Musculoskeletal:  He continues to have chronic trouble with right hip pain.  \"usual, 24/7.\"  He still uses maintenance Percocet, 1 dose \"every 2-3 days maybe.\"  He reports no unexplained arthralgias, myalgias, but has noted more frequent bouts of nighttime leg cramping.  Extremities:  He reports no trouble with fluid retention or significant leg swelling.  Endocrine:  He reports no problems with excess thirst, excessive urination, vasomotor instability, or unexplained fatigue.  Heme/Lymphatic:  He reports no bleeding, petechial rashes, or swollen glands.  Skin:  He reports no itching, rashes, or lesions which won't heal.  Neuro:  He reports night-time stocking-glove pain in his lower extremities.  \"The gabapentin at night sure helps.\"  Says he had previously been seen by Dr. Kinney previously. \"He didn't find any problems.\"  Says Dr. Rosario says he does not have significant vascular disease requiring surgery.  He reports no loss of consciousness, seizures, fainting spells, or dizziness. He reports no weakness of his face, arms, or legs.  He reports no difficulty with speech.  He reports no tremors.  Psych:  He seems generally satisfied with life.  He reports no depression.  He " "reports no mood swings.      VITAL SIGNS: /64   Pulse (!) 47   Temp 97.8 °F (36.6 °C) (Temporal)   Resp 18   Ht 180.3 cm (71\")   Wt 73.3 kg (161 lb 9.6 oz)   SpO2 98%   BMI 22.54 kg/m²       PHYSICAL EXAMINATION:  General:  He is a pleasant, cooperative, slender, and fairly well-kept elderly male in no distress.  He arrived in the exam room ambulatory. He appears to be his stated age.  His skin color is pale. ECOG 1-2 (from 1)  Head/Neck:  The patient is anicteric and atraumatic.  The neck is supple without evidence of jugular venous distention or cervical adenopathy.  Poor dentition  Eyes:  The pupils are equal, round, and reactive to light.  The extraocular movements are full.  There is no scleral jaundice or erythema.  Chest:  The respiratory efforts are normal and unhindered.  The chest is clear to auscultation.  There are no wheezes, rales, or asymmetry of breath sounds.  The port in the right anterior chest wall has been removed and the site has healed.   Cardiac/Vascular:  The patient has an irregularly irregular cardiac rate and rhythm without murmurs.  The peripheral pulses are equal and full.  Abdomen:  The belly is soft and flat.  There is no rebound or guarding. There is no organomegaly, mass-effect, or tenderness.  Bowel sounds are normal.  Ortho:  There is no overt joint stiffness.  There is subtle foreshortening of height.  There are joint changes which suggest degenerative arthritis.  Extremities:  There is no evidence of cyanosis, clubbing, with trace bipedal edema.  Rheumatologic:  There is no overt evidence of rheumatoid deformities of the hands.  There is no sausaging of the fingers.  There is no sign of active synovitis.  Cutaneous:  Few areas of senile purpura are present on his forearms.  There are no overt rashes, disseminated lesions, purpura, or petechiae.  Lymphatics:  There is no evidence of adenopathy in the cervical, supraclavicular, or axillary areas.  Neurologic:  The " patient is alert, oriented, cooperative, and pleasant.  He is appropriately conversant.  He ambulated into the exam room but declined transfer from chair to exam table.  Gait is slow, stooped and antalgic (back pains) but independent.  There is no overt dysfunction of the motor, sensory, or cerebellar systems.  Psych:  Impatient.  Mood and affect are appropriate for circumstance.  Eye contact is appropriate.        LABS    Lab Results - Last 18 Months   Lab Units 01/08/24  1220 07/03/23  1233 01/04/23  1239 09/01/22  1420   HEMOGLOBIN g/dL 13.7 14.3 13.5 13.3   HEMATOCRIT % 43.8 45.6 43.3 41.9   MCV fL 94.2 96.6 97.5* 96.3   WBC 10*3/mm3 4.66 4.21 4.48 4.13   RDW % 14.4 13.5 13.9 13.9   MPV fL 10.2 9.6 10.1 9.5   PLATELETS 10*3/mm3 220 245 200 226   IMM GRAN % % 0.2 0.2 0.4 0.2   NEUTROS ABS 10*3/mm3 2.81 2.85 2.96 2.65   LYMPHS ABS 10*3/mm3 0.93 0.77 0.81 0.87   MONOS ABS 10*3/mm3 0.63 0.43 0.53 0.35   EOS ABS 10*3/mm3 0.22 0.09 0.11 0.21   BASOS ABS 10*3/mm3 0.06 0.06 0.05 0.04   IMMATURE GRANS (ABS) 10*3/mm3 0.01 0.01 0.02 0.01   NRBC /100 WBC 0.0 0.0 0.0 0.0       Lab Results - Last 18 Months   Lab Units 01/08/24  1220 07/03/23  1233 01/04/23  1239 09/01/22  1420   GLUCOSE mg/dL 98 107* 107* 126*   SODIUM mmol/L 141 138 140 139   POTASSIUM mmol/L 3.5 3.9 4.9 3.8   CO2 mmol/L 31.0* 28.0 30.0* 31.0*   CHLORIDE mmol/L 103 101 103 103   ANION GAP mmol/L 7.0 9.0 7.0 5.0   CREATININE mg/dL 0.84 0.85 0.96 1.00   BUN mg/dL 10 10 17 7*   BUN / CREAT RATIO  11.9 11.8 17.7 7.0   CALCIUM mg/dL 9.2 9.6 9.5 9.6   ALK PHOS U/L 71 70 73 76   TOTAL PROTEIN g/dL 6.6 7.6 8.0 7.7   ALT (SGPT) U/L 8 10 15 8   AST (SGOT) U/L 15 14 19 15   BILIRUBIN mg/dL 0.9 0.6 0.6 0.6   ALBUMIN g/dL 3.7 4.1 4.3 4.30   GLOBULIN gm/dL 2.9 3.5 3.7 3.4       Lab Results - Last 18 Months   Lab Units 01/08/24  1220 07/03/23  1233 01/04/23  1239 09/01/22  1420   LDH U/L 181 181 190 198       Lab Results - Last 18 Months   Lab Units 01/08/24  1220  07/03/23  1233 01/04/23  1239 09/01/22  1420   IRON mcg/dL 98 81 89 86   TIBC mcg/dL 314 335 364 341   IRON SATURATION (TSAT) % 31 24 24 25   FERRITIN ng/mL 329.00 297.20 320.80 280.80   FOLATE ng/mL 7.94 11.80 11.50 15.60       ASSESSMENT:  1.    Intermediate to high-grade lymphoma.  History of. No evidence of disease.  05/16/2005:          Stage IV-E.          Baseline Tumor Maynard:   Mediastinum, left hilum, upper abdomen, and liver (clinically).          Complications of Tumor:   Hyperbilirubinemia. Improved.          Response to Therapy:  Clinical remission per CT scan 05/16/2005, 11/2011 and 02/10/2020.          -02/03/2020-CT chest without contrast.  Impression: Atheromatous disease of the thoracic aorta and coronary arteries.  Mild cardiomegaly.  Borderline pulmonary artery dilatation.  No infiltrate or effusion.          -02/03/2020-CT abdomen and pelvis with contrast.  Impression: Prior cholecystectomy.  Mild prominence of biliary tree felt to be related.  Complex renal cyst on the left is stable in size with septation and calcification.  Dominant mid renal cyst on the right side is slightly larger.  Upper pole of the right renal collecting system is mildly dilated and contains 2 stones measuring 8 to 9 mm.  5 mm nonobstructive stone in the left mid kidney.  Ureter is nondilated.  Prostate mildly enlarged 5.1 cm.  Moderate stool in the colon.  No small bowel distention.          Prognosis:  Fair.          IPI at baseline:  3.  2.    Normocytic anemia from iron deficiency and chronic disease. Last Injectafer, 11/20/2019.    --Hemoglobin, 13.7 on 1/8/24 (prior range:  Hgb 12.9 - 14.7).  3.    Leukopenia, NOS. normal since 02/07/2020   4.    Iron deficiency and oral iron intolerant (constipation).  Repleted since receipt of INFeD  5.    Variable B2M.    --Stable, 3.1 on 01/08/2024 (prior range:  1.6 - 3.3).  6.    Adenomatous colon polyp, colonoscopy 09/18/2015.  7.    Atrial fibrillation on chronic  anticoagulation.  8.    Microscopic hematuria.  Management per Dr. Mayes.  9.    Anxiety, chronic, stable on medications (Celexa).  10.  Lower extremity neuropathy.  Mild.  Unchanged.  11.  Hypertension.  Fair control on Cardizem.  12.  Low B12, intermittent  13.  Peripheral vascular disease.  Arterial studies and has been seen by Dr. Rosario of vascular surgery.              a.    Ultrasound ankle/brachial performed on 07/17/2018 at [facility]. Suspected atherosclerosis in the lower extremity arteries, supported by noncompressible posterior tibialis and dorsalis pedis arteries.              b.    Abdominal aortic ultrasound performed on 08/10/2018 at [facility].  No abdominal aortic aneurysm.  14.  COVID vaccination # 2, 03/10/2021  15.  Poor appetite and weight loss.  Willing to try an appetite stimulant.  Marinol did not help    PLAN:  1.    Apprised of labs from 1/8/24 with low normal WBC, normal diff, stable anemia, normal CMP, normal LDH, normal B2M, repleted serum iron, repleted (> 20%) Fe sat, repleted (>100) ferritin, repleted folate, low (283- Fol[facility] called in) B12.     2.    Previously reviewed annual office follow-up with Dr. Miguel, 07/21/2022.  Has elected to forego any future colonoscopies.  3.    Observe  4.    Continue other currently identified medications.  5.    Continue Coumadin.  PT/INR followed by Dr. Crawford.  6.    Continue ongoing management per primary care physician and other specialists.    7.    Return to office in 6 months with pre-office CBC with differential, iron, TIBC, iron saturation, ferritin, B12, folate, CMP, B2M, and LDH.      I spent ~30 minutes caring for Cabrera on this date of service. This time includes time spent by me in the following activities: preparing for the visit, reviewing tests, performing a medically appropriate examination and/or evaluation, counseling and educating the patient/family/caregiver, ordering medications, tests, or procedures and  documenting information in the medical record.       cc:   Azar Mayes MD          [facility]          Sunita Crawford MD [facility]          Jorge Alberto Rosario MD

## 2024-01-24 ENCOUNTER — OFFICE VISIT (OUTPATIENT)
Dept: ONCOLOGY | Facility: CLINIC | Age: 84
End: 2024-01-24
Payer: MEDICARE

## 2024-01-24 VITALS
OXYGEN SATURATION: 98 % | TEMPERATURE: 97.8 F | HEIGHT: 71 IN | WEIGHT: 161.6 LBS | SYSTOLIC BLOOD PRESSURE: 122 MMHG | HEART RATE: 47 BPM | DIASTOLIC BLOOD PRESSURE: 64 MMHG | BODY MASS INDEX: 22.62 KG/M2 | RESPIRATION RATE: 18 BRPM

## 2024-01-24 DIAGNOSIS — C85.90 LYMPHOMA IN REMISSION: Primary | ICD-10-CM

## 2024-01-25 ENCOUNTER — OFFICE VISIT (OUTPATIENT)
Dept: CARDIOLOGY | Facility: CLINIC | Age: 84
End: 2024-01-25
Payer: MEDICARE

## 2024-01-25 VITALS
OXYGEN SATURATION: 96 % | BODY MASS INDEX: 22.4 KG/M2 | WEIGHT: 160 LBS | HEIGHT: 71 IN | DIASTOLIC BLOOD PRESSURE: 68 MMHG | SYSTOLIC BLOOD PRESSURE: 138 MMHG | HEART RATE: 41 BPM

## 2024-01-25 DIAGNOSIS — I48.21 PERMANENT ATRIAL FIBRILLATION: Primary | ICD-10-CM

## 2024-01-25 DIAGNOSIS — R60.0 EDEMA OF EXTREMITIES: ICD-10-CM

## 2024-01-25 PROCEDURE — 1159F MED LIST DOCD IN RCRD: CPT | Performed by: INTERNAL MEDICINE

## 2024-01-25 PROCEDURE — 1160F RVW MEDS BY RX/DR IN RCRD: CPT | Performed by: INTERNAL MEDICINE

## 2024-01-25 PROCEDURE — 93000 ELECTROCARDIOGRAM COMPLETE: CPT | Performed by: INTERNAL MEDICINE

## 2024-01-25 PROCEDURE — 99214 OFFICE O/P EST MOD 30 MIN: CPT | Performed by: INTERNAL MEDICINE

## 2024-01-25 NOTE — PROGRESS NOTES
Subjective:     Encounter Date:01/25/2024      Patient ID: Cabrera Avina is a 83 y.o. male with chronic atrial fibrillation, chronically anticoagulated with warfarin, also on chronic digoxin therapy, presenting today for follow-up.    Chief Complaint: Here for follow-up of atrial fibrillation    History of Present Illness    This patient presents today for follow-up of atrial fibrillation.  In regards to atrial fibrillation, he voices no complaints.  He denies having palpitations, lightheadedness, dizziness or syncope.  He remains anticoagulated with warfarin.  Most recent levels are noted below.  He denies having any bleeding issues.  He also remains on digoxin.  He cannot recall when his last digoxin level was checked but he has not experienced any side effects from medication to his knowledge.  The patient denies having chest discomfort.  He describes breathing as being stable.  He has noted over the past few months that he has had significant right lower extremity edema.  The patient notes a prior hip replacement on the right side but no other significant surgeries in the right lower extremity.  In addition, he notes being compliant with warfarin with INR values generally reasonably well-controlled.  He denies having any pain, significant redness or warmth, skin breakdown, etc.  He has noted no drainage from the area.  He denies having orthopnea, PND.  He does have some left lower extremity ankle edema but his right lower extremity edema goes up to about the level of the knee.  He denies having cough, wheezing.  Otherwise, he says that he is feeling fine just simply noting that he has had some swelling.      Current Outpatient Medications:     aspirin 81 MG EC tablet, Take 1 tablet by mouth Daily., Disp: 90 tablet, Rfl: 3    digoxin (LANOXIN) 250 MCG tablet, Take 1 tablet by mouth Daily., Disp: 90 tablet, Rfl: 3    dilTIAZem CD (CARDIZEM CD) 240 MG 24 hr capsule, Take 1 capsule by mouth Daily., Disp:  90 capsule, Rfl: 3    gabapentin (NEURONTIN) 100 MG capsule, Take 1 capsule by mouth 3 (Three) Times a Day., Disp: 90 capsule, Rfl: 2    guaiFENesin-codeine (GUAIFENESIN AC) 100-10 MG/5ML liquid, Take 5 mL by mouth 3 (Three) Times a Day As Needed for Cough., Disp: 118 mL, Rfl: 0    lubiprostone (Amitiza) 24 MCG capsule, Take 1 capsule by mouth 2 (Two) Times a Day With Meals., Disp: 60 capsule, Rfl: 2    oxyCODONE-acetaminophen (PERCOCET)  MG per tablet, Take 1 tablet by mouth Every 6 (Six) Hours As Needed for Severe Pain. Must last 30 days, Disp: 100 tablet, Rfl: 0    temazepam (RESTORIL) 15 MG capsule, Take 1 capsule by mouth At Night As Needed for Sleep., Disp: 30 capsule, Rfl: 2    tiZANidine (ZANAFLEX) 2 MG tablet, Take 1 tablet by mouth 2 (Two) Times a Day., Disp: 60 tablet, Rfl: 2    warfarin (COUMADIN) 2 MG tablet, TAKE 3 AND ONE HALF TABLET BY MOUTH DAILY, Disp: 360 tablet, Rfl: 5    No Known Allergies    Social History     Tobacco Use    Smoking status: Never    Smokeless tobacco: Never   Substance Use Topics    Alcohol use: No     Review of Systems   Constitutional: Negative for fever and weight loss.   Cardiovascular:  Positive for leg swelling. Negative for chest pain, dyspnea on exertion, orthopnea, palpitations, paroxysmal nocturnal dyspnea and syncope.   Respiratory:  Negative for cough, hemoptysis, shortness of breath and wheezing.    Hematologic/Lymphatic: Negative for bleeding problem. Does not bruise/bleed easily.   Gastrointestinal:  Negative for abdominal pain, hematemesis, hematochezia, melena, nausea and vomiting.   Genitourinary:  Negative for hematuria.   Neurological:  Negative for dizziness, headaches and loss of balance.       ECG 12 Lead    Date/Time: 1/25/2024 2:46 PM  Performed by: Arnaldo Colin MD    Authorized by: Arnaldo Colin MD  Comparison: compared with previous ECG from 1/25/2023  Similar to previous ECG  Rhythm: atrial fibrillation  Ectopy: multifocal  "PVCs  Rate: normal  BPM: 65  Conduction: non-specific intraventricular conduction delay  QRS axis: left  Other findings: non-specific ST-T wave changes and poor R wave progression    Clinical impression: abnormal EKG           Objective:     Vitals reviewed.   Constitutional:       General: Not in acute distress.     Appearance: Well-developed and not in distress. Chronically ill-appearing.   Eyes:      Extraocular Movements: Extraocular movements intact.   HENT:      Head: Normocephalic and atraumatic.   Neck:      Vascular: No JVD.   Pulmonary:      Effort: Pulmonary effort is normal.      Breath sounds: Normal breath sounds. No wheezing. No rhonchi. No rales.   Cardiovascular:      Normal rate. Occasional ectopic beats. Irregularly irregular rhythm.      Murmurs: There is no murmur.      No gallop.    Pulses:     Intact distal pulses.   Edema:     Peripheral edema present.     Pretibial: 2+ edema of the right pretibial area.     Ankle: bilateral 1+ edema of the ankle.  Abdominal:      General: Bowel sounds are normal. There is no distension.      Palpations: Abdomen is soft.      Tenderness: There is no abdominal tenderness.   Skin:     General: Skin is warm and dry. There is no cyanosis.      Findings: No erythema or rash.   Neurological:      Mental Status: Alert and oriented to person, place, and time.      Cranial Nerves: No cranial nerve deficit.   Psychiatric:         Mood and Affect: Mood is anxious.       /68   Pulse (!) 41   Ht 180.3 cm (71\")   Wt 72.6 kg (160 lb)   SpO2 96%   BMI 22.32 kg/m²     Data/Lab Review:     Lab Results   Component Value Date    INR 1.5 (A) 01/12/2024    INR 3.5 (A) 01/05/2024    INR 1.6 (A) 12/18/2023    PROTIME 18.4 01/12/2024    PROTIME 41.7 01/05/2024    PROTIME 19.7 12/18/2023     Lab Results   Component Value Date    WBC 4.66 01/08/2024    HGB 13.7 01/08/2024    HCT 43.8 01/08/2024    MCV 94.2 01/08/2024     01/08/2024     Lab Results   Component Value " Date    GLUCOSE 98 01/08/2024    CALCIUM 9.2 01/08/2024     01/08/2024    K 3.5 01/08/2024    CO2 31.0 (H) 01/08/2024     01/08/2024    BUN 10 01/08/2024    CREATININE 0.84 01/08/2024    EGFR 87.1 01/08/2024    BCR 11.9 01/08/2024    ANIONGAP 7.0 01/08/2024     Lab Results   Component Value Date    DIGOXIN 2.70 (C) 02/18/2014         Assessment:          Diagnosis Plan   1. Permanent atrial fibrillation  ECG 12 Lead    Digoxin Level      2. Edema of extremities  Adult Transthoracic Echo Complete W/ Cont if Necessary Per Protocol             Plan:       1.  Permanent atrial fibrillation: The patient's heart rate remains well-controlled.  He does have PVCs noted on his ECG today which were also noted last year.  He is asymptomatic in this regard with no reports of palpitations, lightheadedness, dizziness, syncope, etc.  He has not had a digoxin level checked in quite some time, therefore we will order a level at this time.  He says that he would prefer to have this drawn at [place].  I have encouraged him to follow through with this order and have the labs checked.  Otherwise, he remains anticoagulated with warfarin.  Levels are followed by his primary care provider.    2.  Edema of extremities: The patient anticoagulated, this seemingly would place him at a lower risk of DVT.  There is some swelling in both legs but definitely right greater than left.  We will order an echocardiogram to assure stable ventricular function.  I do not necessarily see a reason for an ultrasound of his lower extremity at this time given the fact that he is anticoagulated.  Certainly this could be considered if no other obvious cause identified.  Also of note, while this extremity is swollen, I do not see any signs of active infection today.    The patient will follow-up with us in 1 year pending the results of his digoxin level and echocardiogram.

## 2024-01-30 DIAGNOSIS — I48.21 PERMANENT ATRIAL FIBRILLATION: ICD-10-CM

## 2024-01-31 ENCOUNTER — CLINICAL SUPPORT (OUTPATIENT)
Dept: FAMILY MEDICINE CLINIC | Facility: CLINIC | Age: 84
End: 2024-01-31
Payer: MEDICARE

## 2024-01-31 DIAGNOSIS — I48.21 PERMANENT ATRIAL FIBRILLATION: Primary | ICD-10-CM

## 2024-01-31 LAB
INR PPP: 2 (ref 0.9–1.1)
PROTHROMBIN TIME: 24.2 SECONDS

## 2024-02-01 LAB — DIGOXIN SERPL-MCNC: 1.5 NG/ML (ref 0.6–1.2)

## 2024-02-05 ENCOUNTER — TELEPHONE (OUTPATIENT)
Dept: ONCOLOGY | Facility: CLINIC | Age: 84
End: 2024-02-05
Payer: MEDICARE

## 2024-02-05 ENCOUNTER — OFFICE VISIT (OUTPATIENT)
Dept: FAMILY MEDICINE CLINIC | Facility: CLINIC | Age: 84
End: 2024-02-05
Payer: MEDICARE

## 2024-02-05 VITALS
BODY MASS INDEX: 21.7 KG/M2 | WEIGHT: 155 LBS | HEIGHT: 71 IN | DIASTOLIC BLOOD PRESSURE: 82 MMHG | TEMPERATURE: 98.7 F | HEART RATE: 64 BPM | OXYGEN SATURATION: 99 % | RESPIRATION RATE: 22 BRPM | SYSTOLIC BLOOD PRESSURE: 120 MMHG

## 2024-02-05 DIAGNOSIS — E53.8 LOW FOLATE: Primary | ICD-10-CM

## 2024-02-05 DIAGNOSIS — G89.29 CHRONIC PAIN OF RIGHT HIP: ICD-10-CM

## 2024-02-05 DIAGNOSIS — F41.9 ANXIETY: ICD-10-CM

## 2024-02-05 DIAGNOSIS — G47.09 OTHER INSOMNIA: ICD-10-CM

## 2024-02-05 DIAGNOSIS — K59.04 CHRONIC IDIOPATHIC CONSTIPATION: ICD-10-CM

## 2024-02-05 DIAGNOSIS — I48.21 PERMANENT ATRIAL FIBRILLATION: Primary | ICD-10-CM

## 2024-02-05 DIAGNOSIS — M25.551 CHRONIC PAIN OF RIGHT HIP: ICD-10-CM

## 2024-02-05 LAB
ALBUMIN SERPL-MCNC: 3.6 G/DL (ref 3.5–5.2)
ALBUMIN/GLOB SERPL: 1.6 G/DL
ALP SERPL-CCNC: 68 U/L (ref 39–117)
ALT SERPL-CCNC: 11 U/L (ref 1–41)
AST SERPL-CCNC: 14 U/L (ref 1–40)
B2 MICROGLOB SERPL-MCNC: 2.6 MG/L (ref 0.6–2.4)
BASOPHILS # BLD AUTO: 0.04 10*3/MM3 (ref 0–0.2)
BASOPHILS NFR BLD AUTO: 1 % (ref 0–1.5)
BILIRUB SERPL-MCNC: 0.7 MG/DL (ref 0–1.2)
BUN SERPL-MCNC: 9 MG/DL (ref 8–23)
BUN/CREAT SERPL: 8.8 (ref 7–25)
CALCIUM SERPL-MCNC: 8.9 MG/DL (ref 8.6–10.5)
CHLORIDE SERPL-SCNC: 106 MMOL/L (ref 98–107)
CO2 SERPL-SCNC: 29.7 MMOL/L (ref 22–29)
CREAT SERPL-MCNC: 1.02 MG/DL (ref 0.76–1.27)
EGFRCR SERPLBLD CKD-EPI 2021: 73.4 ML/MIN/1.73
EOSINOPHIL # BLD AUTO: 0.06 10*3/MM3 (ref 0–0.4)
EOSINOPHIL NFR BLD AUTO: 1.4 % (ref 0.3–6.2)
ERYTHROCYTE [DISTWIDTH] IN BLOOD BY AUTOMATED COUNT: 13 % (ref 12.3–15.4)
FERRITIN SERPL-MCNC: 300 NG/ML (ref 30–400)
FOLATE SERPL-MCNC: 7.6 NG/ML (ref 4.78–24.2)
GLOBULIN SER CALC-MCNC: 2.3 GM/DL
GLUCOSE SERPL-MCNC: 105 MG/DL (ref 65–99)
HCT VFR BLD AUTO: 44.1 % (ref 37.5–51)
HGB BLD-MCNC: 14.1 G/DL (ref 13–17.7)
IMM GRANULOCYTES # BLD AUTO: 0.01 10*3/MM3 (ref 0–0.05)
IMM GRANULOCYTES NFR BLD AUTO: 0.2 % (ref 0–0.5)
INR PPP: 1.7 (ref 0.9–1.1)
IRON SATN MFR SERPL: 33 % (ref 20–50)
IRON SERPL-MCNC: 107 MCG/DL (ref 59–158)
LDH SERPL L TO P-CCNC: 209 IU/L (ref 121–224)
LDH1 CFR SERPL ELPH: 17 % (ref 17–32)
LDH2 CFR SERPL ELPH: 34 % (ref 25–40)
LDH3 CFR SERPL ELPH: 26 % (ref 17–27)
LDH4 CFR SERPL ELPH: 11 % (ref 5–13)
LDH5 CFR SERPL ELPH: 12 % (ref 4–20)
LYMPHOCYTES # BLD AUTO: 0.56 10*3/MM3 (ref 0.7–3.1)
LYMPHOCYTES NFR BLD AUTO: 13.5 % (ref 19.6–45.3)
MCH RBC QN AUTO: 29.2 PG (ref 26.6–33)
MCHC RBC AUTO-ENTMCNC: 32 G/DL (ref 31.5–35.7)
MCV RBC AUTO: 91.3 FL (ref 79–97)
MONOCYTES # BLD AUTO: 0.41 10*3/MM3 (ref 0.1–0.9)
MONOCYTES NFR BLD AUTO: 9.9 % (ref 5–12)
NEUTROPHILS # BLD AUTO: 3.07 10*3/MM3 (ref 1.7–7)
NEUTROPHILS NFR BLD AUTO: 74 % (ref 42.7–76)
NRBC BLD AUTO-RTO: 0 /100 WBC (ref 0–0.2)
PLATELET # BLD AUTO: 238 10*3/MM3 (ref 140–450)
POTASSIUM SERPL-SCNC: 3.5 MMOL/L (ref 3.5–5.2)
PROT SERPL-MCNC: 5.9 G/DL (ref 6–8.5)
PROTHROMBIN TIME: 20.7 SECONDS
RBC # BLD AUTO: 4.83 10*6/MM3 (ref 4.14–5.8)
SODIUM SERPL-SCNC: 142 MMOL/L (ref 136–145)
TIBC SERPL-MCNC: 323 MCG/DL
UIBC SERPL-MCNC: 216 MCG/DL (ref 112–346)
VIT B12 SERPL-MCNC: 261 PG/ML (ref 211–946)
WBC # BLD AUTO: 4.15 10*3/MM3 (ref 3.4–10.8)

## 2024-02-05 RX ORDER — CYANOCOBALAMIN/FOLIC AC/VIT B6 1-2.5-25MG
1 TABLET ORAL DAILY
Qty: 30 TABLET | Refills: 0 | Status: SHIPPED | OUTPATIENT
Start: 2024-02-05

## 2024-02-05 RX ORDER — OXYCODONE AND ACETAMINOPHEN 10; 325 MG/1; MG/1
1 TABLET ORAL EVERY 6 HOURS PRN
Qty: 100 TABLET | Refills: 0 | Status: SHIPPED | OUTPATIENT
Start: 2024-02-05

## 2024-02-05 RX ORDER — LUBIPROSTONE 24 UG/1
24 CAPSULE ORAL 2 TIMES DAILY WITH MEALS
Qty: 60 CAPSULE | Refills: 2 | Status: SHIPPED | OUTPATIENT
Start: 2024-02-05

## 2024-02-05 NOTE — TELEPHONE ENCOUNTER
----- Message from Paul Rachel MD sent at 2/5/2024  6:52 AM CST -----  [id number] call in [facility] qd#[phone]  ----- Message -----  From: Devang Villarreal Results In  Sent: 2/5/2024   6:07 AM CST  To: Paul Rachel MD

## 2024-02-05 NOTE — PROGRESS NOTES
"Subjective cc: chronic hip pain  Cabrera Avina is a 83 y.o. male who presents for chronic hip pain.    He needs a refill on his chronic pain medication- he reports that he is getting relief from it and helping him function.   He has a fib but denies any heart palpitations - he is taking coumadin - INR checked in office today and pt denies any bleeding.   Anxiety is at baseline with current medication   He is still following with oncology.      History of Present Illness     The following portions of the patient's history were reviewed and updated as appropriate: allergies, current medications, past family history, past medical history, past social history, past surgical history, and problem list.        Review of Systems    Objective   Blood pressure 120/82, pulse 64, temperature 98.7 °F (37.1 °C), resp. rate 22, height 180.3 cm (70.98\"), weight 70.3 kg (155 lb), SpO2 99%.  Physical Exam  Vitals and nursing note reviewed.   Constitutional:       General: He is not in acute distress.     Appearance: He is well-developed. He is ill-appearing. He is not diaphoretic.   HENT:      Head: Normocephalic and atraumatic.      Right Ear: External ear normal.      Left Ear: External ear normal.      Nose: Nose normal.   Eyes:      General:         Right eye: No discharge.         Left eye: No discharge.      Conjunctiva/sclera: Conjunctivae normal.   Neck:      Thyroid: No thyromegaly.      Trachea: No tracheal deviation.   Cardiovascular:      Rate and Rhythm: Normal rate. Rhythm irregular.      Pulses: Normal pulses.   Pulmonary:      Effort: Pulmonary effort is normal. No respiratory distress.      Breath sounds: Normal breath sounds. No stridor. No wheezing.   Chest:      Chest wall: No tenderness.   Abdominal:      General: There is no distension.      Palpations: Abdomen is soft.      Tenderness: There is no abdominal tenderness.   Musculoskeletal:         General: Tenderness present.      Cervical back: Normal " range of motion.   Lymphadenopathy:      Cervical: No cervical adenopathy.   Skin:     General: Skin is warm and dry.      Findings: Bruising present.   Neurological:      Mental Status: He is alert and oriented to person, place, and time. Mental status is at baseline.      Motor: Weakness present. No abnormal muscle tone.      Gait: Gait abnormal.   Psychiatric:         Behavior: Behavior normal.         Thought Content: Thought content normal.         Judgment: Judgment normal.         BMI is within normal parameters. No other follow-up for BMI required.       Assessment & Plan   Problems Addressed this Visit          Cardiac and Vasculature    Permanent atrial fibrillation - Primary    Relevant Orders    POC Protime / INR (Completed)       Gastrointestinal Abdominal     Constipation    Relevant Medications    lubiprostone (Amitiza) 24 MCG capsule       Mental Health    Anxiety       Musculoskeletal and Injuries    Chronic hip pain    Relevant Medications    oxyCODONE-acetaminophen (PERCOCET)  MG per tablet       Sleep    Other insomnia     Diagnoses         Codes Comments    Permanent atrial fibrillation    -  Primary ICD-10-CM: I48.21  ICD-9-CM: 427.31     Chronic pain of right hip     ICD-10-CM: M25.551, G89.29  ICD-9-CM: 719.45, 338.29     Chronic idiopathic constipation     ICD-10-CM: K59.04  ICD-9-CM: 564.00     Other insomnia     ICD-10-CM: G47.09  ICD-9-CM: 780.52     Anxiety     ICD-10-CM: F41.9  ICD-9-CM: 300.00           PLAN:     #1 anxiety: chronic, controlled, continue on current medication    #2 insomnia: chronic, controlled, continue on current medication     #3 constipation: chronic, controlled, continue on current medication     #4 chronic hp pain: chronic, stable, pt continues to have a lot of pain but reports the medication helps him to function, controlled contract in chart, robbie OROZCO reviewed and appropriate     #5 a fib: chronic, controlled, continue on current medication, INR  checked, advised to monitor for any signs of bleeding           This document has been electronically signed by Sunita Crawford MD on March 2, 2024 18:32 CST

## 2024-02-23 ENCOUNTER — HOSPITAL ENCOUNTER (OUTPATIENT)
Dept: CARDIOLOGY | Facility: HOSPITAL | Age: 84
Discharge: HOME | End: 2024-02-23
Payer: MEDICARE

## 2024-02-23 VITALS
WEIGHT: 155 LBS | DIASTOLIC BLOOD PRESSURE: 82 MMHG | BODY MASS INDEX: 22.19 KG/M2 | SYSTOLIC BLOOD PRESSURE: 120 MMHG | HEIGHT: 70 IN

## 2024-02-23 DIAGNOSIS — R60.0 EDEMA OF EXTREMITIES: ICD-10-CM

## 2024-02-23 LAB
BH CV ECHO MEAS - AO MAX PG: 11.6 MMHG
BH CV ECHO MEAS - AO MEAN PG: 6 MMHG
BH CV ECHO MEAS - AO ROOT DIAM: 3.4 CM
BH CV ECHO MEAS - AO V2 MAX: 170 CM/SEC
BH CV ECHO MEAS - AO V2 VTI: 32.3 CM
BH CV ECHO MEAS - AVA(I,D): 3.7 CM2
BH CV ECHO MEAS - EDV(CUBED): 130.3 ML
BH CV ECHO MEAS - EDV(MOD-SP2): 149 ML
BH CV ECHO MEAS - EDV(MOD-SP4): 153 ML
BH CV ECHO MEAS - EF(MOD-BP): 49.9 %
BH CV ECHO MEAS - EF(MOD-SP2): 45.2 %
BH CV ECHO MEAS - EF(MOD-SP4): 55.9 %
BH CV ECHO MEAS - ESV(CUBED): 74.1 ML
BH CV ECHO MEAS - ESV(MOD-SP2): 81.6 ML
BH CV ECHO MEAS - ESV(MOD-SP4): 67.4 ML
BH CV ECHO MEAS - FS: 17.2 %
BH CV ECHO MEAS - IVS/LVPW: 0.74 CM
BH CV ECHO MEAS - IVSD: 0.92 CM
BH CV ECHO MEAS - LA DIMENSION: 4.8 CM
BH CV ECHO MEAS - LAT PEAK E' VEL: 15.2 CM/SEC
BH CV ECHO MEAS - LV DIASTOLIC VOL/BSA (35-75): 81.7 CM2
BH CV ECHO MEAS - LV MASS(C)D: 208.2 GRAMS
BH CV ECHO MEAS - LV MAX PG: 4.2 MMHG
BH CV ECHO MEAS - LV MEAN PG: 2 MMHG
BH CV ECHO MEAS - LV SYSTOLIC VOL/BSA (12-30): 36 CM2
BH CV ECHO MEAS - LV V1 MAX: 103 CM/SEC
BH CV ECHO MEAS - LV V1 VTI: 24.5 CM
BH CV ECHO MEAS - LVIDD: 5.1 CM
BH CV ECHO MEAS - LVIDS: 4.2 CM
BH CV ECHO MEAS - LVOT AREA: 4.9 CM2
BH CV ECHO MEAS - LVOT DIAM: 2.5 CM
BH CV ECHO MEAS - LVPWD: 1.25 CM
BH CV ECHO MEAS - MED PEAK E' VEL: 7.2 CM/SEC
BH CV ECHO MEAS - MR MAX PG: 103.2 MMHG
BH CV ECHO MEAS - MR MAX VEL: 508 CM/SEC
BH CV ECHO MEAS - MR MEAN PG: 73 MMHG
BH CV ECHO MEAS - MR MEAN VEL: 414 CM/SEC
BH CV ECHO MEAS - MR VTI: 170 CM
BH CV ECHO MEAS - MV A MAX VEL: 38.8 CM/SEC
BH CV ECHO MEAS - MV DEC TIME: 0.18 SEC
BH CV ECHO MEAS - MV E MAX VEL: 76.6 CM/SEC
BH CV ECHO MEAS - MV E/A: 1.97
BH CV ECHO MEAS - PA V2 MAX: 71.7 CM/SEC
BH CV ECHO MEAS - PI END-D VEL: 55.7 CM/SEC
BH CV ECHO MEAS - RAP SYSTOLE: 8 MMHG
BH CV ECHO MEAS - RVSP: 44.2 MMHG
BH CV ECHO MEAS - SI(MOD-SP2): 36 ML/M2
BH CV ECHO MEAS - SI(MOD-SP4): 45.7 ML/M2
BH CV ECHO MEAS - SV(LVOT): 120.3 ML
BH CV ECHO MEAS - SV(MOD-SP2): 67.4 ML
BH CV ECHO MEAS - SV(MOD-SP4): 85.6 ML
BH CV ECHO MEAS - TAPSE (>1.6): 1.02 CM
BH CV ECHO MEAS - TR MAX PG: 36.2 MMHG
BH CV ECHO MEAS - TR MAX VEL: 301 CM/SEC
BH CV ECHO MEASUREMENTS AVERAGE E/E' RATIO: 6.84
BH CV XLRA - TDI S': 17 CM/SEC
LEFT ATRIUM VOLUME INDEX: 41.1 ML/M2

## 2024-02-23 PROCEDURE — 93306 TTE W/DOPPLER COMPLETE: CPT

## 2024-02-23 PROCEDURE — 93356 MYOCRD STRAIN IMG SPCKL TRCK: CPT

## 2024-02-28 ENCOUNTER — TELEPHONE (OUTPATIENT)
Dept: CARDIOLOGY | Facility: CLINIC | Age: 84
End: 2024-02-28
Payer: MEDICARE

## 2024-02-28 NOTE — TELEPHONE ENCOUNTER
"Caller: Cabrera Avina \"LO\"    Relationship: Self    Best call back number: [phone]    Caller requesting test results: PATIENT    What test was performed: ECHO    When was the test performed: 2/23/24    Where was the test performed: [facility]    Additional notes: PT IS CALLING TO SEE IF THE TEST RESULTS FOR THE ECHO COMPLETED ON 2/23/24 RESULTS CAME BACK YET AND WHEN DR. LARA WILL BE CALLING  "

## 2024-03-01 ENCOUNTER — TELEPHONE (OUTPATIENT)
Dept: CARDIOLOGY | Facility: CLINIC | Age: 84
End: 2024-03-01
Payer: MEDICARE

## 2024-03-01 NOTE — TELEPHONE ENCOUNTER
"    Caller: Cabrera Avina \"LO\"    Relationship: Self    Best call back number: [phone]    Caller requesting test results: PATIENT    What test was performed: ECHO    When was the test performed: 2.27.24    [facility]     Additional notes: PATIENT IS CALLING TO INQUIRE THE STATUS OF THE ECHO. PLEASE CALL PATIENT.        "

## 2024-03-01 NOTE — TELEPHONE ENCOUNTER
Spoke with patient and let him know when Dr Colin lets me know what he results are I will give him a call. He said it has been a week and he would like to know what they are.

## 2024-03-02 DIAGNOSIS — G62.9 NEUROPATHY: ICD-10-CM

## 2024-03-04 RX ORDER — GABAPENTIN 100 MG/1
100 CAPSULE ORAL 3 TIMES DAILY
Qty: 90 CAPSULE | Refills: 2 | OUTPATIENT
Start: 2024-03-04

## 2024-03-04 NOTE — TELEPHONE ENCOUNTER
Refill too soon  Rx Refill Note  Requested Prescriptions     Pending Prescriptions Disp Refills    gabapentin (NEURONTIN) 100 MG capsule [Pharmacy Med Name: GABAPENTIN 100 MG CAPS 100 Capsule] 90 capsule 2     Sig: Take 1 capsule by mouth 3 (Three) Times a Day.      Last office visit with prescribing clinician: 2/5/2024   Last telemedicine visit with prescribing clinician: Visit date not found   Next office visit with prescribing clinician: 3/5/2024                         Would you like a call back once the refill request has been completed: [] Yes [] No    If the office needs to give you a call back, can they leave a voicemail: [] Yes [] No    Sarika Walker MA  03/04/24, 08:38 CST

## 2024-03-05 ENCOUNTER — OFFICE VISIT (OUTPATIENT)
Dept: FAMILY MEDICINE CLINIC | Facility: CLINIC | Age: 84
End: 2024-03-05
Payer: MEDICARE

## 2024-03-05 VITALS
TEMPERATURE: 98.2 F | HEART RATE: 50 BPM | RESPIRATION RATE: 18 BRPM | HEIGHT: 70 IN | OXYGEN SATURATION: 99 % | SYSTOLIC BLOOD PRESSURE: 138 MMHG | WEIGHT: 159 LBS | DIASTOLIC BLOOD PRESSURE: 70 MMHG | BODY MASS INDEX: 22.76 KG/M2

## 2024-03-05 DIAGNOSIS — M25.551 CHRONIC PAIN OF RIGHT HIP: ICD-10-CM

## 2024-03-05 DIAGNOSIS — Z79.01 CHRONIC ANTICOAGULATION: ICD-10-CM

## 2024-03-05 DIAGNOSIS — G47.09 OTHER INSOMNIA: ICD-10-CM

## 2024-03-05 DIAGNOSIS — I48.21 PERMANENT ATRIAL FIBRILLATION: Primary | ICD-10-CM

## 2024-03-05 DIAGNOSIS — G89.29 CHRONIC PAIN OF RIGHT HIP: ICD-10-CM

## 2024-03-05 LAB
INR PPP: 1.5 (ref 0.9–1.1)
PROTHROMBIN TIME: 18.1 SECONDS

## 2024-03-05 RX ORDER — OXYCODONE AND ACETAMINOPHEN 10; 325 MG/1; MG/1
1 TABLET ORAL EVERY 6 HOURS PRN
Qty: 100 TABLET | Refills: 0 | Status: SHIPPED | OUTPATIENT
Start: 2024-03-05

## 2024-03-05 RX ORDER — FUROSEMIDE 20 MG/1
20 TABLET ORAL DAILY
Qty: 30 TABLET | Refills: 2 | Status: SHIPPED | OUTPATIENT
Start: 2024-03-05

## 2024-03-05 NOTE — PROGRESS NOTES
"Subjective   Cabrera Avina is a 83 y.o. male who presents for evaluation of multiple medical concerns.     History of Present Illness     Atrial fibrillation  He is taking Coumadin 5 mg.    Biatrial enlargement  The patient had an echocardiogram done on 02/ 23/2024. He was told that he has a leakage, but it was not significant enough to be alarmed right now. Cardiology did not make any adjustments to his digoxin. He has a follow-up appointment with Dr. Colin in 1 year.     Chronic hip pain  He takes a muscle relaxer, gabapentin, and pain medication.      Amitiza  Coumadin  Cardizem  Digoxin  Gabapentin  Temazepam    A review of systems was performed and positive findings are noted in the HPI.      The following portions of the patient's history were reviewed and updated as appropriate: allergies, current medications, past family history, past medical history, past social history, past surgical history, and problem list        Review of Systems    Objective   /70 (BP Location: Right arm, Patient Position: Sitting, Cuff Size: Adult)   Temp 98.2 °F (36.8 °C) (Oral)   Resp 18   Ht 177.8 cm (70\")   Wt 72.1 kg (159 lb)   BMI 22.81 kg/m²   Physical Exam    BMI is within normal parameters. No other follow-up for BMI required.     Laboratory Studies  INR is low at 1.5 today.    Labs from 01/2024 showed CMP was all good. CBC was all good.    Imaging  Echocardiogram was reviewed with the patient.  His ejection fraction is at the lower end of normal. Both ventricles are mildly enlarged. The atria are significantly enlarged. He has moderate mitral regurgitation    Assessment & Plan   Problems Addressed this Visit          Cardiac and Vasculature    Permanent atrial fibrillation - Primary    Relevant Orders    POC Protime / INR (Completed)       Coag and Thromboembolic    Chronic anticoagulation       Musculoskeletal and Injuries    Chronic hip pain    Relevant Medications    oxyCODONE-acetaminophen " (PERCOCET)  MG per tablet       Sleep    Other insomnia     Diagnoses         Codes Comments    Permanent atrial fibrillation    -  Primary ICD-10-CM: I48.21  ICD-9-CM: 427.31     Chronic anticoagulation     ICD-10-CM: Z79.01  ICD-9-CM: V58.61     Other insomnia     ICD-10-CM: G47.09  ICD-9-CM: 780.52     Chronic pain of right hip     ICD-10-CM: M25.551, G89.29  ICD-9-CM: 719.45, 338.29             1. Chronic hip pain: Chronic and stable.   He will continue on existing prescription.   Control contract in the chart.   SIMBA reviewed and appropriate.   UDS up to date.   Refill given.    2. Atrial fibrillation.  He is on chronic anticoagulation.   I reviewed his INR today.   We will increase his Coumadin slightly.   The patient will return in 1 to 2 weeks for repeat INR.    3. Insomnia: Chronic, controlled.  Continue on existing prescriptions.    4. Anxiety: Chronic and stable.   He will continue on existing prescription as well.    Follow-up: Return in 1 month for reevaluation.       Transcribed from ambient dictation for Sunita Crawford MD by Angella Fatima.  03/05/24   12:27 CST    Patient or patient representative verbalized consent to the visit recording.  I have personally performed the services described in this document as transcribed by the above individual, and it is both accurate and complete.          This document has been electronically signed by Sunita Crawford MD on March 20, 2024 08:57 CDT

## 2024-03-12 ENCOUNTER — CLINICAL SUPPORT (OUTPATIENT)
Dept: FAMILY MEDICINE CLINIC | Facility: CLINIC | Age: 84
End: 2024-03-12
Payer: MEDICARE

## 2024-03-12 DIAGNOSIS — Z79.01 CHRONIC ANTICOAGULATION: Primary | ICD-10-CM

## 2024-03-12 LAB — INR PPP: 2.3 (ref 0.9–1.1)

## 2024-03-12 PROCEDURE — 36415 COLL VENOUS BLD VENIPUNCTURE: CPT | Performed by: FAMILY MEDICINE

## 2024-03-12 PROCEDURE — 36416 COLLJ CAPILLARY BLOOD SPEC: CPT | Performed by: FAMILY MEDICINE

## 2024-03-12 PROCEDURE — 85610 PROTHROMBIN TIME: CPT | Performed by: FAMILY MEDICINE

## 2024-03-15 DIAGNOSIS — M54.2 CERVICAL PAIN: ICD-10-CM

## 2024-03-15 DIAGNOSIS — G89.29 CHRONIC NECK PAIN: ICD-10-CM

## 2024-03-15 DIAGNOSIS — M54.2 CHRONIC NECK PAIN: ICD-10-CM

## 2024-03-15 RX ORDER — TIZANIDINE 2 MG/1
2 TABLET ORAL 2 TIMES DAILY
Qty: 60 TABLET | Refills: 2 | Status: SHIPPED | OUTPATIENT
Start: 2024-03-15

## 2024-03-15 NOTE — TELEPHONE ENCOUNTER
Rx Refill Note  Requested Prescriptions     Pending Prescriptions Disp Refills    tiZANidine (ZANAFLEX) 2 MG tablet [Pharmacy Med Name: TIZANIDINE HCL 2 MG TAB 2 Tablet] 60 tablet 2     Sig: Take 1 tablet by mouth 2 (Two) Times a Day.      Last office visit with prescribing clinician: 3/5/2024   Last telemedicine visit with prescribing clinician: Visit date not found   Next office visit with prescribing clinician: 4/9/2024                         Would you like a call back once the refill request has been completed: [] Yes [] No    If the office needs to give you a call back, can they leave a voicemail: [] Yes [] No    Ingrid Kaba LPN  03/15/24, 15:05 CDT

## 2024-03-29 ENCOUNTER — OFFICE VISIT (OUTPATIENT)
Dept: FAMILY MEDICINE CLINIC | Facility: CLINIC | Age: 84
End: 2024-03-29
Payer: MEDICARE

## 2024-03-29 VITALS
RESPIRATION RATE: 16 BRPM | TEMPERATURE: 98 F | DIASTOLIC BLOOD PRESSURE: 60 MMHG | OXYGEN SATURATION: 97 % | BODY MASS INDEX: 22.81 KG/M2 | SYSTOLIC BLOOD PRESSURE: 128 MMHG | HEART RATE: 52 BPM | HEIGHT: 70 IN

## 2024-03-29 DIAGNOSIS — S41.111A SKIN TEAR OF RIGHT UPPER ARM WITHOUT COMPLICATION, INITIAL ENCOUNTER: ICD-10-CM

## 2024-03-29 DIAGNOSIS — M25.552 BILATERAL HIP PAIN: Primary | ICD-10-CM

## 2024-03-29 DIAGNOSIS — M25.551 BILATERAL HIP PAIN: Primary | ICD-10-CM

## 2024-03-29 RX ORDER — KETOROLAC TROMETHAMINE 30 MG/ML
30 INJECTION, SOLUTION INTRAMUSCULAR; INTRAVENOUS ONCE
Status: COMPLETED | OUTPATIENT
Start: 2024-03-29 | End: 2024-03-29

## 2024-03-29 RX ADMIN — KETOROLAC TROMETHAMINE 30 MG: 30 INJECTION, SOLUTION INTRAMUSCULAR; INTRAVENOUS at 15:19

## 2024-03-29 NOTE — PROGRESS NOTES
"Chief Complaint  Fall (Patient here for recent fall. )    Subjective        Cabrera Avina presents to [facility]  History of Present Illness  Mr. Avina presents today with pain after a fall.      He fell on Tuesday.  He was at home.  He was going to the bathroom and stumbled.  He fell.      He has pain in both hips.  He has a hematoma over the left side of his pelvis/ASIS.      He has two skin tears on his forearm.      Objective   Vital Signs:  /60 (BP Location: Left arm, Patient Position: Sitting, Cuff Size: Adult)   Pulse 52   Temp 98 °F (36.7 °C) (Infrared)   Resp 16   Ht 177.8 cm (70\")   SpO2 97%   BMI 22.81 kg/m²   Estimated body mass index is 22.81 kg/m² as calculated from the following:    Height as of this encounter: 177.8 cm (70\").    Weight as of 3/5/24: 72.1 kg (159 lb).       BMI is within normal parameters. No other follow-up for BMI required.      Physical Exam  Vitals reviewed.   Constitutional:       Appearance: He is well-developed.   HENT:      Head: Normocephalic and atraumatic.      Right Ear: External ear normal.      Left Ear: External ear normal.      Nose: Nose normal.   Eyes:      General: No scleral icterus.        Right eye: No discharge.         Left eye: No discharge.      Conjunctiva/sclera: Conjunctivae normal.      Pupils: Pupils are equal, round, and reactive to light.   Neck:      Thyroid: No thyromegaly.      Trachea: No tracheal deviation.   Cardiovascular:      Rate and Rhythm: Normal rate and regular rhythm.      Heart sounds: Normal heart sounds. No murmur heard.     No friction rub. No gallop.   Pulmonary:      Effort: Pulmonary effort is normal. No respiratory distress.      Breath sounds: Normal breath sounds. No stridor. No wheezing or rales.   Chest:      Chest wall: No tenderness.   Abdominal:      General: Bowel sounds are normal. There is no distension.      Palpations: Abdomen is soft. There is no mass.      " Tenderness: There is no abdominal tenderness. There is no guarding or rebound.      Hernia: No hernia is present.   Musculoskeletal:         General: No deformity. Normal range of motion.      Cervical back: Normal range of motion and neck supple.   Lymphadenopathy:      Cervical: No cervical adenopathy.   Skin:     General: Skin is warm and dry.      Coloration: Skin is not pale.      Findings: No erythema or rash.      Comments: Two skin tears of right arm   Neurological:      Mental Status: He is alert and oriented to person, place, and time.      Cranial Nerves: No cranial nerve deficit.      Motor: No abnormal muscle tone.      Coordination: Coordination normal.      Deep Tendon Reflexes: Reflexes are normal and symmetric. Reflexes normal.   Psychiatric:         Behavior: Behavior normal.         Thought Content: Thought content normal.         Judgment: Judgment normal.            Result Review :                     Assessment and Plan     Diagnoses and all orders for this visit:    1. Bilateral hip pain (Primary)  -     XR Hips Bilateral With or Without Pelvis 2 View; Future  -     ketorolac (TORADOL) injection 30 mg    2. Skin tear of right upper arm without complication, initial encounter    Will get Hip X-rays  Toradol IM x 1  Continue Percocet PRN for pain  Will wrap skin tears and apply abx ointment    Will follow up on x-ray results         Follow Up     No follow-ups on file.  Patient was given instructions and counseling regarding his condition or for health maintenance advice. Please see specific information pulled into the AVS if appropriate.

## 2024-04-05 DIAGNOSIS — I48.20 CHRONIC ATRIAL FIBRILLATION: ICD-10-CM

## 2024-04-05 RX ORDER — DIGOXIN 250 MCG
250 TABLET ORAL DAILY
Qty: 90 TABLET | Refills: 3 | Status: SHIPPED | OUTPATIENT
Start: 2024-04-05

## 2024-04-05 NOTE — TELEPHONE ENCOUNTER
Rx Refill Note  Requested Prescriptions     Pending Prescriptions Disp Refills    digoxin (LANOXIN) 250 MCG tablet [Pharmacy Med Name: DIGOXIN 250 MCG  Tablet] 90 tablet 3     Sig: Take 1 tablet by mouth Daily.      Last office visit with prescribing clinician: 3/5/2024   Last telemedicine visit with prescribing clinician: Visit date not found   Next office visit with prescribing clinician: 4/9/2024                         Would you like a call back once the refill request has been completed: [] Yes [] No    If the office needs to give you a call back, can they leave a voicemail: [] Yes [] No    Sarika Walker MA  04/05/24, 10:16 CDT

## 2024-04-09 ENCOUNTER — OFFICE VISIT (OUTPATIENT)
Dept: FAMILY MEDICINE CLINIC | Facility: CLINIC | Age: 84
End: 2024-04-09
Payer: MEDICARE

## 2024-04-09 VITALS
HEIGHT: 70 IN | SYSTOLIC BLOOD PRESSURE: 128 MMHG | WEIGHT: 155 LBS | RESPIRATION RATE: 20 BRPM | HEART RATE: 50 BPM | DIASTOLIC BLOOD PRESSURE: 60 MMHG | OXYGEN SATURATION: 97 % | BODY MASS INDEX: 22.19 KG/M2 | TEMPERATURE: 97.8 F

## 2024-04-09 DIAGNOSIS — M25.551 CHRONIC PAIN OF RIGHT HIP: ICD-10-CM

## 2024-04-09 DIAGNOSIS — G62.9 NEUROPATHY: ICD-10-CM

## 2024-04-09 DIAGNOSIS — G89.29 CHRONIC PAIN OF RIGHT HIP: ICD-10-CM

## 2024-04-09 DIAGNOSIS — G47.09 OTHER INSOMNIA: ICD-10-CM

## 2024-04-09 DIAGNOSIS — I48.20 CHRONIC ATRIAL FIBRILLATION: Primary | ICD-10-CM

## 2024-04-09 LAB
INR PPP: 3 (ref 0.9–1.1)
PROTHROMBIN TIME: 35.9 SECONDS

## 2024-04-09 RX ORDER — OXYCODONE AND ACETAMINOPHEN 10; 325 MG/1; MG/1
1 TABLET ORAL EVERY 6 HOURS PRN
Qty: 100 TABLET | Refills: 0 | Status: SHIPPED | OUTPATIENT
Start: 2024-04-09

## 2024-04-09 RX ORDER — GABAPENTIN 100 MG/1
100 CAPSULE ORAL 3 TIMES DAILY
Qty: 90 CAPSULE | Refills: 2 | Status: SHIPPED | OUTPATIENT
Start: 2024-04-09

## 2024-04-09 RX ORDER — TEMAZEPAM 15 MG/1
15 CAPSULE ORAL NIGHTLY PRN
Qty: 30 CAPSULE | Refills: 2 | Status: SHIPPED | OUTPATIENT
Start: 2024-04-09

## 2024-04-20 NOTE — PROGRESS NOTES
"Subjective cc: chronic pain   Cabrera Avina is a 83 y.o. male who presents for follow up on chronic hip pain.  He continues to use the pain medication to allow him to function   He is following up with hematology/oncology   HTN is controlled   INR checked and discussed with pt      History of Present Illness     The following portions of the patient's history were reviewed and updated as appropriate: allergies, current medications, past family history, past medical history, past social history, past surgical history, and problem list.        Review of Systems    Objective   /60 (BP Location: Right arm, Patient Position: Sitting, Cuff Size: Adult)   Pulse 50   Temp 97.8 °F (36.6 °C) (Infrared)   Resp 20   Ht 177.8 cm (70\")   Wt 70.3 kg (155 lb)   SpO2 97%   BMI 22.24 kg/m²   Physical Exam  Vitals and nursing note reviewed.   Constitutional:       General: He is not in acute distress.     Appearance: He is well-developed. He is ill-appearing. He is not diaphoretic.   HENT:      Head: Normocephalic and atraumatic.      Right Ear: External ear normal.      Left Ear: External ear normal.      Nose: Nose normal.   Eyes:      General:         Right eye: No discharge.         Left eye: No discharge.      Conjunctiva/sclera: Conjunctivae normal.   Neck:      Thyroid: No thyromegaly.      Trachea: No tracheal deviation.   Cardiovascular:      Rate and Rhythm: Normal rate. Rhythm irregular.      Heart sounds: Normal heart sounds.   Pulmonary:      Effort: Pulmonary effort is normal. No respiratory distress.      Breath sounds: Normal breath sounds. No stridor. No wheezing.   Chest:      Chest wall: No tenderness.   Abdominal:      General: There is no distension.      Palpations: Abdomen is soft.      Tenderness: There is no abdominal tenderness.   Musculoskeletal:         General: Tenderness present. Normal range of motion.      Cervical back: Normal range of motion.   Skin:     General: Skin is warm and " dry.      Findings: Bruising present.   Neurological:      Mental Status: He is alert and oriented to person, place, and time.      Motor: Weakness present. No abnormal muscle tone.      Gait: Gait abnormal.   Psychiatric:         Behavior: Behavior normal.         Thought Content: Thought content normal.         Judgment: Judgment normal.         BMI is within normal parameters. No other follow-up for BMI required.       Assessment & Plan   Problems Addressed this Visit          Musculoskeletal and Injuries    Chronic hip pain    Relevant Medications    oxyCODONE-acetaminophen (PERCOCET)  MG per tablet       Neuro    Neuropathy    Relevant Medications    gabapentin (NEURONTIN) 100 MG capsule       Sleep    Other insomnia    Relevant Medications    temazepam (RESTORIL) 15 MG capsule     Other Visit Diagnoses       Chronic atrial fibrillation    -  Primary    Relevant Orders    POC Protime / INR (Completed)          Diagnoses         Codes Comments    Chronic atrial fibrillation    -  Primary ICD-10-CM: I48.20  ICD-9-CM: 427.31     Other insomnia     ICD-10-CM: G47.09  ICD-9-CM: 780.52     Chronic pain of right hip     ICD-10-CM: M25.551, G89.29  ICD-9-CM: 719.45, 338.29     Neuropathy     ICD-10-CM: G62.9  ICD-9-CM: 355.9           PLAN:     #1 insomnia: chronic, controlled, continue on current meidcation, refill given     #2 neuropathy: chronic, controlled, continue on current medication, refill given     #3 chronic, right hip pain: stable, refill given, controlled contract in chart, UDS Nii RANDALL reviewed     #4 chronic anticoag/a fib: chronic, continue on nmeidcation, INR 2-3           This document has been electronically signed by Sunita Crawford MD on April 27, 2024 22:06 CDT              This document has been electronically signed by Sunita Crawford MD on April 27, 2024 22:03 CDT

## 2024-05-27 NOTE — PROGRESS NOTES
"Subjective   Cabrera Avina is a 84 y.o. male.   Presents for refill on his pain medication   Reports no new concerns   INR checked today - he is taking his medication - denies any acute bleeding issues - still bruises easily.   BP is elevated today - reports he can check it at home and call us if it is still running high.     Atrial Fibrillation  Past medical history includes atrial fibrillation.        The following portions of the patient's history were reviewed and updated as appropriate: allergies, current medications, past family history, past medical history, past social history, past surgical history, and problem list.        Review of Systems    Objective   /70 (BP Location: Left arm, Patient Position: Sitting, Cuff Size: Adult)   Pulse 68   Temp 98.2 °F (36.8 °C)   Resp 18   Ht 177.8 cm (70\")   Wt 69.4 kg (153 lb)   SpO2 98%   BMI 21.95 kg/m²   Physical Exam  Vitals and nursing note reviewed.   Constitutional:       General: He is not in acute distress.     Appearance: He is well-developed. He is ill-appearing. He is not diaphoretic.   HENT:      Head: Normocephalic and atraumatic.      Right Ear: External ear normal.      Left Ear: External ear normal.      Nose: Nose normal.   Eyes:      General:         Right eye: No discharge.         Left eye: No discharge.      Conjunctiva/sclera: Conjunctivae normal.   Neck:      Thyroid: No thyromegaly.      Trachea: No tracheal deviation.   Cardiovascular:      Rate and Rhythm: Normal rate. Rhythm irregular.      Pulses: Normal pulses.   Pulmonary:      Effort: Pulmonary effort is normal. No respiratory distress.      Breath sounds: Normal breath sounds. No stridor. No wheezing.   Chest:      Chest wall: No tenderness.   Musculoskeletal:         General: Tenderness present.      Cervical back: Normal range of motion.   Lymphadenopathy:      Cervical: No cervical adenopathy.   Skin:     General: Skin is warm and dry.      Findings: Bruising " present.   Neurological:      Mental Status: He is alert and oriented to person, place, and time. Mental status is at baseline.      Motor: Weakness present. No abnormal muscle tone.      Gait: Gait abnormal.   Psychiatric:         Behavior: Behavior normal.         Thought Content: Thought content normal.         Judgment: Judgment normal.         BMI is within normal parameters. No other follow-up for BMI required.       Assessment & Plan   Problems Addressed this Visit          Cardiac and Vasculature    Permanent atrial fibrillation - Primary    Relevant Orders    POC Protime / INR (Completed)       Gastrointestinal Abdominal     Constipation    Relevant Medications    lubiprostone (Amitiza) 24 MCG capsule       Musculoskeletal and Injuries    Chronic hip pain    Relevant Medications    oxyCODONE-acetaminophen (PERCOCET)  MG per tablet     Diagnoses         Codes Comments    Permanent atrial fibrillation    -  Primary ICD-10-CM: I48.21  ICD-9-CM: 427.31     Chronic pain of right hip     ICD-10-CM: M25.551, G89.29  ICD-9-CM: 719.45, 338.29     Chronic idiopathic constipation     ICD-10-CM: K59.04  ICD-9-CM: 564.00           PLAN:     #1 chronic hip pain: chronic, stable, refill needed on pain medication, controlled contract in chart, UDS robbie RANDALL reviewed, advised on risks/benefits     #2 constipation: chronic, needs refill on amitiza     #3 a fib: chronic, stable, on anticoag - discussed INR and monitoring, continue on current medications - return for repeat INR check         This document has been electronically signed by Sunita Crawford MD on May 27, 2024 11:11 CDT